# Patient Record
Sex: FEMALE | Race: WHITE | NOT HISPANIC OR LATINO | Employment: FULL TIME | URBAN - METROPOLITAN AREA
[De-identification: names, ages, dates, MRNs, and addresses within clinical notes are randomized per-mention and may not be internally consistent; named-entity substitution may affect disease eponyms.]

---

## 2023-03-26 ENCOUNTER — TELEMEDICINE (OUTPATIENT)
Dept: PRIMARY CARE | Facility: CLINIC | Age: 58
End: 2023-03-26
Payer: COMMERCIAL

## 2023-03-26 DIAGNOSIS — J01.90 ACUTE NON-RECURRENT SINUSITIS, UNSPECIFIED LOCATION: Primary | ICD-10-CM

## 2023-03-26 PROBLEM — I10 PRIMARY HYPERTENSION: Status: ACTIVE | Noted: 2023-03-26

## 2023-03-26 PROCEDURE — 99441 PR PHYS/QHP TELEPHONE EVALUATION 5-10 MIN: CPT | Performed by: STUDENT IN AN ORGANIZED HEALTH CARE EDUCATION/TRAINING PROGRAM

## 2023-03-26 RX ORDER — LISINOPRIL 20 MG/1
20 TABLET ORAL DAILY
COMMUNITY
Start: 2017-05-26 | End: 2023-09-19

## 2023-03-26 RX ORDER — AMOXICILLIN AND CLAVULANATE POTASSIUM 875; 125 MG/1; MG/1
1 TABLET, FILM COATED ORAL 2 TIMES DAILY
Qty: 20 TABLET | Refills: 0 | Status: SHIPPED | OUTPATIENT
Start: 2023-03-26 | End: 2023-04-05

## 2023-03-26 ASSESSMENT — ENCOUNTER SYMPTOMS
SORE THROAT: 0
ACTIVITY CHANGE: 1
FATIGUE: 1
COUGH: 0
SHORTNESS OF BREATH: 0
SINUS PRESSURE: 1
FACIAL SWELLING: 0
FEVER: 0
TROUBLE SWALLOWING: 0

## 2023-03-26 ASSESSMENT — LIFESTYLE VARIABLES: HISTORY_OF_SMOKING: I HAVE NEVER SMOKED

## 2023-03-26 NOTE — PROGRESS NOTES
Patient Name:  Angie Tobin  MRN:  [id number]  :  1965    Subjective   Patient ID: Angie Tobin is a 57 y.o. female who presents for No chief complaint on file..    HPI   56 yo female presents with sinus concerns  Works-pediatrics [employer]  10 days of sinus congestion, some dental discomfort   Mucinex, nasal saline, sudafed  Honey and tea  Symptoms lingering and not improving  No COVID contacts, no fevers noted    Review of Systems   Constitutional:  Positive for activity change and fatigue. Negative for fever.   HENT:  Positive for congestion, postnasal drip, sinus pressure and sneezing. Negative for facial swelling, sore throat and trouble swallowing.    Respiratory:  Negative for cough and shortness of breath.    Cardiovascular:  Negative for chest pain.   Allergic/Immunologic: Positive for immunocompromised state.     Objective   There were no vitals taken for this visit.    Physical Exam  Constitutional:       Appearance: Normal appearance.   HENT:      Head:      Comments: Sinus pain and pressure to self palpation     Nose: Congestion present.   Pulmonary:      Effort: No respiratory distress.   Neurological:      General: No focal deficit present.      Mental Status: She is alert and oriented to person, place, and time.   Psychiatric:         Mood and Affect: Mood normal.         Behavior: Behavior normal.       Assessment/Plan   1. Acute non-recurrent sinusitis, unspecified location  - amoxicillin-pot clavulanate (Augmentin) 875-125 mg tablet; Take 1 tablet (875 mg) by mouth in the morning and 1 tablet (875 mg) before bedtime. Do all this for 10 days.  Dispense: 20 tablet; Refill: 0    Patient has exhibited symptoms for more than 7 days with worsened features and symptoms including one of the clinical practice guidelines to indicate bacterial sinusitis (purulent nasal drainage, unilateral nasal obstruction, facial pain/pressure, and/or anosmia).In patients with rhinosinusitis, antibiotic therapy is  recommended if the above criteria are met.   Patient is to be treated with: Augmentin  Patient is advised on recommended duration of treatment, allergies are checked with the patient to assure no hx of reaction, patient advised on possible side effects of this medication.   Patient may also use symptomatic relief for this condition including nasal saline irrigation, intranasal corticosteroids.    If symptoms fail to improve or worsen, discussed importance of in person assessment for physical exam and vitals, patient agrees

## 2023-03-28 DIAGNOSIS — B37.9 YEAST INFECTION: Primary | ICD-10-CM

## 2023-03-28 RX ORDER — FLUCONAZOLE 150 MG/1
150 TABLET ORAL ONCE
Qty: 2 TABLET | Refills: 0 | Status: SHIPPED | OUTPATIENT
Start: 2023-03-28 | End: 2023-03-28

## 2023-04-19 ENCOUNTER — TELEMEDICINE (OUTPATIENT)
Dept: PRIMARY CARE | Facility: CLINIC | Age: 58
End: 2023-04-19
Payer: COMMERCIAL

## 2023-04-19 DIAGNOSIS — B37.0 ORAL THRUSH: Primary | ICD-10-CM

## 2023-04-19 DIAGNOSIS — C49.9 LEIOMYOSARCOMA (MULTI): ICD-10-CM

## 2023-04-19 DIAGNOSIS — C78.00 MALIGNANT NEOPLASM METASTATIC TO LUNG, UNSPECIFIED LATERALITY (MULTI): ICD-10-CM

## 2023-04-19 PROBLEM — E21.0 PRIMARY HYPERPARATHYROIDISM (MULTI): Status: ACTIVE | Noted: 2023-04-19

## 2023-04-19 PROBLEM — M25.552 HIP PAIN, BILATERAL: Status: RESOLVED | Noted: 2023-04-19 | Resolved: 2023-04-19

## 2023-04-19 PROBLEM — R60.0 LOWER EXTREMITY EDEMA: Status: RESOLVED | Noted: 2023-04-19 | Resolved: 2023-04-19

## 2023-04-19 PROBLEM — K42.9 UMBILICAL HERNIA: Status: ACTIVE | Noted: 2023-04-19

## 2023-04-19 PROBLEM — M54.9 ACUTE BACK PAIN: Status: RESOLVED | Noted: 2023-04-19 | Resolved: 2023-04-19

## 2023-04-19 PROBLEM — M25.551 HIP PAIN, BILATERAL: Status: RESOLVED | Noted: 2023-04-19 | Resolved: 2023-04-19

## 2023-04-19 PROBLEM — I10 BENIGN ESSENTIAL HTN: Status: RESOLVED | Noted: 2023-04-19 | Resolved: 2023-04-19

## 2023-04-19 PROBLEM — E78.00 PURE HYPERCHOLESTEROLEMIA: Status: ACTIVE | Noted: 2023-04-19

## 2023-04-19 PROBLEM — E04.1 LEFT THYROID NODULE: Status: ACTIVE | Noted: 2023-04-19

## 2023-04-19 PROBLEM — F41.9 ANXIETY DISORDER: Status: ACTIVE | Noted: 2023-04-19

## 2023-04-19 PROBLEM — N95.1 MENOPAUSAL SYMPTOMS: Status: RESOLVED | Noted: 2023-04-19 | Resolved: 2023-04-19

## 2023-04-19 PROBLEM — B35.1 TOENAIL FUNGUS: Status: RESOLVED | Noted: 2023-04-19 | Resolved: 2023-04-19

## 2023-04-19 PROBLEM — R73.09 ELEVATED GLUCOSE LEVEL: Status: RESOLVED | Noted: 2023-04-19 | Resolved: 2023-04-19

## 2023-04-19 PROBLEM — E83.52 HYPERCALCEMIA: Status: ACTIVE | Noted: 2023-04-19

## 2023-04-19 PROBLEM — B00.9 HERPES SIMPLEX: Status: ACTIVE | Noted: 2023-04-19

## 2023-04-19 PROBLEM — K21.9 ACID REFLUX DISEASE: Status: ACTIVE | Noted: 2023-04-19

## 2023-04-19 PROBLEM — E55.9 HYPOVITAMINOSIS D: Status: ACTIVE | Noted: 2023-04-19

## 2023-04-19 PROBLEM — I47.11 INAPPROPRIATE SINUS TACHYCARDIA (CMS-HCC): Status: ACTIVE | Noted: 2023-04-19

## 2023-04-19 PROBLEM — M54.16 ACUTE LEFT LUMBAR RADICULOPATHY: Status: RESOLVED | Noted: 2023-04-19 | Resolved: 2023-04-19

## 2023-04-19 PROBLEM — E66.9 OBESITY: Status: RESOLVED | Noted: 2023-04-19 | Resolved: 2023-04-19

## 2023-04-19 PROBLEM — E04.2 MULTINODULAR GOITER: Status: ACTIVE | Noted: 2023-04-19

## 2023-04-19 PROCEDURE — 99212 OFFICE O/P EST SF 10 MIN: CPT | Performed by: NURSE PRACTITIONER

## 2023-04-19 RX ORDER — FLUCONAZOLE 100 MG/1
100 TABLET ORAL DAILY
Qty: 7 TABLET | Refills: 1 | Status: SHIPPED | OUTPATIENT
Start: 2023-04-19 | End: 2023-04-26

## 2023-04-19 RX ORDER — METFORMIN HYDROCHLORIDE 500 MG/1
1 TABLET ORAL 2 TIMES DAILY
COMMUNITY
Start: 2021-12-17 | End: 2023-06-19

## 2023-04-19 RX ORDER — ZINC SULFATE 50(220)MG
CAPSULE ORAL
COMMUNITY
End: 2023-10-02 | Stop reason: ALTCHOICE

## 2023-04-19 RX ORDER — VALACYCLOVIR HYDROCHLORIDE 1 G/1
1 TABLET, FILM COATED ORAL 2 TIMES DAILY PRN
COMMUNITY
Start: 2016-01-15 | End: 2023-10-31 | Stop reason: SDUPTHER

## 2023-04-19 NOTE — PROGRESS NOTES
Subjective   Patient ID: Angie Tobin is a 57 y.o. female who presents for No chief complaint on file..    This visit was completed via [employer] due to the restrictions of the COVID-19 pandemic. All issues as below were discussed and addressed but no physical exam was performed. If it was felt that the patient should be evaluated in clinic than they were directed there. The patient verbally consented to the visit.    Had amoxicillin for sinus infection 2 weeks ago.  Started with sores and white coating on tongue x4 days. Painful when eating and drinking          Review of Systems   HENT:  Positive for mouth sores.    All other systems reviewed and are negative.      Objective   There were no vitals taken for this visit.    Physical Exam    Assessment/Plan   Problem List Items Addressed This Visit          Respiratory    Metastatic cancer to lung (CMS/HCC)     Follows oncology             Infectious/Inflammatory    Oral thrush - Primary    Relevant Medications    fluconazole (Diflucan) 100 mg tablet       Other    Leiomyosarcoma (CMS/HCC)     Follows oncology

## 2023-04-25 DIAGNOSIS — B37.0 ORAL THRUSH: Primary | ICD-10-CM

## 2023-04-25 RX ORDER — NYSTATIN 100000 [USP'U]/ML
500000 SUSPENSION ORAL 4 TIMES DAILY
Qty: 280 ML | Refills: 0 | Status: SHIPPED | OUTPATIENT
Start: 2023-04-25 | End: 2023-06-24

## 2023-05-11 LAB
CALCIUM (MG/DL) IN URINE: 13.2 MG/DL
CALCIUM (MG/L) IN 24 HOUR URINE: 333 MG/24H (ref 100–300)
COLLECTION DURATION OF URINE: 24 HR
CREATININE (MG/24HR) IN 24 HOUR URINE: 1.61 G/24H (ref 0.67–1.59)
CREATININE (MG/DL) IN URINE: 63.7 MG/DL (ref 20–320)
PHOSPHATE (MG/DL) IN URINE: 57 MG/DL
PHOSPHORUS 24 HOUR URINE: 1439 MG/24H (ref 400–1300)
SODIUM (MMOL/24 HR) IN 24 HOUR URINE: 225 MMOL/24H (ref 80–220)
SODIUM (MMOL/L ) IN URINE: 89 MMOL/L
VOLUME OF URINE: 2525 ML

## 2023-05-15 LAB
ALBUMIN (G/DL) IN SER/PLAS: 4.5 G/DL (ref 3.4–5)
ANION GAP IN SER/PLAS: 16 MMOL/L (ref 10–20)
CALCIUM (MG/DL) IN SER/PLAS: 11.7 MG/DL (ref 8.6–10.6)
CARBON DIOXIDE, TOTAL (MMOL/L) IN SER/PLAS: 24 MMOL/L (ref 21–32)
CHLORIDE (MMOL/L) IN SER/PLAS: 105 MMOL/L (ref 98–107)
CREATININE (MG/DL) IN SER/PLAS: 0.64 MG/DL (ref 0.5–1.05)
GFR FEMALE: >90 ML/MIN/1.73M2
GLUCOSE (MG/DL) IN SER/PLAS: 105 MG/DL (ref 74–99)
PARATHYRIN INTACT (PG/ML) IN SER/PLAS: 161.2 PG/ML (ref 18.5–88)
PHOSPHATE (MG/DL) IN SER/PLAS: 2.8 MG/DL (ref 2.5–4.9)
POTASSIUM (MMOL/L) IN SER/PLAS: 5 MMOL/L (ref 3.5–5.3)
SODIUM (MMOL/L) IN SER/PLAS: 140 MMOL/L (ref 136–145)
UREA NITROGEN (MG/DL) IN SER/PLAS: 14 MG/DL (ref 6–23)

## 2023-06-01 LAB
ALANINE AMINOTRANSFERASE (SGPT) (U/L) IN SER/PLAS: 28 U/L (ref 7–45)
ALBUMIN (G/DL) IN SER/PLAS: 4.8 G/DL (ref 3.4–5)
ALKALINE PHOSPHATASE (U/L) IN SER/PLAS: 138 U/L (ref 33–110)
ANION GAP IN SER/PLAS: 17 MMOL/L (ref 10–20)
ASPARTATE AMINOTRANSFERASE (SGOT) (U/L) IN SER/PLAS: 20 U/L (ref 9–39)
BILIRUBIN TOTAL (MG/DL) IN SER/PLAS: 0.7 MG/DL (ref 0–1.2)
CALCIUM (MG/DL) IN SER/PLAS: 12.1 MG/DL (ref 8.6–10.6)
CARBON DIOXIDE, TOTAL (MMOL/L) IN SER/PLAS: 25 MMOL/L (ref 21–32)
CHLORIDE (MMOL/L) IN SER/PLAS: 104 MMOL/L (ref 98–107)
CREATININE (MG/DL) IN SER/PLAS: 0.66 MG/DL (ref 0.5–1.05)
GFR FEMALE: >90 ML/MIN/1.73M2
GLUCOSE (MG/DL) IN SER/PLAS: 109 MG/DL (ref 74–99)
LACTATE DEHYDROGENASE (U/L) IN SER/PLAS BY LAC->PYR RXN: 164 U/L (ref 84–246)
POTASSIUM (MMOL/L) IN SER/PLAS: 5.5 MMOL/L (ref 3.5–5.3)
PROTEIN TOTAL: 7.5 G/DL (ref 6.4–8.2)
SODIUM (MMOL/L) IN SER/PLAS: 140 MMOL/L (ref 136–145)
UREA NITROGEN (MG/DL) IN SER/PLAS: 17 MG/DL (ref 6–23)

## 2023-06-02 LAB
BASOPHILS (10*3/UL) IN BLOOD BY MANUAL COUNT - WAM: 0.17 X10E9/L (ref 0–0.1)
BASOPHILS/100 LEUKOCYTES IN BLOOD BY MANUAL COUNT - WAM: 0.8 % (ref 0–2)
CBC DIFFERENTIAL PATH REVIEW: NORMAL
EOSINOPHILS (10*3/UL) IN BLOOD BY MANUAL COUNT - WAM: 0.9 X10E9/L (ref 0–0.7)
EOSINOPHILS/100 LEUKOCYTES IN BLOOD BY MANUAL COUNT - WAM: 4.2 % (ref 0–6)
ERYTHROCYTE DISTRIBUTION WIDTH (RATIO) BY AUTOMATED COUNT: 14.9 % (ref 11.5–14.5)
ERYTHROCYTE MEAN CORPUSCULAR HEMOGLOBIN CONCENTRATION (G/DL) BY AUTOMATED: 30.1 G/DL (ref 32–36)
ERYTHROCYTE MEAN CORPUSCULAR VOLUME (FL) BY AUTOMATED COUNT: 84 FL (ref 80–100)
ERYTHROCYTES (10*6/UL) IN BLOOD BY AUTOMATED COUNT: 5.45 X10E12/L (ref 4–5.2)
HEMATOCRIT (%) IN BLOOD BY AUTOMATED COUNT: 45.8 % (ref 36–46)
HEMOGLOBIN (G/DL) IN BLOOD: 13.8 G/DL (ref 12–16)
IMMATURE GRANULOCYTES/100 LEUKOCYTES IN BLOOD BY AUTOMATED COUNT: 0.5 % (ref 0–0.9)
LEUKOCYTES (10*3/UL) IN BLOOD BY AUTOMATED COUNT: 21.5 X10E9/L (ref 4.4–11.3)
LYMPHOCYTES (10*3/UL) IN BLOOD BY MANUAL COUNT - WAM: 11.37 X10E9/L (ref 1.2–4.8)
LYMPHOCYTES VARIANT/100 LEUKOCYTES IN BLOOD - WAM: 3.4 % (ref 0–2)
LYMPHOCYTES/100 LEUKOCYTES IN BLOOD BY MANUAL COUNT - WAM: 52.9 % (ref 13–44)
MANUAL DIFFERENTIAL Y/N: ABNORMAL
MONOCYTES (10*3/UL) IN BLOOD BY MANUAL COUNT - WAM: 0.37 X10E9/L (ref 0.1–1)
MONOCYTES/100 LEUKOCYTES IN BLOOD BY MANUAL COUNT - WAM: 1.7 % (ref 2–10)
NEUTROPHILS (SEGS+BANDS) (10*3/UL) MANUAL COUNT - WAM: 7.96 X10E9/L (ref 1.2–7.7)
NRBC (PER 100 WBCS) BY AUTOMATED COUNT: 0 /100 WBC (ref 0–0)
PLATELETS (10*3/UL) IN BLOOD AUTOMATED COUNT: 377 X10E9/L (ref 150–450)
RBC MORPHOLOGY IN BLOOD: NORMAL
SEGMENTED NEUTROPHILS (10*3/UL) BLOOD MANUAL - WAM: 7.96 X10E9/L (ref 1.2–7)
SEGMENTED NEUTROPHILS/100 LEUKOCYTES BY MANUAL COUNT -: 37 % (ref 40–80)
VARIANT LYMPHOCYTES (10*3/UL) BLOOD MANUAL COUNT - WAM: 0.73 X10E9/L (ref 0–0.5)

## 2023-06-19 DIAGNOSIS — E11.9 TYPE 2 DIABETES MELLITUS WITHOUT COMPLICATION, WITHOUT LONG-TERM CURRENT USE OF INSULIN (MULTI): Primary | ICD-10-CM

## 2023-06-19 RX ORDER — METFORMIN HYDROCHLORIDE 500 MG/1
TABLET ORAL
Qty: 180 TABLET | Refills: 1 | Status: SHIPPED | OUTPATIENT
Start: 2023-06-19 | End: 2023-06-19

## 2023-07-08 LAB
ALANINE AMINOTRANSFERASE (SGPT) (U/L) IN SER/PLAS: 26 U/L (ref 7–45)
ALBUMIN (G/DL) IN SER/PLAS: 4.5 G/DL (ref 3.4–5)
ALKALINE PHOSPHATASE (U/L) IN SER/PLAS: 111 U/L (ref 33–110)
ANION GAP IN SER/PLAS: 15 MMOL/L (ref 10–20)
ASPARTATE AMINOTRANSFERASE (SGOT) (U/L) IN SER/PLAS: 18 U/L (ref 9–39)
BASOPHILS (10*3/UL) IN BLOOD BY AUTOMATED COUNT: 0.12 X10E9/L (ref 0–0.1)
BASOPHILS/100 LEUKOCYTES IN BLOOD BY AUTOMATED COUNT: 0.7 % (ref 0–2)
BILIRUBIN TOTAL (MG/DL) IN SER/PLAS: 0.6 MG/DL (ref 0–1.2)
CALCIUM (MG/DL) IN SER/PLAS: 11.7 MG/DL (ref 8.6–10.6)
CARBON DIOXIDE, TOTAL (MMOL/L) IN SER/PLAS: 22 MMOL/L (ref 21–32)
CHLORIDE (MMOL/L) IN SER/PLAS: 107 MMOL/L (ref 98–107)
CREATININE (MG/DL) IN SER/PLAS: 0.56 MG/DL (ref 0.5–1.05)
EOSINOPHILS (10*3/UL) IN BLOOD BY AUTOMATED COUNT: 0.43 X10E9/L (ref 0–0.7)
EOSINOPHILS/100 LEUKOCYTES IN BLOOD BY AUTOMATED COUNT: 2.6 % (ref 0–6)
ERYTHROCYTE DISTRIBUTION WIDTH (RATIO) BY AUTOMATED COUNT: 14.6 % (ref 11.5–14.5)
ERYTHROCYTE MEAN CORPUSCULAR HEMOGLOBIN CONCENTRATION (G/DL) BY AUTOMATED: 30.5 G/DL (ref 32–36)
ERYTHROCYTE MEAN CORPUSCULAR VOLUME (FL) BY AUTOMATED COUNT: 85 FL (ref 80–100)
ERYTHROCYTES (10*6/UL) IN BLOOD BY AUTOMATED COUNT: 5.07 X10E12/L (ref 4–5.2)
GFR FEMALE: >90 ML/MIN/1.73M2
GLUCOSE (MG/DL) IN SER/PLAS: 139 MG/DL (ref 74–99)
HEMATOCRIT (%) IN BLOOD BY AUTOMATED COUNT: 42.9 % (ref 36–46)
HEMOGLOBIN (G/DL) IN BLOOD: 13.1 G/DL (ref 12–16)
IMMATURE GRANULOCYTES/100 LEUKOCYTES IN BLOOD BY AUTOMATED COUNT: 0.5 % (ref 0–0.9)
INR IN PPP BY COAGULATION ASSAY: 0.9 (ref 0.9–1.1)
LACTATE DEHYDROGENASE (U/L) IN SER/PLAS BY LAC->PYR RXN: 127 U/L (ref 84–246)
LEUKOCYTES (10*3/UL) IN BLOOD BY AUTOMATED COUNT: 16.9 X10E9/L (ref 4.4–11.3)
LYMPHOCYTES (10*3/UL) IN BLOOD BY AUTOMATED COUNT: 8.05 X10E9/L (ref 1.2–4.8)
LYMPHOCYTES/100 LEUKOCYTES IN BLOOD BY AUTOMATED COUNT: 47.8 % (ref 13–44)
MONOCYTES (10*3/UL) IN BLOOD BY AUTOMATED COUNT: 0.8 X10E9/L (ref 0.1–1)
MONOCYTES/100 LEUKOCYTES IN BLOOD BY AUTOMATED COUNT: 4.7 % (ref 2–10)
NEUTROPHILS (10*3/UL) IN BLOOD BY AUTOMATED COUNT: 7.37 X10E9/L (ref 1.2–7.7)
NEUTROPHILS/100 LEUKOCYTES IN BLOOD BY AUTOMATED COUNT: 43.7 % (ref 40–80)
NRBC (PER 100 WBCS) BY AUTOMATED COUNT: 0 /100 WBC (ref 0–0)
PLATELETS (10*3/UL) IN BLOOD AUTOMATED COUNT: 281 X10E9/L (ref 150–450)
POTASSIUM (MMOL/L) IN SER/PLAS: 4.6 MMOL/L (ref 3.5–5.3)
PROTEIN TOTAL: 7 G/DL (ref 6.4–8.2)
PROTHROMBIN TIME (PT) IN PPP BY COAGULATION ASSAY: 10.4 SEC (ref 9.8–12.8)
RBC MORPHOLOGY IN BLOOD: NORMAL
SODIUM (MMOL/L) IN SER/PLAS: 139 MMOL/L (ref 136–145)
UREA NITROGEN (MG/DL) IN SER/PLAS: 17 MG/DL (ref 6–23)

## 2023-07-11 ENCOUNTER — HOSPITAL ENCOUNTER (OUTPATIENT)
Dept: DATA CONVERSION | Facility: HOSPITAL | Age: 58
End: 2023-07-11
Attending: INTERNAL MEDICINE | Admitting: INTERNAL MEDICINE
Payer: COMMERCIAL

## 2023-07-11 DIAGNOSIS — I10 ESSENTIAL (PRIMARY) HYPERTENSION: ICD-10-CM

## 2023-07-11 DIAGNOSIS — C49.9 MALIGNANT NEOPLASM OF CONNECTIVE AND SOFT TISSUE, UNSPECIFIED (MULTI): ICD-10-CM

## 2023-07-11 DIAGNOSIS — K76.89 OTHER SPECIFIED DISEASES OF LIVER: ICD-10-CM

## 2023-07-11 DIAGNOSIS — F41.9 ANXIETY DISORDER, UNSPECIFIED: ICD-10-CM

## 2023-07-11 DIAGNOSIS — Z00.6 ENCOUNTER FOR EXAMINATION FOR NORMAL COMPARISON AND CONTROL IN CLINICAL RESEARCH PROGRAM: ICD-10-CM

## 2023-08-04 LAB
ALANINE AMINOTRANSFERASE (SGPT) (U/L) IN SER/PLAS: 33 U/L (ref 7–45)
ALBUMIN (G/DL) IN SER/PLAS: 4 G/DL (ref 3.4–5)
ALKALINE PHOSPHATASE (U/L) IN SER/PLAS: 140 U/L (ref 33–110)
ANION GAP IN SER/PLAS: 14 MMOL/L (ref 10–20)
ASPARTATE AMINOTRANSFERASE (SGOT) (U/L) IN SER/PLAS: 23 U/L (ref 9–39)
BAND NEUTROPHILS (10*3/UL) BLOOD MANUAL COUNT - WAM: 2.31 X10E9/L (ref 0–0.7)
BASOPHILS (10*3/UL) IN BLOOD BY MANUAL COUNT - WAM: 0.24 X10E9/L (ref 0–0.1)
BASOPHILS/100 LEUKOCYTES IN BLOOD BY MANUAL COUNT - WAM: 0.9 % (ref 0–2)
BILIRUBIN TOTAL (MG/DL) IN SER/PLAS: 0.5 MG/DL (ref 0–1.2)
BURR CELLS PRESENCE IN BLOOD BY LIGHT MICROSCOPY: NORMAL
CALCIUM (MG/DL) IN SER/PLAS: 10.8 MG/DL (ref 8.6–10.6)
CARBON DIOXIDE, TOTAL (MMOL/L) IN SER/PLAS: 25 MMOL/L (ref 21–32)
CHLORIDE (MMOL/L) IN SER/PLAS: 105 MMOL/L (ref 98–107)
CREATININE (MG/DL) IN SER/PLAS: 0.64 MG/DL (ref 0.5–1.05)
DACROCYTES PRESENCE IN BLOOD BY LIGHT MICROSCOPY: NORMAL
EOSINOPHILS (10*3/UL) IN BLOOD BY MANUAL COUNT - WAM: 0 X10E9/L (ref 0–0.7)
EOSINOPHILS/100 LEUKOCYTES IN BLOOD BY MANUAL COUNT - WAM: 0 % (ref 0–6)
ERYTHROCYTE DISTRIBUTION WIDTH (RATIO) BY AUTOMATED COUNT: 15.9 % (ref 11.5–14.5)
ERYTHROCYTE MEAN CORPUSCULAR HEMOGLOBIN CONCENTRATION (G/DL) BY AUTOMATED: 29.9 G/DL (ref 32–36)
ERYTHROCYTE MEAN CORPUSCULAR VOLUME (FL) BY AUTOMATED COUNT: 86 FL (ref 80–100)
ERYTHROCYTES (10*6/UL) IN BLOOD BY AUTOMATED COUNT: 4.38 X10E12/L (ref 4–5.2)
GFR FEMALE: >90 ML/MIN/1.73M2
GLUCOSE (MG/DL) IN SER/PLAS: 119 MG/DL (ref 74–99)
HEMATOCRIT (%) IN BLOOD BY AUTOMATED COUNT: 37.8 % (ref 36–46)
HEMOGLOBIN (G/DL) IN BLOOD: 11.3 G/DL (ref 12–16)
IMMATURE GRANULOCYTES/100 LEUKOCYTES IN BLOOD BY AUTOMATED COUNT: 6.6 % (ref 0–0.9)
LACTATE DEHYDROGENASE (U/L) IN SER/PLAS BY LAC->PYR RXN: 250 U/L (ref 84–246)
LEUKOCYTES (10*3/UL) IN BLOOD BY AUTOMATED COUNT: 26.9 X10E9/L (ref 4.4–11.3)
LYMPHOCYTES (10*3/UL) IN BLOOD BY MANUAL COUNT - WAM: 10.2 X10E9/L (ref 1.2–4.8)
LYMPHOCYTES VARIANT/100 LEUKOCYTES IN BLOOD - WAM: 0.9 % (ref 0–2)
LYMPHOCYTES/100 LEUKOCYTES IN BLOOD BY MANUAL COUNT - WAM: 37.9 % (ref 13–44)
MANUAL DIFFERENTIAL Y/N: ABNORMAL
METAMYELOCYTES (10*3/UL) IN BLOOD BY MANUAL COUNT - WAM: 0.46 X10E9/L (ref 0–0)
METAMYELOCYTES/100 LEUKOCYTES IN BLOOD BY MANUAL COUNT - WAM: 1.7 % (ref 0–0)
MONOCYTES (10*3/UL) IN BLOOD BY MANUAL COUNT - WAM: 0.91 X10E9/L (ref 0.1–1)
MONOCYTES/100 LEUKOCYTES IN BLOOD BY MANUAL COUNT - WAM: 3.4 % (ref 2–10)
NEUTROPHILS (SEGS+BANDS) (10*3/UL) MANUAL COUNT - WAM: 14.85 X10E9/L (ref 1.2–7.7)
NEUTROPHILS BAND FORM/100 LEUKOCYTES IN BLOOD BY MANUAL COUNT - WAM: 8.6 % (ref 0–5)
NRBC (PER 100 WBCS) BY AUTOMATED COUNT: 0.2 /100 WBC (ref 0–0)
PLATELETS (10*3/UL) IN BLOOD AUTOMATED COUNT: 198 X10E9/L (ref 150–450)
POLYCHROMASIA IN BLOOD BY LIGHT MICROSCOPY: NORMAL
POTASSIUM (MMOL/L) IN SER/PLAS: 4.4 MMOL/L (ref 3.5–5.3)
PROTEIN TOTAL: 6.5 G/DL (ref 6.4–8.2)
RBC MORPHOLOGY IN BLOOD: NORMAL
SEGMENTED NEUTROPHILS (10*3/UL) BLOOD MANUAL - WAM: 12.54 X10E9/L (ref 1.2–7)
SEGMENTED NEUTROPHILS/100 LEUKOCYTES BY MANUAL COUNT -: 46.6 % (ref 40–80)
SODIUM (MMOL/L) IN SER/PLAS: 140 MMOL/L (ref 136–145)
UREA NITROGEN (MG/DL) IN SER/PLAS: 12 MG/DL (ref 6–23)
VARIANT LYMPHOCYTES (10*3/UL) BLOOD MANUAL COUNT - WAM: 0.24 X10E9/L (ref 0–0.5)

## 2023-08-12 LAB
ALANINE AMINOTRANSFERASE (SGPT) (U/L) IN SER/PLAS: 62 U/L (ref 7–45)
ALBUMIN (G/DL) IN SER/PLAS: 4.3 G/DL (ref 3.4–5)
ALKALINE PHOSPHATASE (U/L) IN SER/PLAS: 122 U/L (ref 33–110)
ANION GAP IN SER/PLAS: 14 MMOL/L (ref 10–20)
ASPARTATE AMINOTRANSFERASE (SGOT) (U/L) IN SER/PLAS: 37 U/L (ref 9–39)
BASOPHILS (10*3/UL) IN BLOOD BY AUTOMATED COUNT: 0.16 X10E9/L (ref 0–0.1)
BASOPHILS/100 LEUKOCYTES IN BLOOD BY AUTOMATED COUNT: 1 % (ref 0–2)
BILIRUBIN TOTAL (MG/DL) IN SER/PLAS: 0.7 MG/DL (ref 0–1.2)
CALCIUM (MG/DL) IN SER/PLAS: 11.7 MG/DL (ref 8.6–10.6)
CARBON DIOXIDE, TOTAL (MMOL/L) IN SER/PLAS: 24 MMOL/L (ref 21–32)
CHLORIDE (MMOL/L) IN SER/PLAS: 104 MMOL/L (ref 98–107)
CREATININE (MG/DL) IN SER/PLAS: 0.6 MG/DL (ref 0.5–1.05)
DACROCYTES PRESENCE IN BLOOD BY LIGHT MICROSCOPY: NORMAL
EOSINOPHILS (10*3/UL) IN BLOOD BY AUTOMATED COUNT: 0.12 X10E9/L (ref 0–0.7)
EOSINOPHILS/100 LEUKOCYTES IN BLOOD BY AUTOMATED COUNT: 0.8 % (ref 0–6)
ERYTHROCYTE DISTRIBUTION WIDTH (RATIO) BY AUTOMATED COUNT: 16.1 % (ref 11.5–14.5)
ERYTHROCYTE MEAN CORPUSCULAR HEMOGLOBIN CONCENTRATION (G/DL) BY AUTOMATED: 30.8 G/DL (ref 32–36)
ERYTHROCYTE MEAN CORPUSCULAR VOLUME (FL) BY AUTOMATED COUNT: 85 FL (ref 80–100)
ERYTHROCYTES (10*6/UL) IN BLOOD BY AUTOMATED COUNT: 4.26 X10E12/L (ref 4–5.2)
GFR FEMALE: >90 ML/MIN/1.73M2
GLUCOSE (MG/DL) IN SER/PLAS: 198 MG/DL (ref 74–99)
HEMATOCRIT (%) IN BLOOD BY AUTOMATED COUNT: 36.4 % (ref 36–46)
HEMOGLOBIN (G/DL) IN BLOOD: 11.2 G/DL (ref 12–16)
IMMATURE GRANULOCYTES/100 LEUKOCYTES IN BLOOD BY AUTOMATED COUNT: 0.4 % (ref 0–0.9)
LEUKOCYTES (10*3/UL) IN BLOOD BY AUTOMATED COUNT: 16 X10E9/L (ref 4.4–11.3)
LYMPHOCYTES (10*3/UL) IN BLOOD BY AUTOMATED COUNT: 6.95 X10E9/L (ref 1.2–4.8)
LYMPHOCYTES/100 LEUKOCYTES IN BLOOD BY AUTOMATED COUNT: 43.5 % (ref 13–44)
MONOCYTES (10*3/UL) IN BLOOD BY AUTOMATED COUNT: 0.25 X10E9/L (ref 0.1–1)
MONOCYTES/100 LEUKOCYTES IN BLOOD BY AUTOMATED COUNT: 1.6 % (ref 2–10)
NEUTROPHILS (10*3/UL) IN BLOOD BY AUTOMATED COUNT: 8.41 X10E9/L (ref 1.2–7.7)
NEUTROPHILS/100 LEUKOCYTES IN BLOOD BY AUTOMATED COUNT: 52.7 % (ref 40–80)
NRBC (PER 100 WBCS) BY AUTOMATED COUNT: 0 /100 WBC (ref 0–0)
PLATELETS (10*3/UL) IN BLOOD AUTOMATED COUNT: 561 X10E9/L (ref 150–450)
POTASSIUM (MMOL/L) IN SER/PLAS: 5.1 MMOL/L (ref 3.5–5.3)
PROTEIN TOTAL: 6.9 G/DL (ref 6.4–8.2)
RBC MORPHOLOGY IN BLOOD: NORMAL
SODIUM (MMOL/L) IN SER/PLAS: 137 MMOL/L (ref 136–145)
UREA NITROGEN (MG/DL) IN SER/PLAS: 16 MG/DL (ref 6–23)

## 2023-09-19 DIAGNOSIS — I10 PRIMARY HYPERTENSION: Primary | ICD-10-CM

## 2023-09-19 LAB
ALANINE AMINOTRANSFERASE (SGPT) (U/L) IN SER/PLAS: 48 U/L (ref 7–45)
ALBUMIN (G/DL) IN SER/PLAS: 4.6 G/DL (ref 3.4–5)
ALKALINE PHOSPHATASE (U/L) IN SER/PLAS: 139 U/L (ref 33–110)
ANION GAP IN SER/PLAS: 17 MMOL/L (ref 10–20)
APPEARANCE, URINE: ABNORMAL
ASPARTATE AMINOTRANSFERASE (SGOT) (U/L) IN SER/PLAS: 31 U/L (ref 9–39)
BACTERIA, URINE: ABNORMAL /HPF
BASOPHILS (10*3/UL) IN BLOOD BY AUTOMATED COUNT: 0.18 X10E9/L (ref 0–0.1)
BASOPHILS/100 LEUKOCYTES IN BLOOD BY AUTOMATED COUNT: 1.1 % (ref 0–2)
BILIRUBIN TOTAL (MG/DL) IN SER/PLAS: 1.6 MG/DL (ref 0–1.2)
BILIRUBIN, URINE: NEGATIVE
BLOOD, URINE: ABNORMAL
BURR CELLS PRESENCE IN BLOOD BY LIGHT MICROSCOPY: NORMAL
CALCIUM (MG/DL) IN SER/PLAS: 10.6 MG/DL (ref 8.6–10.6)
CARBON DIOXIDE, TOTAL (MMOL/L) IN SER/PLAS: 22 MMOL/L (ref 21–32)
CHLORIDE (MMOL/L) IN SER/PLAS: 106 MMOL/L (ref 98–107)
COLOR, URINE: ABNORMAL
CREATININE (MG/DL) IN SER/PLAS: 0.71 MG/DL (ref 0.5–1.05)
DACROCYTES PRESENCE IN BLOOD BY LIGHT MICROSCOPY: NORMAL
EOSINOPHILS (10*3/UL) IN BLOOD BY AUTOMATED COUNT: 0.88 X10E9/L (ref 0–0.7)
EOSINOPHILS/100 LEUKOCYTES IN BLOOD BY AUTOMATED COUNT: 5.5 % (ref 0–6)
ERYTHROCYTE DISTRIBUTION WIDTH (RATIO) BY AUTOMATED COUNT: 17.8 % (ref 11.5–14.5)
ERYTHROCYTE MEAN CORPUSCULAR HEMOGLOBIN CONCENTRATION (G/DL) BY AUTOMATED: 31.6 G/DL (ref 32–36)
ERYTHROCYTE MEAN CORPUSCULAR VOLUME (FL) BY AUTOMATED COUNT: 84 FL (ref 80–100)
ERYTHROCYTES (10*6/UL) IN BLOOD BY AUTOMATED COUNT: 5.49 X10E12/L (ref 4–5.2)
GAMMA GLUTAMYL TRANSFERASE (U/L) IN SER/PLAS: 61 U/L (ref 5–55)
GFR FEMALE: >90 ML/MIN/1.73M2
GLUCOSE (MG/DL) IN SER/PLAS: 137 MG/DL (ref 74–99)
GLUCOSE, URINE: NEGATIVE MG/DL
HEMATOCRIT (%) IN BLOOD BY AUTOMATED COUNT: 45.9 % (ref 36–46)
HEMOGLOBIN (G/DL) IN BLOOD: 14.5 G/DL (ref 12–16)
HYALINE CASTS, URINE: ABNORMAL /LPF
IGG (MG/DL) IN SER/PLAS: 610 MG/DL (ref 700–1600)
IMMATURE GRANULOCYTES/100 LEUKOCYTES IN BLOOD BY AUTOMATED COUNT: 0.4 % (ref 0–0.9)
KETONES, URINE: NEGATIVE MG/DL
LACTATE DEHYDROGENASE (U/L) IN SER/PLAS BY LAC->PYR RXN: 181 U/L (ref 84–246)
LEUKOCYTE ESTERASE, URINE: ABNORMAL
LEUKOCYTES (10*3/UL) IN BLOOD BY AUTOMATED COUNT: 16.1 X10E9/L (ref 4.4–11.3)
LYMPHOCYTES (10*3/UL) IN BLOOD BY AUTOMATED COUNT: 6.57 X10E9/L (ref 1.2–4.8)
LYMPHOCYTES/100 LEUKOCYTES IN BLOOD BY AUTOMATED COUNT: 40.9 % (ref 13–44)
MONOCYTES (10*3/UL) IN BLOOD BY AUTOMATED COUNT: 0.71 X10E9/L (ref 0.1–1)
MONOCYTES/100 LEUKOCYTES IN BLOOD BY AUTOMATED COUNT: 4.4 % (ref 2–10)
MUCUS, URINE: ABNORMAL /LPF
NEUTROPHILS (10*3/UL) IN BLOOD BY AUTOMATED COUNT: 7.64 X10E9/L (ref 1.2–7.7)
NEUTROPHILS/100 LEUKOCYTES IN BLOOD BY AUTOMATED COUNT: 47.7 % (ref 40–80)
NITRITE, URINE: NEGATIVE
NRBC (PER 100 WBCS) BY AUTOMATED COUNT: 0 /100 WBC (ref 0–0)
PH, URINE: 5 (ref 5–8)
PLATELETS (10*3/UL) IN BLOOD AUTOMATED COUNT: 227 X10E9/L (ref 150–450)
POTASSIUM (MMOL/L) IN SER/PLAS: 4.5 MMOL/L (ref 3.5–5.3)
PROTEIN TOTAL: 7.1 G/DL (ref 6.4–8.2)
PROTEIN, URINE: ABNORMAL MG/DL
RBC MORPHOLOGY IN BLOOD: NORMAL
RBC, URINE: 14 /HPF (ref 0–5)
SODIUM (MMOL/L) IN SER/PLAS: 140 MMOL/L (ref 136–145)
SPECIFIC GRAVITY, URINE: 1.02 (ref 1–1.03)
SQUAMOUS EPITHELIAL CELLS, URINE: 25 /HPF
UREA NITROGEN (MG/DL) IN SER/PLAS: 10 MG/DL (ref 6–23)
UROBILINOGEN, URINE: <2 MG/DL (ref 0–1.9)
WBC, URINE: 57 /HPF (ref 0–5)

## 2023-09-19 RX ORDER — LISINOPRIL 20 MG/1
20 TABLET ORAL DAILY
Qty: 90 TABLET | Refills: 1 | Status: SHIPPED | OUTPATIENT
Start: 2023-09-19 | End: 2023-09-19

## 2023-09-20 ENCOUNTER — TELEPHONE (OUTPATIENT)
Dept: PRIMARY CARE | Facility: CLINIC | Age: 58
End: 2023-09-20
Payer: COMMERCIAL

## 2023-09-20 DIAGNOSIS — N39.0 URINARY TRACT INFECTION WITHOUT HEMATURIA, SITE UNSPECIFIED: Primary | ICD-10-CM

## 2023-09-20 RX ORDER — SULFAMETHOXAZOLE AND TRIMETHOPRIM 800; 160 MG/1; MG/1
1 TABLET ORAL 2 TIMES DAILY
Qty: 10 TABLET | Refills: 0 | Status: SHIPPED | OUTPATIENT
Start: 2023-09-20 | End: 2023-10-02 | Stop reason: ALTCHOICE

## 2023-09-20 RX ORDER — HEPARIN 100 UNIT/ML
500 SYRINGE INTRAVENOUS AS NEEDED
Status: CANCELLED | OUTPATIENT
Start: 2023-10-01

## 2023-09-20 RX ORDER — HEPARIN SODIUM,PORCINE/PF 10 UNIT/ML
50 SYRINGE (ML) INTRAVENOUS AS NEEDED
Status: CANCELLED | OUTPATIENT
Start: 2023-10-01

## 2023-09-21 LAB
ABCP4: NORMAL
CCOLL: NORMAL PER TUBE
CCOUN: 16.1 X10E9/L
FANTI: NORMAL
FCTOR: NORMAL
FCTSO: NORMAL
FSITE: NORMAL
GPERC: 55 %
L10: NORMAL
L11C: NORMAL
L13: NEGATIVE
L14: NEGATIVE
L16: NEGATIVE
L180: NORMAL
L19: NORMAL
L1C: NORMAL
L200: NEGATIVE
L20: NORMAL
L23: NORMAL
L25: NEGATIVE
L2: NEGATIVE
L38: NEGATIVE
L3: NEGATIVE
L43: NEGATIVE
L45: NORMAL
L4: NEGATIVE
L56: NEGATIVE
L5: NEGATIVE
L71: NEGATIVE
L79B: NORMAL
L7: NEGATIVE
L8: NEGATIVE
LCD19: 54 % OF LYMPH
LCD4: 29 % OF LYMPH
LCD8: 7 % OF LYMPH
LDESC: NORMAL
LGPD1: NORMAL
LGPNO: NORMAL
LHLAD: NORMAL
LIGD: NEGATIVE
LKAPP: NORMAL
LLAMB: NEGATIVE
LNK: 10 % OF LYMPH
LPERC: 38 %
MPERC: 4 %
PERC4: 22.7 %
PV194: NORMAL
VIAB: NORMAL

## 2023-09-27 PROBLEM — C54.9: Status: ACTIVE | Noted: 2023-09-27

## 2023-09-27 PROBLEM — H10.9 CONJUNCTIVITIS: Status: ACTIVE | Noted: 2023-09-27

## 2023-09-27 PROBLEM — R19.00 ABDOMINAL MASS: Status: ACTIVE | Noted: 2023-09-27

## 2023-09-27 PROBLEM — D25.9 UTERINE FIBROID: Status: ACTIVE | Noted: 2023-09-27

## 2023-09-27 PROBLEM — R73.09 ELEVATED GLUCOSE LEVEL: Status: ACTIVE | Noted: 2023-09-27

## 2023-09-27 PROBLEM — D47.9 B-CELL LYMPHOPROLIFERATIVE DISORDER (MULTI): Status: ACTIVE | Noted: 2023-09-27

## 2023-09-27 PROBLEM — M51.26 LUMBAR DISC HERNIATION: Status: ACTIVE | Noted: 2023-09-27

## 2023-09-27 PROBLEM — M25.551 HIP PAIN, BILATERAL: Status: ACTIVE | Noted: 2023-09-27

## 2023-09-27 PROBLEM — N95.1 MENOPAUSAL SYMPTOMS: Status: ACTIVE | Noted: 2023-09-27

## 2023-09-27 PROBLEM — D72.828 NEUTROPHILIA: Status: ACTIVE | Noted: 2023-09-27

## 2023-09-27 PROBLEM — D72.9 NEUTROPHILIA: Status: ACTIVE | Noted: 2023-09-27

## 2023-09-27 PROBLEM — M25.552 HIP PAIN, BILATERAL: Status: ACTIVE | Noted: 2023-09-27

## 2023-09-27 PROBLEM — D70.9 NEUTROPENIA (CMS-HCC): Status: ACTIVE | Noted: 2023-09-27

## 2023-09-27 PROBLEM — K43.9 VENTRAL HERNIA WITHOUT OBSTRUCTION OR GANGRENE: Status: ACTIVE | Noted: 2023-09-27

## 2023-09-27 PROBLEM — D72.820 RELATIVE LYMPHOCYTOSIS: Status: ACTIVE | Noted: 2023-09-27

## 2023-09-27 PROBLEM — J06.9 VIRAL URI: Status: ACTIVE | Noted: 2023-09-27

## 2023-09-27 PROBLEM — B35.1 TOENAIL FUNGUS: Status: ACTIVE | Noted: 2023-09-27

## 2023-09-27 PROBLEM — D72.829 LEUKOCYTOSIS: Status: ACTIVE | Noted: 2023-09-27

## 2023-09-27 PROBLEM — K86.89 PANCREATIC MASS (HHS-HCC): Status: ACTIVE | Noted: 2023-09-27

## 2023-09-27 PROBLEM — R60.0 LOWER EXTREMITY EDEMA: Status: ACTIVE | Noted: 2023-09-27

## 2023-09-27 PROBLEM — E66.9 OBESITY: Status: ACTIVE | Noted: 2023-09-27

## 2023-09-27 RX ORDER — MECLIZINE HCL 12.5 MG 12.5 MG/1
12.5-25 TABLET ORAL 3 TIMES DAILY PRN
COMMUNITY

## 2023-09-27 RX ORDER — NIFEDIPINE 30 MG/1
30 TABLET, EXTENDED RELEASE ORAL DAILY
COMMUNITY
Start: 2023-09-12 | End: 2023-10-18 | Stop reason: DRUGHIGH

## 2023-09-27 RX ORDER — OMEPRAZOLE 40 MG/1
40 CAPSULE, DELAYED RELEASE ORAL DAILY
COMMUNITY
Start: 2023-07-26 | End: 2023-10-02 | Stop reason: ALTCHOICE

## 2023-09-27 RX ORDER — PAZOPANIB HYDROCHLORIDE 200 MG/1
TABLET, FILM COATED ORAL
COMMUNITY
Start: 2023-09-11 | End: 2023-11-02 | Stop reason: SDUPTHER

## 2023-09-27 RX ORDER — LORAZEPAM 0.5 MG/1
0.5 TABLET ORAL EVERY 8 HOURS PRN
COMMUNITY
End: 2024-03-11 | Stop reason: SDUPTHER

## 2023-09-27 RX ORDER — DEXAMETHASONE 4 MG/1
TABLET ORAL
COMMUNITY
Start: 2023-08-02 | End: 2024-01-07 | Stop reason: WASHOUT

## 2023-09-27 RX ORDER — PROCHLORPERAZINE MALEATE 10 MG
TABLET ORAL
COMMUNITY
Start: 2023-07-17 | End: 2023-10-02 | Stop reason: SDUPTHER

## 2023-09-27 RX ORDER — TRIAMCINOLONE ACETONIDE 1 MG/G
CREAM TOPICAL
COMMUNITY
Start: 2023-08-23 | End: 2023-10-02 | Stop reason: SDUPTHER

## 2023-10-02 ENCOUNTER — SPECIALTY PHARMACY (OUTPATIENT)
Dept: PHARMACY | Facility: CLINIC | Age: 58
End: 2023-10-02

## 2023-10-02 ENCOUNTER — TELEPHONE (OUTPATIENT)
Dept: ADMISSION | Facility: HOSPITAL | Age: 58
End: 2023-10-02

## 2023-10-02 ENCOUNTER — OFFICE VISIT (OUTPATIENT)
Dept: OPHTHALMOLOGY | Facility: CLINIC | Age: 58
End: 2023-10-02
Payer: COMMERCIAL

## 2023-10-02 ENCOUNTER — APPOINTMENT (OUTPATIENT)
Dept: OPHTHALMOLOGY | Facility: CLINIC | Age: 58
End: 2023-10-02
Payer: COMMERCIAL

## 2023-10-02 DIAGNOSIS — H53.9 VISION DISTURBANCE: Primary | ICD-10-CM

## 2023-10-02 DIAGNOSIS — D31.31 CHOROIDAL NEVUS OF RIGHT EYE: ICD-10-CM

## 2023-10-02 DIAGNOSIS — H53.143 PHOTOPHOBIA OF BOTH EYES: ICD-10-CM

## 2023-10-02 PROCEDURE — 99214 OFFICE O/P EST MOD 30 MIN: CPT | Performed by: OPHTHALMOLOGY

## 2023-10-02 PROCEDURE — 99204 OFFICE O/P NEW MOD 45 MIN: CPT | Performed by: OPHTHALMOLOGY

## 2023-10-02 RX ORDER — CYCLOBENZAPRINE HCL 5 MG
TABLET ORAL
COMMUNITY
Start: 2023-09-27 | End: 2024-01-07 | Stop reason: WASHOUT

## 2023-10-02 ASSESSMENT — ENCOUNTER SYMPTOMS
ALLERGIC/IMMUNOLOGIC NEGATIVE: 0
GASTROINTESTINAL NEGATIVE: 0
HEMATOLOGIC/LYMPHATIC NEGATIVE: 0
NEUROLOGICAL NEGATIVE: 0
CARDIOVASCULAR NEGATIVE: 0
MUSCULOSKELETAL NEGATIVE: 0
PSYCHIATRIC NEGATIVE: 0
EYES NEGATIVE: 0
ENDOCRINE NEGATIVE: 0
RESPIRATORY NEGATIVE: 0
CONSTITUTIONAL NEGATIVE: 0

## 2023-10-02 ASSESSMENT — CONF VISUAL FIELD
OS_SUPERIOR_NASAL_RESTRICTION: 0
OD_INFERIOR_TEMPORAL_RESTRICTION: 0
OS_INFERIOR_NASAL_RESTRICTION: 0
OS_SUPERIOR_TEMPORAL_RESTRICTION: 0
OD_SUPERIOR_TEMPORAL_RESTRICTION: 0
OD_NORMAL: 1
OS_NORMAL: 1
OD_INFERIOR_NASAL_RESTRICTION: 0
OS_INFERIOR_TEMPORAL_RESTRICTION: 0
OD_SUPERIOR_NASAL_RESTRICTION: 0

## 2023-10-02 ASSESSMENT — TONOMETRY
OD_IOP_MMHG: 19
IOP_METHOD: GOLDMANN APPLANATION
OS_IOP_MMHG: 19

## 2023-10-02 ASSESSMENT — VISUAL ACUITY
OD_SC: 20/25
OS_PH_SC: 20/25
METHOD: SNELLEN - LINEAR
OS_SC: 20/50

## 2023-10-02 ASSESSMENT — SLIT LAMP EXAM - LIDS
COMMENTS: NORMAL
COMMENTS: NORMAL

## 2023-10-02 ASSESSMENT — EXTERNAL EXAM - RIGHT EYE: OD_EXAM: NORMAL

## 2023-10-02 ASSESSMENT — CUP TO DISC RATIO
OS_RATIO: 0.4
OD_RATIO: 0.4

## 2023-10-02 ASSESSMENT — EXTERNAL EXAM - LEFT EYE: OS_EXAM: NORMAL

## 2023-10-02 NOTE — TELEPHONE ENCOUNTER
Patient states [facility] is not able to deliver her Votrient until Wednesday. She took her last pills today. Questioning if OK to miss a dose tomorrow or if Dr. Walsh wants her to drive to the specialty pharmacy and  the medication. Secure chat sent to team.     1559: I called Angie back and let her know that I am still waiting to hear back from Dr. Walsh, however, if she can obtain her medication tomorrow by driving to [facility] that would be ideal. She is calling them tomorrow at 0830 to see if they can have it ready for her.     1705: Called Angie and let her know that per Dr. Walsh it is OK if she misses a dose tomorrow. She verbalized understanding and has no further questions/concerns.

## 2023-10-02 NOTE — PROGRESS NOTES
Assessment/Plan   Diagnoses and all orders for this visit:  Vision disturbance  Photophobia of both eyes  Choroidal nevus of right eye  Discussed flashes and floaters and risk of retinal detachment (RD) and to call immediately if sxs are worse.    F/U 6-8 weeks iop and dilate (dil).

## 2023-10-03 ENCOUNTER — SPECIALTY PHARMACY (OUTPATIENT)
Dept: PHARMACY | Facility: CLINIC | Age: 58
End: 2023-10-03

## 2023-10-03 ENCOUNTER — PHARMACY VISIT (OUTPATIENT)
Dept: PHARMACY | Facility: CLINIC | Age: 58
End: 2023-10-03
Payer: COMMERCIAL

## 2023-10-03 PROCEDURE — RXMED WILLOW AMBULATORY MEDICATION CHARGE

## 2023-10-04 ENCOUNTER — PHARMACY VISIT (OUTPATIENT)
Dept: PHARMACY | Facility: CLINIC | Age: 58
End: 2023-10-04
Payer: COMMERCIAL

## 2023-10-04 PROCEDURE — RXMED WILLOW AMBULATORY MEDICATION CHARGE

## 2023-10-09 ENCOUNTER — PHARMACY VISIT (OUTPATIENT)
Dept: PHARMACY | Facility: CLINIC | Age: 58
End: 2023-10-09
Payer: COMMERCIAL

## 2023-10-09 PROCEDURE — RXMED WILLOW AMBULATORY MEDICATION CHARGE

## 2023-10-09 RX ORDER — NIFEDIPINE 30 MG/1
30 TABLET, EXTENDED RELEASE ORAL DAILY
Qty: 30 TABLET | Refills: 2 | OUTPATIENT
Start: 2023-09-12 | End: 2023-10-18 | Stop reason: DRUGHIGH

## 2023-10-12 ENCOUNTER — PHARMACY VISIT (OUTPATIENT)
Dept: PHARMACY | Facility: CLINIC | Age: 58
End: 2023-10-12

## 2023-10-12 PROCEDURE — RXOTC WILLOW AMBULATORY OTC CHARGE

## 2023-10-16 ENCOUNTER — PHARMACY VISIT (OUTPATIENT)
Dept: PHARMACY | Facility: CLINIC | Age: 58
End: 2023-10-16
Payer: COMMERCIAL

## 2023-10-16 ENCOUNTER — TELEMEDICINE (OUTPATIENT)
Dept: PRIMARY CARE | Facility: CLINIC | Age: 58
End: 2023-10-16
Payer: COMMERCIAL

## 2023-10-16 ENCOUNTER — SPECIALTY PHARMACY (OUTPATIENT)
Dept: PHARMACY | Facility: CLINIC | Age: 58
End: 2023-10-16

## 2023-10-16 DIAGNOSIS — J06.9 UPPER RESPIRATORY TRACT INFECTION, UNSPECIFIED TYPE: Primary | ICD-10-CM

## 2023-10-16 PROCEDURE — 99213 OFFICE O/P EST LOW 20 MIN: CPT | Performed by: STUDENT IN AN ORGANIZED HEALTH CARE EDUCATION/TRAINING PROGRAM

## 2023-10-16 PROCEDURE — RXMED WILLOW AMBULATORY MEDICATION CHARGE

## 2023-10-16 RX ORDER — AMOXICILLIN AND CLAVULANATE POTASSIUM 875; 125 MG/1; MG/1
875 TABLET, FILM COATED ORAL 2 TIMES DAILY
Qty: 20 TABLET | Refills: 0 | Status: SHIPPED | OUTPATIENT
Start: 2023-10-16 | End: 2023-10-25 | Stop reason: ALTCHOICE

## 2023-10-16 RX ORDER — BENZONATATE 200 MG/1
200 CAPSULE ORAL 3 TIMES DAILY PRN
Qty: 42 CAPSULE | Refills: 0 | Status: SHIPPED | OUTPATIENT
Start: 2023-10-16 | End: 2024-04-24 | Stop reason: SDUPTHER

## 2023-10-16 RX ORDER — FLUCONAZOLE 150 MG/1
150 TABLET ORAL ONCE
Qty: 1 TABLET | Refills: 0 | Status: SHIPPED | OUTPATIENT
Start: 2023-10-16 | End: 2023-10-16

## 2023-10-16 ASSESSMENT — ENCOUNTER SYMPTOMS
SHORTNESS OF BREATH: 0
FEVER: 0
MYALGIAS: 0
HEADACHES: 0
SWEATS: 0
HEARTBURN: 0
WEIGHT LOSS: 0
CHILLS: 0
WHEEZING: 1
COUGH: 1
HEMOPTYSIS: 0
SORE THROAT: 0
RHINORRHEA: 1

## 2023-10-16 NOTE — PROGRESS NOTES
Subjective   Patient ID: Angie Tobin is a 58 y.o. female who presents for No chief complaint on file..  Cough  This is a new problem. The current episode started in the past 7 days. The problem has been unchanged. The problem occurs every few hours. The cough is Productive of sputum. Associated symptoms include nasal congestion, postnasal drip, rhinorrhea and wheezing. Pertinent negatives include no chest pain, chills, ear congestion, ear pain, fever, headaches, heartburn, hemoptysis, myalgias, rash, sore throat, shortness of breath, sweats or weight loss. The symptoms are aggravated by lying down.       Review of Systems   Constitutional:  Negative for chills, fever and weight loss.   HENT:  Positive for postnasal drip and rhinorrhea. Negative for ear pain and sore throat.    Respiratory:  Positive for cough and wheezing. Negative for hemoptysis and shortness of breath.    Cardiovascular:  Negative for chest pain.   Gastrointestinal:  Negative for heartburn.   Musculoskeletal:  Negative for myalgias.   Skin:  Negative for rash.   Neurological:  Negative for headaches.           Assessment/Plan   Problem List Items Addressed This Visit    None  Visit Diagnoses         Codes    Upper respiratory tract infection, unspecified type    -  Primary J06.9    Relevant Medications    benzonatate (Tessalon) 200 mg capsule    amoxicillin-pot clavulanate (Augmentin) 875-125 mg tablet    fluconazole (Diflucan) 150 mg tablet

## 2023-10-17 ENCOUNTER — LAB (OUTPATIENT)
Dept: LAB | Facility: LAB | Age: 58
End: 2023-10-17
Payer: COMMERCIAL

## 2023-10-17 DIAGNOSIS — C78.00 SECONDARY MALIGNANT NEOPLASM OF UNSPECIFIED LUNG (MULTI): Primary | ICD-10-CM

## 2023-10-17 DIAGNOSIS — Z00.00 ENCOUNTER FOR GENERAL ADULT MEDICAL EXAMINATION WITHOUT ABNORMAL FINDINGS: ICD-10-CM

## 2023-10-17 DIAGNOSIS — Z79.899 OTHER LONG TERM (CURRENT) DRUG THERAPY: ICD-10-CM

## 2023-10-17 LAB
ALBUMIN SERPL BCP-MCNC: 4.7 G/DL (ref 3.4–5)
ALP SERPL-CCNC: 155 U/L (ref 33–110)
ALT SERPL W P-5'-P-CCNC: 78 U/L (ref 7–45)
ANION GAP SERPL CALC-SCNC: 20 MMOL/L (ref 10–20)
APPEARANCE UR: CLEAR
AST SERPL W P-5'-P-CCNC: 52 U/L (ref 9–39)
BACTERIA #/AREA URNS AUTO: ABNORMAL /HPF
BASOPHILS # BLD MANUAL: 0 X10*3/UL (ref 0–0.1)
BASOPHILS NFR BLD MANUAL: 0 %
BILIRUB SERPL-MCNC: 1.3 MG/DL (ref 0–1.2)
BILIRUB UR STRIP.AUTO-MCNC: NEGATIVE MG/DL
BUN SERPL-MCNC: 12 MG/DL (ref 6–23)
CALCIUM SERPL-MCNC: 10.7 MG/DL (ref 8.6–10.6)
CHLORIDE SERPL-SCNC: 102 MMOL/L (ref 98–107)
CO2 SERPL-SCNC: 21 MMOL/L (ref 21–32)
COLOR UR: ABNORMAL
CREAT SERPL-MCNC: 0.72 MG/DL (ref 0.5–1.05)
EOSINOPHIL # BLD MANUAL: 0.29 X10*3/UL (ref 0–0.7)
EOSINOPHIL NFR BLD MANUAL: 1.7 %
ERYTHROCYTE [DISTWIDTH] IN BLOOD BY AUTOMATED COUNT: 17.6 % (ref 11.5–14.5)
GFR SERPL CREATININE-BSD FRML MDRD: >90 ML/MIN/1.73M*2
GGT SERPL-CCNC: 129 U/L (ref 5–55)
GLUCOSE SERPL-MCNC: 127 MG/DL (ref 74–99)
GLUCOSE UR STRIP.AUTO-MCNC: NEGATIVE MG/DL
HCT VFR BLD AUTO: 49.5 % (ref 36–46)
HGB BLD-MCNC: 15 G/DL (ref 12–16)
IGG SERPL-MCNC: 601 MG/DL (ref 700–1600)
IMM GRANULOCYTES # BLD AUTO: 0.06 X10*3/UL (ref 0–0.7)
IMM GRANULOCYTES NFR BLD AUTO: 0.3 % (ref 0–0.9)
KETONES UR STRIP.AUTO-MCNC: NEGATIVE MG/DL
LDH SERPL L TO P-CCNC: 199 U/L (ref 84–246)
LEUKOCYTE ESTERASE UR QL STRIP.AUTO: NEGATIVE
LYMPHOCYTES # BLD MANUAL: 7.64 X10*3/UL (ref 1.2–4.8)
LYMPHOCYTES NFR BLD MANUAL: 44.4 %
MCH RBC QN AUTO: 27 PG (ref 26–34)
MCHC RBC AUTO-ENTMCNC: 30.3 G/DL (ref 32–36)
MCV RBC AUTO: 89 FL (ref 80–100)
MONOCYTES # BLD MANUAL: 0.45 X10*3/UL (ref 0.1–1)
MONOCYTES NFR BLD MANUAL: 2.6 %
NEUTS SEG # BLD MANUAL: 8.82 X10*3/UL (ref 1.2–7)
NEUTS SEG NFR BLD MANUAL: 51.3 %
NITRITE UR QL STRIP.AUTO: NEGATIVE
NRBC BLD-RTO: 0 /100 WBCS (ref 0–0)
PH UR STRIP.AUTO: 5 [PH]
PLATELET # BLD AUTO: 214 X10*3/UL (ref 150–450)
PMV BLD AUTO: 10 FL (ref 7.5–11.5)
POTASSIUM SERPL-SCNC: 4.8 MMOL/L (ref 3.5–5.3)
PROT SERPL-MCNC: 7.6 G/DL (ref 6.4–8.2)
PROT UR STRIP.AUTO-MCNC: NEGATIVE MG/DL
RBC # BLD AUTO: 5.55 X10*6/UL (ref 4–5.2)
RBC # UR STRIP.AUTO: ABNORMAL /UL
RBC #/AREA URNS AUTO: ABNORMAL /HPF
RBC MORPH BLD: ABNORMAL
SODIUM SERPL-SCNC: 138 MMOL/L (ref 136–145)
SP GR UR STRIP.AUTO: 1
TOTAL CELLS COUNTED BLD: 115
UROBILINOGEN UR STRIP.AUTO-MCNC: <2 MG/DL
WBC # BLD AUTO: 17.2 X10*3/UL (ref 4.4–11.3)
WBC #/AREA URNS AUTO: ABNORMAL /HPF

## 2023-10-17 PROCEDURE — 85027 COMPLETE CBC AUTOMATED: CPT

## 2023-10-17 PROCEDURE — 85007 BL SMEAR W/DIFF WBC COUNT: CPT

## 2023-10-17 PROCEDURE — 36415 COLL VENOUS BLD VENIPUNCTURE: CPT

## 2023-10-17 PROCEDURE — 82977 ASSAY OF GGT: CPT

## 2023-10-17 PROCEDURE — 82784 ASSAY IGA/IGD/IGG/IGM EACH: CPT

## 2023-10-17 PROCEDURE — 83615 LACTATE (LD) (LDH) ENZYME: CPT

## 2023-10-17 PROCEDURE — 81001 URINALYSIS AUTO W/SCOPE: CPT

## 2023-10-17 PROCEDURE — 80053 COMPREHEN METABOLIC PANEL: CPT

## 2023-10-18 ENCOUNTER — OFFICE VISIT (OUTPATIENT)
Dept: HEMATOLOGY/ONCOLOGY | Facility: CLINIC | Age: 58
End: 2023-10-18
Payer: COMMERCIAL

## 2023-10-18 DIAGNOSIS — E07.9 THYROID DISORDER: ICD-10-CM

## 2023-10-18 DIAGNOSIS — C49.9 LEIOMYOSARCOMA (MULTI): Primary | ICD-10-CM

## 2023-10-18 DIAGNOSIS — I15.8 OTHER SECONDARY HYPERTENSION: ICD-10-CM

## 2023-10-18 DIAGNOSIS — Z79.899 ENCOUNTER FOR LONG-TERM CURRENT USE OF HIGH RISK MEDICATION: ICD-10-CM

## 2023-10-18 PROCEDURE — 1036F TOBACCO NON-USER: CPT | Performed by: INTERNAL MEDICINE

## 2023-10-18 PROCEDURE — 99443 PR PHYS/QHP TELEPHONE EVALUATION 21-30 MIN: CPT | Performed by: INTERNAL MEDICINE

## 2023-10-18 RX ORDER — NIFEDIPINE 60 MG/1
60 TABLET, FILM COATED, EXTENDED RELEASE ORAL
Qty: 90 TABLET | Refills: 3 | Status: SHIPPED | OUTPATIENT
Start: 2023-10-18 | End: 2023-10-25 | Stop reason: SDUPTHER

## 2023-10-18 ASSESSMENT — ENCOUNTER SYMPTOMS
FEVER: 0
EYE PROBLEMS: 0
ADENOPATHY: 0
HEMOPTYSIS: 0
FLANK PAIN: 0
FREQUENCY: 0
APPETITE CHANGE: 0
SORE THROAT: 0
HEMATURIA: 0
CONFUSION: 0
DYSURIA: 0
DEPRESSION: 0
HOT FLASHES: 0
CHILLS: 0
SCLERAL ICTERUS: 0
COUGH: 1
CHEST TIGHTNESS: 0
NAUSEA: 0
SHORTNESS OF BREATH: 0
MYALGIAS: 0
DIFFICULTY URINATING: 0
VOMITING: 0
DIZZINESS: 0
LEG SWELLING: 0
DIARRHEA: 0
FATIGUE: 0
EXTREMITY WEAKNESS: 0
PALPITATIONS: 0
CONSTIPATION: 0
BACK PAIN: 0
NERVOUS/ANXIOUS: 0
TROUBLE SWALLOWING: 0
ABDOMINAL PAIN: 0
SEIZURES: 0

## 2023-10-19 ENCOUNTER — PHARMACY VISIT (OUTPATIENT)
Dept: PHARMACY | Facility: CLINIC | Age: 58
End: 2023-10-19
Payer: COMMERCIAL

## 2023-10-19 PROCEDURE — RXMED WILLOW AMBULATORY MEDICATION CHARGE

## 2023-10-19 RX ORDER — NIFEDIPINE 60 MG/1
TABLET, EXTENDED RELEASE ORAL
Qty: 90 TABLET | Refills: 3 | OUTPATIENT
Start: 2023-10-18 | End: 2023-10-25

## 2023-10-19 NOTE — PATIENT INSTRUCTIONS
Ms. Angie Tobin ,  It was a pleasure talking to you today.    As discussed, this is the plan for you  CT scan and MRI on 11/13/23.  Please get labs done on the same day  RTC on 11/15/23  New prescription of Nifedipine ER 60mg pO daily has been sent to your pharmacy. Please continue monitoring blood pressure.    Instructions to take Pazopanib:  - Take 4 pills together (1 pill at a time). Do not crush the pill. Swallow as a whole tablet.   - Take 1 hour prior to meal or 2 hours after meal.   - Please do not drink or eat Grapefruit, [place]aird oranges, Pomelo and Star fruit (and their juice), while you are on this medication.  - If you are taking Pantoprazole, Omeprazole, Lansoprazole or Esmoprazole, then please stop these medicines while you are on Pazopanib.   - Monitor your blood pressure at home. If systolic BP goes above 150mmHg or diastolic BP goes above 94mmHg, then give us a call.   - Every physician/nurse, especially surgeons should know that you are on Pazopanib. Surgeries are CONTRAINDICATED when you are taking pazopanib. Please let your surgeon talk to us before they plan surgery for you.    Our offices will be reaching out to you to make all the appointments. I am always available at the phone numbers listed below for an earlier appointment should you wish one.    In case of an emergency please dial 911 or report to your nearest Emergency Room.  For all other questions, please do not hesitate to reach out to us at the number listed below.    Thank you for choosing [facility] at [facility].   We appreciate your visit.     MD Roxann Frankel, LUDA Boone, RN    Sarcoma and Cutaneous Oncology team    Phone: [phone]  Fax: [phone].

## 2023-10-19 NOTE — PROGRESS NOTES
.Patient ID: Angie Tobin is a 58 y.o. female.  Referring Physician: No referring provider defined for this encounter.  Primary Care Provider: JUAN Russell     Chief Complaint   Patient presents with    Follow-up     Follow up on high risk medicine (pazopanib)    Sarcoma        Cancer History:   Treatment Synopsis:   Ms. Tobin is a pleasant woman who presented with RUQ pain in March 2019. Further investigations and imaging showed a large heterogeneous mass in the pancreatic  bed. Initially this was thought to be lymphoma, however biopsy showed soft tissue sarcoma. On April 22, 2019 this mass was removed by Dr. Gonzalez via surgery. 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 She was subsequently  seen by Gyn Onc and underwent hysterectomy and bilateral salpingo-oopherectomy on Danae 10, 2019. This specimen did not show any evidence of tumor. We saw her first in August 2019. Scans did not detect any tumor at that point. Her case was discussed in  the tumor board for post op RT. Since there was no focus to be seen, it was decided that RT would be deferred for the future if a recurrence happens. CT scan in April 2021 detected new solitary nodules in lung and liver. MRI done on April 23, 2021 confirmed  recurrence in the liver. Her case was discussed in the tumor board and a decision was made to pursue systemic chemotherapy. We started her on doxorubicin and dacarbazine with zinecard protection. s/p 2 cycles, CT scans shows positive response in tumor  burden. Completed 6 cycles uneventfully on 08/26/2021.     Of note, the patient also has a history of Monoclonal B-cell lymphocytosis. She was worked up in 2016 for this reason (BM Biopsy report present in the physician portal)     03/31/2022- CT scan showed a lung nodule that is 0.6cm (was inconspicuous on the previous scan). Port removed on Feb 10, 2022.   07/05/2022- CT scan on 7/1/22 showed that the lung nodule  "has enlarged to 0.9cm. We petitioned for a biopsy of this lung nodule. Interestingly, the liver lesions have disappeared.   08/16/2022- Lung biopsy completed in early august and path reads leiomyosarcoma. Case presented in tumor board on 8/19/22 and decision made to refer her to CT surgery   10/14/2022- Surgery completed- 6 wedges removed. Multiple small nodules of leiomyosarcoma and microscopic foci reported.   12/06/2022- CT scan is [facility]. Repeat staging in 3 months.  03/03/2023- CT scan showed the liver lesion to be stable.   06/02/2023- CT scan shows worsening liver lesion- MRI liver ordered on 06/16/2023 showing progressing liver mets. We petitioned for Docetaxel/Gemcitabine to start 07/17/2023.  08/21/2023- CT scan and MRI done after 2 cycles show progressive disease in the liver and in the lungs.   08/23/2023- Ordered Pazopanib.   08/30/2023- Started pazopanib at 400mg by mouth daily  09/07/2023- Increased to 800mg by mouth daily     Treatment History:   Systemic treatment:  1. Doxorubicin + Dacarbazine + Zinecard (Day 1-3) of 21 day of cycle.  C1- 05/10/2021 to 05/12/2021  C2- 06/01/2021 to 06/03/2021  C3- 06/22/2021 to 06/24/2021  C4- 07/13/2021 to 07/15/2021  C5- 08/03/2021 to 08/05/2021   C6- 08/24/2021 to 08/26/2021     2. Docetaxel (75mg/m2) + Gemcitabine (900mg/m2) D1, D8 of a 21 day cycle  C1- 07/17/2023   C2- 08/07/2023- Discontinued after progression noted.      3. Pazopanib   Start date 08/30/2023 at 400mg by mouth daily.   Increase to 09/07/2023 at 800mg by mouth daily.         Subjective   Please refer to \"Notes/Cancer History\" above for complete History of present illness.     Ms Angie Tobin is doing well. The blood pressure is slightly on the higher side which makes her worried. She is otherwise doing well.     The visit was changed to phone visit/virtual visit in agreement with the patient. I am always available to meet with the patient in person should they wish to meet me.    Review of " Systems:   Review of Systems   Constitutional:  Negative for appetite change, chills, fatigue and fever.   HENT:   Negative for hearing loss, lump/mass, mouth sores, sore throat and trouble swallowing.    Eyes:  Negative for eye problems and icterus.   Respiratory:  Positive for cough. Negative for chest tightness, hemoptysis and shortness of breath.    Cardiovascular:  Negative for chest pain, leg swelling and palpitations.   Gastrointestinal:  Negative for abdominal pain, constipation, diarrhea, nausea and vomiting.   Endocrine: Negative for hot flashes.   Genitourinary:  Negative for difficulty urinating, dysuria, frequency and hematuria.    Musculoskeletal:  Negative for back pain, flank pain, gait problem and myalgias.   Skin:  Negative for itching and rash.   Neurological:  Negative for dizziness, extremity weakness, gait problem and seizures.   Hematological:  Negative for adenopathy.   Psychiatric/Behavioral:  Negative for confusion and depression. The patient is not nervous/anxious.          MEDICAL HISTORY  Past Medical History:   Diagnosis Date    Benign essential HTN 04/19/2023    Candidiasis, unspecified 01/20/2022    Antibiotic-induced yeast infection    Elevated blood-pressure reading, without diagnosis of hypertension 05/26/2017    Elevated BP without diagnosis of hypertension    Other conditions influencing health status 09/27/2017    History of cough    Personal history of diseases of the blood and blood-forming organs and certain disorders involving the immune mechanism 06/06/2016    History of leukocytosis    Personal history of other benign neoplasm 05/08/2019    History of other benign neoplasm    Personal history of other diseases of the musculoskeletal system and connective tissue 04/18/2018    History of low back pain    Personal history of other diseases of the nervous system and sense organs 10/03/2016    History of neuropathy    Personal history of other diseases of the respiratory system  2017    History of acute bronchitis    Personal history of other diseases of the respiratory system 2022    History of acute sinusitis    Personal history of other specified conditions 2020    History of weight gain    Right lower quadrant abdominal tenderness 2019    RLQ abdominal tenderness    Right upper quadrant pain 2015    Abdominal pain, RUQ (right upper quadrant)    Toenail fungus 2023       FAMILY HISTORY  Family History   Problem Relation Name Age of Onset    Other (atrial flutter) Mother      Diabetes Mother      Leukemia Father      Liver cancer Father      Ovarian cancer Sister      Hypothyroidism Brother      Breast cancer Paternal Grandmother      No Known Problems Sibling         TOBACCO HISTORY  Tobacco Use: Low Risk  (10/16/2023)    Patient History     Smoking Tobacco Use: Never     Smokeless Tobacco Use: Never     Passive Exposure: Not on file       SOCIAL HISTORY  Social Connections: Not on file        Outpatient Medication Profile:  Current Outpatient Medications on File Prior to Visit   Medication Sig Dispense Refill    albuterol 108 (90 Base) MCG/ACT inhaler Inhale 1-2 puffs. Every 4-6 hours as needed      amoxicillin-pot clavulanate (Augmentin) 875-125 mg tablet Take 1 tablet (875 mg) by mouth 2 times a day for 10 days. 20 tablet 0    benzonatate (Tessalon) 200 mg capsule Take 1 capsule (200 mg) by mouth 3 times a day as needed for cough. Do not crush or chew. 42 capsule 0    cyclobenzaprine (Flexeril) 5 mg tablet       dexAMETHasone (Decadron) 4 mg tablet       [] fluconazole (Diflucan) 150 mg tablet Take 1 tablet (150 mg) by mouth 1 time for 1 dose. 1 tablet 0    lisinopril 20 mg tablet TAKE 1 TABLET BY MOUTH ONE TIME DAILY. 90 tablet 1    LORazepam (Ativan) 0.5 mg tablet Take 1 tablet (0.5 mg) by mouth every 8 hours if needed (agitation).      meclizine (Antivert) 12.5 mg tablet Take 1-2 tablets (12.5-25 mg) by mouth 3 times a day as needed  (vertigo).      metFORMIN (Glucophage) 500 mg tablet TAKE 1 TABLET BY MOUTH TWO TIMES A DAY. 180 tablet 1    PAZOPanib (Votrient) 200 mg tablet TAKE FOUR (4) TABLETS BY MOUTH ONCE A  tablet 3    prochlorperazine (Compazine) 10 mg tablet TAKE 1 TABLET BY MOUTH EVERY 6 HOURS AS NEEDED FOR NAUSEA AND VOMITING WITH CHEMOTHERAPY 56 tablet 2    triamcinolone (Kenalog) 0.1 % cream APPLY TOPICALLY TO AFFECTED AREA 2 TIMES A DAY AS DIRECTED 454 g 2    valACYclovir (Valtrex) 1 gram tablet Take 1 tablet (1,000 mg) by mouth 2 times a day as needed (cold sores).      Votrient 200 mg tablet       [DISCONTINUED] NIFEdipine ER (NIFEdipine XL) 30 mg 24 hr tablet Take 1 tablet (30 mg) by mouth once daily. 30 tablet 2    [DISCONTINUED] NIFEdipine XL 30 mg 24 hr tablet Take 1 tablet (30 mg) by mouth once daily.       No current facility-administered medications on file prior to visit.         Performance Status:  Asymptomatic     Vitals and Measurements:   There were no vitals taken for this visit.      Physical Exam:   Physical Exam         Lab Results:  I have reviewed these laboratory results:     Lab Results   Component Value Date    WBC 17.2 (H) 10/17/2023    HGB 15.0 10/17/2023    HCT 49.5 (H) 10/17/2023    MCV 89 10/17/2023     10/17/2023         Chemistry    Lab Results   Component Value Date/Time     10/17/2023 0927    K 4.8 10/17/2023 0927     10/17/2023 0927    CO2 21 10/17/2023 0927    BUN 12 10/17/2023 0927    CREATININE 0.72 10/17/2023 0927    Lab Results   Component Value Date/Time    CALCIUM 10.7 (H) 10/17/2023 0927    ALKPHOS 155 (H) 10/17/2023 0927    AST 52 (H) 10/17/2023 0927    ALT 78 (H) 10/17/2023 0927    BILITOT 1.3 (H) 10/17/2023 0927            Lab Results   Component Value Date    TSH 1.73 03/31/2022    THYROIDPAB 37 03/31/2022        Radiology Result:  I have reviewed the latest Imaging in PACS and the findings are noted in this note. I discussed the results of the latest imaging  with the patient. All previous imaging were reviewed at the time it was completed. Full records are available in the EMR for review as well.     === 08/21/23 ===    CT CHEST WO IV CONTRAST    Addendum 8/23/2023  9:54 AM ------------------------------------------------  Interpreted By:  IKE BLEDSOE MD  Addendum Begins  MRN: [id number]  Patient Name: MAGAN SCHULTZ    ADDENDUM:  Notifi message was left for JANE JOYCEGIFTY regarding this exam  by Dr. Bledsoe on 8/22/2023 at approximately 17:01 hours.    Addendum Ends    - Impression -  1. Slight interval increase in size of dominant bilateral  subcentimeter lung nodules as above.  2. Interval progression of metastatic liver disease. Correlation with  MRI findings of the liver recommended.  3. Additional detailed findings as above.    === 08/21/23 ===    MR PELVIS W AND WO CONTRAST    - Impression -  1.  Multifocal liver lesions are mildly measurably increased.  2. Mild increased hepatosplenomegaly.  3. Similar hepatic steatosis.  4. Unchanged small renal cortical cysts.  5. Mild colonic diverticulosis.       Pathology Results:  I have reviewed the full pathology report recorded in the EMR. The pertinent portions indicating diagnosis are listed here in the note. for details please refer to the full report recorded in the EMR.    Surgical Pathology [Apr 30 2019 4:54PM] ([id number])     Specimens: RETROPERITONEAL MASS ANTERIOR TO VENA CAVA /Received fresh for intraoperative consultation, labeled with the patient's      Name MAGAN SCHULTZ      Accession #: [id number]   Pathologist: CIELO AGUIRRE M.D.   Date of Procedure: 4/22/2019   Date Received: 4/22/2019   Date Reported 4/30/2019   Submitting Physician: KANA MUKHERJEE M.D.   Location: [facility] Other External #         FINAL DIAGNOSIS   A. RETROPERITONEAL MASS, ANTERIOR TO VENA CAVA, EXCISION:   -- LEIOMYOSARCOMA, 8.7 CM, INFILTRATING VESSEL WALL OF INFERIOR VENA CAVA, SEE NOTE   -- DISTANCE TO SOFT  TISSUE/ PERIPHERAL RESECTION MARGIN < 0.5 MM, FOCAL INVOLVEMENT CANNOT BY EXCLUDED   -- VASCULAR MARGINS UNINVOLVED BY MALIGNANCY   -- TWO LYMPH NODES WITH NO EVIDENCE OF METASTASIS (0/2)      Note: The spindle cell neoplasm demonstrates moderate to high-grade nuclear atypia, a high mitotic rate (up to 28 mitoses per 10 HPF), and extensively infiltrates the adventitia and  media of the venous vessel wall. Focal (indeterminate-type) necrosis is noted. Immunohistochemistry demonstrates expression of estrogen receptor (60-70% of tumor cells) and progesterone receptor (>95% of tumor cells) and focal WT-1. Immunohistochemistry  performed on the prior core needle biopsy ([id number]) had demonstrated smooth muscle differentiation (positive: SMA, desmin, h-caldesmon; negative: CD34, HMB-45, myogenin, S100, DOG-1, ).      The gross and/or microscopic findings were reviewed in conjunction with pathology resident, Vika Ruiz M.D.      Electronically Signed Out By CIELO AGUIRRE M.D./CIRO   By the signature on this report, the individual or group listed as making the Final Interpretation/Diagnosis certifies that they have reviewed this case.      Assessment and Plan:   Assessment/Plan      Mr. Angie Tobin is a 58 y.o. female with a diagnosis of metastatic leiomyosarcoma. Please see the evolution of the case listed above in the cancer history.     Today,   Ms. Tobin has tolerated the Pazopanib 800mg po daily well. Her LFT show a slight rise in ALT/AST, but it seems that she is dehydrated. She will increase her water intake and then we will hopefully see recovery in 4 weeks. Her scans will be scheduled in 4 weeks.     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   - Continue Pazopanib at 800mg by mouth daily today.  - CT and MRI scheduled on 11/13/23.     # VEGF induced HTN  - Increase Nifedipine ER to 60mg by mouth daily today (10/18/23).       # Chemo induced skin rash  - Triamcinolone 0.1% for local  application over the affected area.      # B-cell monoclonal lymphocytosis:   - White count came down. Likely Neulasta effect that diminished after chemotherapy      # Fatty infiltration of liver  - Following up with the PCP.     # High PTH levels and hypercalcemia  - Continue follow up with Endocrinology and nephrology      # Tachycardia   - Following with Cardiology      # Diabetes Mellitus  - Followed by PCP.  - Currently on Metformin     DISCLAIMER:   In preparing for this visit and writing this note, I reviewed all the previous electronic medical records (labs, imaging and medical charts) of the patient available in the physician portal. Significant findings which helped in decision making are recorded  in this chart.     The plan was discussed with the patient. We gave him ample opportunities to ask questions. All questions were answered to his satisfaction and he verbalized understanding.       Nico Walsh MD    Hematology and Oncology     Phone: [phone]  Fax: [phone].

## 2023-10-20 ENCOUNTER — SPECIALTY PHARMACY (OUTPATIENT)
Dept: PHARMACY | Facility: CLINIC | Age: 58
End: 2023-10-20

## 2023-10-24 ENCOUNTER — TELEPHONE (OUTPATIENT)
Dept: ADMISSION | Facility: HOSPITAL | Age: 58
End: 2023-10-24
Payer: COMMERCIAL

## 2023-10-24 NOTE — TELEPHONE ENCOUNTER
Pt started nifedipine 60mg this morning.   Pt has noticed multiple incidents of heart rate going up to 120-130. Rate goes back to 100 within a few minutes .   Pt denies dizziness, changes in vision. Pt states that she does have a dull headache as well.     BP today 136/86 and 130/90.  yesterday ranged 129-142/90-98.

## 2023-10-24 NOTE — TELEPHONE ENCOUNTER
Pt will be seen in [facility] 10/25 at 10am for evaluation, EKG and labs if needed.   Pt in agreement with plan.

## 2023-10-25 ENCOUNTER — OFFICE VISIT (OUTPATIENT)
Dept: CARDIOLOGY | Facility: HOSPITAL | Age: 58
End: 2023-10-25
Payer: COMMERCIAL

## 2023-10-25 ENCOUNTER — APPOINTMENT (OUTPATIENT)
Dept: HEMATOLOGY/ONCOLOGY | Facility: HOSPITAL | Age: 58
End: 2023-10-25
Payer: COMMERCIAL

## 2023-10-25 ENCOUNTER — OFFICE VISIT (OUTPATIENT)
Dept: HEMATOLOGY/ONCOLOGY | Facility: HOSPITAL | Age: 58
End: 2023-10-25
Payer: COMMERCIAL

## 2023-10-25 VITALS
DIASTOLIC BLOOD PRESSURE: 102 MMHG | SYSTOLIC BLOOD PRESSURE: 148 MMHG | BODY MASS INDEX: 38.39 KG/M2 | HEART RATE: 122 BPM | WEIGHT: 264.55 LBS

## 2023-10-25 VITALS
RESPIRATION RATE: 18 BRPM | HEART RATE: 159 BPM | TEMPERATURE: 98.4 F | SYSTOLIC BLOOD PRESSURE: 142 MMHG | BODY MASS INDEX: 38.29 KG/M2 | OXYGEN SATURATION: 97 % | WEIGHT: 263.89 LBS | DIASTOLIC BLOOD PRESSURE: 103 MMHG

## 2023-10-25 DIAGNOSIS — C49.9 LEIOMYOSARCOMA (MULTI): ICD-10-CM

## 2023-10-25 DIAGNOSIS — R00.0 TACHYCARDIA: ICD-10-CM

## 2023-10-25 DIAGNOSIS — R60.0 LOWER EXTREMITY EDEMA: ICD-10-CM

## 2023-10-25 DIAGNOSIS — I47.11 INAPPROPRIATE SINUS TACHYCARDIA (CMS-HCC): ICD-10-CM

## 2023-10-25 DIAGNOSIS — I15.8 OTHER SECONDARY HYPERTENSION: ICD-10-CM

## 2023-10-25 DIAGNOSIS — C78.02 MALIGNANT NEOPLASM METASTATIC TO LEFT LUNG (MULTI): ICD-10-CM

## 2023-10-25 DIAGNOSIS — Z51.81 ENCOUNTER FOR MONITORING CARDIOTOXIC DRUG THERAPY: ICD-10-CM

## 2023-10-25 DIAGNOSIS — E86.0 DEHYDRATION: ICD-10-CM

## 2023-10-25 DIAGNOSIS — I10 PRIMARY HYPERTENSION: Primary | ICD-10-CM

## 2023-10-25 DIAGNOSIS — R19.7 DIARRHEA, UNSPECIFIED TYPE: ICD-10-CM

## 2023-10-25 DIAGNOSIS — Z79.899 ENCOUNTER FOR MONITORING CARDIOTOXIC DRUG THERAPY: ICD-10-CM

## 2023-10-25 DIAGNOSIS — E86.0 DEHYDRATION: Primary | ICD-10-CM

## 2023-10-25 PROBLEM — Z78.9 NEVER SMOKED ANY SUBSTANCE: Status: ACTIVE | Noted: 2023-10-25

## 2023-10-25 LAB
25(OH)D3 SERPL-MCNC: 14 NG/ML (ref 30–100)
ALBUMIN SERPL BCP-MCNC: 4.8 G/DL (ref 3.4–5)
ALP SERPL-CCNC: 134 U/L (ref 33–110)
ALT SERPL W P-5'-P-CCNC: 106 U/L (ref 7–45)
ANION GAP SERPL CALC-SCNC: 17 MMOL/L (ref 10–20)
AST SERPL W P-5'-P-CCNC: 72 U/L (ref 9–39)
BASOPHILS # BLD AUTO: 0.07 X10*3/UL (ref 0–0.1)
BASOPHILS NFR BLD AUTO: 0.4 %
BILIRUB SERPL-MCNC: 1.5 MG/DL (ref 0–1.2)
BNP SERPL-MCNC: 6 PG/ML (ref 0–99)
BUN SERPL-MCNC: 13 MG/DL (ref 6–23)
CALCIUM SERPL-MCNC: 11.2 MG/DL (ref 8.6–10.3)
CARDIAC TROPONIN I PNL SERPL HS: <3 NG/L (ref 0–34)
CHLORIDE SERPL-SCNC: 102 MMOL/L (ref 98–107)
CO2 SERPL-SCNC: 21 MMOL/L (ref 21–32)
CREAT SERPL-MCNC: 0.76 MG/DL (ref 0.5–1.05)
EOSINOPHIL # BLD AUTO: 0.16 X10*3/UL (ref 0–0.7)
EOSINOPHIL NFR BLD AUTO: 1 %
ERYTHROCYTE [DISTWIDTH] IN BLOOD BY AUTOMATED COUNT: 17.2 % (ref 11.5–14.5)
GFR SERPL CREATININE-BSD FRML MDRD: >90 ML/MIN/1.73M*2
GLUCOSE SERPL-MCNC: 123 MG/DL (ref 74–99)
HCT VFR BLD AUTO: 47.3 % (ref 36–46)
HGB BLD-MCNC: 15.7 G/DL (ref 12–16)
IMM GRANULOCYTES # BLD AUTO: 0.06 X10*3/UL (ref 0–0.7)
IMM GRANULOCYTES NFR BLD AUTO: 0.4 % (ref 0–0.9)
LYMPHOCYTES # BLD AUTO: 6.34 X10*3/UL (ref 1.2–4.8)
LYMPHOCYTES NFR BLD AUTO: 39.3 %
MCH RBC QN AUTO: 27.4 PG (ref 26–34)
MCHC RBC AUTO-ENTMCNC: 33.2 G/DL (ref 32–36)
MCV RBC AUTO: 82 FL (ref 80–100)
MONOCYTES # BLD AUTO: 0.53 X10*3/UL (ref 0.1–1)
MONOCYTES NFR BLD AUTO: 3.3 %
NEUTROPHILS # BLD AUTO: 8.96 X10*3/UL (ref 1.2–7.7)
NEUTROPHILS NFR BLD AUTO: 55.6 %
NRBC BLD-RTO: 0 /100 WBCS (ref 0–0)
PLATELET # BLD AUTO: 231 X10*3/UL (ref 150–450)
PMV BLD AUTO: 9.3 FL (ref 7.5–11.5)
POLYCHROMASIA BLD QL SMEAR: NORMAL
POTASSIUM SERPL-SCNC: 4.6 MMOL/L (ref 3.5–5.3)
PROT SERPL-MCNC: 7.9 G/DL (ref 6.4–8.2)
RBC # BLD AUTO: 5.74 X10*6/UL (ref 4–5.2)
RBC MORPH BLD: NORMAL
SODIUM SERPL-SCNC: 135 MMOL/L (ref 136–145)
T4 FREE SERPL-MCNC: 1.05 NG/DL (ref 0.78–1.48)
TSH SERPL-ACNC: 8 MIU/L (ref 0.44–3.98)
WBC # BLD AUTO: 16.1 X10*3/UL (ref 4.4–11.3)

## 2023-10-25 PROCEDURE — 2500000004 HC RX 250 GENERAL PHARMACY W/ HCPCS (ALT 636 FOR OP/ED)

## 2023-10-25 PROCEDURE — 83880 ASSAY OF NATRIURETIC PEPTIDE: CPT

## 2023-10-25 PROCEDURE — 80048 BASIC METABOLIC PNL TOTAL CA: CPT

## 2023-10-25 PROCEDURE — 3080F DIAST BP >= 90 MM HG: CPT | Performed by: INTERNAL MEDICINE

## 2023-10-25 PROCEDURE — 82306 VITAMIN D 25 HYDROXY: CPT

## 2023-10-25 PROCEDURE — 3077F SYST BP >= 140 MM HG: CPT | Performed by: INTERNAL MEDICINE

## 2023-10-25 PROCEDURE — 85025 COMPLETE CBC W/AUTO DIFF WBC: CPT

## 2023-10-25 PROCEDURE — 84443 ASSAY THYROID STIM HORMONE: CPT

## 2023-10-25 PROCEDURE — 96360 HYDRATION IV INFUSION INIT: CPT | Mod: INF

## 2023-10-25 PROCEDURE — 1036F TOBACCO NON-USER: CPT | Performed by: INTERNAL MEDICINE

## 2023-10-25 PROCEDURE — 3077F SYST BP >= 140 MM HG: CPT

## 2023-10-25 PROCEDURE — 3080F DIAST BP >= 90 MM HG: CPT

## 2023-10-25 PROCEDURE — 99215 OFFICE O/P EST HI 40 MIN: CPT

## 2023-10-25 PROCEDURE — 80053 COMPREHEN METABOLIC PANEL: CPT

## 2023-10-25 PROCEDURE — 99215 OFFICE O/P EST HI 40 MIN: CPT | Performed by: INTERNAL MEDICINE

## 2023-10-25 PROCEDURE — 84484 ASSAY OF TROPONIN QUANT: CPT

## 2023-10-25 PROCEDURE — 1036F TOBACCO NON-USER: CPT

## 2023-10-25 PROCEDURE — 84439 ASSAY OF FREE THYROXINE: CPT

## 2023-10-25 PROCEDURE — 36415 COLL VENOUS BLD VENIPUNCTURE: CPT

## 2023-10-25 RX ORDER — NIFEDIPINE 30 MG/1
30 TABLET, FILM COATED, EXTENDED RELEASE ORAL
Qty: 90 TABLET | Refills: 3 | Status: SHIPPED | OUTPATIENT
Start: 2023-10-25 | End: 2023-10-30 | Stop reason: SDUPTHER

## 2023-10-25 RX ORDER — METOPROLOL SUCCINATE 25 MG/1
25 TABLET, EXTENDED RELEASE ORAL DAILY
Qty: 90 TABLET | Refills: 3 | Status: SHIPPED | OUTPATIENT
Start: 2023-10-25 | End: 2024-02-13 | Stop reason: ALTCHOICE

## 2023-10-25 RX ORDER — SODIUM CHLORIDE 9 MG/ML
1000 INJECTION, SOLUTION INTRAVENOUS ONCE
Status: COMPLETED | OUTPATIENT
Start: 2023-10-25 | End: 2023-10-25

## 2023-10-25 RX ADMIN — SODIUM CHLORIDE 1000 ML/HR: 9 INJECTION, SOLUTION INTRAVENOUS at 11:55

## 2023-10-25 ASSESSMENT — ENCOUNTER SYMPTOMS
NERVOUS/ANXIOUS: 1
VOMITING: 0
EYES NEGATIVE: 1
OCCASIONAL FEELINGS OF UNSTEADINESS: 1
CARDIOVASCULAR NEGATIVE: 1
ALLERGIC/IMMUNOLOGIC NEGATIVE: 1
NAUSEA: 1
BLOOD IN STOOL: 0
DIZZINESS: 1
FATIGUE: 1
FEVER: 0
DIARRHEA: 1
ARTHRALGIAS: 1
UNEXPECTED WEIGHT CHANGE: 0
RESPIRATORY NEGATIVE: 1
ABDOMINAL DISTENTION: 1
DEPRESSION: 0
HEMATOLOGIC/LYMPHATIC NEGATIVE: 1
LOSS OF SENSATION IN FEET: 0
WEAKNESS: 0
ENDOCRINE NEGATIVE: 1

## 2023-10-25 ASSESSMENT — PAIN SCALES - GENERAL: PAINLEVEL: 0-NO PAIN

## 2023-10-25 NOTE — PROGRESS NOTES
Patient ID: Angie Tobin is a 58 y.o. female        Oncologic history:  Treatment Synopsis:   Ms. Tobin is a pleasant woman who presented with RUQ pain in March 2019. Further investigations and imaging showed a large heterogeneous mass in the pancreatic  bed. Initially this was thought to be lymphoma, however biopsy showed soft tissue sarcoma. On April 22, 2019 this mass was removed by Dr. Gonzalez via surgery. 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 She was subsequently  seen by Gyn Onc and underwent hysterectomy and bilateral salpingo-oopherectomy on Danae 10, 2019. This specimen did not show any evidence of tumor. We saw her first in August 2019. Scans did not detect any tumor at that point. Her case was discussed in  the tumor board for post op RT. Since there was no focus to be seen, it was decided that RT would be deferred for the future if a recurrence happens. CT scan in April 2021 detected new solitary nodules in lung and liver. MRI done on April 23, 2021 confirmed  recurrence in the liver. Her case was discussed in the tumor board and a decision was made to pursue systemic chemotherapy. We started her on doxorubicin and dacarbazine with zinecard protection. s/p 2 cycles, CT scans shows positive response in tumor  burden. Completed 6 cycles uneventfully on 08/26/2021.     Of note, the patient also has a history of Monoclonal B-cell lymphocytosis. She was worked up in 2016 for this reason (BM Biopsy report present in the physician portal)     03/31/2022- CT scan showed a lung nodule that is 0.6cm (was inconspicuous on the previous scan). Port removed on Feb 10, 2022.   07/05/2022- CT scan on 7/1/22 showed that the lung nodule has enlarged to 0.9cm. We petitioned for a biopsy of this lung nodule. Interestingly, the liver lesions have disappeared.   08/16/2022- Lung biopsy completed in early august and path reads leiomyosarcoma. Case presented in tumor board on  8/19/22 and decision made to refer her to CT surgery   10/14/2022- Surgery completed- 6 wedges removed. Multiple small nodules of leiomyosarcoma and microscopic foci reported.   12/06/2022- CT scan is LIN[facility]. Repeat staging in 3 months.  03/03/2023- CT scan showed the liver lesion to be stable.   06/02/2023- CT scan shows worsening liver lesion- MRI liver ordered on 06/16/2023 showing progressing liver mets. We petitioned for Docetaxel/Gemcitabine to start 07/17/2023.  08/21/2023- CT scan and MRI done after 2 cycles show progressive disease in the liver and in the lungs.   08/23/2023- Ordered Pazopanib.   08/30/2023- Started pazopanib at 400mg by mouth daily  09/07/2023- Increased to 800mg by mouth daily    Treatment History:   Systemic treatment:  1. Doxorubicin + Dacarbazine + Zinecard (Day 1-3) of 21 day of cycle.  C1- 05/10/2021 to 05/12/2021  C2- 06/01/2021 to 06/03/2021  C3- 06/22/2021 to 06/24/2021  C4- 07/13/2021 to 07/15/2021  C5- 08/03/2021 to 08/05/2021   C6- 08/24/2021 to 08/26/2021     2. Docetaxel (75mg/m2) + Gemcitabine (900mg/m2) D1, D8 of a 21 day cycle  C1- 07/17/2023   C2- 08/07/2023- Discontinued after progression noted.      3. Pazopanib   Start date 08/30/2023 at 400mg by mouth daily.   Increase to 09/07/2023 at 800mg by mouth daily.    Past Medical History:   Past Medical History:  04/19/2023: Benign essential HTN  01/20/2022: Candidiasis, unspecified      Comment:  Antibiotic-induced yeast infection  05/26/2017: Elevated blood-pressure reading, without diagnosis of   hypertension      Comment:  Elevated BP without diagnosis of hypertension  09/27/2017: Other conditions influencing health status      Comment:  History of cough  06/06/2016: Personal history of diseases of the blood and blood-  forming organs and certain disorders involving the immune mechanism      Comment:  History of leukocytosis  05/08/2019: Personal history of other benign neoplasm      Comment:  History of other benign  neoplasm  04/18/2018: Personal history of other diseases of the musculoskeletal   system and connective tissue      Comment:  History of low back pain  10/03/2016: Personal history of other diseases of the nervous system   and sense organs      Comment:  History of neuropathy  08/31/2017: Personal history of other diseases of the respiratory   system      Comment:  History of acute bronchitis  01/24/2022: Personal history of other diseases of the respiratory   system      Comment:  History of acute sinusitis  09/16/2020: Personal history of other specified conditions      Comment:  History of weight gain  03/04/2019: Right lower quadrant abdominal tenderness      Comment:  RLQ abdominal tenderness  03/13/2015: Right upper quadrant pain      Comment:  Abdominal pain, RUQ (right upper quadrant)  04/19/2023: Toenail fungus   Surgical History:    Past Surgical History:   Procedure Laterality Date    BREAST SURGERY  05/30/2013    Breast Surgery Reduction Procedure    CHOLECYSTECTOMY  12/02/2014    Cholecystectomy Laparoscopic    CT GUIDED PERCUTANEOUS BIOPSY BONE  10/24/2016    CT GUIDED PERCUTANEOUS BIOPSY BONE 10/24/2016 [facility]    CT GUIDED PERCUTANEOUS BIOPSY LUNG  8/5/2022    CT GUIDED PERCUTANEOUS BIOPSY LUNG 8/5/2022 [facility]    ESOPHAGOGASTRODUODENOSCOPY  04/16/2013    Diagnostic Esophagogastroduodenoscopy    OTHER SURGICAL HISTORY  04/19/2013    Anal Fistulotomy (Subcutaneous)    OTHER SURGICAL HISTORY  10/25/2022    Lung wedge resection    OTHER SURGICAL HISTORY  10/25/2022    Lung lobectomy    OTHER SURGICAL HISTORY  05/08/2019    Resection    OTHER SURGICAL HISTORY  05/30/2013    Perineal Transplant Of Anovaginal Fistula    US GUIDED NEEDLE LIVER BIOPSY  7/11/2023    US GUIDED NEEDLE LIVER BIOPSY 7/11/2023 [facility]      Family History:    Family History   Problem Relation Name Age of Onset    Other (atrial flutter) Mother      Diabetes Mother      Leukemia Father      Liver cancer Father       Ovarian cancer Sister      Hypothyroidism Brother      Breast cancer Paternal Grandmother      No Known Problems Sibling       Family Oncology History:    Cancer-related family history includes Breast cancer in her paternal grandmother; Liver cancer in her father; Ovarian cancer in her sister.  Social History:    Social History     Tobacco Use    Smoking status: Never    Smokeless tobacco: Never          Subjective   Chief Complaint:   Patient presents to [facility] for tachycardia. States Nifedipine dose was increased to 60mg PO every day. Since taking yesterday she has felt off. Patient monitors her HR daily, typically she runs in the 110's. Believes this is related to dehydration. She has not been keeping up with hydration. Denies headache, blurry vision, dizziness, chest pain, lower extremity edema, SOB, cough, abdominal pain, n/v, or numbness or tingling. Has been having loose stools due to her oral chemotherapy. Denies any other concerns at this time.        ROS  ROS 14 points performed, See HPI for exceptions        Allergies  Allergies   Allergen Reactions    Codeine Other     Abdominal pain        Medications  Current Outpatient Medications   Medication Instructions    albuterol 108 (90 Base) MCG/ACT inhaler 1-2 puffs, inhalation, Every 4-6 hours as needed    benzonatate (TESSALON) 200 mg, oral, 3 times daily PRN, Do not crush or chew.    cyclobenzaprine (Flexeril) 5 mg tablet     dexAMETHasone (Decadron) 4 mg tablet     lisinopril 20 mg tablet TAKE 1 TABLET BY MOUTH ONE TIME DAILY.    LORazepam (ATIVAN) 0.5 mg, oral, Every 8 hours PRN    meclizine (ANTIVERT) 12.5-25 mg, oral, 3 times daily PRN    metFORMIN (Glucophage) 500 mg tablet TAKE 1 TABLET BY MOUTH TWO TIMES A DAY.    metoprolol succinate XL (TOPROL-XL) 25 mg, oral, Daily, Do not crush or chew.    NIFEdipine ER (ADALAT CC) 30 mg, oral, Daily before breakfast, Do not crush, chew, or split.    prochlorperazine (Compazine) 10 mg tablet TAKE 1 TABLET BY MOUTH  EVERY 6 HOURS AS NEEDED FOR NAUSEA AND VOMITING WITH CHEMOTHERAPY    triamcinolone (Kenalog) 0.1 % cream APPLY TOPICALLY TO AFFECTED AREA 2 TIMES A DAY AS DIRECTED    valACYclovir (Valtrex) 1 gram tablet 1 tablet, oral, 2 times daily PRN    Votrient 200 mg tablet           Objective   Vitals: BP (!) 142/103 (BP Location: Right arm, Patient Position: Standing)   Pulse (!) 159   Temp 36.9 °C (98.4 °F) (Core)   Resp 18   Wt 120 kg (263 lb 14.3 oz)   SpO2 97%   BMI 38.29 kg/m²   Weight:   Vitals:    10/25/23 1018   Weight: 120 kg (263 lb 14.3 oz)         Physical exam:  Constitutional: Well developed, no distress, alert and cooperative   Eyes: EOMI, clear sclera   ENMT: mucous membranes moist, no apparent injury, no lesions seen   Head/Neck: Neck supple, no apparent injury   Respiratory/Thorax: Patent airways, CTAB, normal breath sounds with good chest expansion, thorax symmetric   Cardiovascular: tachycardia   Gastrointestinal: Nondistended, soft, +BS   Musculoskeletal: No joint swelling, normal strength   Extremities: normal extremities, no cyanosis, edema, contusions or wounds, no clubbing   Neurological: alert and oriented x4, no apparent sensory or motor abnormalities   Lymphatic: No cervical, supraclavicular, or infraclavicular lymphadenopathy   Psychological: Appropriate mood and behavior   Skin: Warm and dry, no lesions, no rashes, no jaundice       Diagnostic Results     Labs  Results from last 7 days   Lab Units 10/25/23  1138   WBC AUTO x10*3/uL 16.1*   HEMOGLOBIN g/dL 15.7   HEMATOCRIT % 47.3*   PLATELETS AUTO x10*3/uL 231   NEUTROS ABS x10*3/uL 8.96*   LYMPHS ABS AUTO x10*3/uL 6.34*   MONOS ABS AUTO x10*3/uL 0.53   EOS ABS AUTO x10*3/uL 0.16   NEUTROS PCT AUTO % 55.6   LYMPHS PCT AUTO % 39.3   MONOS PCT AUTO % 3.3   EOS PCT AUTO % 1.0      Results from last 7 days   Lab Units 10/25/23  1138   GLUCOSE mg/dL 123*   SODIUM mmol/L 135*   POTASSIUM mmol/L 4.6   CHLORIDE mmol/L 102   CO2 mmol/L 21   BUN  mg/dL 13   CREATININE mg/dL 0.76   EGFR mL/min/1.73m*2 >90   CALCIUM mg/dL 11.2*   ALBUMIN g/dL 4.8   PROTEIN TOTAL g/dL 7.9   BILIRUBIN TOTAL mg/dL 1.5*   ALK PHOS U/L 134*   ALT U/L 106*   AST U/L 72*                     Images         Assessment/Plan   ASSESSMENT  Angie Tobin is a 58 y.o. female with a diagnosis of metastatic leiomyosarcoma. Presents to [facility] with tachycardia.       ACC Course  - Orthos  - Labs CBC, CMP, TSH, vitamin d, BNP, Troponin - Notified hem/onc [place] of TSH level.  - 1L NS for dehydration  -ECG: sinus  tach  -Seen by Dr. Yudith Morgan -provided medication changes- changed nifedipine to 30mg every day, added Metoprolol, continue lisinopril, ordered ECHO    Dispo:  - Patient discharged home with no needs after ACC course complete  - Return to clinic/ED instructions given to patient  - Follow up w/ Oncology as scheduled  -follow up with Dr. Yudith Morgan in 2 weeks        Ana Vilchis, LUDA-CNP

## 2023-10-25 NOTE — PATIENT INSTRUCTIONS
Take lisinopril and metoprolol in the am and the nifedipine in the evening  Two week follow up  Keep track of heart rte and blood pressure at home  We will schedule you for an echo when we see you next

## 2023-10-25 NOTE — PROGRESS NOTES
"  Patient:  Angie Tobin  YOB: 1965  MRN: [id number]       Chief Complaint/Active Symptoms:       Angie Tobin is a 58 y.o. female who returns today for cardiac follow-up.    Asked to see patient while on infusion therapy for HTN and tachycardia. Was seen by Cardio-Oncology last a year ago with hx of cardiotoxic chemotherapy for her leiomyosarcoma with lung mets. She has been trying to do better with hydration but knows that her heart rate is elevated. Has an apple watch and her resting HR is usually below 100 but frequently is 110 or so. Has no associated dizziness or lightheadedness. Has been drinking 48-64 oz daily but unfortunately has also been having intermittent large volume diarrhea. No syncope or near syncope. No anginal chest pain. No edema, claudication. No shortness of breath, orthopnea or PND. Is fatigued.     Past chemotherapy included liposomal doxorubicin with total dose 460 mg/m2. Current Pazopinib has resulted in HTN and nifedipine was started at 30 mg xl daily and increased several days ago to 60 mg XL daily. Felt horrible with the increased dose so did not take it today before coming to the appointment. Is already on lisinopril 20 mg daily which she takes at bedtime.     Was placed on beta blockers in the past for her sinus tachycardia and \"didn't do well\" with them. Also had LE edema with amlodipine. Tolerates her lisinopril well.     Review of Systems   Constitutional:  Positive for fatigue. Negative for fever and unexpected weight change.   HENT:  Positive for congestion and dental problem.    Eyes: Negative.    Respiratory: Negative.     Cardiovascular: Negative.    Gastrointestinal:  Positive for abdominal distention, diarrhea and nausea. Negative for blood in stool and vomiting.   Endocrine: Negative.    Genitourinary: Negative.    Musculoskeletal:  Positive for arthralgias.   Skin: Negative.    Allergic/Immunologic: Negative.    Neurological:  Positive for dizziness. " Negative for syncope and weakness.   Hematological: Negative.    Psychiatric/Behavioral:  The patient is nervous/anxious.    All other systems reviewed and are negative.      Objective:     Vitals:    10/25/23 1215   BP: (!) 148/102   Pulse: (!) 122         Allergies:     Allergies   Allergen Reactions    Codeine Other     Abdominal pain          Medications:     Current Outpatient Medications   Medication Instructions    albuterol 108 (90 Base) MCG/ACT inhaler 1-2 puffs, inhalation, Every 4-6 hours as needed    benzonatate (TESSALON) 200 mg, oral, 3 times daily PRN, Do not crush or chew.    cyclobenzaprine (Flexeril) 5 mg tablet     dexAMETHasone (Decadron) 4 mg tablet     lisinopril 20 mg tablet TAKE 1 TABLET BY MOUTH ONE TIME DAILY.    LORazepam (ATIVAN) 0.5 mg, oral, Every 8 hours PRN    meclizine (ANTIVERT) 12.5-25 mg, oral, 3 times daily PRN    metFORMIN (Glucophage) 500 mg tablet TAKE 1 TABLET BY MOUTH TWO TIMES A DAY.    metoprolol succinate XL (TOPROL-XL) 25 mg, oral, Daily, Do not crush or chew.    NIFEdipine ER (ADALAT CC) 30 mg, oral, Daily before breakfast, Do not crush, chew, or split.    prochlorperazine (Compazine) 10 mg tablet TAKE 1 TABLET BY MOUTH EVERY 6 HOURS AS NEEDED FOR NAUSEA AND VOMITING WITH CHEMOTHERAPY    triamcinolone (Kenalog) 0.1 % cream APPLY TOPICALLY TO AFFECTED AREA 2 TIMES A DAY AS DIRECTED    valACYclovir (Valtrex) 1 gram tablet 1 tablet, oral, 2 times daily PRN    Votrient 200 mg tablet        Physical Examination:   GENERAL:  Well developed, well nourished, in no acute distress.  HEENT: NC AT, EOMI with anicteric sclera, dry membranes  NECK:  Supple, no JVD, no bruit.  CHEST:  Symmetric and nontender.  LUNGS:  Clear to auscultation bilaterally, normal respiratory effort.  HEART:  PMI is nonpalpable. RR and tachycardic, normal S1 and S2, no S3. Soft YAHIR USB, no diastolic murmur or S3.   ABDOMEN: Soft, NT, ND without palpable organomegaly or bruits  EXTREMITIES:  Warm with good  "color, no clubbing or cyanosis.  There is no edema noted.  PERIPHERAL VASCULAR:  Pulses present and equally palpable; 2+ throughout.  MUSCULOSKELETAL:   NEURO/PSYCH:  Alert and oriented times three with approppriate behavior and responses. Nonfocal motor examination with normal gait and ambulation  Lymph: No significant palpable lymphadenopathy  Skin: no rash or lesions on exposed skin or reported.    Lab:     CBC:   Lab Results   Component Value Date    WBC 17.2 (H) 10/17/2023    RBC 5.55 (H) 10/17/2023    HGB 15.0 10/17/2023    HCT 49.5 (H) 10/17/2023     10/17/2023        CMP:    Lab Results   Component Value Date     10/17/2023    K 4.8 10/17/2023     10/17/2023    CO2 21 10/17/2023    BUN 12 10/17/2023    CREATININE 0.72 10/17/2023    GLUCOSE 127 (H) 10/17/2023    CALCIUM 10.7 (H) 10/17/2023       Magnesium:    Lab Results   Component Value Date    MG 1.73 10/17/2022       Lipid Profile:    No results found for: \"CHLPL\", \"TRIG\", \"HDL\", \"LDLCALC\", \"LDLDIRECT\"    TSH:    Lab Results   Component Value Date    TSH 1.73 03/31/2022       BNP:   Lab Results   Component Value Date    BNP 8 06/01/2021        PT/INR:    Lab Results   Component Value Date    PROTIME 10.4 07/08/2023    INR 0.9 07/08/2023       HgBA1c:    Lab Results   Component Value Date    HGBA1C 7.4 (A) 03/31/2022       BMP:  Lab Results   Component Value Date     10/17/2023     09/19/2023     08/23/2023     08/12/2023    K 4.8 10/17/2023    K 4.5 09/19/2023    K 4.6 08/23/2023    K 5.1 08/12/2023     10/17/2023     09/19/2023     08/23/2023     08/12/2023    CO2 21 10/17/2023    CO2 22 09/19/2023    CO2 28 08/23/2023    CO2 24 08/12/2023    BUN 12 10/17/2023    BUN 10 09/19/2023    BUN 12 08/23/2023    BUN 16 08/12/2023    CREATININE 0.72 10/17/2023    CREATININE 0.71 09/19/2023    CREATININE 0.69 08/23/2023    CREATININE 0.60 08/12/2023       Cardiac Enzymes:    No results found for: " "\"TROPHS\"    Hepatic Function Panel:    Lab Results   Component Value Date    ALKPHOS 155 (H) 10/17/2023    ALT 78 (H) 10/17/2023    AST 52 (H) 10/17/2023    PROT 7.6 10/17/2023    BILITOT 1.3 (H) 10/17/2023    BILIDIR 0.1 04/06/2020         Diagnostic Studies:     No echocardiogram results found for the past 12 months  Echo in 2021 with normal LVEF of 60-65%  No nuclear medicine results found for the past 12 months    No valid procedures specified.    EKG:   No results found for: \"EKG\"  EKG today - sinus tachycardia at 138 bpm with NS ST and T abn. No change from prior EKG's   Radiology:     Onco-Echo Limited (Strain And 3D)    (Results Pending)         ASSESSMENT     Problem List Items Addressed This Visit       Primary hypertension - Primary    Relevant Medications    NIFEdipine ER (Adalat CC) 30 mg 24 hr tablet    metoprolol succinate XL (Toprol-XL) 25 mg 24 hr tablet    Inappropriate sinus tachycardia    Relevant Medications    NIFEdipine ER (Adalat CC) 30 mg 24 hr tablet    metoprolol succinate XL (Toprol-XL) 25 mg 24 hr tablet    Other Relevant Orders    Onco-Echo Limited (Strain And 3D)    Metastatic cancer to lung (CMS/HCC)    RESOLVED: Lower extremity edema    Other secondary hypertension    Encounter for monitoring cardiotoxic drug therapy    Relevant Orders    Onco-Echo Limited (Strain And 3D)    Dehydration    Diarrhea       PLAN     1.  Primary and secondary hypertension.  The patient has baseline hypertension and now with her new medication has secondary hypertension as well.  The problem is that she has baseline inappropriate sinus tachycardia that is worsened both by her dehydration and the medications used to treat her secondary hypertension.  As she is feeling very poorly on the higher dose of nifedipine we recommend she go back to 30 mg a day, hydrate herself better, add metoprolol succinate 25 mg a day and monitor her daily blood pressures at noon.  We have rearranged her medication schedule so " she will take her lisinopril in the morning with metoprolol succinate and nifedipine XL with her evening meal.  She will be in contact well last with her heart rate and blood pressure readings so we can continue to titrate her medications as needed.  Hopefully her heart rates will come down she describes having side effects from higher doses of beta-blockers when given them in the past.  Instead of increasing the nifedipine we have the option to increase her metoprolol were to increase the lisinopril dose.  We can also give her lisinopril dose twice daily if she has problems after taking it.    2.  Leiomyosarcoma with history of cardiotoxic drug therapy.  We will dose of doxorubicin was 460 mg per metered squared but this was lysosomal with protection.  We have requested a follow-up echocardiogram.  Her last echocardiogram a year ago was normal.    3.  Sinus tachycardia.  As described above this is likely multifactorial and longstanding.  The patient with the use of her Apple Watch notes that her heart rates at rest are usually less than 100.  Our goal is only to achieve an average heart rate over 24 hours of less than 100 bpm.  TSH has been requested to make sure there is no changes there and we will have the patient increase her hydration as well.  We will see what she is finding on her Apple Watch in terms of her resting heart rates and average heart rates.  If necessary a 24-hour Holter monitor can be performed once we have her optimized on her medications.    4.  Diarrhea and dehydration.  We have talked to a little bit today about how much she should be drinking and compensating for her diarrhea.  She is to receive IV hydration today believe this will help with her heart rates.    We will see the patient in follow-up in 2 weeks but she will be in contact with us about her heart rate blood pressure readings in the interim for us to adjust medications.  For the patient's convenience she will follow-up in the  [place] office location.

## 2023-10-30 DIAGNOSIS — I10 PRIMARY HYPERTENSION: ICD-10-CM

## 2023-10-30 RX ORDER — NIFEDIPINE 30 MG/1
30 TABLET, FILM COATED, EXTENDED RELEASE ORAL
Qty: 90 TABLET | Refills: 3 | Status: SHIPPED | OUTPATIENT
Start: 2023-10-30 | End: 2024-01-23 | Stop reason: ALTCHOICE

## 2023-10-31 ENCOUNTER — TELEPHONE (OUTPATIENT)
Dept: PRIMARY CARE | Facility: CLINIC | Age: 58
End: 2023-10-31
Payer: COMMERCIAL

## 2023-10-31 DIAGNOSIS — B00.9 HERPES: Primary | ICD-10-CM

## 2023-10-31 RX ORDER — VALACYCLOVIR HYDROCHLORIDE 1 G/1
1000 TABLET, FILM COATED ORAL 3 TIMES DAILY
Qty: 21 TABLET | Refills: 0 | Status: SHIPPED | OUTPATIENT
Start: 2023-10-31 | End: 2023-11-07

## 2023-11-02 ENCOUNTER — SPECIALTY PHARMACY (OUTPATIENT)
Dept: PHARMACY | Facility: CLINIC | Age: 58
End: 2023-11-02

## 2023-11-02 ENCOUNTER — TELEPHONE (OUTPATIENT)
Dept: HEMATOLOGY/ONCOLOGY | Facility: HOSPITAL | Age: 58
End: 2023-11-02
Payer: COMMERCIAL

## 2023-11-02 ENCOUNTER — PHARMACY VISIT (OUTPATIENT)
Dept: PHARMACY | Facility: CLINIC | Age: 58
End: 2023-11-02
Payer: COMMERCIAL

## 2023-11-02 DIAGNOSIS — C49.9 LEIOMYOSARCOMA (MULTI): Primary | ICD-10-CM

## 2023-11-02 PROCEDURE — RXMED WILLOW AMBULATORY MEDICATION CHARGE

## 2023-11-02 RX ORDER — PAZOPANIB HYDROCHLORIDE 200 MG/1
800 TABLET, FILM COATED ORAL DAILY
Qty: 360 TABLET | Refills: 3 | Status: SHIPPED | OUTPATIENT
Start: 2023-11-02 | End: 2023-11-15

## 2023-11-02 NOTE — TELEPHONE ENCOUNTER
Rupert emailed back from  [facility] that they are now getting an insurance rejection on this medication. He will keep me updated.

## 2023-11-02 NOTE — TELEPHONE ENCOUNTER
Sent email to Rupert at  [facility] to see the soonest patient can get medication since on last day of pills.

## 2023-11-02 NOTE — TELEPHONE ENCOUNTER
Rupert from  [facility] called and wanted to know if pt. Is still on Votrient or if it had been discontinued. I told him I would check and call him back. If still on medication will need a new RX.

## 2023-11-02 NOTE — TELEPHONE ENCOUNTER
Called Rupert back at  [facility] and L/M on direct line that I put a request in to the team to send a new RX over to  [facility]. Pt is still on Votrient since called this morning and requested a refill. Pt only had one day left of medication.

## 2023-11-03 NOTE — TELEPHONE ENCOUNTER
I called patient to let her know Rupert from  [facility] said they did the override on insurance for the Votrient and now getting the copay card to go through and he will email me when ready for . I let Rupert know not to mail Rx this will be for pick-up.

## 2023-11-03 NOTE — TELEPHONE ENCOUNTER
Patient called the On-Call Nursge Triage line on Sat.  Nmov. 4, 2023  patient is very upset because she is out of her VOTTRIENT, she has been calling everyday for the last 3 days for this medication according to the patient    RX was sent to Dr. Walsh's Team per the phone nurse from 11/2/23, then Roxann SOLANO CNP was going to call the [facility] on 11/2/23 according to the notes. In the EMR on 11/2/23   Not sure if this is an insurance issue or not, reason for the delay.     The patient was given the phone # for [facility], they are closed today, however, there is an On-Call Pharmacist , patient instructed  to request to speak to the On-Call Pharmacist, however,   if she still had questions or concerns to call back, call-back number given [phone]. 100% understanding by the patient.  Yanick, RN            Called patient back to let her know that Rupert from  [facility] pharmacy emailed me that her medication was ready for delivery today. Pt said she did get a call from Olga at pharmacy telling her delivery would be today. Pt agreed to same day delivery and not .

## 2023-11-07 NOTE — PROGRESS NOTES
Cardiology Subsequent Encounter Clinic Note  Name: Angie Tobin  MRN: [id number]  : 1965    CC: History of treatment with cardiotoxic chemotherapy    Active Issues:  Angie Tobin is a 58 y.o. female with a medical history of soft tissue sarcoma (diagnosed 2019). Underwent hysterectomy and bilateral oophorectomy 2019.     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  Radiation: TBD      Risk Factors: HTN   Social Hx: Never smoker  Family Hx: DM      CT Chest 2021 No coronary artery atherosclerotic calcifications.    Since her last visit patient underwent lobe resection/lobectomy 2022.  2023 CT scan were suggestive of worsening liver metastatic that later on was also noted in the lungs.  She has since then been initiated on pazopanib 800 mg daily.    Encounter for cardiotoxic chemotherapy  -Serial echocardiograms have shown preserved ejection fraction. Last echocardiogram performed 2021.     Inappropriate sinus tachycardia  -Review of patient's chart shows that her heart rate has been chronically 120-140 bpm for the past 4 or 5 years  -Review of EKGs shows sinus rhythm; no evidence of atrial/ventricular malignant arrhythmias     Overall patient denies any chest discomfort or shortness of breath. Denies any orthopnea/PND/lower extremity edema. Denies any dizziness lightheadedness. Continues to have issues with diarrhea secondary to her chemotherapy medication.  Reviewed blood pressure readings at home and they range between 130-140 systolic.      Past Medical History  Past Medical History:   Diagnosis Date    Benign essential HTN 2023    Candidiasis, unspecified 2022    Antibiotic-induced yeast infection    Elevated blood-pressure reading, without diagnosis of hypertension 2017    Elevated BP without diagnosis of hypertension    Other conditions influencing health  status 09/27/2017    History of cough    Personal history of diseases of the blood and blood-forming organs and certain disorders involving the immune mechanism 06/06/2016    History of leukocytosis    Personal history of other benign neoplasm 05/08/2019    History of other benign neoplasm    Personal history of other diseases of the musculoskeletal system and connective tissue 04/18/2018    History of low back pain    Personal history of other diseases of the nervous system and sense organs 10/03/2016    History of neuropathy    Personal history of other diseases of the respiratory system 08/31/2017    History of acute bronchitis    Personal history of other diseases of the respiratory system 01/24/2022    History of acute sinusitis    Personal history of other specified conditions 09/16/2020    History of weight gain    Right lower quadrant abdominal tenderness 03/04/2019    RLQ abdominal tenderness    Right upper quadrant pain 03/13/2015    Abdominal pain, RUQ (right upper quadrant)    Toenail fungus 04/19/2023       Past Surgical History  Past Surgical History:   Procedure Laterality Date    BREAST SURGERY  05/30/2013    Breast Surgery Reduction Procedure    CHOLECYSTECTOMY  12/02/2014    Cholecystectomy Laparoscopic    CT GUIDED PERCUTANEOUS BIOPSY BONE  10/24/2016    CT GUIDED PERCUTANEOUS BIOPSY BONE 10/24/2016 [facility]    CT GUIDED PERCUTANEOUS BIOPSY LUNG  8/5/2022    CT GUIDED PERCUTANEOUS BIOPSY LUNG 8/5/2022 [facility]    ESOPHAGOGASTRODUODENOSCOPY  04/16/2013    Diagnostic Esophagogastroduodenoscopy    OTHER SURGICAL HISTORY  04/19/2013    Anal Fistulotomy (Subcutaneous)    OTHER SURGICAL HISTORY  10/25/2022    Lung wedge resection    OTHER SURGICAL HISTORY  10/25/2022    Lung lobectomy    OTHER SURGICAL HISTORY  05/08/2019    Resection    OTHER SURGICAL HISTORY  05/30/2013    Perineal Transplant Of Anovaginal Fistula    US GUIDED NEEDLE LIVER BIOPSY  7/11/2023    US GUIDED NEEDLE LIVER BIOPSY  2023 [facility]       Medications  Current Outpatient Medications on File Prior to Visit   Medication Sig Dispense Refill    albuterol 108 (90 Base) MCG/ACT inhaler Inhale 1-2 puffs. Every 4-6 hours as needed      benzonatate (Tessalon) 200 mg capsule Take 1 capsule (200 mg) by mouth 3 times a day as needed for cough. Do not crush or chew. 42 capsule 0    cyclobenzaprine (Flexeril) 5 mg tablet       dexAMETHasone (Decadron) 4 mg tablet       lisinopril 20 mg tablet TAKE 1 TABLET BY MOUTH ONE TIME DAILY. 90 tablet 1    LORazepam (Ativan) 0.5 mg tablet Take 1 tablet (0.5 mg) by mouth every 8 hours if needed (agitation).      meclizine (Antivert) 12.5 mg tablet Take 1-2 tablets (12.5-25 mg) by mouth 3 times a day as needed (vertigo).      metFORMIN (Glucophage) 500 mg tablet TAKE 1 TABLET BY MOUTH TWO TIMES A DAY. 180 tablet 1    metoprolol succinate XL (Toprol-XL) 25 mg 24 hr tablet Take 1 tablet (25 mg) by mouth once daily. Do not crush or chew. 90 tablet 3    NIFEdipine ER (Adalat CC) 30 mg 24 hr tablet Take 1 tablet (30 mg) by mouth once daily in the morning. Take before meals. Do not crush, chew, or split. 90 tablet 3    prochlorperazine (Compazine) 10 mg tablet TAKE 1 TABLET BY MOUTH EVERY 6 HOURS AS NEEDED FOR NAUSEA AND VOMITING WITH CHEMOTHERAPY 56 tablet 2    triamcinolone (Kenalog) 0.1 % cream APPLY TOPICALLY TO AFFECTED AREA 2 TIMES A DAY AS DIRECTED 454 g 2    Votrient 200 mg tablet Take 4 tablets (800 mg total) by mouth once daily.  Take on an empty stomach, at least 1 hour before or 2 hours after a meal. 360 tablet 3    [] valACYclovir (Valtrex) 1 gram tablet Take 1 tablet (1,000 mg) by mouth 3 times a day for 7 days. 21 tablet 0    [DISCONTINUED] Votrient 200 mg tablet        No current facility-administered medications on file prior to visit.       Allergies  Allergies   Allergen Reactions    Codeine Other     Abdominal pain       Social History  Social History     Tobacco Use    Smoking  status: Never    Smokeless tobacco: Never       Family History  Family History   Problem Relation Name Age of Onset    Other (atrial flutter) Mother      Diabetes Mother      Leukemia Father      Liver cancer Father      Ovarian cancer Sister      Hypothyroidism Brother      Breast cancer Paternal Grandmother      No Known Problems Sibling         Physical Examination  Vitals: BP (!) 144/98   Pulse 105   Temp 36.2 °C (97.2 °F) (Temporal)   Resp 22   Wt 123 kg (270 lb 11.6 oz)   SpO2 98%   BMI 39.29 kg/m²   General: awake, alert and oriented. No acute distress.   Skin: Skin is warm, dry and intact without rashes or lesions. Appropriate color for ethnicity. Nail beds pink with no cyanosis or clubbing  HEENT: normocephalic, atraumatic; conjunctivae are clear without exudates or hemorrhage. Sclera is non-icteric. Eyelids are normal in appearance without swelling or lesions. Hearing intact. Nares are patent bilaterally. Moist mucous membranes.   Cardiovascular: Regular. No murmurs, gallops, or rubs are auscultated. S1 and S2 are heard and are of normal intensity. No JVD, no carotid bruits  Respiratory: Thorax symmetric. CTAB, breath sounds vesicular. No crackles, wheezes or ronchi.   Gastrointestinal: soft, non-distended, BS + x 4  Genitourinary: exam deferred  Musculoskeletal: moves all extremities  Extremities: pulses palpable bilaterally; no swelling or erythema; no edema  Neurological: alert & oriented x 3; no focal deficits  Psychiatric: appropriate mood and affect       Labs/Imaging/Procedures    Lab Results   Component Value Date    HGB 15.7 10/25/2023    HGB 15.0 10/17/2023    HGB 14.5 09/19/2023    HGB 11.4 (L) 08/23/2023    HGB 11.2 (L) 08/12/2023     10/25/2023    WBC 16.1 (H) 10/25/2023     (L) 10/25/2023    K 4.6 10/25/2023    CREATININE 0.76 10/25/2023    CREATININE 0.72 10/17/2023    CREATININE 0.71 09/19/2023    CREATININE 0.69 08/23/2023    CREATININE 0.60 08/12/2023    BUN 13  10/25/2023    CALCIUM 11.2 (H) 10/25/2023    INR 0.9 07/08/2023    BNP 6 10/25/2023    TROPHS <3 10/25/2023     No echocardiogram results found for the past 12 months  MR pelvis w and wo IV contrast  Narrative: Interpreted By:  KRYSTAL MINAYA MD  MRN: [id number]  Patient Name: MAGAN SCHULTZ     STUDY:  MRI ABDOMEN WO/W CONTRAST;  8/21/2023 12:19 pm     INDICATION:  Leiomyosarcoma metastatic to the lung, currently on chemotherapy  C78.00: Metastatic sarcoma to lung.     COMPARISON:  CT examinations as far back as March 4, 2019 including June 2, 2020  CT chest abdomen and pelvis and August 21, 2020 2 chest CT. March 4, 2019, and June 16 2023 MRI examination. Comparison measurements are  with June 16, 2023 MRI     ACCESSION NUMBER(S):  [id number]     ORDERING CLINICIAN:  JANE CHO     TECHNIQUE:  MRI abdomen and pelvis:  Multiplanar magnetic resonance images of the  abdomen were obtained including the following sequences; T2-weighted  SSFSE with and without fat saturation, T1-weighted GRE in/opposed  phase, DWI, fat saturated 3D-T1w GRE pre and dynamically post  contrast.  20 ml of  Gadolinium contrast agent Dotarem were administered  intravenously without immediate complication.     FINDINGS:  LIVER:  Enlarged measuring 25 cm length compared with 23 cm. The liver  parenchyma demonstrates diffusely decreased signal intensity on T1w  opposed phase imaging compared to T1w inphase imaging consistent with  fatty changes.  Redemonstrated multifocal enhancing right and left  lobe liver lesions compatible with metastases which demonstrate  similar distribution. Representative lesions measured on arterial  phase images series 30 include:  4 cm transverse diameter liver dome lesion image 24 compared with 3.7  cm.  2.8 cm posterior right lobe lesion image 53 compares with 2.2 cm.  Adjacent peripheral lateral right lobe lesion measures 2.3 cm image  56 compared with 1.9 cm.  Posteroinferior right lobe lesion measures  2.3 cm image 73 compared  with 2.0 cm.     BILE DUCTS:  No intrahepatic or extrahepatic bile duct dilatation is demonstrated.     GALLBLADDER:  Not identified     PANCREAS:  Normal signal intensity. Normal enhancement. No masses. The  pancreatic duct is normal.     SPLEEN:  Spleen measures 14.5 cm maximum diameter series 15, image 12 compared  with 12.7 cm.     ADRENAL GLANDS:  Within normal limits.     KIDNEYS:  Unchanged small bilateral cortical cysts measuring up to 1 cm  including evident clustered tiny cyst lateral midportion right  kidney. No hydronephrosis.     LYMPH NODES:  No lymphadenopathy.     ABDOMINAL VESSELS:  No detected recurrent caval region mass. The aorta and IVC are normal  caliber. The principal central vessels are patent.     BOWEL:  Normal caliber without detected inflammatory change or mass. Mild  colonic diverticulosis.     PERITONEUM/RETROPERITONEUM:  No detected ascites or mass. Again seen is a ventral wall hernia  containing portion of bowel without features of obstruction.     BLADDER: Unremarkable     REPRODUCTIVE ORGANS: No detected pelvic mass. Uterus and ovaries not  identified.     BONES AND LOWER THORAX:  No focal suspicious enhancing osseous lesion is identified. There is  T2 hypointense marrow which may be treatment related. No acute  detected abnormality visualized portion of the chest.     Impression: 1.  Multifocal liver lesions are mildly measurably increased.  2. Mild increased hepatosplenomegaly.  3. Similar hepatic steatosis.  4. Unchanged small renal cortical cysts.  5. Mild colonic diverticulosis.     CT chest wo IV contrast  Addendum: Interpreted By:  IKE MORALEZ MD   Addendum Begins   MRN: [id number]   Patient Name: MAGAN SCHULTZ       ADDENDUM:   Notifi message was left for JANE CHO regarding this exam   by Dr. Moralez on 8/22/2023 at approximately 17:01 hours.     Addendum Ends  Narrative: MRN: [id number]  Patient Name: MAGAN SCHULTZ     STUDY:  CT  CHEST WO CONTRAST;  8/21/2023 11:48 am     INDICATION:  Leiomyosarcoma metastatic to the lung, currently on chemotherapy  C78.00: Metastatic sarcoma to lung.     COMPARISON:  06/02/2023     ACCESSION NUMBER(S):  [id number]     ORDERING CLINICIAN:  JANE CHO     TECHNIQUE:  CT of the chest was performed. Contiguous axial images were obtained  at 3 mm slice thickness through the chest, abdomen and pelvis.  Coronal and sagittal reconstructions at 3 mm slice thickness were  performed.  No intravenous contrast was administered; positive oral  contrast was given.     FINDINGS:  Please note that the study is limited without intravenous contrast.     CHEST:     LUNG/PLEURA/LARGE AIRWAYS:  Trachea and central airways are patent.  Tiny nonspecific nodules  within bilateral lobes measuring up to 4.5 mm in size, medial aspect  of the right upper lobe, best seen on axial image 121/302 previously  measured 3.3 mm in greatest dimension and up to 4.5 mm in size,  medial aspect of the left upper lobe, axial image 83/302, previously  measured 2.5 mm in size. Additional smaller nodules are seen  bilaterally.     VESSELS:  The imaged thoracic aorta is normal in caliber. No evidence of  aneurysm. Main pulmonary artery is normal in caliber.  Moderate  coronary wall calcifications. Study not optimized for evaluation of  coronary arteries.     HEART:  The heart is normal in size.  No pericardial effusion.     MEDIASTINUM AND NELA:  No pathologically enlarged lymph nodes in the mediastinum or the  nela. Nonspecific borderline enlarged lymph node in the left axilla  with fatty hilum measuring to 1.3 cm in diameter stable since prior.     CHEST WALL AND LOWER NECK:  No pathologically enlarged lymph nodes in the supraclavicular space.  Stable appearance of lucent lesion involving the T2 vertebra. Stable  sclerotic foci within the T4 and T12 vertebra.     ABDOMEN:     Limited evaluation of visualized upper abdominal  structures  demonstrate fat containing subxiphoid hernia within the midline  ventral upper abdomen which is incompletely included in field of view  measuring up to 13.3 cm in diameter. There are multiple liver lesions  seen which are incompletely characterized in this unenhanced exam but  given patient's history of sarcoma are suggestive of metastatic liver  disease. Largest within the dome of the liver measures approximately  4.8 cm in diameter. The spleen is enlarged in size measuring up to  16.2 cm in greatest AP dimension.     Impression: 1. Slight interval increase in size of dominant bilateral  subcentimeter lung nodules as above.  2. Interval progression of metastatic liver disease. Correlation with  MRI findings of the liver recommended.  3. Additional detailed findings as above.      Impression  Angie Tobin is a 58 y.o. female with a medical history of soft tissue sarcoma (diagnosed 4/2019). Underwent hysterectomy and bilateral oophorectomy 6/2019.     Cancer Diagnosis: Leiomyosarcoma: high grade (metastatic to the lung)  Treatment: Has now completed doxorubicin based chemotherapy     Risk Factors: HTN   Social Hx: Never smoker  Family Hx: DM      CT Chest 4/12/2021 No coronary artery atherosclerotic calcifications.     Encounter for cardiotoxic chemotherapy  -Serial echocardiograms have shown preserved ejection fraction. Last echocardiogram performed 12/27/2021.  -We will get repeat echocardiogram now; will need annual echocardiograms till 2025 as part of surveillance for anthracycline induced cardiomyopathy.  -Reviewed EKG from October 25, 2023.  Currently on pazopanib; blood pressure appears to be well controlled; QTc normal on EKG.     Inappropriate sinus tachycardia  -her heart rate has been chronically 120-140 bpm  -Review of EKGs shows sinus rhythm; no evidence of atrial/ventricular malignant arrhythmias     Plan:  -For inappropriate sinus tachycardia: No treatment required at this time.  -Echo  now; continue annual surveillance as mentioned above  -We will get a lipid panel  -RTC 1 year      Darin Paul MD  Advanced Heart Failure/Transplant Cardiology  Cardio-Oncology  [facility]

## 2023-11-08 ENCOUNTER — OFFICE VISIT (OUTPATIENT)
Dept: CARDIOLOGY | Facility: CLINIC | Age: 58
End: 2023-11-08
Payer: COMMERCIAL

## 2023-11-08 VITALS
BODY MASS INDEX: 39.29 KG/M2 | HEART RATE: 105 BPM | TEMPERATURE: 97.2 F | OXYGEN SATURATION: 98 % | RESPIRATION RATE: 22 BRPM | SYSTOLIC BLOOD PRESSURE: 144 MMHG | DIASTOLIC BLOOD PRESSURE: 98 MMHG | WEIGHT: 270.73 LBS

## 2023-11-08 DIAGNOSIS — Z79.899 ENCOUNTER FOR MONITORING CARDIOTOXIC DRUG THERAPY: Primary | ICD-10-CM

## 2023-11-08 DIAGNOSIS — Z51.81 ENCOUNTER FOR MONITORING CARDIOTOXIC DRUG THERAPY: Primary | ICD-10-CM

## 2023-11-08 DIAGNOSIS — I47.11 INAPPROPRIATE SINUS TACHYCARDIA (CMS-HCC): ICD-10-CM

## 2023-11-08 DIAGNOSIS — E78.00 PURE HYPERCHOLESTEROLEMIA: ICD-10-CM

## 2023-11-08 PROCEDURE — 99214 OFFICE O/P EST MOD 30 MIN: CPT | Performed by: STUDENT IN AN ORGANIZED HEALTH CARE EDUCATION/TRAINING PROGRAM

## 2023-11-08 PROCEDURE — 3080F DIAST BP >= 90 MM HG: CPT | Performed by: STUDENT IN AN ORGANIZED HEALTH CARE EDUCATION/TRAINING PROGRAM

## 2023-11-08 PROCEDURE — 3077F SYST BP >= 140 MM HG: CPT | Performed by: STUDENT IN AN ORGANIZED HEALTH CARE EDUCATION/TRAINING PROGRAM

## 2023-11-08 PROCEDURE — 1036F TOBACCO NON-USER: CPT | Performed by: STUDENT IN AN ORGANIZED HEALTH CARE EDUCATION/TRAINING PROGRAM

## 2023-11-08 NOTE — PATIENT INSTRUCTIONS
Schedule echo next available - we will call you with results.   Fasting lipid panel   Echo and follow up in one year.

## 2023-11-09 ENCOUNTER — TELEPHONE (OUTPATIENT)
Dept: ADMISSION | Facility: HOSPITAL | Age: 58
End: 2023-11-09

## 2023-11-09 ENCOUNTER — LAB (OUTPATIENT)
Dept: LAB | Facility: LAB | Age: 58
End: 2023-11-09
Payer: COMMERCIAL

## 2023-11-09 ENCOUNTER — ANCILLARY PROCEDURE (OUTPATIENT)
Dept: RADIOLOGY | Facility: CLINIC | Age: 58
End: 2023-11-09
Payer: COMMERCIAL

## 2023-11-09 ENCOUNTER — APPOINTMENT (OUTPATIENT)
Dept: RADIOLOGY | Facility: CLINIC | Age: 58
End: 2023-11-09
Payer: COMMERCIAL

## 2023-11-09 DIAGNOSIS — Z79.899 ENCOUNTER FOR LONG-TERM CURRENT USE OF HIGH RISK MEDICATION: ICD-10-CM

## 2023-11-09 DIAGNOSIS — I15.8 OTHER SECONDARY HYPERTENSION: ICD-10-CM

## 2023-11-09 DIAGNOSIS — C49.9 LEIOMYOSARCOMA (MULTI): ICD-10-CM

## 2023-11-09 DIAGNOSIS — E07.9 THYROID DISORDER: ICD-10-CM

## 2023-11-09 DIAGNOSIS — C78.00 MALIGNANT NEOPLASM METASTATIC TO LUNG, UNSPECIFIED LATERALITY (MULTI): Primary | ICD-10-CM

## 2023-11-09 LAB
ALBUMIN SERPL BCP-MCNC: 4.5 G/DL (ref 3.4–5)
ALP SERPL-CCNC: 121 U/L (ref 33–110)
ALT SERPL W P-5'-P-CCNC: 81 U/L (ref 7–45)
ANION GAP SERPL CALC-SCNC: 16 MMOL/L (ref 10–20)
AST SERPL W P-5'-P-CCNC: 50 U/L (ref 9–39)
BASOPHILS # BLD AUTO: 0.11 X10*3/UL (ref 0–0.1)
BASOPHILS NFR BLD AUTO: 0.7 %
BILIRUB SERPL-MCNC: 1.1 MG/DL (ref 0–1.2)
BUN SERPL-MCNC: 14 MG/DL (ref 6–23)
CALCIUM SERPL-MCNC: 11.1 MG/DL (ref 8.6–10.6)
CHLORIDE SERPL-SCNC: 104 MMOL/L (ref 98–107)
CO2 SERPL-SCNC: 25 MMOL/L (ref 21–32)
CREAT SERPL-MCNC: 0.75 MG/DL (ref 0.5–1.05)
EOSINOPHIL # BLD AUTO: 0.35 X10*3/UL (ref 0–0.7)
EOSINOPHIL NFR BLD AUTO: 2.1 %
ERYTHROCYTE [DISTWIDTH] IN BLOOD BY AUTOMATED COUNT: 16 % (ref 11.5–14.5)
GFR SERPL CREATININE-BSD FRML MDRD: >90 ML/MIN/1.73M*2
GGT SERPL-CCNC: 113 U/L (ref 5–55)
GLUCOSE SERPL-MCNC: 114 MG/DL (ref 74–99)
HCT VFR BLD AUTO: 49.5 % (ref 36–46)
HGB BLD-MCNC: 15.3 G/DL (ref 12–16)
IMM GRANULOCYTES # BLD AUTO: 0.06 X10*3/UL (ref 0–0.7)
IMM GRANULOCYTES NFR BLD AUTO: 0.4 % (ref 0–0.9)
LDH SERPL L TO P-CCNC: 178 U/L (ref 84–246)
LYMPHOCYTES # BLD AUTO: 8.28 X10*3/UL (ref 1.2–4.8)
LYMPHOCYTES NFR BLD AUTO: 50.5 %
MCH RBC QN AUTO: 26.9 PG (ref 26–34)
MCHC RBC AUTO-ENTMCNC: 30.9 G/DL (ref 32–36)
MCV RBC AUTO: 87 FL (ref 80–100)
MONOCYTES # BLD AUTO: 0.61 X10*3/UL (ref 0.1–1)
MONOCYTES NFR BLD AUTO: 3.7 %
NEUTROPHILS # BLD AUTO: 6.97 X10*3/UL (ref 1.2–7.7)
NEUTROPHILS NFR BLD AUTO: 42.6 %
NRBC BLD-RTO: 0 /100 WBCS (ref 0–0)
PLATELET # BLD AUTO: 223 X10*3/UL (ref 150–450)
POTASSIUM SERPL-SCNC: 4.8 MMOL/L (ref 3.5–5.3)
PROT SERPL-MCNC: 7.3 G/DL (ref 6.4–8.2)
RBC # BLD AUTO: 5.68 X10*6/UL (ref 4–5.2)
RBC MORPH BLD: NORMAL
SODIUM SERPL-SCNC: 140 MMOL/L (ref 136–145)
T4 FREE SERPL-MCNC: 0.95 NG/DL (ref 0.78–1.48)
TSH SERPL-ACNC: 9.96 MIU/L (ref 0.44–3.98)
WBC # BLD AUTO: 16.4 X10*3/UL (ref 4.4–11.3)

## 2023-11-09 PROCEDURE — 71250 CT THORAX DX C-: CPT | Performed by: RADIOLOGY

## 2023-11-09 PROCEDURE — 85025 COMPLETE CBC W/AUTO DIFF WBC: CPT

## 2023-11-09 PROCEDURE — 84443 ASSAY THYROID STIM HORMONE: CPT

## 2023-11-09 PROCEDURE — 71250 CT THORAX DX C-: CPT

## 2023-11-09 PROCEDURE — 83615 LACTATE (LD) (LDH) ENZYME: CPT

## 2023-11-09 PROCEDURE — 82977 ASSAY OF GGT: CPT

## 2023-11-09 PROCEDURE — 84439 ASSAY OF FREE THYROXINE: CPT

## 2023-11-09 PROCEDURE — 36415 COLL VENOUS BLD VENIPUNCTURE: CPT

## 2023-11-09 PROCEDURE — 80053 COMPREHEN METABOLIC PANEL: CPT

## 2023-11-09 NOTE — TELEPHONE ENCOUNTER
Patient left a message on the triage line that the lab is asking pt's CMP be changed to stat to they can run her labs prior to her scans today. Secure Chat sent to team to change order.

## 2023-11-13 ENCOUNTER — HOSPITAL ENCOUNTER (OUTPATIENT)
Dept: RADIOLOGY | Facility: HOSPITAL | Age: 58
Discharge: HOME | End: 2023-11-13
Payer: COMMERCIAL

## 2023-11-13 PROCEDURE — 74183 MRI ABD W/O CNTR FLWD CNTR: CPT

## 2023-11-13 PROCEDURE — 72197 MRI PELVIS W/O & W/DYE: CPT | Performed by: RADIOLOGY

## 2023-11-13 PROCEDURE — 2550000001 HC RX 255 CONTRASTS: Performed by: INTERNAL MEDICINE

## 2023-11-13 PROCEDURE — A9575 INJ GADOTERATE MEGLUMI 0.1ML: HCPCS | Performed by: INTERNAL MEDICINE

## 2023-11-13 PROCEDURE — 74183 MRI ABD W/O CNTR FLWD CNTR: CPT | Performed by: RADIOLOGY

## 2023-11-13 PROCEDURE — 72197 MRI PELVIS W/O & W/DYE: CPT

## 2023-11-13 RX ORDER — GADOTERATE MEGLUMINE 376.9 MG/ML
24 INJECTION INTRAVENOUS
Status: COMPLETED | OUTPATIENT
Start: 2023-11-13 | End: 2023-11-13

## 2023-11-13 RX ADMIN — GADOTERATE MEGLUMINE 24 ML: 376.9 INJECTION INTRAVENOUS at 08:09

## 2023-11-14 ENCOUNTER — PHARMACY VISIT (OUTPATIENT)
Dept: PHARMACY | Facility: CLINIC | Age: 58
End: 2023-11-14
Payer: COMMERCIAL

## 2023-11-14 DIAGNOSIS — I15.8 OTHER SECONDARY HYPERTENSION: Primary | ICD-10-CM

## 2023-11-14 PROCEDURE — RXMED WILLOW AMBULATORY MEDICATION CHARGE

## 2023-11-14 RX ORDER — NIFEDIPINE 30 MG/1
30 TABLET, FILM COATED, EXTENDED RELEASE ORAL DAILY
Qty: 30 TABLET | Refills: 0 | Status: SHIPPED | OUTPATIENT
Start: 2023-11-14 | End: 2023-12-12 | Stop reason: SDUPTHER

## 2023-11-14 RX ORDER — METOPROLOL SUCCINATE 25 MG/1
TABLET, EXTENDED RELEASE ORAL DAILY
Qty: 30 TABLET | Refills: 10 | OUTPATIENT
Start: 2023-10-25 | End: 2024-03-11 | Stop reason: SDUPTHER

## 2023-11-15 ENCOUNTER — PHARMACY VISIT (OUTPATIENT)
Dept: PHARMACY | Facility: CLINIC | Age: 58
End: 2023-11-15
Payer: COMMERCIAL

## 2023-11-15 ENCOUNTER — OFFICE VISIT (OUTPATIENT)
Dept: HEMATOLOGY/ONCOLOGY | Facility: CLINIC | Age: 58
End: 2023-11-15
Payer: COMMERCIAL

## 2023-11-15 VITALS
RESPIRATION RATE: 18 BRPM | OXYGEN SATURATION: 97 % | DIASTOLIC BLOOD PRESSURE: 124 MMHG | WEIGHT: 269.62 LBS | SYSTOLIC BLOOD PRESSURE: 190 MMHG | TEMPERATURE: 96.8 F | HEART RATE: 103 BPM | BODY MASS INDEX: 39.13 KG/M2

## 2023-11-15 DIAGNOSIS — C49.9 LEIOMYOSARCOMA (MULTI): ICD-10-CM

## 2023-11-15 DIAGNOSIS — I15.8 OTHER SECONDARY HYPERTENSION: ICD-10-CM

## 2023-11-15 DIAGNOSIS — Z79.899 ENCOUNTER FOR LONG-TERM CURRENT USE OF HIGH RISK MEDICATION: ICD-10-CM

## 2023-11-15 DIAGNOSIS — E07.9 THYROID DISORDER: ICD-10-CM

## 2023-11-15 PROCEDURE — 99215 OFFICE O/P EST HI 40 MIN: CPT | Performed by: INTERNAL MEDICINE

## 2023-11-15 PROCEDURE — 1036F TOBACCO NON-USER: CPT | Performed by: INTERNAL MEDICINE

## 2023-11-15 PROCEDURE — 3080F DIAST BP >= 90 MM HG: CPT | Performed by: INTERNAL MEDICINE

## 2023-11-15 PROCEDURE — RXOTC WILLOW AMBULATORY OTC CHARGE

## 2023-11-15 PROCEDURE — RXMED WILLOW AMBULATORY MEDICATION CHARGE

## 2023-11-15 PROCEDURE — 3077F SYST BP >= 140 MM HG: CPT | Performed by: INTERNAL MEDICINE

## 2023-11-15 ASSESSMENT — ENCOUNTER SYMPTOMS
ABDOMINAL PAIN: 0
CONSTIPATION: 0
DIARRHEA: 0
EXTREMITY WEAKNESS: 0
OCCASIONAL FEELINGS OF UNSTEADINESS: 0
CONFUSION: 0
TROUBLE SWALLOWING: 0
CHILLS: 0
CHEST TIGHTNESS: 0
SEIZURES: 0
HEMATURIA: 0
NERVOUS/ANXIOUS: 1
VOMITING: 0
LOSS OF SENSATION IN FEET: 0
DIFFICULTY URINATING: 0
HOT FLASHES: 0
COUGH: 0
EYE PROBLEMS: 0
BACK PAIN: 0
FREQUENCY: 0
SORE THROAT: 0
FATIGUE: 0
APPETITE CHANGE: 0
NAUSEA: 0
SHORTNESS OF BREATH: 0
DEPRESSION: 0
ADENOPATHY: 0
FLANK PAIN: 0
DIZZINESS: 0
DYSURIA: 0
SCLERAL ICTERUS: 0
HEMOPTYSIS: 0
MYALGIAS: 0
LEG SWELLING: 0
FEVER: 0
PALPITATIONS: 0

## 2023-11-15 ASSESSMENT — COLUMBIA-SUICIDE SEVERITY RATING SCALE - C-SSRS
1. IN THE PAST MONTH, HAVE YOU WISHED YOU WERE DEAD OR WISHED YOU COULD GO TO SLEEP AND NOT WAKE UP?: NO
6. HAVE YOU EVER DONE ANYTHING, STARTED TO DO ANYTHING, OR PREPARED TO DO ANYTHING TO END YOUR LIFE?: NO
2. HAVE YOU ACTUALLY HAD ANY THOUGHTS OF KILLING YOURSELF?: NO

## 2023-11-15 ASSESSMENT — PAIN SCALES - GENERAL: PAINLEVEL: 0-NO PAIN

## 2023-11-15 ASSESSMENT — PATIENT HEALTH QUESTIONNAIRE - PHQ9
SUM OF ALL RESPONSES TO PHQ9 QUESTIONS 1 AND 2: 0
1. LITTLE INTEREST OR PLEASURE IN DOING THINGS: NOT AT ALL
2. FEELING DOWN, DEPRESSED OR HOPELESS: NOT AT ALL

## 2023-11-15 ASSESSMENT — VISUAL ACUITY: OU: 1

## 2023-11-15 NOTE — PROGRESS NOTES
.Patient ID: Angie Tobin is a 58 y.o. female.  Referring Physician: No referring provider defined for this encounter.  Primary Care Provider: LUDA Russell-CNP     Chief Complaint   Patient presents with    Follow-up     Pazopanib follow up. Imaging discussion.    Sarcoma     Metastatic non-uterine leiomyosarcoma        Cancer History:   Treatment Synopsis:   Ms. Tobin is a pleasant woman who presented with RUQ pain in March 2019. Further investigations and imaging showed a large heterogeneous mass in the pancreatic  bed. Initially this was thought to be lymphoma, however biopsy showed soft tissue sarcoma. On April 22, 2019 this mass was removed by Dr. Gonzalez via surgery. 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 She was subsequently  seen by Gyn Onc and underwent hysterectomy and bilateral salpingo-oopherectomy on Danae 10, 2019. This specimen did not show any evidence of tumor. We saw her first in August 2019. Scans did not detect any tumor at that point. Her case was discussed in  the tumor board for post op RT. Since there was no focus to be seen, it was decided that RT would be deferred for the future if a recurrence happens. CT scan in April 2021 detected new solitary nodules in lung and liver. MRI done on April 23, 2021 confirmed  recurrence in the liver. Her case was discussed in the tumor board and a decision was made to pursue systemic chemotherapy. We started her on doxorubicin and dacarbazine with zinecard protection. s/p 2 cycles, CT scans shows positive response in tumor  burden. Completed 6 cycles uneventfully on 08/26/2021.     Of note, the patient also has a history of Monoclonal B-cell lymphocytosis. She was worked up in 2016 for this reason (BM Biopsy report present in the physician portal)     03/31/2022- CT scan showed a lung nodule that is 0.6cm (was inconspicuous on the previous scan). Port removed on Feb 10, 2022.   07/05/2022- CT scan  "on 7/1/22 showed that the lung nodule has enlarged to 0.9cm. We petitioned for a biopsy of this lung nodule. Interestingly, the liver lesions have disappeared.   08/16/2022- Lung biopsy completed in early august and path reads leiomyosarcoma. Case presented in tumor board on 8/19/22 and decision made to refer her to CT surgery   10/14/2022- Surgery completed- 6 wedges removed. Multiple small nodules of leiomyosarcoma and microscopic foci reported.   12/06/2022- CT scan is [facility]. Repeat staging in 3 months.  03/03/2023- CT scan showed the liver lesion to be stable.   06/02/2023- CT scan shows worsening liver lesion- MRI liver ordered on 06/16/2023 showing progressing liver mets. We petitioned for Docetaxel/Gemcitabine to start 07/17/2023.  08/21/2023- CT scan and MRI done after 2 cycles show progressive disease in the liver and in the lungs.   08/23/2023- Ordered Pazopanib.   08/30/2023- Started pazopanib at 400mg by mouth daily  09/07/2023- Increased to 800mg by mouth daily    11/13/2023- Imaging shows progressive disease in the liver.   11/15/2023- Stopped pazopanib and referred for Y-90.     Treatment History:   Systemic treatment:  1. Doxorubicin + Dacarbazine + Zinecard (Day 1-3) of 21 day of cycle.  C1- 05/10/2021 to 05/12/2021  C2- 06/01/2021 to 06/03/2021  C3- 06/22/2021 to 06/24/2021  C4- 07/13/2021 to 07/15/2021  C5- 08/03/2021 to 08/05/2021   C6- 08/24/2021 to 08/26/2021     2. Docetaxel (75mg/m2) + Gemcitabine (900mg/m2) D1, D8 of a 21 day cycle  C1- 07/17/2023   C2- 08/07/2023- Discontinued after progression noted.      3. Pazopanib   Start date 08/30/2023 at 400mg by mouth daily.   Increase to 09/07/2023 at 800mg by mouth daily.   Stopped on 11/15/23 due to progressive disease.        Subjective   Please refer to \"Notes/Cancer History\" above for complete History of present illness.     Ms Angie Tobin is doing well. The blood pressure is under good control. She wants to know of the results of the " scan.     Review of Systems:   Review of Systems   Constitutional:  Negative for appetite change, chills, fatigue and fever.   HENT:   Negative for hearing loss, lump/mass, mouth sores, sore throat and trouble swallowing.    Eyes:  Negative for eye problems and icterus.   Respiratory:  Negative for chest tightness, cough, hemoptysis and shortness of breath.    Cardiovascular:  Negative for chest pain, leg swelling and palpitations.   Gastrointestinal:  Negative for abdominal pain, constipation, diarrhea, nausea and vomiting.   Endocrine: Negative for hot flashes.   Genitourinary:  Negative for difficulty urinating, dysuria, frequency and hematuria.    Musculoskeletal:  Negative for back pain, flank pain, gait problem and myalgias.   Skin:  Negative for itching and rash.   Neurological:  Negative for dizziness, extremity weakness, gait problem and seizures.   Hematological:  Negative for adenopathy.   Psychiatric/Behavioral:  Negative for confusion and depression. The patient is nervous/anxious.          MEDICAL HISTORY  Past Medical History:   Diagnosis Date    Benign essential HTN 04/19/2023    Candidiasis, unspecified 01/20/2022    Antibiotic-induced yeast infection    Elevated blood-pressure reading, without diagnosis of hypertension 05/26/2017    Elevated BP without diagnosis of hypertension    Other conditions influencing health status 09/27/2017    History of cough    Personal history of diseases of the blood and blood-forming organs and certain disorders involving the immune mechanism 06/06/2016    History of leukocytosis    Personal history of other benign neoplasm 05/08/2019    History of other benign neoplasm    Personal history of other diseases of the musculoskeletal system and connective tissue 04/18/2018    History of low back pain    Personal history of other diseases of the nervous system and sense organs 10/03/2016    History of neuropathy    Personal history of other diseases of the respiratory  system 08/31/2017    History of acute bronchitis    Personal history of other diseases of the respiratory system 01/24/2022    History of acute sinusitis    Personal history of other specified conditions 09/16/2020    History of weight gain    Right lower quadrant abdominal tenderness 03/04/2019    RLQ abdominal tenderness    Right upper quadrant pain 03/13/2015    Abdominal pain, RUQ (right upper quadrant)    Toenail fungus 04/19/2023       FAMILY HISTORY  Family History   Problem Relation Name Age of Onset    Other (atrial flutter) Mother      Diabetes Mother      Leukemia Father      Liver cancer Father      Ovarian cancer Sister      Hypothyroidism Brother      Breast cancer Paternal Grandmother      No Known Problems Sibling         TOBACCO HISTORY  Tobacco Use: Low Risk  (11/15/2023)    Patient History     Smoking Tobacco Use: Never     Smokeless Tobacco Use: Never     Passive Exposure: Not on file       SOCIAL HISTORY  Social Connections: Not on file        Outpatient Medication Profile:  Current Outpatient Medications on File Prior to Visit   Medication Sig Dispense Refill    albuterol 108 (90 Base) MCG/ACT inhaler Inhale 1-2 puffs. Every 4-6 hours as needed      benzonatate (Tessalon) 200 mg capsule Take 1 capsule (200 mg) by mouth 3 times a day as needed for cough. Do not crush or chew. 42 capsule 0    cyclobenzaprine (Flexeril) 5 mg tablet       dexAMETHasone (Decadron) 4 mg tablet       lisinopril 20 mg tablet TAKE 1 TABLET BY MOUTH ONE TIME DAILY. 90 tablet 1    LORazepam (Ativan) 0.5 mg tablet Take 1 tablet (0.5 mg) by mouth every 8 hours if needed (agitation).      meclizine (Antivert) 12.5 mg tablet Take 1-2 tablets (12.5-25 mg) by mouth 3 times a day as needed (vertigo).      metFORMIN (Glucophage) 500 mg tablet TAKE 1 TABLET BY MOUTH TWO TIMES A DAY. 180 tablet 1    metoprolol succinate XL (Toprol-XL) 25 mg 24 hr tablet Take 1 tablet (25 mg) by mouth once daily. Do not crush or chew. 90 tablet 3     metoprolol succinate XL (Toprol-XL) 25 mg 24 hr tablet Take 1 tablet by mouth once daily. Do not crush or chew. 30 tablet 10    NIFEdipine ER (Adalat CC) 30 mg 24 hr tablet Take 1 tablet (30 mg) by mouth once daily in the morning. Take before meals. Do not crush, chew, or split. 90 tablet 3    NIFEdipine ER (Adalat CC) 30 mg 24 hr tablet Take 1 tablet (30 mg) by mouth once daily. 30 tablet 0    prochlorperazine (Compazine) 10 mg tablet TAKE 1 TABLET BY MOUTH EVERY 6 HOURS AS NEEDED FOR NAUSEA AND VOMITING WITH CHEMOTHERAPY 56 tablet 2    triamcinolone (Kenalog) 0.1 % cream APPLY TOPICALLY TO AFFECTED AREA 2 TIMES A DAY AS DIRECTED 454 g 2    [DISCONTINUED] Votrient 200 mg tablet Take 4 tablets (800 mg total) by mouth once daily.  Take on an empty stomach, at least 1 hour before or 2 hours after a meal. 360 tablet 3     No current facility-administered medications on file prior to visit.         Performance Status:  Asymptomatic     Vitals and Measurements:   BP (!) 190/124   Pulse 103   Temp 36 °C (96.8 °F)   Resp 18   Wt 122 kg (269 lb 10 oz)   SpO2 97%   BMI 39.13 kg/m²       Physical Exam:   Physical Exam  Constitutional:       General: She is awake.      Appearance: Normal appearance. She is well-developed.   HENT:      Head: Normocephalic and atraumatic.   Eyes:      General: Lids are normal. Vision grossly intact.   Neck:      Thyroid: No thyroid mass.   Cardiovascular:      Rate and Rhythm: Normal rate and regular rhythm.      Heart sounds: Normal heart sounds, S1 normal and S2 normal.   Pulmonary:      Effort: Pulmonary effort is normal.      Breath sounds: Normal breath sounds and air entry. No decreased breath sounds.   Abdominal:      General: Abdomen is flat. Bowel sounds are normal.      Palpations: Abdomen is soft.      Tenderness: There is no abdominal tenderness.   Musculoskeletal:      Cervical back: Normal range of motion and neck supple. No edema or rigidity.   Lymphadenopathy:      Upper  Body:      Right upper body: No axillary adenopathy.      Lower Body: No right inguinal adenopathy. No left inguinal adenopathy.   Skin:     General: Skin is warm and moist.      Capillary Refill: Capillary refill takes less than 2 seconds.   Neurological:      Mental Status: She is alert and oriented to person, place, and time.      Cranial Nerves: Cranial nerves 2-12 are intact.      Sensory: Sensation is intact.      Motor: Motor function is intact.   Psychiatric:         Attention and Perception: Attention normal.         Mood and Affect: Mood and affect normal.         Speech: Speech normal.         Behavior: Behavior is cooperative.              Lab Results:  I have reviewed these laboratory results:     Lab Results   Component Value Date    WBC 16.4 (H) 11/09/2023    HGB 15.3 11/09/2023    HCT 49.5 (H) 11/09/2023    MCV 87 11/09/2023     11/09/2023         Chemistry    Lab Results   Component Value Date/Time     11/09/2023 0942    K 4.8 11/09/2023 0942     11/09/2023 0942    CO2 25 11/09/2023 0942    BUN 14 11/09/2023 0942    CREATININE 0.75 11/09/2023 0942    Lab Results   Component Value Date/Time    CALCIUM 11.1 (H) 11/09/2023 0942    ALKPHOS 121 (H) 11/09/2023 0942    AST 50 (H) 11/09/2023 0942    ALT 81 (H) 11/09/2023 0942    BILITOT 1.1 11/09/2023 0942            Lab Results   Component Value Date    TSH 9.96 (H) 11/09/2023    THYROIDPAB 37 03/31/2022        Radiology Result:  I have reviewed the latest Imaging in PACS and the findings are noted in this note. I discussed the results of the latest imaging with the patient. All previous imaging were reviewed at the time it was completed. Full records are available in the EMR for review as well.     === 11/09/23 ===    CT CHEST WO IV CONTRAST    - Impression -  1.  Stable size of multiple subcentimeter pulmonary nodules scattered  throughout the bilateral lungs. No new discrete suspicious pulmonary  nodules.  2. Stable postsurgical  changes of left lower lobectomy without  evidence of locoregional disease recurrence.  3. Similar size and appearance of hepatic metastatic lesions as  compared with most recent CT chest dated 08/21/2023.  4. Hepatic steatosis.    I personally reviewed the images/study and I agree with the resident  findings as stated. This study was interpreted at [place], [place].    MACRO:  None    Signed by: Issa Panchal 11/10/2023 10:19 PM  Dictation workstation:   [id number]    === 11/09/23 ===    MR ABDOMEN W AND WO CONTRAST    - Impression -  1.  Hepatic metastases have increased in size and number compared to  prior exam less than 3 months ago.  2. T11 enhancing metastasis has slightly enlarged.  Proximal left femoral enhancing lesion, probably metastatic, was not  imaged on the prior exam.      MACRO:  None    Signed by: Damion Rojas 11/13/2023 10:57 AM  Dictation workstation:   [id number]     Pathology Results:  I have reviewed the full pathology report recorded in the EMR. The pertinent portions indicating diagnosis are listed here in the note. for details please refer to the full report recorded in the EMR.    Surgical Pathology [Apr 30 2019 4:54PM] ([id number])     Specimens: RETROPERITONEAL MASS ANTERIOR TO VENA CAVA /Received fresh for intraoperative consultation, labeled with the patient's      Name MAGAN SCHULTZ      Accession #: [id number]   Pathologist: CIELO AGUIRRE M.D.   Date of Procedure: 4/22/2019   Date Received: 4/22/2019   Date Reported 4/30/2019   Submitting Physician: KANA MUKHERJEE M.D.   Location: [facility] Other External #         FINAL DIAGNOSIS   A. RETROPERITONEAL MASS, ANTERIOR TO VENA CAVA, EXCISION:   -- LEIOMYOSARCOMA, 8.7 CM, INFILTRATING VESSEL WALL OF INFERIOR VENA CAVA, SEE NOTE   -- DISTANCE TO SOFT TISSUE/ PERIPHERAL RESECTION MARGIN < 0.5 MM, FOCAL INVOLVEMENT CANNOT BY EXCLUDED   -- VASCULAR MARGINS UNINVOLVED BY  MALIGNANCY   -- TWO LYMPH NODES WITH NO EVIDENCE OF METASTASIS (0/2)      Note: The spindle cell neoplasm demonstrates moderate to high-grade nuclear atypia, a high mitotic rate (up to 28 mitoses per 10 HPF), and extensively infiltrates the adventitia and  media of the venous vessel wall. Focal (indeterminate-type) necrosis is noted. Immunohistochemistry demonstrates expression of estrogen receptor (60-70% of tumor cells) and progesterone receptor (>95% of tumor cells) and focal WT-1. Immunohistochemistry  performed on the prior core needle biopsy ([id number]) had demonstrated smooth muscle differentiation (positive: SMA, desmin, h-caldesmon; negative: CD34, HMB-45, myogenin, S100, DOG-1, ).      The gross and/or microscopic findings were reviewed in conjunction with pathology resident, Vika Ruiz M.D.      Electronically Signed Out By CIELO AGUIRRE M.D./CIRO   By the signature on this report, the individual or group listed as making the Final Interpretation/Diagnosis certifies that they have reviewed this case.      Assessment and Plan:   Assessment/Plan      Mr. Angie Tobin is a 58 y.o. female with a diagnosis of metastatic leiomyosarcoma. Please see the evolution of the case listed above in the cancer history.     [facility] results- ANNE MARIE stable, TMB- 0 muts/Mb. RB gene mutated. TP53 and PTEN mutated.     Today,   Ms. Tobin has tolerated the Pazopanib 800mg po daily well. Her blood pressure is under good control. Unfortunately, the latest MRI showed progressive disease in the liver. I will refer her for Y-90 as the progressive disease is limited to the liver. We will stop pazopanib for now. Will consider Yondelis in the future.     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   - STOP pazopanib.   - Phone visit next week.   - referred for Y-90.     # VEGF induced HTN  - continue Nifedipine ER to 60mg by mouth daily - will titrate this according to her BP.     # Chemo induced  skin rash  - Triamcinolone 0.1% for local application over the affected area.      # B-cell monoclonal lymphocytosis:   - White count came down. Likely Neulasta effect that diminished after chemotherapy      # Fatty infiltration of liver  - Following up with the PCP.     # High PTH levels and hypercalcemia  - Continue follow up with Endocrinology and nephrology      # Tachycardia   - Following with Cardiology      # Diabetes Mellitus  - Followed by PCP.  - Currently on Metformin     DISCLAIMER:   In preparing for this visit and writing this note, I reviewed all the previous electronic medical records (labs, imaging and medical charts) of the patient available in the physician portal. Significant findings which helped in decision making are recorded  in this chart.     The plan was discussed with the patient. We gave him ample opportunities to ask questions. All questions were answered to his satisfaction and he verbalized understanding.       Nico Walsh MD    Hematology and Oncology     Phone: [phone]  Fax: [phone].

## 2023-11-15 NOTE — H&P (VIEW-ONLY)
.Patient ID: Angie Tobin is a 58 y.o. female.  Referring Physician: No referring provider defined for this encounter.  Primary Care Provider: LUDA Russell-CNP     Chief Complaint   Patient presents with    Follow-up     Pazopanib follow up. Imaging discussion.    Sarcoma     Metastatic non-uterine leiomyosarcoma        Cancer History:   Treatment Synopsis:   Ms. Tobin is a pleasant woman who presented with RUQ pain in March 2019. Further investigations and imaging showed a large heterogeneous mass in the pancreatic  bed. Initially this was thought to be lymphoma, however biopsy showed soft tissue sarcoma. On April 22, 2019 this mass was removed by Dr. Gonzalez via surgery. 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 She was subsequently  seen by Gyn Onc and underwent hysterectomy and bilateral salpingo-oopherectomy on Danae 10, 2019. This specimen did not show any evidence of tumor. We saw her first in August 2019. Scans did not detect any tumor at that point. Her case was discussed in  the tumor board for post op RT. Since there was no focus to be seen, it was decided that RT would be deferred for the future if a recurrence happens. CT scan in April 2021 detected new solitary nodules in lung and liver. MRI done on April 23, 2021 confirmed  recurrence in the liver. Her case was discussed in the tumor board and a decision was made to pursue systemic chemotherapy. We started her on doxorubicin and dacarbazine with zinecard protection. s/p 2 cycles, CT scans shows positive response in tumor  burden. Completed 6 cycles uneventfully on 08/26/2021.     Of note, the patient also has a history of Monoclonal B-cell lymphocytosis. She was worked up in 2016 for this reason (BM Biopsy report present in the physician portal)     03/31/2022- CT scan showed a lung nodule that is 0.6cm (was inconspicuous on the previous scan). Port removed on Feb 10, 2022.   07/05/2022- CT scan  "on 7/1/22 showed that the lung nodule has enlarged to 0.9cm. We petitioned for a biopsy of this lung nodule. Interestingly, the liver lesions have disappeared.   08/16/2022- Lung biopsy completed in early august and path reads leiomyosarcoma. Case presented in tumor board on 8/19/22 and decision made to refer her to CT surgery   10/14/2022- Surgery completed- 6 wedges removed. Multiple small nodules of leiomyosarcoma and microscopic foci reported.   12/06/2022- CT scan is [facility]. Repeat staging in 3 months.  03/03/2023- CT scan showed the liver lesion to be stable.   06/02/2023- CT scan shows worsening liver lesion- MRI liver ordered on 06/16/2023 showing progressing liver mets. We petitioned for Docetaxel/Gemcitabine to start 07/17/2023.  08/21/2023- CT scan and MRI done after 2 cycles show progressive disease in the liver and in the lungs.   08/23/2023- Ordered Pazopanib.   08/30/2023- Started pazopanib at 400mg by mouth daily  09/07/2023- Increased to 800mg by mouth daily    11/13/2023- Imaging shows progressive disease in the liver.   11/15/2023- Stopped pazopanib and referred for Y-90.     Treatment History:   Systemic treatment:  1. Doxorubicin + Dacarbazine + Zinecard (Day 1-3) of 21 day of cycle.  C1- 05/10/2021 to 05/12/2021  C2- 06/01/2021 to 06/03/2021  C3- 06/22/2021 to 06/24/2021  C4- 07/13/2021 to 07/15/2021  C5- 08/03/2021 to 08/05/2021   C6- 08/24/2021 to 08/26/2021     2. Docetaxel (75mg/m2) + Gemcitabine (900mg/m2) D1, D8 of a 21 day cycle  C1- 07/17/2023   C2- 08/07/2023- Discontinued after progression noted.      3. Pazopanib   Start date 08/30/2023 at 400mg by mouth daily.   Increase to 09/07/2023 at 800mg by mouth daily.   Stopped on 11/15/23 due to progressive disease.        Subjective   Please refer to \"Notes/Cancer History\" above for complete History of present illness.     Ms Angie Tobin is doing well. The blood pressure is under good control. She wants to know of the results of the " scan.     Review of Systems:   Review of Systems   Constitutional:  Negative for appetite change, chills, fatigue and fever.   HENT:   Negative for hearing loss, lump/mass, mouth sores, sore throat and trouble swallowing.    Eyes:  Negative for eye problems and icterus.   Respiratory:  Negative for chest tightness, cough, hemoptysis and shortness of breath.    Cardiovascular:  Negative for chest pain, leg swelling and palpitations.   Gastrointestinal:  Negative for abdominal pain, constipation, diarrhea, nausea and vomiting.   Endocrine: Negative for hot flashes.   Genitourinary:  Negative for difficulty urinating, dysuria, frequency and hematuria.    Musculoskeletal:  Negative for back pain, flank pain, gait problem and myalgias.   Skin:  Negative for itching and rash.   Neurological:  Negative for dizziness, extremity weakness, gait problem and seizures.   Hematological:  Negative for adenopathy.   Psychiatric/Behavioral:  Negative for confusion and depression. The patient is nervous/anxious.          MEDICAL HISTORY  Past Medical History:   Diagnosis Date    Benign essential HTN 04/19/2023    Candidiasis, unspecified 01/20/2022    Antibiotic-induced yeast infection    Elevated blood-pressure reading, without diagnosis of hypertension 05/26/2017    Elevated BP without diagnosis of hypertension    Other conditions influencing health status 09/27/2017    History of cough    Personal history of diseases of the blood and blood-forming organs and certain disorders involving the immune mechanism 06/06/2016    History of leukocytosis    Personal history of other benign neoplasm 05/08/2019    History of other benign neoplasm    Personal history of other diseases of the musculoskeletal system and connective tissue 04/18/2018    History of low back pain    Personal history of other diseases of the nervous system and sense organs 10/03/2016    History of neuropathy    Personal history of other diseases of the respiratory  system 08/31/2017    History of acute bronchitis    Personal history of other diseases of the respiratory system 01/24/2022    History of acute sinusitis    Personal history of other specified conditions 09/16/2020    History of weight gain    Right lower quadrant abdominal tenderness 03/04/2019    RLQ abdominal tenderness    Right upper quadrant pain 03/13/2015    Abdominal pain, RUQ (right upper quadrant)    Toenail fungus 04/19/2023       FAMILY HISTORY  Family History   Problem Relation Name Age of Onset    Other (atrial flutter) Mother      Diabetes Mother      Leukemia Father      Liver cancer Father      Ovarian cancer Sister      Hypothyroidism Brother      Breast cancer Paternal Grandmother      No Known Problems Sibling         TOBACCO HISTORY  Tobacco Use: Low Risk  (11/15/2023)    Patient History     Smoking Tobacco Use: Never     Smokeless Tobacco Use: Never     Passive Exposure: Not on file       SOCIAL HISTORY  Social Connections: Not on file        Outpatient Medication Profile:  Current Outpatient Medications on File Prior to Visit   Medication Sig Dispense Refill    albuterol 108 (90 Base) MCG/ACT inhaler Inhale 1-2 puffs. Every 4-6 hours as needed      benzonatate (Tessalon) 200 mg capsule Take 1 capsule (200 mg) by mouth 3 times a day as needed for cough. Do not crush or chew. 42 capsule 0    cyclobenzaprine (Flexeril) 5 mg tablet       dexAMETHasone (Decadron) 4 mg tablet       lisinopril 20 mg tablet TAKE 1 TABLET BY MOUTH ONE TIME DAILY. 90 tablet 1    LORazepam (Ativan) 0.5 mg tablet Take 1 tablet (0.5 mg) by mouth every 8 hours if needed (agitation).      meclizine (Antivert) 12.5 mg tablet Take 1-2 tablets (12.5-25 mg) by mouth 3 times a day as needed (vertigo).      metFORMIN (Glucophage) 500 mg tablet TAKE 1 TABLET BY MOUTH TWO TIMES A DAY. 180 tablet 1    metoprolol succinate XL (Toprol-XL) 25 mg 24 hr tablet Take 1 tablet (25 mg) by mouth once daily. Do not crush or chew. 90 tablet 3     metoprolol succinate XL (Toprol-XL) 25 mg 24 hr tablet Take 1 tablet by mouth once daily. Do not crush or chew. 30 tablet 10    NIFEdipine ER (Adalat CC) 30 mg 24 hr tablet Take 1 tablet (30 mg) by mouth once daily in the morning. Take before meals. Do not crush, chew, or split. 90 tablet 3    NIFEdipine ER (Adalat CC) 30 mg 24 hr tablet Take 1 tablet (30 mg) by mouth once daily. 30 tablet 0    prochlorperazine (Compazine) 10 mg tablet TAKE 1 TABLET BY MOUTH EVERY 6 HOURS AS NEEDED FOR NAUSEA AND VOMITING WITH CHEMOTHERAPY 56 tablet 2    triamcinolone (Kenalog) 0.1 % cream APPLY TOPICALLY TO AFFECTED AREA 2 TIMES A DAY AS DIRECTED 454 g 2    [DISCONTINUED] Votrient 200 mg tablet Take 4 tablets (800 mg total) by mouth once daily.  Take on an empty stomach, at least 1 hour before or 2 hours after a meal. 360 tablet 3     No current facility-administered medications on file prior to visit.         Performance Status:  Asymptomatic     Vitals and Measurements:   BP (!) 190/124   Pulse 103   Temp 36 °C (96.8 °F)   Resp 18   Wt 122 kg (269 lb 10 oz)   SpO2 97%   BMI 39.13 kg/m²       Physical Exam:   Physical Exam  Constitutional:       General: She is awake.      Appearance: Normal appearance. She is well-developed.   HENT:      Head: Normocephalic and atraumatic.   Eyes:      General: Lids are normal. Vision grossly intact.   Neck:      Thyroid: No thyroid mass.   Cardiovascular:      Rate and Rhythm: Normal rate and regular rhythm.      Heart sounds: Normal heart sounds, S1 normal and S2 normal.   Pulmonary:      Effort: Pulmonary effort is normal.      Breath sounds: Normal breath sounds and air entry. No decreased breath sounds.   Abdominal:      General: Abdomen is flat. Bowel sounds are normal.      Palpations: Abdomen is soft.      Tenderness: There is no abdominal tenderness.   Musculoskeletal:      Cervical back: Normal range of motion and neck supple. No edema or rigidity.   Lymphadenopathy:      Upper  Body:      Right upper body: No axillary adenopathy.      Lower Body: No right inguinal adenopathy. No left inguinal adenopathy.   Skin:     General: Skin is warm and moist.      Capillary Refill: Capillary refill takes less than 2 seconds.   Neurological:      Mental Status: She is alert and oriented to person, place, and time.      Cranial Nerves: Cranial nerves 2-12 are intact.      Sensory: Sensation is intact.      Motor: Motor function is intact.   Psychiatric:         Attention and Perception: Attention normal.         Mood and Affect: Mood and affect normal.         Speech: Speech normal.         Behavior: Behavior is cooperative.              Lab Results:  I have reviewed these laboratory results:     Lab Results   Component Value Date    WBC 16.4 (H) 11/09/2023    HGB 15.3 11/09/2023    HCT 49.5 (H) 11/09/2023    MCV 87 11/09/2023     11/09/2023         Chemistry    Lab Results   Component Value Date/Time     11/09/2023 0942    K 4.8 11/09/2023 0942     11/09/2023 0942    CO2 25 11/09/2023 0942    BUN 14 11/09/2023 0942    CREATININE 0.75 11/09/2023 0942    Lab Results   Component Value Date/Time    CALCIUM 11.1 (H) 11/09/2023 0942    ALKPHOS 121 (H) 11/09/2023 0942    AST 50 (H) 11/09/2023 0942    ALT 81 (H) 11/09/2023 0942    BILITOT 1.1 11/09/2023 0942            Lab Results   Component Value Date    TSH 9.96 (H) 11/09/2023    THYROIDPAB 37 03/31/2022        Radiology Result:  I have reviewed the latest Imaging in PACS and the findings are noted in this note. I discussed the results of the latest imaging with the patient. All previous imaging were reviewed at the time it was completed. Full records are available in the EMR for review as well.     === 11/09/23 ===    CT CHEST WO IV CONTRAST    - Impression -  1.  Stable size of multiple subcentimeter pulmonary nodules scattered  throughout the bilateral lungs. No new discrete suspicious pulmonary  nodules.  2. Stable postsurgical  changes of left lower lobectomy without  evidence of locoregional disease recurrence.  3. Similar size and appearance of hepatic metastatic lesions as  compared with most recent CT chest dated 08/21/2023.  4. Hepatic steatosis.    I personally reviewed the images/study and I agree with the resident  findings as stated. This study was interpreted at [place], [place].    MACRO:  None    Signed by: Issa Panchal 11/10/2023 10:19 PM  Dictation workstation:   [id number]    === 11/09/23 ===    MR ABDOMEN W AND WO CONTRAST    - Impression -  1.  Hepatic metastases have increased in size and number compared to  prior exam less than 3 months ago.  2. T11 enhancing metastasis has slightly enlarged.  Proximal left femoral enhancing lesion, probably metastatic, was not  imaged on the prior exam.      MACRO:  None    Signed by: Damion Rojas 11/13/2023 10:57 AM  Dictation workstation:   [id number]     Pathology Results:  I have reviewed the full pathology report recorded in the EMR. The pertinent portions indicating diagnosis are listed here in the note. for details please refer to the full report recorded in the EMR.    Surgical Pathology [Apr 30 2019 4:54PM] ([id number])     Specimens: RETROPERITONEAL MASS ANTERIOR TO VENA CAVA /Received fresh for intraoperative consultation, labeled with the patient's      Name MAGAN SCHULTZ      Accession #: [id number]   Pathologist: CIELO AGUIRRE M.D.   Date of Procedure: 4/22/2019   Date Received: 4/22/2019   Date Reported 4/30/2019   Submitting Physician: KANA MUKHERJEE M.D.   Location: [facility] Other External #         FINAL DIAGNOSIS   A. RETROPERITONEAL MASS, ANTERIOR TO VENA CAVA, EXCISION:   -- LEIOMYOSARCOMA, 8.7 CM, INFILTRATING VESSEL WALL OF INFERIOR VENA CAVA, SEE NOTE   -- DISTANCE TO SOFT TISSUE/ PERIPHERAL RESECTION MARGIN < 0.5 MM, FOCAL INVOLVEMENT CANNOT BY EXCLUDED   -- VASCULAR MARGINS UNINVOLVED BY  MALIGNANCY   -- TWO LYMPH NODES WITH NO EVIDENCE OF METASTASIS (0/2)      Note: The spindle cell neoplasm demonstrates moderate to high-grade nuclear atypia, a high mitotic rate (up to 28 mitoses per 10 HPF), and extensively infiltrates the adventitia and  media of the venous vessel wall. Focal (indeterminate-type) necrosis is noted. Immunohistochemistry demonstrates expression of estrogen receptor (60-70% of tumor cells) and progesterone receptor (>95% of tumor cells) and focal WT-1. Immunohistochemistry  performed on the prior core needle biopsy ([id number]) had demonstrated smooth muscle differentiation (positive: SMA, desmin, h-caldesmon; negative: CD34, HMB-45, myogenin, S100, DOG-1, ).      The gross and/or microscopic findings were reviewed in conjunction with pathology resident, Vika Ruiz M.D.      Electronically Signed Out By CIELO AGUIRRE M.D./CIRO   By the signature on this report, the individual or group listed as making the Final Interpretation/Diagnosis certifies that they have reviewed this case.      Assessment and Plan:   Assessment/Plan      Mr. Angie Tobin is a 58 y.o. female with a diagnosis of metastatic leiomyosarcoma. Please see the evolution of the case listed above in the cancer history.     [facility] results- ANNE MARIE stable, TMB- 0 muts/Mb. RB gene mutated. TP53 and PTEN mutated.     Today,   Ms. Tobin has tolerated the Pazopanib 800mg po daily well. Her blood pressure is under good control. Unfortunately, the latest MRI showed progressive disease in the liver. I will refer her for Y-90 as the progressive disease is limited to the liver. We will stop pazopanib for now. Will consider Yondelis in the future.     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   - STOP pazopanib.   - Phone visit next week.   - referred for Y-90.     # VEGF induced HTN  - continue Nifedipine ER to 60mg by mouth daily - will titrate this according to her BP.     # Chemo induced  skin rash  - Triamcinolone 0.1% for local application over the affected area.      # B-cell monoclonal lymphocytosis:   - White count came down. Likely Neulasta effect that diminished after chemotherapy      # Fatty infiltration of liver  - Following up with the PCP.     # High PTH levels and hypercalcemia  - Continue follow up with Endocrinology and nephrology      # Tachycardia   - Following with Cardiology      # Diabetes Mellitus  - Followed by PCP.  - Currently on Metformin     DISCLAIMER:   In preparing for this visit and writing this note, I reviewed all the previous electronic medical records (labs, imaging and medical charts) of the patient available in the physician portal. Significant findings which helped in decision making are recorded  in this chart.     The plan was discussed with the patient. We gave him ample opportunities to ask questions. All questions were answered to his satisfaction and he verbalized understanding.       Nico Walsh MD    Hematology and Oncology     Phone: [phone]  Fax: [phone].        [phone]

## 2023-11-16 ENCOUNTER — PREP FOR PROCEDURE (OUTPATIENT)
Dept: RADIOLOGY | Facility: HOSPITAL | Age: 58
End: 2023-11-16
Payer: COMMERCIAL

## 2023-11-17 ENCOUNTER — TUMOR BOARD CONFERENCE (OUTPATIENT)
Dept: HEMATOLOGY/ONCOLOGY | Facility: HOSPITAL | Age: 58
End: 2023-11-17
Payer: COMMERCIAL

## 2023-11-17 NOTE — TUMOR BOARD NOTE
Tumor Board Documentation    Angie Tobin was presented by Nico Walsh MD at our Tumor Board on 11/17/2023, which included representatives from Surgical/Ortho/Radiology and Radiation Oncology  .    Angie currently presents as metastatic non-uterine leiomyosarcoma  with history of the following treatments:  .  Surgical resection in 2019  Recurrence in lungs and liver by 2021.   6 cycles of doxorubicin and dacarbazine in 2021.   Lung nodule removed in 07/2022  Progressive liver lesion in 06/2023- started on pazopanib.   11/2023- Continued to have progressive disease.    Additionally, we reviewed previous medical and familial history, history of present illness, and recent lab results along with all available histopathologic and imaging studies. The tumor board considered available treatment options and made the following recommendations:     Agreed with the recommendation to give Y-90. Referral already placed in the clinic.     The following procedures/referrals were also placed: No orders of the defined types were placed in this encounter.    Clinical Trial Status:       National site-specific guidelines were discussed with respect to the case.

## 2023-11-21 ENCOUNTER — APPOINTMENT (OUTPATIENT)
Dept: HEMATOLOGY/ONCOLOGY | Facility: HOSPITAL | Age: 58
End: 2023-11-21
Payer: COMMERCIAL

## 2023-12-01 ENCOUNTER — CONSULT (OUTPATIENT)
Dept: RADIOLOGY | Facility: HOSPITAL | Age: 58
End: 2023-12-01
Payer: COMMERCIAL

## 2023-12-01 VITALS — RESPIRATION RATE: 16 BRPM | DIASTOLIC BLOOD PRESSURE: 85 MMHG | HEART RATE: 81 BPM | SYSTOLIC BLOOD PRESSURE: 156 MMHG

## 2023-12-01 DIAGNOSIS — C49.9 LEIOMYOSARCOMA (MULTI): ICD-10-CM

## 2023-12-01 DIAGNOSIS — Z79.899 ENCOUNTER FOR LONG-TERM CURRENT USE OF HIGH RISK MEDICATION: ICD-10-CM

## 2023-12-01 DIAGNOSIS — E07.9 THYROID DISORDER: ICD-10-CM

## 2023-12-01 DIAGNOSIS — I15.8 OTHER SECONDARY HYPERTENSION: ICD-10-CM

## 2023-12-01 PROCEDURE — 99204 OFFICE O/P NEW MOD 45 MIN: CPT | Performed by: NURSE PRACTITIONER

## 2023-12-01 NOTE — CONSULTS
Interventional Radiology Consultation  December 1, 2023    Assessment/Plan  In clinic today we discussed Y-90 procedure including risks, benefits, and what to expect day of procedure. Patient will be scheduled for Y-90 embolization with Dr. Gold.    History of Present Illness  58 y.o. F currently presents as metastatic non-uterine leiomyosarcoma  with history of the following treatments:  .  Surgical resection in 2019  Recurrence in lungs and liver by 2021.   6 cycles of doxorubicin and dacarbazine in 2021.   Lung nodule removed in 07/2022  Progressive liver lesion in 06/2023- started on pazopanib.   11/2023- Continued to have progressive disease.    Referral from Dr. Walsh for Y-90 consultation. Patient states that it was discovered over the summer and has progressively growing. She has no complaints at time of clinic.    MR pelvis w and wo IV contrast 11/13/2023 [id number] Final     LIVER:  Multiple hyperenhancing masses scattered throughout the liver are  again noted. These are hyperintense to liver from the arterial to  delayed postcontrast sequences. See annotations on series 41 Segment  VI: Enlarged from 23 mm to 28 mm Segment VI/VII: Enlarged from 28 mm  to 39 mm. There are new areas central necrosis. Segment VI/VII  anterior: Enlarged from 23 mm 26 mm. Segment VII/VIII: Enlarged from  40 mm to 50 mm      There are few new enhancing metastases, for example measuring 9 mm in  segment VIII image 30/110 and 8 mm in segment VII on the same image.    Lab on 11/09/2023   Component Date Value Ref Range Status    Glucose 11/09/2023 114 (H)  74 - 99 mg/dL Final    Sodium 11/09/2023 140  136 - 145 mmol/L Final    Potassium 11/09/2023 4.8  3.5 - 5.3 mmol/L Final    Chloride 11/09/2023 104  98 - 107 mmol/L Final    Bicarbonate 11/09/2023 25  21 - 32 mmol/L Final    Anion Gap 11/09/2023 16  10 - 20 mmol/L Final    Urea Nitrogen 11/09/2023 14  6 - 23 mg/dL Final    Creatinine 11/09/2023 0.75  0.50 - 1.05 mg/dL Final     eGFR 11/09/2023 >90  >60 mL/min/1.73m*2 Final    Calculations of estimated GFR are performed using the 2021 CKD-EPI Study Refit equation without the race variable for the IDMS-Traceable creatinine methods.  https://jasn.asnjournals.org/content/early/2021/09/22/ASN.9028025427    Calcium 11/09/2023 11.1 (H)  8.6 - 10.6 mg/dL Final    Albumin 11/09/2023 4.5  3.4 - 5.0 g/dL Final    Alkaline Phosphatase 11/09/2023 121 (H)  33 - 110 U/L Final    Total Protein 11/09/2023 7.3  6.4 - 8.2 g/dL Final    AST 11/09/2023 50 (H)  9 - 39 U/L Final    Bilirubin, Total 11/09/2023 1.1  0.0 - 1.2 mg/dL Final    ALT 11/09/2023 81 (H)  7 - 45 U/L Final    Patients treated with Sulfasalazine may generate falsely decreased results for ALT.    WBC 11/09/2023 16.4 (H)  4.4 - 11.3 x10*3/uL Final    nRBC 11/09/2023 0.0  0.0 - 0.0 /100 WBCs Final    RBC 11/09/2023 5.68 (H)  4.00 - 5.20 x10*6/uL Final    Hemoglobin 11/09/2023 15.3  12.0 - 16.0 g/dL Final    Hematocrit 11/09/2023 49.5 (H)  36.0 - 46.0 % Final    MCV 11/09/2023 87  80 - 100 fL Final    MCH 11/09/2023 26.9  26.0 - 34.0 pg Final    MCHC 11/09/2023 30.9 (L)  32.0 - 36.0 g/dL Final    RDW 11/09/2023 16.0 (H)  11.5 - 14.5 % Final    Platelets 11/09/2023 223  150 - 450 x10*3/uL Final    Neutrophils % 11/09/2023 42.6  40.0 - 80.0 % Final    Immature Granulocytes %, Automated 11/09/2023 0.4  0.0 - 0.9 % Final    Immature Granulocyte Count (IG) includes promyelocytes, myelocytes and metamyelocytes but does not include bands. Percent differential counts (%) should be interpreted in the context of the absolute cell counts (cells/UL).    Lymphocytes % 11/09/2023 50.5  13.0 - 44.0 % Final    Monocytes % 11/09/2023 3.7  2.0 - 10.0 % Final    Eosinophils % 11/09/2023 2.1  0.0 - 6.0 % Final    Basophils % 11/09/2023 0.7  0.0 - 2.0 % Final    Neutrophils Absolute 11/09/2023 6.97  1.20 - 7.70 x10*3/uL Final    Percent differential counts (%) should be interpreted in the context of the absolute  cell counts (cells/uL).    Immature Granulocytes Absolute, Au* 11/09/2023 0.06  0.00 - 0.70 x10*3/uL Final    Lymphocytes Absolute 11/09/2023 8.28 (H)  1.20 - 4.80 x10*3/uL Final    Monocytes Absolute 11/09/2023 0.61  0.10 - 1.00 x10*3/uL Final    Eosinophils Absolute 11/09/2023 0.35  0.00 - 0.70 x10*3/uL Final    Basophils Absolute 11/09/2023 0.11 (H)  0.00 - 0.10 x10*3/uL Final    GGT 11/09/2023 113 (H)  5 - 55 U/L Final    LDH 11/09/2023 178  84 - 246 U/L Final    Thyroid Stimulating Hormone 11/09/2023 9.96 (H)  0.44 - 3.98 mIU/L Final    RBC Morphology 11/09/2023 No significant RBC morphology present   Final    Thyroxine, Free 11/09/2023 0.95  0.78 - 1.48 ng/dL Final   Office Visit on 10/25/2023   Component Date Value Ref Range Status    BNP 10/25/2023 6  0 - 99 pg/mL Final    Troponin I, High Sensitivity 10/25/2023 <3  0 - 34 ng/L Final    Thyroid Stimulating Hormone 10/25/2023 8.00 (H)  0.44 - 3.98 mIU/L Final    Vitamin D, 25-Hydroxy, Total 10/25/2023 14 (L)  30 - 100 ng/mL Final    Glucose 10/25/2023 123 (H)  74 - 99 mg/dL Final    Sodium 10/25/2023 135 (L)  136 - 145 mmol/L Final    Potassium 10/25/2023 4.6  3.5 - 5.3 mmol/L Final    Chloride 10/25/2023 102  98 - 107 mmol/L Final    Bicarbonate 10/25/2023 21  21 - 32 mmol/L Final    Anion Gap 10/25/2023 17  10 - 20 mmol/L Final    Urea Nitrogen 10/25/2023 13  6 - 23 mg/dL Final    Creatinine 10/25/2023 0.76  0.50 - 1.05 mg/dL Final    eGFR 10/25/2023 >90  >60 mL/min/1.73m*2 Final    Calculations of estimated GFR are performed using the 2021 CKD-EPI Study Refit equation without the race variable for the IDMS-Traceable creatinine methods.  https://jasn.asnjournals.org/content/early/2021/09/22/ASN.2155331236    Calcium 10/25/2023 11.2 (H)  8.6 - 10.3 mg/dL Final    Albumin 10/25/2023 4.8  3.4 - 5.0 g/dL Final    Alkaline Phosphatase 10/25/2023 134 (H)  33 - 110 U/L Final    Total Protein 10/25/2023 7.9  6.4 - 8.2 g/dL Final    AST 10/25/2023 72 (H)  9 - 39 U/L  Final    Bilirubin, Total 10/25/2023 1.5 (H)  0.0 - 1.2 mg/dL Final    ALT 10/25/2023 106 (H)  7 - 45 U/L Final    Patients treated with Sulfasalazine may generate falsely decreased results for ALT.    WBC 10/25/2023 16.1 (H)  4.4 - 11.3 x10*3/uL Final    nRBC 10/25/2023 0.0  0.0 - 0.0 /100 WBCs Final    RBC 10/25/2023 5.74 (H)  4.00 - 5.20 x10*6/uL Final    Hemoglobin 10/25/2023 15.7  12.0 - 16.0 g/dL Final    Hematocrit 10/25/2023 47.3 (H)  36.0 - 46.0 % Final    MCV 10/25/2023 82  80 - 100 fL Final    MCH 10/25/2023 27.4  26.0 - 34.0 pg Final    MCHC 10/25/2023 33.2  32.0 - 36.0 g/dL Final    RDW 10/25/2023 17.2 (H)  11.5 - 14.5 % Final    Platelets 10/25/2023 231  150 - 450 x10*3/uL Final    MPV 10/25/2023 9.3  7.5 - 11.5 fL Final    Neutrophils % 10/25/2023 55.6  40.0 - 80.0 % Final    Immature Granulocytes %, Automated 10/25/2023 0.4  0.0 - 0.9 % Final    Immature Granulocyte Count (IG) includes promyelocytes, myelocytes and metamyelocytes but does not include bands. Percent differential counts (%) should be interpreted in the context of the absolute cell counts (cells/UL).    Lymphocytes % 10/25/2023 39.3  13.0 - 44.0 % Final    Monocytes % 10/25/2023 3.3  2.0 - 10.0 % Final    Eosinophils % 10/25/2023 1.0  0.0 - 6.0 % Final    Basophils % 10/25/2023 0.4  0.0 - 2.0 % Final    Neutrophils Absolute 10/25/2023 8.96 (H)  1.20 - 7.70 x10*3/uL Final    Percent differential counts (%) should be interpreted in the context of the absolute cell counts (cells/uL).    Immature Granulocytes Absolute, Au* 10/25/2023 0.06  0.00 - 0.70 x10*3/uL Final    Lymphocytes Absolute 10/25/2023 6.34 (H)  1.20 - 4.80 x10*3/uL Final    Monocytes Absolute 10/25/2023 0.53  0.10 - 1.00 x10*3/uL Final    Eosinophils Absolute 10/25/2023 0.16  0.00 - 0.70 x10*3/uL Final    Basophils Absolute 10/25/2023 0.07  0.00 - 0.10 x10*3/uL Final    Automated WBC differential has been confirmed by manual smear.    RBC Morphology 10/25/2023 See Below    Final    Polychromasia 10/25/2023 Mild   Final    Thyroxine, Free 10/25/2023 1.05  0.78 - 1.48 ng/dL Final   Lab on 10/17/2023   Component Date Value Ref Range Status    WBC 10/17/2023 17.2 (H)  4.4 - 11.3 x10*3/uL Final    nRBC 10/17/2023 0.0  0.0 - 0.0 /100 WBCs Final    RBC 10/17/2023 5.55 (H)  4.00 - 5.20 x10*6/uL Final    Hemoglobin 10/17/2023 15.0  12.0 - 16.0 g/dL Final    Hematocrit 10/17/2023 49.5 (H)  36.0 - 46.0 % Final    MCV 10/17/2023 89  80 - 100 fL Final    MCH 10/17/2023 27.0  26.0 - 34.0 pg Final    MCHC 10/17/2023 30.3 (L)  32.0 - 36.0 g/dL Final    RDW 10/17/2023 17.6 (H)  11.5 - 14.5 % Final    Platelets 10/17/2023 214  150 - 450 x10*3/uL Final    MPV 10/17/2023 10.0  7.5 - 11.5 fL Final    Immature Granulocytes %, Automated 10/17/2023 0.3  0.0 - 0.9 % Final    Immature Granulocyte Count (IG) includes promyelocytes, myelocytes and metamyelocytes but does not include bands. Percent differential counts (%) should be interpreted in the context of the absolute cell counts (cells/UL).    Immature Granulocytes Absolute, Au* 10/17/2023 0.06  0.00 - 0.70 x10*3/uL Final    Glucose 10/17/2023 127 (H)  74 - 99 mg/dL Final    Sodium 10/17/2023 138  136 - 145 mmol/L Final    Potassium 10/17/2023 4.8  3.5 - 5.3 mmol/L Final    Chloride 10/17/2023 102  98 - 107 mmol/L Final    Bicarbonate 10/17/2023 21  21 - 32 mmol/L Final    Anion Gap 10/17/2023 20  10 - 20 mmol/L Final    Urea Nitrogen 10/17/2023 12  6 - 23 mg/dL Final    Creatinine 10/17/2023 0.72  0.50 - 1.05 mg/dL Final    eGFR 10/17/2023 >90  >60 mL/min/1.73m*2 Final    Calculations of estimated GFR are performed using the 2021 CKD-EPI Study Refit equation without the race variable for the IDMS-Traceable creatinine methods.  https://jasn.asnjournals.org/content/early/2021/09/22/ASN.6207643116    Calcium 10/17/2023 10.7 (H)  8.6 - 10.6 mg/dL Final    Albumin 10/17/2023 4.7  3.4 - 5.0 g/dL Final    Alkaline Phosphatase 10/17/2023 155 (H)  33 - 110 U/L  Final    Total Protein 10/17/2023 7.6  6.4 - 8.2 g/dL Final    AST 10/17/2023 52 (H)  9 - 39 U/L Final    Bilirubin, Total 10/17/2023 1.3 (H)  0.0 - 1.2 mg/dL Final    ALT 10/17/2023 78 (H)  7 - 45 U/L Final    Patients treated with Sulfasalazine may generate falsely decreased results for ALT.    LDH 10/17/2023 199  84 - 246 U/L Final    Color, Urine 10/17/2023 Straw  Straw, Yellow Final    Appearance, Urine 10/17/2023 Clear  Clear Final    Specific Gravity, Urine 10/17/2023 1.003 (N)  1.005 - 1.035 Final    pH, Urine 10/17/2023 5.0  5.0, 5.5, 6.0, 6.5, 7.0, 7.5, 8.0 Final    Protein, Urine 10/17/2023 NEGATIVE  NEGATIVE mg/dL Final    Glucose, Urine 10/17/2023 NEGATIVE  NEGATIVE mg/dL Final    Blood, Urine 10/17/2023 SMALL (1+) (A)  NEGATIVE Final    Ketones, Urine 10/17/2023 NEGATIVE  NEGATIVE mg/dL Final    Bilirubin, Urine 10/17/2023 NEGATIVE  NEGATIVE Final    Urobilinogen, Urine 10/17/2023 <2.0  <2.0 mg/dL Final    Nitrite, Urine 10/17/2023 NEGATIVE  NEGATIVE Final    Leukocyte Esterase, Urine 10/17/2023 NEGATIVE  NEGATIVE Final    IgG 10/17/2023 601 (L)  700 - 1,600 mg/dL Final    MONOCLONAL PROTEINS MAY CAUSE FALSELY LOW  RESULTS IN THIS ASSAY. SERUM PROTEIN  ELECTROPHORESIS SHOULD BE DONE AS THE  FIRST TEST TO EVALUATE MONOCLONAL GAMMOPATHY.      GGT 10/17/2023 129 (H)  5 - 55 U/L Final    Neutrophils %, Manual 10/17/2023 51.3  40.0 - 80.0 % Final    WBC: Plt clumping present  Percent differential counts (%) should be interpreted in the context of the absolute cell counts (cells/uL).    Lymphocytes %, Manual 10/17/2023 44.4  13.0 - 44.0 % Final    Monocytes %, Manual 10/17/2023 2.6  2.0 - 10.0 % Final    Eosinophils %, Manual 10/17/2023 1.7  0.0 - 6.0 % Final    Basophils %, Manual 10/17/2023 0.0  0.0 - 2.0 % Final    Seg Neutrophils Absolute, Manual 10/17/2023 8.82 (H)  1.20 - 7.00 x10*3/uL Final    Lymphocytes Absolute, Manual 10/17/2023 7.64 (H)  1.20 - 4.80 x10*3/uL Final    Monocytes Absolute, Manual  10/17/2023 0.45  0.10 - 1.00 x10*3/uL Final    Eosinophils Absolute, Manual 10/17/2023 0.29  0.00 - 0.70 x10*3/uL Final    Basophils Absolute, Manual 10/17/2023 0.00  0.00 - 0.10 x10*3/uL Final    Total Cells Counted 10/17/2023 115   Final    RBC Morphology 10/17/2023 No significant RBC morphology present   Final    WBC, Urine 10/17/2023 NONE  1-5, NONE /HPF Final    RBC, Urine 10/17/2023 NONE  NONE, 1-2, 3-5 /HPF Final    Bacteria, Urine 10/17/2023 1+ (A)  NONE SEEN /HPF Final   Orders Only on 09/19/2023   Component Date Value Ref Range Status    Glucose 09/19/2023 137 (H)  74 - 99 mg/dL Final    Sodium 09/19/2023 140  136 - 145 mmol/L Final    Potassium 09/19/2023 4.5  3.5 - 5.3 mmol/L Final    Chloride 09/19/2023 106  98 - 107 mmol/L Final    Bicarbonate 09/19/2023 22  21 - 32 mmol/L Final    Anion Gap 09/19/2023 17  10 - 20 mmol/L Final    Urea Nitrogen 09/19/2023 10  6 - 23 mg/dL Final    Creatinine 09/19/2023 0.71  0.50 - 1.05 mg/dL Final    GFR Female 09/19/2023 >90  >90 mL/min/1.73m2 Final    Comment:  CALCULATIONS OF ESTIMATED GFR ARE PERFORMED   USING THE 2021 CKD-EPI STUDY REFIT EQUATION   WITHOUT THE RACE VARIABLE FOR THE IDMS-TRACEABLE   CREATININE METHODS.    https://jasn.asnjournals.org/content/early/2021/09/22/ASN.0496315208      Calcium 09/19/2023 10.6  8.6 - 10.6 mg/dL Final    Albumin 09/19/2023 4.6  3.4 - 5.0 g/dL Final    Alkaline Phosphatase 09/19/2023 139 (H)  33 - 110 U/L Final    Total Protein 09/19/2023 7.1  6.4 - 8.2 g/dL Final    AST 09/19/2023 31  9 - 39 U/L Final    Total Bilirubin 09/19/2023 1.6 (H)  0.0 - 1.2 mg/dL Final    ALT (SGPT) 09/19/2023 48 (H)  7 - 45 U/L Final    Comment:  Patients treated with Sulfasalazine may generate    falsely decreased results for ALT.      WBC 09/19/2023 16.1 (H)  4.4 - 11.3 x10E9/L Final    nRBC 09/19/2023 0.0  0.0 - 0.0 /100 WBC Final    RBC 09/19/2023 5.49 (H)  4.00 - 5.20 x10E12/L Final    Hemoglobin 09/19/2023 14.5  12.0 - 16.0 g/dL Final     Hematocrit 09/19/2023 45.9  36.0 - 46.0 % Final    MCV 09/19/2023 84  80 - 100 fL Final    MCHC 09/19/2023 31.6 (L)  32.0 - 36.0 g/dL Final    Platelets 09/19/2023 227  150 - 450 x10E9/L Final    RDW 09/19/2023 17.8 (H)  11.5 - 14.5 % Final    Neutrophils % 09/19/2023 47.7  40.0 - 80.0 % Final    Immature Granulocytes %, Automated 09/19/2023 0.4  0.0 - 0.9 % Final    Comment:  Immature Granulocyte Count (IG) includes promyelocytes,    myelocytes and metamyelocytes but does not include bands.   Percent differential counts (%) should be interpreted in the   context of the absolute cell counts (cells/L).      Lymphocytes % 09/19/2023 40.9  13.0 - 44.0 % Final    Monocytes % 09/19/2023 4.4  2.0 - 10.0 % Final    Eosinophils % 09/19/2023 5.5  0.0 - 6.0 % Final    Basophils % 09/19/2023 1.1  0.0 - 2.0 % Final    Neutrophils Absolute 09/19/2023 7.64  1.20 - 7.70 x10E9/L Final    Lymphocytes Absolute 09/19/2023 6.57 (H)  1.20 - 4.80 x10E9/L Final    Monocytes Absolute 09/19/2023 0.71  0.10 - 1.00 x10E9/L Final    Eosinophils Absolute 09/19/2023 0.88 (H)  0.00 - 0.70 x10E9/L Final    Basophils Absolute 09/19/2023 0.18 (H)  0.00 - 0.10 x10E9/L Final    Automated WBC differential has been confirmed by manual smear.    Source 09/19/2023 WHOLE BLD-EDTA   Final    Flow Test Ordered 09/19/2023 LOW GRADE PANEL   Final    GGT 09/19/2023 61 (H)  5 - 55 U/L Final    Color, Urine 09/19/2023 LUCIANO  STRAW,YELLOW Final    Appearance, Urine 09/19/2023 HAZY  CLEAR Final    Specific Gravity, Urine 09/19/2023 1.020  1.005 - 1.035 Final    pH, Urine 09/19/2023 5.0  5.0 - 8.0 Final    Protein, Urine 09/19/2023 100 (2+) (A)  NEGATIVE mg/dL Final    Glucose, Urine 09/19/2023 NEGATIVE  NEGATIVE mg/dL Final    Blood, Urine 09/19/2023 SMALL (1+) (A)  NEGATIVE Final    Ketones, Urine 09/19/2023 NEGATIVE  NEGATIVE mg/dL Final    Bilirubin, Urine 09/19/2023 NEGATIVE  NEGATIVE Final    Urobilinogen, Urine 09/19/2023 <2.0  0.0 - 1.9 mg/dL Final     Nitrite, Urine 09/19/2023 NEGATIVE  NEGATIVE Final    Leukocyte Esterase, Urine 09/19/2023 SMALL (1+) (A)  NEGATIVE Final    Total IgG 09/19/2023 610 (L)  700 - 1,600 mg/dL Final    Comment: MONOCLONAL PROTEINS MAY CAUSE FALSELY LOW  RESULTS IN THIS ASSAY. SERUM PROTEIN  ELECTROPHORESIS SHOULD BE DONE AS THE  FIRST TEST TO EVALUATE MONOCLONAL GAMMOPATHY.      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      Note 09/19/2023 SEE BELOW   Final    Comment: The phenotype is similar to the one identified in this patient's previous   examination.  Clinical and morphologic correlation is suggested.      Specimen Viability 09/19/2023 Acceptable   Final    Comment:  Flow cytometry results should be interpreted in the   context of morphology.  Flow cytometry findings may be   unreliable due to sampling issues, differential loss or   recovery of cell populations ex vivo, or low viability.      Cell Count 09/19/2023 16.1  x10E9/L Final    Number of Cells Collected 09/19/2023 100,000  per tube Final    Abnormal Cell Population 09/19/2023 Lymphocytes   Final    Percentage 09/19/2023 22.70  % Final    CD1c 09/19/2023 heterogeneous   Final    CD2 09/19/2023 negative   Final    CD3 09/19/2023 negative   Final    CD4 09/19/2023 negative   Final    CD5 09/19/2023 negative   Final    CD7 09/19/2023 negative   Final    CD8  09/19/2023 negative   Final    CD10 09/19/2023 partial dim   Final    CD11c 09/19/2023 dim--negative   Final    CD13 09/19/2023 negative   Final    CD14 09/19/2023 negative   Final    CD16 09/19/2023 negative   Final    CD19 09/19/2023 moderate   Final    CD20 09/19/2023 moderate   Final    CD23 09/19/2023 partial dim   Final    CD25 09/19/2023 negative   Final    CD38 09/19/2023 negative   Final    CD43 09/19/2023 negative   Final    CD45 09/19/2023 moderate   Final    CD56 09/19/2023 negative   Final    CD71 09/19/2023 negative   Final    CD79b 09/19/2023 moderate   Final     09/19/2023 moderate   Final     09/19/2023 negative   Final    HLA-DR 09/19/2023 moderate   Final    IgD 09/19/2023 negative   Final    [place] 09/19/2023 moderate   Final    Lambda 09/19/2023 negative   Final    Lymphocyte % 09/19/2023 38  % Final    Comment 09/19/2023 see below   Final    No immunophenotypic abnormality of T cells was detected.    CD4% 09/19/2023 29  % of Lymph Final    CD8% 09/19/2023 7  % of Lymph Final    NK% 09/19/2023 10  % of Lymph Final    CD19% 09/19/2023 54  % of Lymph Final    Monoclonal, see description above.    Monocyte % 09/19/2023 4  % Final    Granulocyte % 09/19/2023 55  % Final    Method 09/19/2023 SEE BELOW   Final    Comment: This test is a multicolor, whole blood lysis assay.  It was   developed and its performance characteristics determined by the   Department of Pathology, [facility], and   has not been cleared or approved by the U.[place]. Food and Drug   Administration. The laboratory is regulated under CLIA as   qualified to perform high complexity testing.  This test is used   for clinical purposes.  It should not be regarded as   investigational or for research.     Immunophenotypic analysis was performed using the following   antibodies:  CD45, CD71, CD13, , CD14, HLADR, CD2, CD7,   CD4, CD5, CD3, CD16, CD56, CD8, CD10, CD20, CD38, CD19, CD43,   CD23, CD1c, CD25,  , CD11c, CD79b, Kappa, Lambda, IgD.      Pathologist Review 09/19/2023 KRYS   Final    Comment:  By her/his signature above, the Pathologist   listed as making the final interpretation   certifies that she/he has personally reviewed    this case.  ----------------------------------------------      Legacy Encounter on 08/23/2023   Component Date Value Ref Range Status    Glucose 08/23/2023 173 (H)  74 - 99 mg/dL Final    Sodium 08/23/2023 140  136 - 145 mmol/L Final    Potassium 08/23/2023 4.6  3.5 - 5.3 mmol/L Final    Chloride 08/23/2023 103  98 - 107 mmol/L Final    Bicarbonate 08/23/2023 28  21 - 32 mmol/L Final    Anion Gap 08/23/2023 14  10 - 20 mmol/L Final    Urea Nitrogen 08/23/2023 12  6 - 23 mg/dL Final    Creatinine 08/23/2023 0.69  0.50 - 1.05 mg/dL Final    GFR Female 08/23/2023 >90  >90 mL/min/1.73m2 Final    Comment:  CALCULATIONS OF ESTIMATED GFR ARE PERFORMED   USING THE 2021 CKD-EPI STUDY REFIT EQUATION   WITHOUT THE RACE VARIABLE FOR THE IDMS-TRACEABLE   CREATININE METHODS.    https://jasn.asnjournals.org/content/early/2021/09/22/ASN.1532779960      Calcium 08/23/2023 11.3 (H)  8.6 - 10.6 mg/dL Final    Albumin 08/23/2023 4.5  3.4 - 5.0 g/dL Final    Alkaline Phosphatase 08/23/2023 187 (H)  33 - 110 U/L Final    Total Protein 08/23/2023 7.1  6.4 - 8.2 g/dL Final    AST 08/23/2023 36  9 - 39 U/L Final    Total Bilirubin 08/23/2023 0.7  0.0 - 1.2 mg/dL Final    ALT (SGPT) 08/23/2023 49 (H)  7 - 45 U/L Final    Comment:  Patients treated with Sulfasalazine may generate    falsely decreased results for ALT.      LD 08/23/2023 297 (H)  84 - 246 U/L Final    WBC 08/23/2023 44.9 (H)  4.4 - 11.3 x10E9/L Final    RBC 08/23/2023 4.36  4.00 - 5.20 x10E12/L Final    Hemoglobin 08/23/2023 11.4 (L)  12.0 - 16.0 g/dL Final    Hematocrit 08/23/2023 37.2  36.0 - 46.0 % Final    MCV 08/23/2023 85  80 - 100 fL Final    MCHC 08/23/2023 30.6 (L)  32.0 - 36.0 g/dL Final    Platelets 08/23/2023 160  150 -  450 x10E9/L Final    RDW 08/23/2023 17.1 (H)  11.5 - 14.5 % Final    Immature Granulocytes %, Automated 08/23/2023 9.5 (H)  0.0 - 0.9 % Final    Comment:  Immature Granulocyte Count (IG) includes promyelocytes,    myelocytes and metamyelocytes but does not include bands.   Percent differential counts (%) should be interpreted in the   context of the absolute cell counts (cells/L).      MANUAL DIFFERENTIAL Y/N 08/23/2023 SEE MANUAL DIFF   Final    Color, Urine 08/23/2023 YELLOW  STRAW,YELLOW Final    Appearance, Urine 08/23/2023 TURBID  CLEAR Final    Specific Gravity, Urine 08/23/2023 1.023  1.005 - 1.035 Final    pH, Urine 08/23/2023 6.0  5.0 - 8.0 Final    Protein, Urine 08/23/2023 NEGATIVE  NEGATIVE mg/dL Final    Glucose, Urine 08/23/2023 NEGATIVE  NEGATIVE mg/dL Final    Blood, Urine 08/23/2023 NEGATIVE  NEGATIVE Final    Ketones, Urine 08/23/2023 NEGATIVE  NEGATIVE mg/dL Final    Bilirubin, Urine 08/23/2023 NEGATIVE  NEGATIVE Final    Urobilinogen, Urine 08/23/2023 <2.0  0.0 - 1.9 mg/dL Final    Nitrite, Urine 08/23/2023 NEGATIVE  NEGATIVE Final    Leukocyte Esterase, Urine 08/23/2023 NEGATIVE  NEGATIVE Final    Neutrophils % 08/23/2023 48.0  40.0 - 80.0 % Final    Comment:  Percent differential counts (%) should be interpreted in the   context of the absolute cell counts (cells/L).      Bands % 08/23/2023 4.0  0.0 - 5.0 % Final    Lymphocytes % 08/23/2023 29.0  13.0 - 44.0 % Final    Monocytes % 08/23/2023 5.0  2.0 - 10.0 % Final    Eosinophils % 08/23/2023 1.0  0.0 - 6.0 % Final    Basophils % 08/23/2023 0.0  0.0 - 2.0 % Final    Atypical Lymphocytes % 08/23/2023 6.0  0.0 - 2.0 % Final    Metamyelocytes % 08/23/2023 3.0  0.0 - 0.0 % Final    Myelocytes Relative 08/23/2023 4.0  0.0 - 0.0 % Final    Absolute Neutrophil Count 08/23/2023 23.35 (H)  1.20 - 7.70 x10E9/L Final    Segs Absolute 08/23/2023 21.55 (H)  1.20 - 7.00 x10E9/L Final    Bands Absolute 08/23/2023 1.80 (H)  0.00 - 0.70 x10E9/L Final     Lymphocytes Absolute 08/23/2023 13.02 (H)  1.20 - 4.80 x10E9/L Final    Monocytes Absolute 08/23/2023 2.25 (H)  0.10 - 1.00 x10E9/L Final    Eosinophils Absolute 08/23/2023 0.45  0.00 - 0.70 x10E9/L Final    Basophils Absolute 08/23/2023 0.00  0.00 - 0.10 x10E9/L Final    Atypical Lymphs Absolute 08/23/2023 2.69 (H)  0.00 - 0.50 x10E9/L Final    Metamyelocytes Absolute 08/23/2023 1.35 (A)  0.00 - 0.00 x10E9/L Final    Myelocytes Absolute 08/23/2023 1.80 (A)  0.00 - 0.00 x10E9/L Final    RBC Morphology 08/23/2023 See Below   Final    Polychromasia 08/23/2023 Mild   Final    Ovalocytes 08/23/2023 Few   Final    Teardrop Cells 08/23/2023 Few   Final    Hypersegmented Neutrophils 08/23/2023 Present   Final    Reactive Lymph 08/23/2023 Few   Final   Orders Only on 08/12/2023   Component Date Value Ref Range Status    WBC 08/12/2023 16.0 (H)  4.4 - 11.3 x10E9/L Final    nRBC 08/12/2023 0.0  0.0 - 0.0 /100 WBC Final    RBC 08/12/2023 4.26  4.00 - 5.20 x10E12/L Final    Hemoglobin 08/12/2023 11.2 (L)  12.0 - 16.0 g/dL Final    Hematocrit 08/12/2023 36.4  36.0 - 46.0 % Final    MCV 08/12/2023 85  80 - 100 fL Final    MCHC 08/12/2023 30.8 (L)  32.0 - 36.0 g/dL Final    Platelets 08/12/2023 561 (H)  150 - 450 x10E9/L Final    RDW 08/12/2023 16.1 (H)  11.5 - 14.5 % Final    Neutrophils % 08/12/2023 52.7  40.0 - 80.0 % Final    Immature Granulocytes %, Automated 08/12/2023 0.4  0.0 - 0.9 % Final    Comment:  Immature Granulocyte Count (IG) includes promyelocytes,    myelocytes and metamyelocytes but does not include bands.   Percent differential counts (%) should be interpreted in the   context of the absolute cell counts (cells/L).      Lymphocytes % 08/12/2023 43.5  13.0 - 44.0 % Final    Monocytes % 08/12/2023 1.6  2.0 - 10.0 % Final    Eosinophils % 08/12/2023 0.8  0.0 - 6.0 % Final    Basophils % 08/12/2023 1.0  0.0 - 2.0 % Final    Neutrophils Absolute 08/12/2023 8.41 (H)  1.20 - 7.70 x10E9/L Final    Lymphocytes Absolute  08/12/2023 6.95 (H)  1.20 - 4.80 x10E9/L Final    Monocytes Absolute 08/12/2023 0.25  0.10 - 1.00 x10E9/L Final    Eosinophils Absolute 08/12/2023 0.12  0.00 - 0.70 x10E9/L Final    Basophils Absolute 08/12/2023 0.16 (H)  0.00 - 0.10 x10E9/L Final    Glucose 08/12/2023 198 (H)  74 - 99 mg/dL Final    Sodium 08/12/2023 137  136 - 145 mmol/L Final    Potassium 08/12/2023 5.1  3.5 - 5.3 mmol/L Final    Chloride 08/12/2023 104  98 - 107 mmol/L Final    Bicarbonate 08/12/2023 24  21 - 32 mmol/L Final    Anion Gap 08/12/2023 14  10 - 20 mmol/L Final    Urea Nitrogen 08/12/2023 16  6 - 23 mg/dL Final    Creatinine 08/12/2023 0.60  0.50 - 1.05 mg/dL Final    GFR Female 08/12/2023 >90  >90 mL/min/1.73m2 Final    Comment:  CALCULATIONS OF ESTIMATED GFR ARE PERFORMED   USING THE 2021 CKD-EPI STUDY REFIT EQUATION   WITHOUT THE RACE VARIABLE FOR THE IDMS-TRACEABLE   CREATININE METHODS.    https://jasn.asnjournals.org/content/early/2021/09/22/ASN.[email or web address]      Calcium 08/12/2023 11.7 (H)  8.6 - 10.6 mg/dL Final    Albumin 08/12/2023 4.3  3.4 - 5.0 g/dL Final    Alkaline Phosphatase 08/12/2023 122 (H)  33 - 110 U/L Final    Total Protein 08/12/2023 6.9  6.4 - 8.2 g/dL Final    AST 08/12/2023 37  9 - 39 U/L Final    Total Bilirubin 08/12/2023 0.7  0.0 - 1.2 mg/dL Final    ALT (SGPT) 08/12/2023 62 (H)  7 - 45 U/L Final    Comment:  Patients treated with Sulfasalazine may generate    falsely decreased results for ALT.      RBC Morphology 08/12/2023 See Below   Final    Teardrop Cells 08/12/2023 Few   Final   Orders Only on 08/04/2023   Component Date Value Ref Range Status    Glucose 08/04/2023 119 (H)  74 - 99 mg/dL Final    Sodium 08/04/2023 140  136 - 145 mmol/L Final    Potassium 08/04/2023 4.4  3.5 - 5.3 mmol/L Final    Chloride 08/04/2023 105  98 - 107 mmol/L Final    Bicarbonate 08/04/2023 25  21 - 32 mmol/L Final    Anion Gap 08/04/2023 14  10 - 20 mmol/L Final    Urea Nitrogen 08/04/2023 12  6 - 23 mg/dL Final     Creatinine 08/04/2023 0.64  0.50 - 1.05 mg/dL Final    GFR Female 08/04/2023 >90  >90 mL/min/1.73m2 Final    Comment:  CALCULATIONS OF ESTIMATED GFR ARE PERFORMED   USING THE 2021 CKD-EPI STUDY REFIT EQUATION   WITHOUT THE RACE VARIABLE FOR THE IDMS-TRACEABLE   CREATININE METHODS.    https://jasn.asnjournals.org/content/early/2021/09/22/ASN.[email or web address]      Calcium 08/04/2023 10.8 (H)  8.6 - 10.6 mg/dL Final    Albumin 08/04/2023 4.0  3.4 - 5.0 g/dL Final    Alkaline Phosphatase 08/04/2023 140 (H)  33 - 110 U/L Final    Total Protein 08/04/2023 6.5  6.4 - 8.2 g/dL Final    AST 08/04/2023 23  9 - 39 U/L Final    Total Bilirubin 08/04/2023 0.5  0.0 - 1.2 mg/dL Final    ALT (SGPT) 08/04/2023 33  7 - 45 U/L Final    Comment:  Patients treated with Sulfasalazine may generate    falsely decreased results for ALT.      WBC 08/04/2023 26.9 (H)  4.4 - 11.3 x10E9/L Final    nRBC 08/04/2023 0.2  0.0 - 0.0 /100 WBC Final    RBC 08/04/2023 4.38  4.00 - 5.20 x10E12/L Final    Hemoglobin 08/04/2023 11.3 (L)  12.0 - 16.0 g/dL Final    Hematocrit 08/04/2023 37.8  36.0 - 46.0 % Final    MCV 08/04/2023 86  80 - 100 fL Final    MCHC 08/04/2023 29.9 (L)  32.0 - 36.0 g/dL Final    Platelets 08/04/2023 198  150 - 450 x10E9/L Final    RDW 08/04/2023 15.9 (H)  11.5 - 14.5 % Final    Immature Granulocytes %, Automated 08/04/2023 6.6 (H)  0.0 - 0.9 % Final    Comment:  Immature Granulocyte Count (IG) includes promyelocytes,    myelocytes and metamyelocytes but does not include bands.   Percent differential counts (%) should be interpreted in the   context of the absolute cell counts (cells/L).      MANUAL DIFFERENTIAL Y/N 08/04/2023 SEE MANUAL DIFF   Final    LD 08/04/2023 250 (H)  84 - 246 U/L Final    Neutrophils % 08/04/2023 46.6  40.0 - 80.0 % Final    Comment:  Percent differential counts (%) should be interpreted in the   context of the absolute cell counts (cells/L).      Bands % 08/04/2023 8.6  0.0 - 5.0 % Final    Lymphocytes %  08/04/2023 37.9  13.0 - 44.0 % Final    Monocytes % 08/04/2023 3.4  2.0 - 10.0 % Final    Eosinophils % 08/04/2023 0.0  0.0 - 6.0 % Final    Basophils % 08/04/2023 0.9  0.0 - 2.0 % Final    Atypical Lymphocytes % 08/04/2023 0.9  0.0 - 2.0 % Final    Metamyelocytes % 08/04/2023 1.7  0.0 - 0.0 % Final    Absolute Neutrophil Count 08/04/2023 14.85 (H)  1.20 - 7.70 x10E9/L Final    Segs Absolute 08/04/2023 12.54 (H)  1.20 - 7.00 x10E9/L Final    Bands Absolute 08/04/2023 2.31 (H)  0.00 - 0.70 x10E9/L Final    Lymphocytes Absolute 08/04/2023 10.20 (H)  1.20 - 4.80 x10E9/L Final    Monocytes Absolute 08/04/2023 0.91  0.10 - 1.00 x10E9/L Final    Eosinophils Absolute 08/04/2023 0.00  0.00 - 0.70 x10E9/L Final    Basophils Absolute 08/04/2023 0.24 (H)  0.00 - 0.10 x10E9/L Final    Atypical Lymphs Absolute 08/04/2023 0.24  0.00 - 0.50 x10E9/L Final    Metamyelocytes Absolute 08/04/2023 0.46 (A)  0.00 - 0.00 x10E9/L Final    RBC Morphology 08/04/2023 See Below   Final    Polychromasia 08/04/2023 Few   Final    Teardrop Cells 08/04/2023 Few   Final    Elaine Cells 08/04/2023 Few   Final   Legacy Encounter on 07/24/2023   Component Date Value Ref Range Status    Glucose 07/24/2023 113 (H)  74 - 99 mg/dL Final    Sodium 07/24/2023 139  136 - 145 mmol/L Final    Potassium 07/24/2023 4.9  3.5 - 5.3 mmol/L Final    Chloride 07/24/2023 105  98 - 107 mmol/L Final    Bicarbonate 07/24/2023 26  21 - 32 mmol/L Final    Anion Gap 07/24/2023 13  10 - 20 mmol/L Final    Urea Nitrogen 07/24/2023 11  6 - 23 mg/dL Final    Creatinine 07/24/2023 0.57  0.50 - 1.05 mg/dL Final    GFR Female 07/24/2023 >90  >90 mL/min/1.73m2 Final    Comment:  CALCULATIONS OF ESTIMATED GFR ARE PERFORMED   USING THE 2021 CKD-EPI STUDY REFIT EQUATION   WITHOUT THE RACE VARIABLE FOR THE IDMS-TRACEABLE   CREATININE METHODS.    https://jasn.asnjournals.org/content/early/2021/09/22/ASN.[email or web address]      Calcium 07/24/2023 11.3 (H)  8.6 - 10.3 mg/dL Final    Albumin  07/24/2023 4.3  3.4 - 5.0 g/dL Final    Alkaline Phosphatase 07/24/2023 116 (H)  33 - 110 U/L Final    Total Protein 07/24/2023 7.1  6.4 - 8.2 g/dL Final    AST 07/24/2023 24  9 - 39 U/L Final    Total Bilirubin 07/24/2023 0.5  0.0 - 1.2 mg/dL Final    ALT (SGPT) 07/24/2023 56 (H)  7 - 45 U/L Final    Comment:  Patients treated with Sulfasalazine may generate    falsely decreased results for ALT.      WBC 07/24/2023 12.7 (H)  4.4 - 11.3 x10E9/L Final    RBC 07/24/2023 4.55  4.00 - 5.20 x10E12/L Final    Hemoglobin 07/24/2023 11.8 (L)  12.0 - 16.0 g/dL Final    Hematocrit 07/24/2023 37.2  36.0 - 46.0 % Final    MCV 07/24/2023 82  80 - 100 fL Final    MCHC 07/24/2023 31.7 (L)  32.0 - 36.0 g/dL Final    Platelets 07/24/2023 219  150 - 450 x10E9/L Final    RDW 07/24/2023 14.4  11.5 - 14.5 % Final    Neutrophils % 07/24/2023 34.9  40.0 - 80.0 % Final    Immature Granulocytes %, Automated 07/24/2023 0.5  0.0 - 0.9 % Final    Comment:  Immature Granulocyte Count (IG) includes promyelocytes,    myelocytes and metamyelocytes but does not include bands.   Percent differential counts (%) should be interpreted in the   context of the absolute cell counts (cells/L).      Lymphocytes % 07/24/2023 58.8  13.0 - 44.0 % Final    Monocytes % 07/24/2023 3.9  2.0 - 10.0 % Final    Eosinophils % 07/24/2023 1.4  0.0 - 6.0 % Final    Basophils % 07/24/2023 0.5  0.0 - 2.0 % Final    Neutrophils Absolute 07/24/2023 4.44  1.20 - 7.70 x10E9/L Final    Lymphocytes Absolute 07/24/2023 7.46 (H)  1.20 - 4.80 x10E9/L Final    Monocytes Absolute 07/24/2023 0.49  0.10 - 1.00 x10E9/L Final    Eosinophils Absolute 07/24/2023 0.18  0.00 - 0.70 x10E9/L Final    Basophils Absolute 07/24/2023 0.06  0.00 - 0.10 x10E9/L Final    Automated WBC differential has been confirmed by manual smear.    RBC Morphology 07/24/2023 SEE COMMENT   Final    Comment: NO SIGNIFICANT RBC ABNORMALITIES SEEN ON  SMEAR REVIEW.     [facility] Encounter on 07/17/2023   Component Date  Value Ref Range Status    WBC 07/18/2023 CANCELED   Final-Edited    Result canceled by the ancillary.    nRBC 07/18/2023 CANCELED   Final-Edited    Result canceled by the ancillary.    RBC 07/18/2023 CANCELED   Final-Edited    Result canceled by the ancillary.    Hemoglobin 07/18/2023 CANCELED   Final-Edited    Result canceled by the ancillary.    Hematocrit 07/18/2023 CANCELED   Final-Edited    Result canceled by the ancillary.    MCV 07/18/2023 CANCELED   Final-Edited    Result canceled by the ancillary.    MCHC 07/18/2023 CANCELED   Final-Edited    Result canceled by the ancillary.    Platelets 07/18/2023 CANCELED   Final-Edited    Result canceled by the ancillary.    RDW 07/18/2023 CANCELED   Final-Edited    Result canceled by the ancillary.    Neutrophils % 07/18/2023 CANCELED   Final-Edited    Result canceled by the ancillary.    Immature Granulocytes %, Automated 07/18/2023 CANCELED   Final-Edited    Comment:  Immature Granulocyte Count (IG) includes promyelocytes,    myelocytes and metamyelocytes but does not include bands.   Percent differential counts (%) should be interpreted in the   context of the absolute cell counts (cells/L).    Result canceled by the ancillary.      Lymphocytes % 07/18/2023 CANCELED   Final-Edited    Result canceled by the ancillary.    Monocytes % 07/18/2023 CANCELED   Final-Edited    Result canceled by the ancillary.    Eosinophils % 07/18/2023 CANCELED   Final-Edited    Result canceled by the ancillary.    Basophils % 07/18/2023 CANCELED   Final-Edited    Result canceled by the ancillary.    Neutrophils Absolute 07/18/2023 CANCELED   Final-Edited    Result canceled by the ancillary.    Lymphocytes Absolute 07/18/2023 CANCELED   Final-Edited    Result canceled by the ancillary.    Monocytes Absolute 07/18/2023 CANCELED   Final-Edited    Result canceled by the ancillary.    Eosinophils Absolute 07/18/2023 CANCELED   Final-Edited    Result canceled by the ancillary.    Basophils  Absolute 07/18/2023 CANCELED   Final-Edited    Result canceled by the ancillary.    MANUAL DIFFERENTIAL Y/N 07/18/2023 CANCELED   Final-Edited    Result canceled by the ancillary.    Glucose 07/18/2023 CANCELED   Final-Edited    Result canceled by the ancillary.    Sodium 07/18/2023 CANCELED   Final-Edited    Result canceled by the ancillary.    Potassium 07/18/2023 CANCELED   Final-Edited    Result canceled by the ancillary.    Chloride 07/18/2023 CANCELED   Final-Edited    Result canceled by the ancillary.    Bicarbonate 07/18/2023 CANCELED   Final-Edited    Result canceled by the ancillary.    Anion Gap 07/18/2023 CANCELED   Final-Edited    Result canceled by the ancillary.    Urea Nitrogen 07/18/2023 CANCELED   Final-Edited    Result canceled by the ancillary.    Creatinine 07/18/2023 CANCELED   Final-Edited    Result canceled by the ancillary.    GFR Female 07/18/2023 CANCELED   Final-Edited    Comment:  CALCULATIONS OF ESTIMATED GFR ARE PERFORMED   USING THE 2021 CKD-EPI STUDY REFIT EQUATION   WITHOUT THE RACE VARIABLE FOR THE IDMS-TRACEABLE   CREATININE METHODS.    https://jasn.asnjournals.org/content/early/2021/09/22/ASN.[email or web address]    Result canceled by the ancillary.      GFR MALE 07/18/2023 CANCELED   Final-Edited    Comment:  CALCULATIONS OF ESTIMATED GFR ARE PERFORMED   USING THE 2021 CKD-EPI STUDY REFIT EQUATION   WITHOUT THE RACE VARIABLE FOR THE IDMS-TRACEABLE   CREATININE METHODS.    https://jasn.asnjournals.org/content/early/2021/09/22/ASN.[email or web address]    Result canceled by the ancillary.      Calcium 07/18/2023 CANCELED   Final-Edited    Result canceled by the ancillary.    Albumin 07/18/2023 CANCELED   Final-Edited    Result canceled by the ancillary.    Alkaline Phosphatase 07/18/2023 CANCELED   Final-Edited    Result canceled by the ancillary.    Total Protein 07/18/2023 CANCELED   Final-Edited    Result canceled by the ancillary.    AST 07/18/2023 CANCELED   Final-Edited    Result canceled by the  ancillary.    Total Bilirubin 07/18/2023 CANCELED   Final-Edited    Result canceled by the ancillary.    ALT (SGPT) 07/18/2023 CANCELED   Final-Edited    Comment:  Patients treated with Sulfasalazine may generate    falsely decreased results for ALT.    Result canceled by the ancillary.     Orders Only on 07/08/2023   Component Date Value Ref Range Status    Glucose 07/08/2023 139 (H)  74 - 99 mg/dL Final    Sodium 07/08/2023 139  136 - 145 mmol/L Final    Potassium 07/08/2023 4.6  3.5 - 5.3 mmol/L Final    Chloride 07/08/2023 107  98 - 107 mmol/L Final    Bicarbonate 07/08/2023 22  21 - 32 mmol/L Final    Anion Gap 07/08/2023 15  10 - 20 mmol/L Final    Urea Nitrogen 07/08/2023 17  6 - 23 mg/dL Final    Creatinine 07/08/2023 0.56  0.50 - 1.05 mg/dL Final    GFR Female 07/08/2023 >90  >90 mL/min/1.73m2 Final    Comment:  CALCULATIONS OF ESTIMATED GFR ARE PERFORMED   USING THE 2021 CKD-EPI STUDY REFIT EQUATION   WITHOUT THE RACE VARIABLE FOR THE IDMS-TRACEABLE   CREATININE METHODS.    https://jasn.asnjournals.org/content/early/2021/09/22/ASN.[email or web address]      Calcium 07/08/2023 11.7 (H)  8.6 - 10.6 mg/dL Final    Albumin 07/08/2023 4.5  3.4 - 5.0 g/dL Final    Alkaline Phosphatase 07/08/2023 111 (H)  33 - 110 U/L Final    Total Protein 07/08/2023 7.0  6.4 - 8.2 g/dL Final    AST 07/08/2023 18  9 - 39 U/L Final    Total Bilirubin 07/08/2023 0.6  0.0 - 1.2 mg/dL Final    ALT (SGPT) 07/08/2023 26  7 - 45 U/L Final    Comment:  Patients treated with Sulfasalazine may generate    falsely decreased results for ALT.      WBC 07/08/2023 16.9 (H)  4.4 - 11.3 x10E9/L Final    nRBC 07/08/2023 0.0  0.0 - 0.0 /100 WBC Final    RBC 07/08/2023 5.07  4.00 - 5.20 x10E12/L Final    Hemoglobin 07/08/2023 13.1  12.0 - 16.0 g/dL Final    Hematocrit 07/08/2023 42.9  36.0 - 46.0 % Final    MCV 07/08/2023 85  80 - 100 fL Final    MCHC 07/08/2023 30.5 (L)  32.0 - 36.0 g/dL Final    Platelets 07/08/2023 281  150 - 450 x10E9/L Final    RDW  07/08/2023 14.6 (H)  11.5 - 14.5 % Final    Neutrophils % 07/08/2023 43.7  40.0 - 80.0 % Final    Immature Granulocytes %, Automated 07/08/2023 0.5  0.0 - 0.9 % Final    Comment:  Immature Granulocyte Count (IG) includes promyelocytes,    myelocytes and metamyelocytes but does not include bands.   Percent differential counts (%) should be interpreted in the   context of the absolute cell counts (cells/L).      Lymphocytes % 07/08/2023 47.8  13.0 - 44.0 % Final    Monocytes % 07/08/2023 4.7  2.0 - 10.0 % Final    Eosinophils % 07/08/2023 2.6  0.0 - 6.0 % Final    Basophils % 07/08/2023 0.7  0.0 - 2.0 % Final    Neutrophils Absolute 07/08/2023 7.37  1.20 - 7.70 x10E9/L Final    Lymphocytes Absolute 07/08/2023 8.05 (H)  1.20 - 4.80 x10E9/L Final    Monocytes Absolute 07/08/2023 0.80  0.10 - 1.00 x10E9/L Final    Eosinophils Absolute 07/08/2023 0.43  0.00 - 0.70 x10E9/L Final    Basophils Absolute 07/08/2023 0.12 (H)  0.00 - 0.10 x10E9/L Final    Automated WBC differential has been confirmed by manual smear.    Protime 07/08/2023 10.4  9.8 - 12.8 sec Final    Note new reference range as of 6/20/2023 at 10:00am.    INR 07/08/2023 0.9  0.9 - 1.1 Final    LD 07/08/2023 127  84 - 246 U/L Final    RBC Morphology 07/08/2023 SEE COMMENT   Final    Comment: NO SIGNIFICANT RBC ABNORMALITIES SEEN ON  SMEAR REVIEW.     There may be more visits with results that are not included.       Review of Systems  Review of Systems   Constitutional: Negative.    HENT: Negative.     Respiratory: Negative.     Cardiovascular: Negative.    Gastrointestinal: Negative.    Genitourinary: Negative.    Musculoskeletal: Negative.    Skin: Negative.    Neurological: Negative.    Hematological: Negative.    Psychiatric/Behavioral: Negative.          Physical Exam  Physical Exam  Constitutional:       Appearance: Normal appearance. She is normal weight.   Eyes:      Pupils: Pupils are equal, round, and reactive to light.   Cardiovascular:      Pulses:  Normal pulses.      Heart sounds: Normal heart sounds.   Pulmonary:      Effort: Pulmonary effort is normal.      Breath sounds: Normal breath sounds.   Abdominal:      General: Bowel sounds are normal.      Palpations: Abdomen is soft.   Musculoskeletal:         General: Normal range of motion.      Cervical back: Normal range of motion and neck supple.   Skin:     General: Skin is warm and dry.   Neurological:      General: No focal deficit present.      Mental Status: She is alert and oriented to person, place, and time. Mental status is at baseline.   Psychiatric:         Mood and Affect: Mood normal.         Behavior: Behavior normal.         Thought Content: Thought content normal.         Judgment: Judgment normal.          I have personally reviewed pertinent imaging and laboratory results.      This procedure has been fully reviewed with the patient and written informed consent has been obtained.      IR has been consulted to evaluate the patient, determine the appropriate procedure, and whether or not a procedure can and should be performed.     Thank you for allowing me to participate in the care of Angie Tobin.

## 2023-12-04 DIAGNOSIS — C49.9 LEIOMYOSARCOMA (MULTI): Primary | ICD-10-CM

## 2023-12-07 ENCOUNTER — SPECIALTY PHARMACY (OUTPATIENT)
Dept: PHARMACY | Facility: CLINIC | Age: 58
End: 2023-12-07

## 2023-12-07 ENCOUNTER — TELEPHONE (OUTPATIENT)
Dept: ADMISSION | Facility: HOSPITAL | Age: 58
End: 2023-12-07
Payer: COMMERCIAL

## 2023-12-07 NOTE — TELEPHONE ENCOUNTER
Received voicemail from patient, stating she had question about notification she received in My Chart for upcoming procedure  She is scheduled for arterial embolism in IR, ordered by LUPE Brown  Left message on unverified voicemail to contact office to discuss

## 2023-12-11 ASSESSMENT — ENCOUNTER SYMPTOMS
MUSCULOSKELETAL NEGATIVE: 1
HEMATOLOGIC/LYMPHATIC NEGATIVE: 1
CONSTITUTIONAL NEGATIVE: 1
PSYCHIATRIC NEGATIVE: 1
GASTROINTESTINAL NEGATIVE: 1
RESPIRATORY NEGATIVE: 1
NEUROLOGICAL NEGATIVE: 1
CARDIOVASCULAR NEGATIVE: 1

## 2023-12-12 ENCOUNTER — APPOINTMENT (OUTPATIENT)
Dept: HEMATOLOGY/ONCOLOGY | Facility: HOSPITAL | Age: 58
End: 2023-12-12
Payer: COMMERCIAL

## 2023-12-12 ENCOUNTER — LAB (OUTPATIENT)
Dept: LAB | Facility: LAB | Age: 58
End: 2023-12-12
Payer: COMMERCIAL

## 2023-12-12 DIAGNOSIS — C49.9 LEIOMYOSARCOMA (MULTI): ICD-10-CM

## 2023-12-12 DIAGNOSIS — I15.8 OTHER SECONDARY HYPERTENSION: ICD-10-CM

## 2023-12-12 PROCEDURE — 36415 COLL VENOUS BLD VENIPUNCTURE: CPT

## 2023-12-12 PROCEDURE — RXMED WILLOW AMBULATORY MEDICATION CHARGE

## 2023-12-12 PROCEDURE — 85610 PROTHROMBIN TIME: CPT

## 2023-12-12 RX ORDER — NIFEDIPINE 30 MG/1
30 TABLET, FILM COATED, EXTENDED RELEASE ORAL DAILY
Qty: 30 TABLET | Refills: 0 | Status: SHIPPED | OUTPATIENT
Start: 2023-12-12 | End: 2024-01-23 | Stop reason: ALTCHOICE

## 2023-12-13 ENCOUNTER — HOSPITAL ENCOUNTER (OUTPATIENT)
Dept: RADIOLOGY | Facility: HOSPITAL | Age: 58
Discharge: HOME | End: 2023-12-13
Payer: COMMERCIAL

## 2023-12-13 VITALS
SYSTOLIC BLOOD PRESSURE: 114 MMHG | WEIGHT: 265 LBS | HEART RATE: 97 BPM | BODY MASS INDEX: 37.94 KG/M2 | DIASTOLIC BLOOD PRESSURE: 70 MMHG | HEIGHT: 70 IN | RESPIRATION RATE: 18 BRPM | TEMPERATURE: 98.1 F | OXYGEN SATURATION: 99 %

## 2023-12-13 DIAGNOSIS — C49.9 LEIOMYOSARCOMA (MULTI): ICD-10-CM

## 2023-12-13 LAB
ALBUMIN SERPL BCP-MCNC: 4.5 G/DL (ref 3.4–5)
ALP SERPL-CCNC: 99 U/L (ref 33–110)
ALT SERPL W P-5'-P-CCNC: 61 U/L (ref 7–45)
ANION GAP SERPL CALC-SCNC: 18 MMOL/L (ref 10–20)
AST SERPL W P-5'-P-CCNC: 47 U/L (ref 9–39)
BILIRUB SERPL-MCNC: 1 MG/DL (ref 0–1.2)
BUN SERPL-MCNC: 15 MG/DL (ref 6–23)
CALCIUM SERPL-MCNC: 12 MG/DL (ref 8.6–10.6)
CHLORIDE SERPL-SCNC: 107 MMOL/L (ref 98–107)
CO2 SERPL-SCNC: 22 MMOL/L (ref 21–32)
CREAT SERPL-MCNC: 0.71 MG/DL (ref 0.5–1.05)
GFR SERPL CREATININE-BSD FRML MDRD: >90 ML/MIN/1.73M*2
GLUCOSE SERPL-MCNC: 152 MG/DL (ref 74–99)
INR PPP: 0.8 (ref 0.9–1.1)
POTASSIUM SERPL-SCNC: 5 MMOL/L (ref 3.5–5.3)
PROT SERPL-MCNC: 7.4 G/DL (ref 6.4–8.2)
PROTHROMBIN TIME: 9.3 SECONDS (ref 9.8–12.8)
SODIUM SERPL-SCNC: 142 MMOL/L (ref 136–145)

## 2023-12-13 PROCEDURE — 2780000003 HC OR 278 NO HCPCS

## 2023-12-13 PROCEDURE — 36415 COLL VENOUS BLD VENIPUNCTURE: CPT

## 2023-12-13 PROCEDURE — 37242 VASC EMBOLIZE/OCCLUDE ARTERY: CPT | Performed by: RADIOLOGY

## 2023-12-13 PROCEDURE — 36247 INS CATH ABD/L-EXT ART 3RD: CPT | Performed by: RADIOLOGY

## 2023-12-13 PROCEDURE — 3430000001 HC RX 343 DIAGNOSTIC RADIOPHARMACEUTICALS: Performed by: NURSE PRACTITIONER

## 2023-12-13 PROCEDURE — 2550000001 HC RX 255 CONTRASTS: Performed by: RADIOLOGY

## 2023-12-13 PROCEDURE — 76937 US GUIDE VASCULAR ACCESS: CPT | Performed by: RADIOLOGY

## 2023-12-13 PROCEDURE — C1769 GUIDE WIRE: HCPCS

## 2023-12-13 PROCEDURE — 2720000007 HC OR 272 NO HCPCS

## 2023-12-13 PROCEDURE — 99152 MOD SED SAME PHYS/QHP 5/>YRS: CPT

## 2023-12-13 PROCEDURE — 75726 ARTERY X-RAYS ABDOMEN: CPT | Performed by: RADIOLOGY

## 2023-12-13 PROCEDURE — 78830 RP LOCLZJ TUM SPECT W/CT 1: CPT | Performed by: STUDENT IN AN ORGANIZED HEALTH CARE EDUCATION/TRAINING PROGRAM

## 2023-12-13 PROCEDURE — 99152 MOD SED SAME PHYS/QHP 5/>YRS: CPT | Performed by: RADIOLOGY

## 2023-12-13 PROCEDURE — 2500000004 HC RX 250 GENERAL PHARMACY W/ HCPCS (ALT 636 FOR OP/ED): Performed by: RADIOLOGY

## 2023-12-13 PROCEDURE — 78215 LVR&SPLEEN IMG STATIC ONLY: CPT

## 2023-12-13 PROCEDURE — 99153 MOD SED SAME PHYS/QHP EA: CPT | Mod: 59

## 2023-12-13 PROCEDURE — 99153 MOD SED SAME PHYS/QHP EA: CPT | Performed by: RADIOLOGY

## 2023-12-13 PROCEDURE — C1760 CLOSURE DEV, VASC: HCPCS

## 2023-12-13 PROCEDURE — C1894 INTRO/SHEATH, NON-LASER: HCPCS

## 2023-12-13 PROCEDURE — A9540 TC99M MAA: HCPCS | Performed by: NURSE PRACTITIONER

## 2023-12-13 PROCEDURE — 75710 ARTERY X-RAYS ARM/LEG: CPT | Mod: RT | Performed by: RADIOLOGY

## 2023-12-13 PROCEDURE — 77300 RADIATION THERAPY DOSE PLAN: CPT

## 2023-12-13 PROCEDURE — 80053 COMPREHEN METABOLIC PANEL: CPT

## 2023-12-13 PROCEDURE — C1982 CATH, PRESSURE,VALVE-OCCLU: HCPCS

## 2023-12-13 PROCEDURE — 75774 ARTERY X-RAY EACH VESSEL: CPT | Performed by: RADIOLOGY

## 2023-12-13 PROCEDURE — 96373 THER/PROPH/DIAG INJ IA: CPT | Performed by: NURSE PRACTITIONER

## 2023-12-13 PROCEDURE — 36247 INS CATH ABD/L-EXT ART 3RD: CPT

## 2023-12-13 RX ORDER — MIDAZOLAM HYDROCHLORIDE 1 MG/ML
INJECTION INTRAMUSCULAR; INTRAVENOUS
Status: COMPLETED | OUTPATIENT
Start: 2023-12-13 | End: 2023-12-13

## 2023-12-13 RX ORDER — FENTANYL CITRATE 50 UG/ML
INJECTION, SOLUTION INTRAMUSCULAR; INTRAVENOUS
Status: COMPLETED | OUTPATIENT
Start: 2023-12-13 | End: 2023-12-13

## 2023-12-13 RX ADMIN — Medication 9.8 MILLICURIE: at 10:50

## 2023-12-13 RX ADMIN — FENTANYL CITRATE 50 MCG: 50 INJECTION, SOLUTION INTRAMUSCULAR; INTRAVENOUS at 08:30

## 2023-12-13 RX ADMIN — FENTANYL CITRATE 50 MCG: 50 INJECTION, SOLUTION INTRAMUSCULAR; INTRAVENOUS at 08:35

## 2023-12-13 RX ADMIN — KIT FOR THE PREPARATION OF TECHNETIUM TC 99M ALBUMIN AGGREGATED 2.1 MILLICURIE: 2.5 INJECTION, POWDER, FOR SOLUTION INTRAVENOUS at 09:12

## 2023-12-13 RX ADMIN — MIDAZOLAM HYDROCHLORIDE 1 MG: 1 INJECTION, SOLUTION INTRAMUSCULAR; INTRAVENOUS at 08:35

## 2023-12-13 RX ADMIN — IOHEXOL 150 ML: 350 INJECTION, SOLUTION INTRAVENOUS at 09:13

## 2023-12-13 RX ADMIN — MIDAZOLAM HYDROCHLORIDE 1 MG: 1 INJECTION, SOLUTION INTRAMUSCULAR; INTRAVENOUS at 08:30

## 2023-12-13 ASSESSMENT — PAIN SCALES - GENERAL
PAINLEVEL_OUTOF10: 0 - NO PAIN

## 2023-12-13 ASSESSMENT — PAIN - FUNCTIONAL ASSESSMENT
PAIN_FUNCTIONAL_ASSESSMENT: 0-10

## 2023-12-13 NOTE — PRE-PROCEDURE NOTE
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    Relevant review of systems: NA    Relevant Labs:   Lab Results   Component Value Date    CREATININE 0.75 11/09/2023    EGFR >90 11/09/2023    INR 0.8 (L) 12/12/2023    PROTIME 9.3 (L) 12/12/2023       Planned Sedation/Anesthesia: Minimal    Airway assessment: normal    Directed physical examination:    Physical Exam  Constitutional:       Appearance: Normal appearance.   HENT:      Head: Normocephalic.      Nose: Nose normal.      Mouth/Throat:      Pharynx: Oropharynx is clear.   Cardiovascular:      Rate and Rhythm: Normal rate and regular rhythm.   Pulmonary:      Effort: Pulmonary effort is normal.      Breath sounds: Normal breath sounds.   Neurological:      Mental Status: She is alert and oriented to person, place, and time.          Mallampati: III (soft and hard palate and base of uvula visible)    ASA Score: ASA 2 - Patient with mild systemic disease with no functional limitations    Benefits, risks and alternatives of procedure and planned sedation have been discussed with the patient and/or their representative. All questions answered and they agree to proceed.

## 2023-12-13 NOTE — POST-PROCEDURE NOTE
Interventional Radiology Brief Postprocedure Note    Attending: Dr. Oj Gold    Assistant: None    Diagnosis: Metastatic Non-Uterine Leiomyosarcoma    Description of procedure: Technically successful MAA/Radioembolization procedure through right common femoral artery access. No bleeding or hematoma at conclusion of procedure. Please see PACS for full procedural report.     Anesthesia:  Local    Complications: None    Estimated Blood Loss: none    Medications (Filter: Administrations occurring from 0815 to 0917 on 12/13/23) As of 12/13/23 0917      fentaNYL PF (Sublimaze) injection (mcg) Total dose:  100 mcg      Date/Time Rate/Dose/Volume Action       12/13/23  0830 50 mcg Given      0835 50 mcg Given               midazolam (Versed) injection (mg) Total dose:  2 mg      Date/Time Rate/Dose/Volume Action       12/13/23  0830 1 mg Given      0835 1 mg Given               iohexol (OMNIPaque) 350 mg iodine/mL solution 150 mL (mL) Total volume:  150 mL Dosing weight:  120      Date/Time Rate/Dose/Volume Action       12/13/23  0913 150 mL Given                   No specimens collected      See detailed result report with images in PACS.    The patient tolerated the procedure well without incident or complication and is in stable condition.

## 2023-12-13 NOTE — DISCHARGE INSTRUCTIONS
You received moderate sedation:  - Do not drive a car, or operate any machinery or power tools of any kind.  - Do not drink any alcoholic drinks.  - Do not take any over the counter medications that may cause drowsiness.  - Do not make any important decisions or sign any legal documents.  - You need to have a responsible adult accompany you home.  - You may resume your normal diet.  - We strongly suggest that a responsible adult be with you for the rest of the day and also during the night. This is for your protection and safety.     For questions related to your procedure:  Please call [phone] between the hours of 7:00am-5:00pm Monday through Friday.  Please call [phone] after 5:00pm and on weekends and holidays.     In the event of an emergency call 911 or go to your nearest emergency room.

## 2023-12-13 NOTE — Clinical Note
MAA complete. Access via R groin, closure with 5Fr Mynx, 2x2 tegaderm, dressing clean, dry, and intact, no hematoma. Pt received 100 mcg fentanyl and 2 mg versed. VSS. Pt transferred to Nuclear Medicine, RPCU RN given report.

## 2023-12-16 ENCOUNTER — PHARMACY VISIT (OUTPATIENT)
Dept: PHARMACY | Facility: CLINIC | Age: 58
End: 2023-12-16
Payer: COMMERCIAL

## 2023-12-16 PROCEDURE — RXMED WILLOW AMBULATORY MEDICATION CHARGE

## 2023-12-19 ENCOUNTER — HOSPITAL ENCOUNTER (OUTPATIENT)
Dept: RADIOLOGY | Facility: HOSPITAL | Age: 58
Discharge: HOME | End: 2023-12-19
Payer: COMMERCIAL

## 2023-12-19 VITALS
SYSTOLIC BLOOD PRESSURE: 105 MMHG | TEMPERATURE: 97.2 F | RESPIRATION RATE: 16 BRPM | HEART RATE: 105 BPM | OXYGEN SATURATION: 96 % | DIASTOLIC BLOOD PRESSURE: 67 MMHG

## 2023-12-19 DIAGNOSIS — C49.9 LEIOMYOSARCOMA (MULTI): ICD-10-CM

## 2023-12-19 PROCEDURE — 99152 MOD SED SAME PHYS/QHP 5/>YRS: CPT | Performed by: RADIOLOGY

## 2023-12-19 PROCEDURE — 77263 THER RADIOLOGY TX PLNG CPLX: CPT | Performed by: RADIOLOGY

## 2023-12-19 PROCEDURE — 75726 ARTERY X-RAYS ABDOMEN: CPT | Mod: 59 | Performed by: RADIOLOGY

## 2023-12-19 PROCEDURE — 99152 MOD SED SAME PHYS/QHP 5/>YRS: CPT | Mod: 59

## 2023-12-19 PROCEDURE — C1982 CATH, PRESSURE,VALVE-OCCLU: HCPCS

## 2023-12-19 PROCEDURE — 3430000001 HC RX 343 DIAGNOSTIC RADIOPHARMACEUTICALS: Performed by: NURSE PRACTITIONER

## 2023-12-19 PROCEDURE — 37243 VASC EMBOLIZE/OCCLUDE ORGAN: CPT | Performed by: RADIOLOGY

## 2023-12-19 PROCEDURE — 96373 THER/PROPH/DIAG INJ IA: CPT | Performed by: NURSE PRACTITIONER

## 2023-12-19 PROCEDURE — 2720000007 HC OR 272 NO HCPCS

## 2023-12-19 PROCEDURE — C2616 BRACHYTX, NON-STR,YTTRIUM-90: HCPCS | Performed by: NURSE PRACTITIONER

## 2023-12-19 PROCEDURE — C1760 CLOSURE DEV, VASC: HCPCS

## 2023-12-19 PROCEDURE — 76937 US GUIDE VASCULAR ACCESS: CPT | Performed by: RADIOLOGY

## 2023-12-19 PROCEDURE — 79445 NUCLEAR RX INTRA-ARTERIAL: CPT

## 2023-12-19 PROCEDURE — 77300 RADIATION THERAPY DOSE PLAN: CPT | Performed by: RADIOLOGY

## 2023-12-19 PROCEDURE — 36247 INS CATH ABD/L-EXT ART 3RD: CPT | Performed by: RADIOLOGY

## 2023-12-19 PROCEDURE — 99153 MOD SED SAME PHYS/QHP EA: CPT

## 2023-12-19 PROCEDURE — C1769 GUIDE WIRE: HCPCS

## 2023-12-19 PROCEDURE — 2500000004 HC RX 250 GENERAL PHARMACY W/ HCPCS (ALT 636 FOR OP/ED): Performed by: RADIOLOGY

## 2023-12-19 PROCEDURE — 2780000003 HC OR 278 NO HCPCS

## 2023-12-19 PROCEDURE — 77300 RADIATION THERAPY DOSE PLAN: CPT

## 2023-12-19 PROCEDURE — 77790 RADIATION HANDLING: CPT | Performed by: RADIOLOGY

## 2023-12-19 PROCEDURE — 37243 VASC EMBOLIZE/OCCLUDE ORGAN: CPT

## 2023-12-19 PROCEDURE — 2550000001 HC RX 255 CONTRASTS: Performed by: RADIOLOGY

## 2023-12-19 PROCEDURE — 99153 MOD SED SAME PHYS/QHP EA: CPT | Performed by: RADIOLOGY

## 2023-12-19 PROCEDURE — 75894 X-RAYS TRANSCATH THERAPY: CPT | Mod: 59 | Performed by: RADIOLOGY

## 2023-12-19 RX ORDER — MIDAZOLAM HYDROCHLORIDE 1 MG/ML
INJECTION INTRAMUSCULAR; INTRAVENOUS
Status: COMPLETED | OUTPATIENT
Start: 2023-12-19 | End: 2023-12-19

## 2023-12-19 RX ORDER — FENTANYL CITRATE 50 UG/ML
INJECTION, SOLUTION INTRAMUSCULAR; INTRAVENOUS
Status: COMPLETED | OUTPATIENT
Start: 2023-12-19 | End: 2023-12-19

## 2023-12-19 RX ADMIN — FENTANYL CITRATE 50 MCG: 50 INJECTION, SOLUTION INTRAMUSCULAR; INTRAVENOUS at 08:27

## 2023-12-19 RX ADMIN — Medication 139.42 MILLICURIE: at 12:04

## 2023-12-19 RX ADMIN — MIDAZOLAM HYDROCHLORIDE 1 MG: 1 INJECTION, SOLUTION INTRAMUSCULAR; INTRAVENOUS at 08:34

## 2023-12-19 RX ADMIN — FENTANYL CITRATE 50 MCG: 50 INJECTION, SOLUTION INTRAMUSCULAR; INTRAVENOUS at 08:34

## 2023-12-19 RX ADMIN — IOHEXOL 100 ML: 350 INJECTION, SOLUTION INTRAVENOUS at 08:54

## 2023-12-19 RX ADMIN — MIDAZOLAM HYDROCHLORIDE 1 MG: 1 INJECTION, SOLUTION INTRAMUSCULAR; INTRAVENOUS at 08:27

## 2023-12-19 ASSESSMENT — PAIN SCALES - GENERAL
PAINLEVEL_OUTOF10: 0 - NO PAIN
PAINLEVEL_OUTOF10: 5 - MODERATE PAIN
PAINLEVEL_OUTOF10: 0 - NO PAIN
PAINLEVEL_OUTOF10: 4
PAINLEVEL_OUTOF10: 0 - NO PAIN

## 2023-12-19 ASSESSMENT — PAIN - FUNCTIONAL ASSESSMENT

## 2023-12-19 NOTE — Clinical Note
Y-90 procedure complete.  Pt received 2mg Versed/100mcg over 45 minutes. Attempting to close R groin with Mynx closing device.

## 2023-12-19 NOTE — PRE-PROCEDURE NOTE
INTERVENTIONAL RADIOLOGY PRE-PROCEDURE NOTE    Angie Tobin is a 58 y.o. female with PMHx of metastatic leiomyosarcoma who presents to the interventional radiology department for Y-90 right hepatic lobar therapy.    Procedure: Y-90 right hepatic lobar therapy    Indication for procedure: The encounter diagnosis was Leiomyosarcoma (CMS/HCC).    Past Medical History:   Diagnosis Date    Benign essential HTN 04/19/2023    Candidiasis, unspecified 01/20/2022    Antibiotic-induced yeast infection    Elevated blood-pressure reading, without diagnosis of hypertension 05/26/2017    Elevated BP without diagnosis of hypertension    Other conditions influencing health status 09/27/2017    History of cough    Personal history of diseases of the blood and blood-forming organs and certain disorders involving the immune mechanism 06/06/2016    History of leukocytosis    Personal history of other benign neoplasm 05/08/2019    History of other benign neoplasm    Personal history of other diseases of the musculoskeletal system and connective tissue 04/18/2018    History of low back pain    Personal history of other diseases of the nervous system and sense organs 10/03/2016    History of neuropathy    Personal history of other diseases of the respiratory system 08/31/2017    History of acute bronchitis    Personal history of other diseases of the respiratory system 01/24/2022    History of acute sinusitis    Personal history of other specified conditions 09/16/2020    History of weight gain    Right lower quadrant abdominal tenderness 03/04/2019    RLQ abdominal tenderness    Right upper quadrant pain 03/13/2015    Abdominal pain, RUQ (right upper quadrant)    Toenail fungus 04/19/2023      Past Surgical History:   Procedure Laterality Date    BREAST SURGERY  05/30/2013    Breast Surgery Reduction Procedure    CHOLECYSTECTOMY  12/02/2014    Cholecystectomy Laparoscopic    CT GUIDED PERCUTANEOUS BIOPSY BONE  10/24/2016    CT GUIDED  PERCUTANEOUS BIOPSY BONE 10/24/2016 [facility]    CT GUIDED PERCUTANEOUS BIOPSY LUNG  8/5/2022    CT GUIDED PERCUTANEOUS BIOPSY LUNG 8/5/2022 [facility]    ESOPHAGOGASTRODUODENOSCOPY  04/16/2013    Diagnostic Esophagogastroduodenoscopy    IR INTERVENTION ARTERIAL EMBOLIZATION  12/13/2023    IR INTERVENTION ARTERIAL EMBOLIZATION 12/13/2023 Oj Gold MD [facility] [place] ANGIO    OTHER SURGICAL HISTORY  04/19/2013    Anal Fistulotomy (Subcutaneous)    OTHER SURGICAL HISTORY  10/25/2022    Lung wedge resection    OTHER SURGICAL HISTORY  10/25/2022    Lung lobectomy    OTHER SURGICAL HISTORY  05/08/2019    Resection    OTHER SURGICAL HISTORY  05/30/2013    Perineal Transplant Of Anovaginal Fistula    US GUIDED NEEDLE LIVER BIOPSY  7/11/2023    US GUIDED NEEDLE LIVER BIOPSY 7/11/2023 [facility] US       Relevant Labs:   Lab Results   Component Value Date    CREATININE 0.71 12/13/2023    EGFR >90 12/13/2023    INR 0.8 (L) 12/12/2023    PROTIME 9.3 (L) 12/12/2023       Planned Sedation/Anesthesia: Moderate    Directed physical examination:    General Appearance: Normal appearance, behavior, cognition and NAD  Heart: Heart regular rate and rhythm  Lungs: No increased work of breathing  Abdomen: soft and nontender  Psych exam: oriented to time, place and person  Skin: normal and no rashes      Current Outpatient Medications:     albuterol 108 (90 Base) MCG/ACT inhaler, Inhale 1-2 puffs. Every 4-6 hours as needed, Disp: , Rfl:     cyclobenzaprine (Flexeril) 5 mg tablet, , Disp: , Rfl:     dexAMETHasone (Decadron) 4 mg tablet, , Disp: , Rfl:     lisinopril 20 mg tablet, TAKE 1 TABLET BY MOUTH ONE TIME DAILY., Disp: 90 tablet, Rfl: 1    LORazepam (Ativan) 0.5 mg tablet, Take 1 tablet (0.5 mg) by mouth every 8 hours if needed (agitation)., Disp: , Rfl:     meclizine (Antivert) 12.5 mg tablet, Take 1-2 tablets (12.5-25 mg) by mouth 3 times a day as needed (vertigo)., Disp: , Rfl:     metFORMIN (Glucophage) 500 mg tablet, TAKE 1 TABLET  BY MOUTH TWO TIMES A DAY., Disp: 180 tablet, Rfl: 1    metoprolol succinate XL (Toprol-XL) 25 mg 24 hr tablet, Take 1 tablet (25 mg) by mouth once daily. Do not crush or chew., Disp: 90 tablet, Rfl: 3    metoprolol succinate XL (Toprol-XL) 25 mg 24 hr tablet, Take 1 tablet by mouth once daily. Do not crush or chew., Disp: 30 tablet, Rfl: 10    NIFEdipine ER (Adalat CC) 30 mg 24 hr tablet, Take 1 tablet (30 mg) by mouth once daily in the morning. Take before meals. Do not crush, chew, or split., Disp: 90 tablet, Rfl: 3    NIFEdipine ER (Adalat CC) 30 mg 24 hr tablet, Take 1 tablet (30 mg) by mouth once daily., Disp: 30 tablet, Rfl: 0    prochlorperazine (Compazine) 10 mg tablet, TAKE 1 TABLET BY MOUTH EVERY 6 HOURS AS NEEDED FOR NAUSEA AND VOMITING WITH CHEMOTHERAPY, Disp: 56 tablet, Rfl: 2    triamcinolone (Kenalog) 0.1 % cream, APPLY TOPICALLY TO AFFECTED AREA 2 TIMES A DAY AS DIRECTED, Disp: 454 g, Rfl: 2  No current facility-administered medications for this encounter.     Mallampati: III (soft and hard palate and base of uvula visible)    ASA Score: ASA 2 - Patient with mild systemic disease with no functional limitations    Benefits, risks and alternatives of procedure and planned sedation have been discussed with the patient and/or their representative. All questions answered and they agree to proceed.     Herman Marcos MD, PGY-5  Vascular & Interventional Radiology  IR pager: [phone]    NON-Urgent on call weekends and after hours weekdays (5pm - 5am) IR pager: [phone]  Urgent & emergent on call weekends and after hours weekdays (5pm-7am) IR pager: [phone]

## 2023-12-19 NOTE — POST-PROCEDURE NOTE
Interventional Radiology Post-Procedure Note    Y-90 right hepatic lobar therapy      Indication for procedure: The encounter diagnosis was Leiomyosarcoma (CMS/HCC).    Pre-Procedure Verification and Time Out:  · Procedure Location procedure area   · HUDDLE - Pre-procedure Verification completed   · TIME OUT - Final Verification completed immediately prior to procedure start   · DEBRIEF completed     General Information:  Date/Time of Procedure: 12/19/23 at 9:45 AM  Indication(s)/Pre - Procedure Diagnoses: metastatic leiomyosarcoma  Post-Procedure Diagnosis: metastatic leiomyosarcoma  Procedure Name: Y-90 right hepatic lobar therapy  Findings: See PACS  Procedure performed by: Herman Marcos MD   Assistant(s): Dr. Oj Gold MD  Estimated Blood Loss (mL):  10  Specimen: No  Informed Consent: written consent obtained    Procedure Details:    Technically successful and uncomplicated Y-90 right hepatic lobar therapy.  Please see PACS for full procedural details.    Patient Tolerance: good  Complications: None    Prep:  Ultrasound Guided Insertion: Yes  Large Drape, Hand Hygiene, Surgical Cap, Surgical Mask, Sterile Gown, Sterile Gloves, Glasses, and Scrubs  Patient Position: Supine  Site Prep: chlorhexidine, draped, usual sterile procedure followed    Anesthesia/Medications:  Procedural Sedation:  Medications (Filter: Administrations occurring from 0807 to 0930 on 12/19/23) As of 12/19/23 0930      fentaNYL PF (Sublimaze) injection (mcg) Total dose:  100 mcg      Date/Time Rate/Dose/Volume Action       12/19/23  0827 50 mcg Given      0834 50 mcg Given               midazolam (Versed) injection (mg) Total dose:  2 mg      Date/Time Rate/Dose/Volume Action       12/19/23  0827 1 mg Given      0834 1 mg Given               iohexol (OMNIPaque) 350 mg iodine/mL solution 100 mL (mL) Total volume:  100 mL Dosing weight:  120      Date/Time Rate/Dose/Volume Action       12/19/23  0854 100 mL Given                    Fentanyl: 100 mcg  Versed: 2 mg  1% Lidocaine: 9 mL    Attending Attestation:  I was present for the entire procedure    Herman Marcos MD, PGY-5  Interventional Radiology  IR pager: [phone]    NON-Urgent on call weekends and after hours weekdays (5pm - 5am) IR pager: [phone]  Urgent & emergent on call weekends and after hours weekdays (5pm-7am) IR pager: [phone]

## 2023-12-19 NOTE — DISCHARGE INSTRUCTIONS
You received moderate sedation:  - Do not drive a car, or operate any machinery or power tools of any kind.  - Do not drink any alcoholic drinks.  - Do not take any over the counter medications that may cause drowsiness.  - Do not make any important decisions or sign any legal documents.  - You need to have a responsible adult accompany you home.  - You may resume your normal diet.  - We strongly suggest that a responsible adult be with you for the rest of the day and also during the night. This is for your protection and safety.   Refer to mynx pamphlet for post care on closure device and y-90 discharge instruction sheet from nuclear medicine    For questions related to your procedure:  Please call [phone] between the hours of 7:00am-5:00pm Monday through Friday.  Please call [phone] after 5:00pm and on weekends and holidays.     In the event of an emergency call [phone] or go to your nearest emergency room.

## 2023-12-21 NOTE — ADDENDUM NOTE
Encounter addended by: Ana Millan on: 12/21/2023 1:37 PM   Actions taken: Charge Capture section accepted

## 2023-12-23 PROCEDURE — RXMED WILLOW AMBULATORY MEDICATION CHARGE

## 2023-12-26 ENCOUNTER — APPOINTMENT (OUTPATIENT)
Dept: HEMATOLOGY/ONCOLOGY | Facility: HOSPITAL | Age: 58
End: 2023-12-26
Payer: COMMERCIAL

## 2023-12-27 ENCOUNTER — PHARMACY VISIT (OUTPATIENT)
Dept: PHARMACY | Facility: CLINIC | Age: 58
End: 2023-12-27
Payer: COMMERCIAL

## 2024-01-07 ENCOUNTER — TELEMEDICINE (OUTPATIENT)
Dept: PRIMARY CARE | Facility: CLINIC | Age: 59
End: 2024-01-07
Payer: COMMERCIAL

## 2024-01-07 DIAGNOSIS — J32.9 SINOBRONCHITIS: Primary | ICD-10-CM

## 2024-01-07 DIAGNOSIS — J40 SINOBRONCHITIS: Primary | ICD-10-CM

## 2024-01-07 PROCEDURE — 99213 OFFICE O/P EST LOW 20 MIN: CPT | Performed by: NURSE PRACTITIONER

## 2024-01-07 RX ORDER — METHYLPREDNISOLONE 4 MG/1
TABLET ORAL
Qty: 21 TABLET | Refills: 0 | Status: SHIPPED | OUTPATIENT
Start: 2024-01-07 | End: 2024-01-14

## 2024-01-07 RX ORDER — DOXYCYCLINE 100 MG/1
100 CAPSULE ORAL 2 TIMES DAILY
Qty: 20 CAPSULE | Refills: 0 | Status: SHIPPED | OUTPATIENT
Start: 2024-01-07 | End: 2024-01-17

## 2024-01-07 RX ORDER — ALBUTEROL SULFATE 90 UG/1
2 AEROSOL, METERED RESPIRATORY (INHALATION) EVERY 4 HOURS PRN
Qty: 8.5 G | Refills: 0 | Status: SHIPPED | OUTPATIENT
Start: 2024-01-07 | End: 2024-02-13 | Stop reason: ALTCHOICE

## 2024-01-07 ASSESSMENT — ENCOUNTER SYMPTOMS
SINUS PAIN: 1
SHORTNESS OF BREATH: 0
FEVER: 0
WHEEZING: 0
RHINORRHEA: 1
MYALGIAS: 0
NAUSEA: 0
FATIGUE: 0
APPETITE CHANGE: 0
HEADACHES: 0
COUGH: 1
DIARRHEA: 0
CHILLS: 0
SINUS PRESSURE: 1
ABDOMINAL PAIN: 0
VOMITING: 0
SORE THROAT: 0

## 2024-01-07 NOTE — PROGRESS NOTES
Symptoms started three weeks ago with cough and congestion. Patient says that her symptoms never improved and have persisted. Pt has taken tessalon perles and mucinex and it is not helping. Patient has yellow/green nasal drainage. No chest pain or difficulty breathing. Has been using inhaler but will need a refill. Patient is vaccinated against COVID with boosters (not the most recent vaccine).     Review of Systems   Constitutional:  Negative for appetite change, chills, fatigue and fever.   HENT:  Positive for congestion, postnasal drip, rhinorrhea, sinus pressure and sinus pain. Negative for sore throat.    Respiratory:  Positive for cough. Negative for shortness of breath and wheezing.    Cardiovascular:  Negative for chest pain.   Gastrointestinal:  Negative for abdominal pain, diarrhea, nausea and vomiting.   Musculoskeletal:  Negative for myalgias.   Neurological:  Negative for headaches.     Objective   There were no vitals taken for this visit.    Physical Exam  Constitutional:       General: She is not in acute distress.     Appearance: Normal appearance. She is not toxic-appearing.   Pulmonary:      Comments: Patient with a dry cough   Neurological:      Mental Status: She is alert.     Assessment/Plan   Problem List Items Addressed This Visit    None  Visit Diagnoses         Codes    Sinobronchitis    -  Primary J32.9, J40    Relevant Medications    doxycycline (Vibramycin) 100 mg capsule    albuterol (ProAir HFA) 90 mcg/actuation inhaler    methylPREDNISolone (Medrol Dospak) 4 mg tablets          This visit was completed via Epic. All issues as below were discussed and addressed but no physical exam was performed. If it was felt that the patient should be evaluated in the clinic then they were directed there. The patient verbally consented to the visit.     Approximately 15 minutes spent performing virtual video visit.     Pt declined the need for COVID testing at this time stating she will test if her  symptoms don't improve. Pt declined the need for a chest xray at this time.     Pt started on medrol gissell, doxycycline and proair inhaler. Pt to avoid other anti-inflammatories while on the steroids; she agreed. She will use the inhaler every four to six hours as needed. Advised patient on use of humidifier and hot steam treatments. Discussed that patient is to drink plenty of fluids and stay well hydrated. Patient can use mucinex and flonase as well. Discussed that patient is to go to the ER for any chest pain, difficulty breathing, shortness of breath or new/concerning symptoms; she agreed. Pt to follow up in person in 2-3 days if no better despite the use of the medications.

## 2024-01-09 DIAGNOSIS — I15.8 OTHER SECONDARY HYPERTENSION: ICD-10-CM

## 2024-01-09 DIAGNOSIS — E11.9 TYPE 2 DIABETES MELLITUS WITHOUT COMPLICATION, WITHOUT LONG-TERM CURRENT USE OF INSULIN (MULTI): ICD-10-CM

## 2024-01-09 PROCEDURE — RXMED WILLOW AMBULATORY MEDICATION CHARGE

## 2024-01-09 RX ORDER — METFORMIN HYDROCHLORIDE 500 MG/1
500 TABLET ORAL 2 TIMES DAILY
Qty: 60 TABLET | Refills: 0 | Status: SHIPPED | OUTPATIENT
Start: 2024-01-09 | End: 2024-02-12 | Stop reason: SDUPTHER

## 2024-01-09 RX ORDER — NIFEDIPINE 30 MG/1
30 TABLET, FILM COATED, EXTENDED RELEASE ORAL DAILY
Qty: 30 TABLET | Refills: 0 | Status: CANCELLED | OUTPATIENT
Start: 2024-01-09

## 2024-01-10 ENCOUNTER — PHARMACY VISIT (OUTPATIENT)
Dept: PHARMACY | Facility: CLINIC | Age: 59
End: 2024-01-10
Payer: COMMERCIAL

## 2024-01-10 NOTE — TELEPHONE ENCOUNTER
Attempted to call patient back to discuss refill request, voice message left to return our call at the office [phone]

## 2024-01-10 NOTE — TELEPHONE ENCOUNTER
Spoke to patient, advised we started the nifedipine due to the Votrient she was previously taking while she received radiation, this medication can cause your blood pressure to increase and then will normally not be needed any longer once the pazopanib is stopped, patient educated to stop nifedipine per Dr. Walsh and monitor BP at home, she will call back with any concerns or questions

## 2024-01-18 ENCOUNTER — TELEMEDICINE (OUTPATIENT)
Dept: PRIMARY CARE | Facility: CLINIC | Age: 59
End: 2024-01-18
Payer: COMMERCIAL

## 2024-01-18 DIAGNOSIS — U07.1 COVID-19: Primary | ICD-10-CM

## 2024-01-18 PROCEDURE — 99213 OFFICE O/P EST LOW 20 MIN: CPT

## 2024-01-18 ASSESSMENT — ENCOUNTER SYMPTOMS
VOMITING: 0
COUGH: 1
SORE THROAT: 0
HEADACHES: 1
RHINORRHEA: 1
DIARRHEA: 1
NAUSEA: 1
SINUS PAIN: 1
WHEEZING: 0
DYSURIA: 0

## 2024-01-18 NOTE — PROGRESS NOTES
This visit was completed via video conference. All issues as below were discussed and addressed but no physical exam was performed. If it was felt that the patient should be evaluated in clinic than they were directed there. The patient verbally consented to the visit.      Subjective   Patient ID: Angie Tobin is a 58 y.o. female who presents for covid.    URI   This is a new problem. The current episode started yesterday (COVID positive 1/18/24). The problem has been gradually worsening. Maximum temperature: felt feverish, did not measure. Associated symptoms include congestion, coughing, diarrhea, headaches, nausea, rhinorrhea and sinus pain. Pertinent negatives include no dysuria, ear pain, plugged ear sensation, sore throat, vomiting or wheezing. She has tried NSAIDs (mucinex) for the symptoms. The treatment provided mild relief.        Review of Systems   HENT:  Positive for congestion, rhinorrhea and sinus pain. Negative for ear pain and sore throat.    Respiratory:  Positive for cough. Negative for wheezing.    Gastrointestinal:  Positive for diarrhea and nausea. Negative for vomiting.   Genitourinary:  Negative for dysuria.   Neurological:  Positive for headaches.       Objective   There were no vitals taken for this visit.    Physical Exam pt seen from her home to be in no acute distress.     Assessment/Plan   Problem List Items Addressed This Visit    None  Visit Diagnoses         Codes    COVID-19    -  Primary U07.1    Relevant Medications    nirmatrelvir-ritonavir (PAXLOVID) 300 mg (150 mg x 2)-100 mg tablet therapy pack

## 2024-01-23 ENCOUNTER — OFFICE VISIT (OUTPATIENT)
Dept: HEMATOLOGY/ONCOLOGY | Facility: HOSPITAL | Age: 59
End: 2024-01-23
Payer: COMMERCIAL

## 2024-01-23 DIAGNOSIS — E07.9 THYROID DISORDER: ICD-10-CM

## 2024-01-23 DIAGNOSIS — F41.1 GENERALIZED ANXIETY DISORDER: ICD-10-CM

## 2024-01-23 DIAGNOSIS — I15.8 OTHER SECONDARY HYPERTENSION: ICD-10-CM

## 2024-01-23 DIAGNOSIS — C49.9 LEIOMYOSARCOMA (MULTI): Primary | ICD-10-CM

## 2024-01-23 DIAGNOSIS — Z79.899 ENCOUNTER FOR LONG-TERM CURRENT USE OF HIGH RISK MEDICATION: ICD-10-CM

## 2024-01-23 PROCEDURE — 1036F TOBACCO NON-USER: CPT | Performed by: INTERNAL MEDICINE

## 2024-01-23 PROCEDURE — 99443 PR PHYS/QHP TELEPHONE EVALUATION 21-30 MIN: CPT | Performed by: INTERNAL MEDICINE

## 2024-01-23 ASSESSMENT — ENCOUNTER SYMPTOMS
APPETITE CHANGE: 0
SHORTNESS OF BREATH: 0
NERVOUS/ANXIOUS: 1
ADENOPATHY: 0
BACK PAIN: 0
CONSTIPATION: 0
DIARRHEA: 0
CHEST TIGHTNESS: 0
CHILLS: 0
DEPRESSION: 0
CONFUSION: 0
HEMOPTYSIS: 0
SEIZURES: 0
SORE THROAT: 0
DIZZINESS: 0
DYSURIA: 0
SCLERAL ICTERUS: 0
NAUSEA: 0
COUGH: 0
FREQUENCY: 0
EYE PROBLEMS: 0
FATIGUE: 0
FEVER: 0
EXTREMITY WEAKNESS: 0
HEMATURIA: 0
FLANK PAIN: 0
MYALGIAS: 0
LEG SWELLING: 0
PALPITATIONS: 0
DIFFICULTY URINATING: 0
HOT FLASHES: 0
TROUBLE SWALLOWING: 0
VOMITING: 0
ABDOMINAL PAIN: 0

## 2024-01-23 NOTE — PROGRESS NOTES
.Patient ID: Angie Tobin is a 58 y.o. female.  Referring Physician: Nico Walsh MD  [id number] [place] [address]  Primary Care Provider: JUAN Russell     Chief Complaint   Patient presents with    Follow-up    Sarcoma     Leiomyosarcoma follow up  Y-90 follow up  Phone visit follow up        Cancer History:   Treatment Synopsis:   Ms. Tobin is a pleasant woman who presented with RUQ pain in March 2019. Further investigations and imaging showed a large heterogeneous mass in the pancreatic  bed. Initially this was thought to be lymphoma, however biopsy showed soft tissue sarcoma. On April 22, 2019 this mass was removed by Dr. Gonzalez via surgery. 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 She was subsequently  seen by Gyn Onc and underwent hysterectomy and bilateral salpingo-oopherectomy on Danae 10, 2019. This specimen did not show any evidence of tumor. We saw her first in August 2019. Scans did not detect any tumor at that point. Her case was discussed in  the tumor board for post op RT. Since there was no focus to be seen, it was decided that RT would be deferred for the future if a recurrence happens. CT scan in April 2021 detected new solitary nodules in lung and liver. MRI done on April 23, 2021 confirmed  recurrence in the liver. Her case was discussed in the tumor board and a decision was made to pursue systemic chemotherapy. We started her on doxorubicin and dacarbazine with zinecard protection. s/p 2 cycles, CT scans shows positive response in tumor  burden. Completed 6 cycles uneventfully on 08/26/2021.     Of note, the patient also has a history of Monoclonal B-cell lymphocytosis. She was worked up in 2016 for this reason (BM Biopsy report present in the physician portal)     03/31/2022- CT scan showed a lung nodule that is 0.6cm (was inconspicuous on the previous scan). Port removed on Feb 10, 2022.   07/05/2022- CT scan on 7/1/22  "showed that the lung nodule has enlarged to 0.9cm. We petitioned for a biopsy of this lung nodule. Interestingly, the liver lesions have disappeared.   08/16/2022- Lung biopsy completed in early august and path reads leiomyosarcoma. Case presented in tumor board on 8/19/22 and decision made to refer her to CT surgery   10/14/2022- Surgery completed- 6 wedges removed. Multiple small nodules of leiomyosarcoma and microscopic foci reported.   12/06/2022- CT scan is [facility]. Repeat staging in 3 months.  03/03/2023- CT scan showed the liver lesion to be stable.   06/02/2023- CT scan shows worsening liver lesion- MRI liver ordered on 06/16/2023 showing progressing liver mets. We petitioned for Docetaxel/Gemcitabine to start 07/17/2023.  08/21/2023- CT scan and MRI done after 2 cycles show progressive disease in the liver and in the lungs.   08/23/2023- Ordered Pazopanib.   08/30/2023- Started pazopanib at 400mg by mouth daily  09/07/2023- Increased to 800mg by mouth daily  11/13/2023- Imaging shows progressive disease in the liver.   11/15/2023- Stopped pazopanib and referred for Y-90.  12/19/2023- Received Y-90 treatment.     2024 01/23/24- Phone visit. Ordered imaging. Recovered well from y-90.     Treatment History:   Systemic treatment:  1. Doxorubicin + Dacarbazine + Zinecard (Day 1-3) of 21 day of cycle.  C1- 05/10/2021 to 05/12/2021  C2- 06/01/2021 to 06/03/2021  C3- 06/22/2021 to 06/24/2021  C4- 07/13/2021 to 07/15/2021  C5- 08/03/2021 to 08/05/2021   C6- 08/24/2021 to 08/26/2021     2. Docetaxel (75mg/m2) + Gemcitabine (900mg/m2) D1, D8 of a 21 day cycle  C1- 07/17/2023   C2- 08/07/2023- Discontinued after progression noted.      3. Pazopanib   Start date 08/30/2023 at 400mg by mouth daily.   Increase to 09/07/2023 at 800mg by mouth daily.   Stopped on 11/15/23 due to progressive disease.      Subjective   Please refer to \"Notes/Cancer History\" above for complete History of present illness.     Ms Angie Tobin " is doing well. She got COVID, hence this was converted to a phone visit. She tolerated the Y-90 very well.     The visit was changed to phone visit/virtual visit in agreement with the patient. I am always available to meet with the patient in person should they wish to meet me.    Review of Systems:   Review of Systems   Constitutional:  Negative for appetite change, chills, fatigue and fever.   HENT:   Negative for hearing loss, lump/mass, mouth sores, sore throat and trouble swallowing.    Eyes:  Negative for eye problems and icterus.   Respiratory:  Negative for chest tightness, cough, hemoptysis and shortness of breath.    Cardiovascular:  Negative for chest pain, leg swelling and palpitations.   Gastrointestinal:  Negative for abdominal pain, constipation, diarrhea, nausea and vomiting.   Endocrine: Negative for hot flashes.   Genitourinary:  Negative for difficulty urinating, dysuria, frequency and hematuria.    Musculoskeletal:  Negative for back pain, flank pain, gait problem and myalgias.   Skin:  Negative for itching and rash.   Neurological:  Negative for dizziness, extremity weakness, gait problem and seizures.   Hematological:  Negative for adenopathy.   Psychiatric/Behavioral:  Negative for confusion and depression. The patient is nervous/anxious.          MEDICAL HISTORY  Past Medical History:   Diagnosis Date    Benign essential HTN 04/19/2023    Candidiasis, unspecified 01/20/2022    Antibiotic-induced yeast infection    Elevated blood-pressure reading, without diagnosis of hypertension 05/26/2017    Elevated BP without diagnosis of hypertension    Other conditions influencing health status 09/27/2017    History of cough    Personal history of diseases of the blood and blood-forming organs and certain disorders involving the immune mechanism 06/06/2016    History of leukocytosis    Personal history of other benign neoplasm 05/08/2019    History of other benign neoplasm    Personal history of other  diseases of the musculoskeletal system and connective tissue 2018    History of low back pain    Personal history of other diseases of the nervous system and sense organs 10/03/2016    History of neuropathy    Personal history of other diseases of the respiratory system 2017    History of acute bronchitis    Personal history of other diseases of the respiratory system 2022    History of acute sinusitis    Personal history of other specified conditions 2020    History of weight gain    Right lower quadrant abdominal tenderness 2019    RLQ abdominal tenderness    Right upper quadrant pain 2015    Abdominal pain, RUQ (right upper quadrant)    Toenail fungus 2023       FAMILY HISTORY  Family History   Problem Relation Name Age of Onset    Other (atrial flutter) Mother      Diabetes Mother      Leukemia Father      Liver cancer Father      Ovarian cancer Sister      Hypothyroidism Brother      Breast cancer Paternal Grandmother      No Known Problems Sibling         TOBACCO HISTORY  Tobacco Use: Low Risk  (2024)    Patient History     Smoking Tobacco Use: Never     Smokeless Tobacco Use: Never     Passive Exposure: Not on file       SOCIAL HISTORY  Social Connections: Not on file        Outpatient Medication Profile:  Current Outpatient Medications on File Prior to Visit   Medication Sig Dispense Refill    albuterol (ProAir HFA) 90 mcg/actuation inhaler Inhale 2 puffs every 4 hours if needed for wheezing or shortness of breath. 8.5 g 0    albuterol 108 (90 Base) MCG/ACT inhaler Inhale 1-2 puffs. Every 4-6 hours as needed      [] doxycycline (Vibramycin) 100 mg capsule Take 1 capsule (100 mg) by mouth 2 times a day for 10 days. Take with at least 8 ounces (large glass) of water, do not lie down for 30 minutes after 20 capsule 0    lisinopril 20 mg tablet TAKE 1 TABLET BY MOUTH ONE TIME DAILY. 90 tablet 1    LORazepam (Ativan) 0.5 mg tablet Take 1 tablet (0.5 mg) by  mouth every 8 hours if needed (agitation).      meclizine (Antivert) 12.5 mg tablet Take 1-2 tablets (12.5-25 mg) by mouth 3 times a day as needed (vertigo).      metFORMIN (Glucophage) 500 mg tablet Take 1 tablet (500 mg) by mouth 2 times a day. PT NEEDS TO MAKE A ROUTINE FOLLOW UP WITH ANOTHER PROVIDER AT [facility] FOR MORE REFILLS. 60 tablet 0    metoprolol succinate XL (Toprol-XL) 25 mg 24 hr tablet Take 1 tablet (25 mg) by mouth once daily. Do not crush or chew. 90 tablet 3    metoprolol succinate XL (Toprol-XL) 25 mg 24 hr tablet Take 1 tablet by mouth once daily. Do not crush or chew. 30 tablet 10    NIFEdipine ER (Adalat CC) 30 mg 24 hr tablet Take 1 tablet (30 mg) by mouth once daily in the morning. Take before meals. Do not crush, chew, or split. 90 tablet 3    NIFEdipine ER (Adalat CC) 30 mg 24 hr tablet Take 1 tablet (30 mg) by mouth once daily. 30 tablet 0    nirmatrelvir-ritonavir (PAXLOVID) 300 mg (150 mg x 2)-100 mg tablet therapy pack Take 3 tablets by mouth 2 times a day for 5 days. Follow the instructions on the package 30 tablet 0    prochlorperazine (Compazine) 10 mg tablet TAKE 1 TABLET BY MOUTH EVERY 6 HOURS AS NEEDED FOR NAUSEA AND VOMITING WITH CHEMOTHERAPY 56 tablet 2    triamcinolone (Kenalog) 0.1 % cream APPLY TOPICALLY TO AFFECTED AREA 2 TIMES A DAY AS DIRECTED 454 g 2     No current facility-administered medications on file prior to visit.         Performance Status:  Asymptomatic     Vitals and Measurements:   There were no vitals taken for this visit.      Physical Exam:   Physical Exam         Lab Results:  I have reviewed these laboratory results:     No visits with results within 1 Month(s) from this visit.   Latest known visit with results is:   Hospital Outpatient Visit on 12/13/2023   Component Date Value Ref Range Status    Glucose 12/13/2023 152 (H)  74 - 99 mg/dL Final    Sodium 12/13/2023 142  136 - 145 mmol/L Final    Potassium 12/13/2023 5.0  3.5 - 5.3 mmol/L  Final    Chloride 12/13/2023 107  98 - 107 mmol/L Final    Bicarbonate 12/13/2023 22  21 - 32 mmol/L Final    Anion Gap 12/13/2023 18  10 - 20 mmol/L Final    Urea Nitrogen 12/13/2023 15  6 - 23 mg/dL Final    Creatinine 12/13/2023 0.71  0.50 - 1.05 mg/dL Final    eGFR 12/13/2023 >90  >60 mL/min/1.73m*2 Final    Calcium 12/13/2023 12.0 (H)  8.6 - 10.6 mg/dL Final    Albumin 12/13/2023 4.5  3.4 - 5.0 g/dL Final    Alkaline Phosphatase 12/13/2023 99  33 - 110 U/L Final    Total Protein 12/13/2023 7.4  6.4 - 8.2 g/dL Final    AST 12/13/2023 47 (H)  9 - 39 U/L Final    Bilirubin, Total 12/13/2023 1.0  0.0 - 1.2 mg/dL Final    ALT 12/13/2023 61 (H)  7 - 45 U/L Final     Radiology Result:  I have reviewed the latest Imaging in PACS and the findings are noted in this note. I discussed the results of the latest imaging with the patient. All previous imaging were reviewed at the time it was completed. Full records are available in the EMR for review as well.     No new imaging to review.      Pathology Results:  I have reviewed the full pathology report recorded in the EMR. The pertinent portions indicating diagnosis are listed here in the note. for details please refer to the full report recorded in the EMR.    Surgical Pathology [Apr 30 2019 4:54PM] ([id number])     Specimens: RETROPERITONEAL MASS ANTERIOR TO VENA CAVA /Received fresh for intraoperative consultation, labeled with the patient's      Name MAGAN SCHULTZ      Accession #: [id number]   Pathologist: CIELO AGUIRRE M.D.   Date of Procedure: 4/22/2019   Date Received: 4/22/2019   Date Reported 4/30/2019   Submitting Physician: KANA MUKHERJEE M.D.   Location: [facility] Other External #         FINAL DIAGNOSIS   A. RETROPERITONEAL MASS, ANTERIOR TO VENA CAVA, EXCISION:   -- LEIOMYOSARCOMA, 8.7 CM, INFILTRATING VESSEL WALL OF INFERIOR VENA CAVA, SEE NOTE   -- DISTANCE TO SOFT TISSUE/ PERIPHERAL RESECTION MARGIN < 0.5 MM, FOCAL INVOLVEMENT CANNOT BY  EXCLUDED   -- VASCULAR MARGINS UNINVOLVED BY MALIGNANCY   -- TWO LYMPH NODES WITH NO EVIDENCE OF METASTASIS (0/2)      Note: The spindle cell neoplasm demonstrates moderate to high-grade nuclear atypia, a high mitotic rate (up to 28 mitoses per 10 HPF), and extensively infiltrates the adventitia and  media of the venous vessel wall. Focal (indeterminate-type) necrosis is noted. Immunohistochemistry demonstrates expression of estrogen receptor (60-70% of tumor cells) and progesterone receptor (>95% of tumor cells) and focal WT-1. Immunohistochemistry  performed on the prior core needle biopsy ([id number]) had demonstrated smooth muscle differentiation (positive: SMA, desmin, h-caldesmon; negative: CD34, HMB-45, myogenin, S100, DOG-1, ).      The gross and/or microscopic findings were reviewed in conjunction with pathology resident, Vika Ruiz M.D.      Electronically Signed Out By CIELO AGUIRRE M.D./SXA   By the signature on this report, the individual or group listed as making the Final Interpretation/Diagnosis certifies that they have reviewed this case.      Assessment and Plan:   Assessment/Plan      Mr. Angie Tobin is a 58 y.o. female with a diagnosis of metastatic leiomyosarcoma. Please see the evolution of the case listed above in the cancer history.     [facility] results- ANNE MARIE stable, TMB- 0 muts/Mb. RB gene mutated. TP53 and PTEN mutated.     Today,   Ms. Tobin had Y-90 procedure before [facility]. She tolerated the Y-90 OK. She is recovering from COVID. I will order restaging scan to happen in 2 weeks.     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   - s/p Y-90 to liver.   - Imaging in 2 weeks to restage.     # VEGF induced HTN  - disconitnued VEGF. Blood pressure under good control. Stopped Nifedipine.     # Chemo induced skin rash  - Resolved     # B-cell monoclonal lymphocytosis:   - White count came down. Likely Neulasta effect that diminished after chemotherapy       # Fatty infiltration of liver  - Following up with the PCP.     # High PTH levels and hypercalcemia  - Continue follow up with Endocrinology and nephrology      # Tachycardia   - Following with Cardiology      # Diabetes Mellitus  - Followed by PCP.  - Currently on Metformin     DISCLAIMER:   In preparing for this visit and writing this note, I reviewed all the previous electronic medical records (labs, imaging and medical charts) of the patient available in the physician portal. Significant findings which helped in decision making are recorded  in this chart.     The plan was discussed with the patient. We gave him ample opportunities to ask questions. All questions were answered to his satisfaction and he verbalized understanding.       Nico Walsh MD    Hematology and Oncology     Phone: [phone]  Fax: [phone].

## 2024-01-24 PROCEDURE — RXMED WILLOW AMBULATORY MEDICATION CHARGE

## 2024-01-26 ENCOUNTER — PHARMACY VISIT (OUTPATIENT)
Dept: PHARMACY | Facility: CLINIC | Age: 59
End: 2024-01-26
Payer: COMMERCIAL

## 2024-01-31 ENCOUNTER — HOSPITAL ENCOUNTER (OUTPATIENT)
Dept: RADIOLOGY | Facility: HOSPITAL | Age: 59
Discharge: HOME | End: 2024-01-31
Payer: COMMERCIAL

## 2024-01-31 DIAGNOSIS — C49.9 LEIOMYOSARCOMA (MULTI): ICD-10-CM

## 2024-01-31 DIAGNOSIS — E07.9 THYROID DISORDER: ICD-10-CM

## 2024-01-31 PROCEDURE — A9575 INJ GADOTERATE MEGLUMI 0.1ML: HCPCS | Performed by: INTERNAL MEDICINE

## 2024-01-31 PROCEDURE — 74183 MRI ABD W/O CNTR FLWD CNTR: CPT

## 2024-01-31 PROCEDURE — 71250 CT THORAX DX C-: CPT

## 2024-01-31 PROCEDURE — 2550000001 HC RX 255 CONTRASTS: Performed by: INTERNAL MEDICINE

## 2024-01-31 RX ORDER — GADOTERATE MEGLUMINE 376.9 MG/ML
20 INJECTION INTRAVENOUS
Status: COMPLETED | OUTPATIENT
Start: 2024-01-31 | End: 2024-01-31

## 2024-01-31 RX ADMIN — GADOTERATE MEGLUMINE 20 ML: 376.9 INJECTION INTRAVENOUS at 11:05

## 2024-02-05 ENCOUNTER — LAB (OUTPATIENT)
Dept: LAB | Facility: LAB | Age: 59
End: 2024-02-05
Payer: COMMERCIAL

## 2024-02-05 ENCOUNTER — HOSPITAL ENCOUNTER (OUTPATIENT)
Dept: RADIOLOGY | Facility: HOSPITAL | Age: 59
Discharge: HOME | End: 2024-02-05
Payer: COMMERCIAL

## 2024-02-05 DIAGNOSIS — C49.9 LEIOMYOSARCOMA (MULTI): ICD-10-CM

## 2024-02-05 DIAGNOSIS — E07.9 THYROID DISORDER: ICD-10-CM

## 2024-02-05 DIAGNOSIS — F41.1 GENERALIZED ANXIETY DISORDER: ICD-10-CM

## 2024-02-05 LAB
ALBUMIN SERPL BCP-MCNC: 4.2 G/DL (ref 3.4–5)
ALP SERPL-CCNC: 158 U/L (ref 33–110)
ALT SERPL W P-5'-P-CCNC: 41 U/L (ref 7–45)
ANION GAP SERPL CALC-SCNC: 19 MMOL/L (ref 10–20)
AST SERPL W P-5'-P-CCNC: 41 U/L (ref 9–39)
BASOPHILS # BLD AUTO: 0.1 X10*3/UL (ref 0–0.1)
BASOPHILS NFR BLD AUTO: 0.9 %
BILIRUB SERPL-MCNC: 1 MG/DL (ref 0–1.2)
BUN SERPL-MCNC: 9 MG/DL (ref 6–23)
CALCIUM SERPL-MCNC: 11.4 MG/DL (ref 8.6–10.3)
CHLORIDE SERPL-SCNC: 103 MMOL/L (ref 98–107)
CO2 SERPL-SCNC: 22 MMOL/L (ref 21–32)
CREAT SERPL-MCNC: 0.65 MG/DL (ref 0.5–1.05)
EGFRCR SERPLBLD CKD-EPI 2021: >90 ML/MIN/1.73M*2
EOSINOPHIL # BLD AUTO: 0.45 X10*3/UL (ref 0–0.7)
EOSINOPHIL NFR BLD AUTO: 4.2 %
ERYTHROCYTE [DISTWIDTH] IN BLOOD BY AUTOMATED COUNT: 14.9 % (ref 11.5–14.5)
GGT SERPL-CCNC: 173 U/L (ref 5–55)
GLUCOSE SERPL-MCNC: 138 MG/DL (ref 74–99)
HCT VFR BLD AUTO: 43.7 % (ref 36–46)
HGB BLD-MCNC: 13.1 G/DL (ref 12–16)
IMM GRANULOCYTES # BLD AUTO: 0.06 X10*3/UL (ref 0–0.7)
IMM GRANULOCYTES NFR BLD AUTO: 0.6 % (ref 0–0.9)
LDH SERPL L TO P-CCNC: 211 U/L (ref 84–246)
LYMPHOCYTES # BLD AUTO: 1.75 X10*3/UL (ref 1.2–4.8)
LYMPHOCYTES NFR BLD AUTO: 16.4 %
MCH RBC QN AUTO: 27.7 PG (ref 26–34)
MCHC RBC AUTO-ENTMCNC: 30 G/DL (ref 32–36)
MCV RBC AUTO: 92 FL (ref 80–100)
MONOCYTES # BLD AUTO: 0.81 X10*3/UL (ref 0.1–1)
MONOCYTES NFR BLD AUTO: 7.6 %
NEUTROPHILS # BLD AUTO: 7.51 X10*3/UL (ref 1.2–7.7)
NEUTROPHILS NFR BLD AUTO: 70.3 %
NRBC BLD-RTO: 0 /100 WBCS (ref 0–0)
PLATELET # BLD AUTO: 208 X10*3/UL (ref 150–450)
POTASSIUM SERPL-SCNC: 4.9 MMOL/L (ref 3.5–5.3)
PROT SERPL-MCNC: 6.8 G/DL (ref 6.4–8.2)
RBC # BLD AUTO: 4.73 X10*6/UL (ref 4–5.2)
SODIUM SERPL-SCNC: 139 MMOL/L (ref 136–145)
TSH SERPL-ACNC: 2.12 MIU/L (ref 0.44–3.98)
WBC # BLD AUTO: 10.7 X10*3/UL (ref 4.4–11.3)

## 2024-02-05 PROCEDURE — 36415 COLL VENOUS BLD VENIPUNCTURE: CPT

## 2024-02-05 PROCEDURE — 80053 COMPREHEN METABOLIC PANEL: CPT

## 2024-02-05 PROCEDURE — A9575 INJ GADOTERATE MEGLUMI 0.1ML: HCPCS | Performed by: INTERNAL MEDICINE

## 2024-02-05 PROCEDURE — 82977 ASSAY OF GGT: CPT

## 2024-02-05 PROCEDURE — 2550000001 HC RX 255 CONTRASTS: Performed by: INTERNAL MEDICINE

## 2024-02-05 PROCEDURE — 84443 ASSAY THYROID STIM HORMONE: CPT

## 2024-02-05 PROCEDURE — 85025 COMPLETE CBC W/AUTO DIFF WBC: CPT

## 2024-02-05 PROCEDURE — 72197 MRI PELVIS W/O & W/DYE: CPT

## 2024-02-05 PROCEDURE — 83615 LACTATE (LD) (LDH) ENZYME: CPT

## 2024-02-05 PROCEDURE — 72197 MRI PELVIS W/O & W/DYE: CPT | Performed by: RADIOLOGY

## 2024-02-05 RX ORDER — GADOTERATE MEGLUMINE 376.9 MG/ML
20 INJECTION INTRAVENOUS
Status: COMPLETED | OUTPATIENT
Start: 2024-02-05 | End: 2024-02-05

## 2024-02-05 RX ADMIN — GADOTERATE MEGLUMINE 20 ML: 376.9 INJECTION INTRAVENOUS at 11:08

## 2024-02-06 ENCOUNTER — APPOINTMENT (OUTPATIENT)
Dept: RADIOLOGY | Facility: HOSPITAL | Age: 59
End: 2024-02-06
Payer: COMMERCIAL

## 2024-02-06 ENCOUNTER — APPOINTMENT (OUTPATIENT)
Dept: RADIOLOGY | Facility: CLINIC | Age: 59
End: 2024-02-06
Payer: COMMERCIAL

## 2024-02-12 DIAGNOSIS — E11.9 TYPE 2 DIABETES MELLITUS WITHOUT COMPLICATION, WITHOUT LONG-TERM CURRENT USE OF INSULIN (MULTI): ICD-10-CM

## 2024-02-12 RX ORDER — METFORMIN HYDROCHLORIDE 500 MG/1
500 TABLET ORAL 2 TIMES DAILY
Qty: 20 TABLET | Refills: 0 | Status: SHIPPED | OUTPATIENT
Start: 2024-02-12 | End: 2024-02-13 | Stop reason: SDUPTHER

## 2024-02-13 ENCOUNTER — OFFICE VISIT (OUTPATIENT)
Dept: HEMATOLOGY/ONCOLOGY | Facility: HOSPITAL | Age: 59
End: 2024-02-13
Payer: COMMERCIAL

## 2024-02-13 VITALS
HEART RATE: 146 BPM | BODY MASS INDEX: 38.03 KG/M2 | RESPIRATION RATE: 20 BRPM | OXYGEN SATURATION: 100 % | HEIGHT: 70 IN | SYSTOLIC BLOOD PRESSURE: 161 MMHG | WEIGHT: 265.65 LBS | TEMPERATURE: 97.7 F | DIASTOLIC BLOOD PRESSURE: 100 MMHG

## 2024-02-13 DIAGNOSIS — I15.8 OTHER SECONDARY HYPERTENSION: ICD-10-CM

## 2024-02-13 DIAGNOSIS — C49.9 LEIOMYOSARCOMA (MULTI): Primary | ICD-10-CM

## 2024-02-13 DIAGNOSIS — E11.9 TYPE 2 DIABETES MELLITUS WITHOUT COMPLICATION, WITHOUT LONG-TERM CURRENT USE OF INSULIN (MULTI): ICD-10-CM

## 2024-02-13 DIAGNOSIS — F41.1 GENERALIZED ANXIETY DISORDER: ICD-10-CM

## 2024-02-13 PROCEDURE — 1036F TOBACCO NON-USER: CPT | Performed by: INTERNAL MEDICINE

## 2024-02-13 PROCEDURE — 3077F SYST BP >= 140 MM HG: CPT | Performed by: INTERNAL MEDICINE

## 2024-02-13 PROCEDURE — 99215 OFFICE O/P EST HI 40 MIN: CPT | Performed by: INTERNAL MEDICINE

## 2024-02-13 PROCEDURE — 3080F DIAST BP >= 90 MM HG: CPT | Performed by: INTERNAL MEDICINE

## 2024-02-13 PROCEDURE — 4010F ACE/ARB THERAPY RXD/TAKEN: CPT | Performed by: INTERNAL MEDICINE

## 2024-02-13 RX ORDER — METFORMIN HYDROCHLORIDE 500 MG/1
500 TABLET ORAL 2 TIMES DAILY
Qty: 60 TABLET | Refills: 0 | Status: SHIPPED | OUTPATIENT
Start: 2024-02-13 | End: 2024-03-11 | Stop reason: SDUPTHER

## 2024-02-13 ASSESSMENT — ENCOUNTER SYMPTOMS
NAUSEA: 0
TROUBLE SWALLOWING: 0
DYSURIA: 0
PALPITATIONS: 0
ABDOMINAL PAIN: 0
CHILLS: 0
HEMOPTYSIS: 0
ADENOPATHY: 0
VOMITING: 0
EXTREMITY WEAKNESS: 0
DIFFICULTY URINATING: 0
HEMATURIA: 0
FLANK PAIN: 0
FATIGUE: 0
FEVER: 0
SORE THROAT: 0
BACK PAIN: 0
EYE PROBLEMS: 0
LEG SWELLING: 0
HOT FLASHES: 0
CHEST TIGHTNESS: 0
COUGH: 0
MYALGIAS: 0
DIARRHEA: 0
SCLERAL ICTERUS: 0
DEPRESSION: 0
FREQUENCY: 0
APPETITE CHANGE: 0
CONSTIPATION: 0
CONFUSION: 0
SEIZURES: 0
SHORTNESS OF BREATH: 0
NERVOUS/ANXIOUS: 1
DIZZINESS: 0

## 2024-02-13 ASSESSMENT — PAIN SCALES - GENERAL: PAINLEVEL: 0-NO PAIN

## 2024-02-13 NOTE — PROGRESS NOTES
.Patient ID: Angie Tobin is a 58 y.o. female.  Referring Physician: Nico Walsh MD  [id number] [place] [address]  Primary Care Provider: LUDA Huertas-CNP     Chief Complaint   Patient presents with    Follow-up    Sarcoma     Leiomyosarcoma follow-up   Y-90 follow up  Imaging follow up        Cancer History:   Treatment Synopsis:   Ms. Tobin is a pleasant woman who presented with RUQ pain in March 2019. Further investigations and imaging showed a large heterogeneous mass in the pancreatic  bed. Initially this was thought to be lymphoma, however biopsy showed soft tissue sarcoma. On April 22, 2019 this mass was removed by Dr. Gonzalez via surgery. 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 She was subsequently  seen by Gyn Onc and underwent hysterectomy and bilateral salpingo-oopherectomy on Dnaae 10, 2019. This specimen did not show any evidence of tumor. We saw her first in August 2019. Scans did not detect any tumor at that point. Her case was discussed in  the tumor board for post op RT. Since there was no focus to be seen, it was decided that RT would be deferred for the future if a recurrence happens. CT scan in April 2021 detected new solitary nodules in lung and liver. MRI done on April 23, 2021 confirmed  recurrence in the liver. Her case was discussed in the tumor board and a decision was made to pursue systemic chemotherapy. We started her on doxorubicin and dacarbazine with zinecard protection. s/p 2 cycles, CT scans shows positive response in tumor  burden. Completed 6 cycles uneventfully on 08/26/2021.     Of note, the patient also has a history of Monoclonal B-cell lymphocytosis. She was worked up in 2016 for this reason (BM Biopsy report present in the physician portal)     03/31/2022- CT scan showed a lung nodule that is 0.6cm (was inconspicuous on the previous scan). Port removed on Feb 10, 2022.   07/05/2022- CT scan on  7/1/22 showed that the lung nodule has enlarged to 0.9cm. We petitioned for a biopsy of this lung nodule. Interestingly, the liver lesions have disappeared.   08/16/2022- Lung biopsy completed in early august and path reads leiomyosarcoma. Case presented in tumor board on 8/19/22 and decision made to refer her to CT surgery   10/14/2022- Surgery completed- 6 wedges removed. Multiple small nodules of leiomyosarcoma and microscopic foci reported.   12/06/2022- CT scan is [facility]. Repeat staging in 3 months.  03/03/2023- CT scan showed the liver lesion to be stable.   06/02/2023- CT scan shows worsening liver lesion- MRI liver ordered on 06/16/2023 showing progressing liver mets. We petitioned for Docetaxel/Gemcitabine to start 07/17/2023.  08/21/2023- CT scan and MRI done after 2 cycles show progressive disease in the liver and in the lungs.   08/23/2023- Ordered Pazopanib.   08/30/2023- Started pazopanib at 400mg by mouth daily  09/07/2023- Increased to 800mg by mouth daily  11/13/2023- Imaging shows progressive disease in the liver.   11/15/2023- Stopped pazopanib and referred for Y-90.  12/19/2023- Received Y-90 treatment.     01/31/2024- CT scan done. Very small lung nodules. Radiology reports mild increase in size. I feel this is measurement differences.  02/02/2024- Excellent response to Y-90. However, there is another mass in the dome of the liver that is increasing in size. I spoke to Dr. Gold and he will perform Y-90 to that mass.       2024 01/23/24- Phone visit. Ordered imaging. Recovered well from y-90.     Treatment History:   Systemic treatment:  1. Doxorubicin + Dacarbazine + Zinecard (Day 1-3) of 21 day of cycle.  C1- 05/10/2021 to 05/12/2021  C2- 06/01/2021 to 06/03/2021  C3- 06/22/2021 to 06/24/2021  C4- 07/13/2021 to 07/15/2021  C5- 08/03/2021 to 08/05/2021   C6- 08/24/2021 to 08/26/2021     2. Docetaxel (75mg/m2) + Gemcitabine (900mg/m2) D1, D8 of a 21 day cycle  C1- 07/17/2023   C2- 08/07/2023-  "Discontinued after progression noted.      3. Pazopanib   Start date 08/30/2023 at 400mg by mouth daily.   Increase to 09/07/2023 at 800mg by mouth daily.   Stopped on 11/15/23 due to progressive disease.      Subjective   Please refer to \"Notes/Cancer History\" above for complete History of present illness.     Ms Angie Tobin is doing well. She is here with her friend. Wants to review the scan.     Review of Systems:   Review of Systems   Constitutional:  Negative for appetite change, chills, fatigue and fever.   HENT:   Negative for hearing loss, lump/mass, mouth sores, sore throat and trouble swallowing.    Eyes:  Negative for eye problems and icterus.   Respiratory:  Negative for chest tightness, cough, hemoptysis and shortness of breath.    Cardiovascular:  Negative for chest pain, leg swelling and palpitations.   Gastrointestinal:  Negative for abdominal pain, constipation, diarrhea, nausea and vomiting.   Endocrine: Negative for hot flashes.   Genitourinary:  Negative for difficulty urinating, dysuria, frequency and hematuria.    Musculoskeletal:  Negative for back pain, flank pain, gait problem and myalgias.   Skin:  Negative for itching and rash.   Neurological:  Negative for dizziness, extremity weakness, gait problem and seizures.   Hematological:  Negative for adenopathy.   Psychiatric/Behavioral:  Negative for confusion and depression. The patient is nervous/anxious.          MEDICAL HISTORY  Past Medical History:   Diagnosis Date    Benign essential HTN 04/19/2023    Candidiasis, unspecified 01/20/2022    Antibiotic-induced yeast infection    Elevated blood-pressure reading, without diagnosis of hypertension 05/26/2017    Elevated BP without diagnosis of hypertension    Other conditions influencing health status 09/27/2017    History of cough    Personal history of diseases of the blood and blood-forming organs and certain disorders involving the immune mechanism 06/06/2016    History of " leukocytosis    Personal history of other benign neoplasm 05/08/2019    History of other benign neoplasm    Personal history of other diseases of the musculoskeletal system and connective tissue 04/18/2018    History of low back pain    Personal history of other diseases of the nervous system and sense organs 10/03/2016    History of neuropathy    Personal history of other diseases of the respiratory system 08/31/2017    History of acute bronchitis    Personal history of other diseases of the respiratory system 01/24/2022    History of acute sinusitis    Personal history of other specified conditions 09/16/2020    History of weight gain    Right lower quadrant abdominal tenderness 03/04/2019    RLQ abdominal tenderness    Right upper quadrant pain 03/13/2015    Abdominal pain, RUQ (right upper quadrant)    Toenail fungus 04/19/2023       FAMILY HISTORY  Family History   Problem Relation Name Age of Onset    Other (atrial flutter) Mother      Diabetes Mother      Leukemia Father      Liver cancer Father      Ovarian cancer Sister      Hypothyroidism Brother      Breast cancer Paternal Grandmother      No Known Problems Sibling         TOBACCO HISTORY  Tobacco Use: Low Risk  (1/7/2024)    Patient History     Smoking Tobacco Use: Never     Smokeless Tobacco Use: Never     Passive Exposure: Not on file       SOCIAL HISTORY  Social Connections: Not on file        Outpatient Medication Profile:  Current Outpatient Medications on File Prior to Visit   Medication Sig Dispense Refill    albuterol 108 (90 Base) MCG/ACT inhaler Inhale 1-2 puffs. Every 4-6 hours as needed      lisinopril 20 mg tablet TAKE 1 TABLET BY MOUTH ONE TIME DAILY. 90 tablet 1    LORazepam (Ativan) 0.5 mg tablet Take 1 tablet (0.5 mg) by mouth every 8 hours if needed (agitation).      meclizine (Antivert) 12.5 mg tablet Take 1-2 tablets (12.5-25 mg) by mouth 3 times a day as needed (vertigo).      metoprolol succinate XL (Toprol-XL) 25 mg 24 hr tablet  "Take 1 tablet by mouth once daily. Do not crush or chew. 30 tablet 10    prochlorperazine (Compazine) 10 mg tablet TAKE 1 TABLET BY MOUTH EVERY 6 HOURS AS NEEDED FOR NAUSEA AND VOMITING WITH CHEMOTHERAPY 56 tablet 2    triamcinolone (Kenalog) 0.1 % cream APPLY TOPICALLY TO AFFECTED AREA 2 TIMES A DAY AS DIRECTED 454 g 2    [DISCONTINUED] albuterol (ProAir HFA) 90 mcg/actuation inhaler Inhale 2 puffs every 4 hours if needed for wheezing or shortness of breath. 8.5 g 0    [DISCONTINUED] metFORMIN (Glucophage) 500 mg tablet Take 1 tablet (500 mg) by mouth 2 times a day. PT NEEDS TO MAKE A ROUTINE FOLLOW UP WITH ANOTHER PROVIDER AT [facility] FOR MORE REFILLS. 60 tablet 0    [DISCONTINUED] metFORMIN (Glucophage) 500 mg tablet Take 1 tablet (500 mg) by mouth 2 times a day for 10 days. 20 tablet 0    [DISCONTINUED] metoprolol succinate XL (Toprol-XL) 25 mg 24 hr tablet Take 1 tablet (25 mg) by mouth once daily. Do not crush or chew. 90 tablet 3     No current facility-administered medications on file prior to visit.         Performance Status:  Asymptomatic     Vitals and Measurements:   BP (!) 161/100   Pulse (!) 146   Temp 36.5 °C (97.7 °F) (Core)   Resp 20   Ht (S) 1.771 m (5' 9.72\")   Wt 121 kg (265 lb 10.5 oz)   SpO2 100%   BMI 38.42 kg/m²       Physical Exam:   Physical Exam         Lab Results:  I have reviewed these laboratory results:     Lab on 02/05/2024   Component Date Value Ref Range Status    WBC 02/05/2024 10.7  4.4 - 11.3 x10*3/uL Final    nRBC 02/05/2024 0.0  0.0 - 0.0 /100 WBCs Final    RBC 02/05/2024 4.73  4.00 - 5.20 x10*6/uL Final    Hemoglobin 02/05/2024 13.1  12.0 - 16.0 g/dL Final    Hematocrit 02/05/2024 43.7  36.0 - 46.0 % Final    MCV 02/05/2024 92  80 - 100 fL Final    MCH 02/05/2024 27.7  26.0 - 34.0 pg Final    MCHC 02/05/2024 30.0 (L)  32.0 - 36.0 g/dL Final    RDW 02/05/2024 14.9 (H)  11.5 - 14.5 % Final    Platelets 02/05/2024 208  150 - 450 x10*3/uL Final    Neutrophils " % 02/05/2024 70.3  40.0 - 80.0 % Final    Immature Granulocytes %, Automated 02/05/2024 0.6  0.0 - 0.9 % Final    Lymphocytes % 02/05/2024 16.4  13.0 - 44.0 % Final    Monocytes % 02/05/2024 7.6  2.0 - 10.0 % Final    Eosinophils % 02/05/2024 4.2  0.0 - 6.0 % Final    Basophils % 02/05/2024 0.9  0.0 - 2.0 % Final    Neutrophils Absolute 02/05/2024 7.51  1.20 - 7.70 x10*3/uL Final    Immature Granulocytes Absolute, Au* 02/05/2024 0.06  0.00 - 0.70 x10*3/uL Final    Lymphocytes Absolute 02/05/2024 1.75  1.20 - 4.80 x10*3/uL Final    Monocytes Absolute 02/05/2024 0.81  0.10 - 1.00 x10*3/uL Final    Eosinophils Absolute 02/05/2024 0.45  0.00 - 0.70 x10*3/uL Final    Basophils Absolute 02/05/2024 0.10  0.00 - 0.10 x10*3/uL Final    Glucose 02/05/2024 138 (H)  74 - 99 mg/dL Final    Sodium 02/05/2024 139  136 - 145 mmol/L Final    Potassium 02/05/2024 4.9  3.5 - 5.3 mmol/L Final    Chloride 02/05/2024 103  98 - 107 mmol/L Final    Bicarbonate 02/05/2024 22  21 - 32 mmol/L Final    Anion Gap 02/05/2024 19  10 - 20 mmol/L Final    Urea Nitrogen 02/05/2024 9  6 - 23 mg/dL Final    Creatinine 02/05/2024 0.65  0.50 - 1.05 mg/dL Final    eGFR 02/05/2024 >90  >60 mL/min/1.73m*2 Final    Calcium 02/05/2024 11.4 (H)  8.6 - 10.3 mg/dL Final    Albumin 02/05/2024 4.2  3.4 - 5.0 g/dL Final    Alkaline Phosphatase 02/05/2024 158 (H)  33 - 110 U/L Final    Total Protein 02/05/2024 6.8  6.4 - 8.2 g/dL Final    AST 02/05/2024 41 (H)  9 - 39 U/L Final    Bilirubin, Total 02/05/2024 1.0  0.0 - 1.2 mg/dL Final    ALT 02/05/2024 41  7 - 45 U/L Final    LDH 02/05/2024 211  84 - 246 U/L Final    Thyroid Stimulating Hormone 02/05/2024 2.12  0.44 - 3.98 mIU/L Final    GGT 02/05/2024 173 (H)  5 - 55 U/L Final     Radiology Result:  I have reviewed the latest Imaging in PACS and the findings are noted in this note. I discussed the results of the latest imaging with the patient. All previous imaging were reviewed at the time it was completed. Full  records are available in the EMR for review as well.     MR pelvis w and wo IV contrast    Result Date: 2/9/2024  Impression: No evidence of metastatic disease in the pelvis.   I personally reviewed the images/study and I agree with the findings as stated by resident physician Dr. Rex Kim.   MACRO: None   Signed by: Issa Panchal 2/9/2024 4:08 PM Dictation workstation:   [id number]    MR abdomen w and wo IV contrast    Result Date: 2/2/2024  Impression: Hyperenhancement involving the posterior and mid right lobe of the liver most consistent with radiation effects on the hepatic parenchyma.   There has been interval decrease in size of enhancing lesions within the right lobe of the liver within the treatment area.   Interval enlargement of 1 of the 3 hyperenhancing lesions within the dome of the right lobe of the liver which appear outside the treatment field. MACRO: none   Signed by: Davie Hernandez 2/2/2024 5:53 PM Dictation workstation:   [id number]    CT chest wo IV contrast    Result Date: 1/31/2024  Impression: 1. There are several subcentimeter pulmonary nodules bilaterally. Several of these appear to have increased slightly compared to the previous study from 11/09/2023. No new pulmonary nodules are identified. Findings may be indicative of metastatic disease in view of the interval slight increase in size. These appear to be too small to be assessed by PET-CT at this time. 2. Large amount of coronary artery calcification is incidentally noted. 3. Partially included within the field of view is upper abdominal anterior wall hernia containing mesenteric fat.   MACRO: none   Signed by: Nabeel Nolan 1/31/2024 4:56 PM Dictation workstation:   [id number]        Pathology Results:  I have reviewed the full pathology report recorded in the EMR. The pertinent portions indicating diagnosis are listed here in the note. for details please refer to the full report recorded in the EMR.    Surgical Pathology  [Apr 30 2019 4:54PM] ([id number])     Specimens: RETROPERITONEAL MASS ANTERIOR TO VENA CAVA /Received fresh for intraoperative consultation, labeled with the patient's      Name ANGIE TOBIN      Accession #: [id number]   Pathologist: CIELO AGUIRRE M.D.   Date of Procedure: 4/22/2019   Date Received: 4/22/2019   Date Reported 4/30/2019   Submitting Physician: KANA MUKHERJEE M.D.   Location: [facility] Other External #         FINAL DIAGNOSIS   A. RETROPERITONEAL MASS, ANTERIOR TO VENA CAVA, EXCISION:   -- LEIOMYOSARCOMA, 8.7 CM, INFILTRATING VESSEL WALL OF INFERIOR VENA CAVA, SEE NOTE   -- DISTANCE TO SOFT TISSUE/ PERIPHERAL RESECTION MARGIN < 0.5 MM, FOCAL INVOLVEMENT CANNOT BY EXCLUDED   -- VASCULAR MARGINS UNINVOLVED BY MALIGNANCY   -- TWO LYMPH NODES WITH NO EVIDENCE OF METASTASIS (0/2)      Note: The spindle cell neoplasm demonstrates moderate to high-grade nuclear atypia, a high mitotic rate (up to 28 mitoses per 10 HPF), and extensively infiltrates the adventitia and  media of the venous vessel wall. Focal (indeterminate-type) necrosis is noted. Immunohistochemistry demonstrates expression of estrogen receptor (60-70% of tumor cells) and progesterone receptor (>95% of tumor cells) and focal WT-1. Immunohistochemistry  performed on the prior core needle biopsy ([id number]) had demonstrated smooth muscle differentiation (positive: SMA, desmin, h-caldesmon; negative: CD34, HMB-45, myogenin, S100, DOG-1, ).      The gross and/or microscopic findings were reviewed in conjunction with pathology resident, Vika Ruiz M.D.      Electronically Signed Out By CIELO AGUIRRE M.D./CIRO   By the signature on this report, the individual or group listed as making the Final Interpretation/Diagnosis certifies that they have reviewed this case.      Assessment and Plan:   Assessment/Plan      Mr. Angie Tobin is a 58 y.o. female with a diagnosis of metastatic leiomyosarcoma. Please see the evolution of  the case listed above in the cancer history.     [facility] results- ANNE MARIE stable, TMB- 0 muts/Mb. RB gene mutated. TP53 and PTEN mutated.     Today,   Ms. Tobin had a very good response to the Y-90 procedure. The most recent imaging shows some growth in the liver which is outside the radiation field. We will send her again for a second Y-90 procedure.     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   - s/p Y-90 to liver. Will send her again for the 2nd procedure.   - The nodules in the lungs are very small and hard to measure. She also had COVID recently. I recommend continued surveillance.      # B-cell monoclonal lymphocytosis:   - White count came down. Likely Neulasta effect that diminished after chemotherapy      # Fatty infiltration of liver  - Following up with the PCP.     # High PTH levels and hypercalcemia  - Continue follow up with Endocrinology and nephrology      # Tachycardia   - Following with Cardiology      # Diabetes Mellitus  - Followed by PCP.  - Currently on Metformin     DISCLAIMER:   In preparing for this visit and writing this note, I reviewed all the previous electronic medical records (labs, imaging and medical charts) of the patient available in the physician portal. Significant findings which helped in decision making are recorded  in this chart.     The plan was discussed with the patient. We gave him ample opportunities to ask questions. All questions were answered to his satisfaction and he verbalized understanding.       Nico Walsh MD    Hematology and Oncology     Phone: [phone]  Fax: [phone].

## 2024-02-19 PROCEDURE — RXMED WILLOW AMBULATORY MEDICATION CHARGE

## 2024-02-20 ENCOUNTER — APPOINTMENT (OUTPATIENT)
Dept: PRIMARY CARE | Facility: CLINIC | Age: 59
End: 2024-02-20
Payer: COMMERCIAL

## 2024-02-26 ENCOUNTER — PHARMACY VISIT (OUTPATIENT)
Dept: PHARMACY | Facility: CLINIC | Age: 59
End: 2024-02-26
Payer: COMMERCIAL

## 2024-02-27 ENCOUNTER — HOSPITAL ENCOUNTER (OUTPATIENT)
Dept: RADIOLOGY | Facility: HOSPITAL | Age: 59
Discharge: HOME | End: 2024-02-27
Payer: COMMERCIAL

## 2024-02-27 VITALS
SYSTOLIC BLOOD PRESSURE: 186 MMHG | DIASTOLIC BLOOD PRESSURE: 81 MMHG | OXYGEN SATURATION: 97 % | TEMPERATURE: 99.1 F | HEART RATE: 124 BPM

## 2024-02-27 DIAGNOSIS — C49.9 LEIOMYOSARCOMA (MULTI): ICD-10-CM

## 2024-02-27 PROCEDURE — 99203 OFFICE O/P NEW LOW 30 MIN: CPT | Performed by: PHYSICIAN ASSISTANT

## 2024-02-27 ASSESSMENT — ENCOUNTER SYMPTOMS
EYES NEGATIVE: 1
CONSTITUTIONAL NEGATIVE: 1
CARDIOVASCULAR NEGATIVE: 1
PSYCHIATRIC NEGATIVE: 1
GASTROINTESTINAL NEGATIVE: 1
RESPIRATORY NEGATIVE: 1
NEUROLOGICAL NEGATIVE: 1

## 2024-02-27 NOTE — CONSULTS
INTERVENTIONAL RADIOLOGY OUTPATIENT CONSULTATION  [facility], [place]    Subjective  Angie Tobin, 58 y.o. female is a patent presenting with:  No chief complaint on file.      HPI    58 y.o. F currently presents as metastatic non-uterine leiomyosarcoma  with history of the following treatments:  .  Surgical resection in 2019  Recurrence in lungs and liver by 2021.   6 cycles of doxorubicin and dacarbazine in 2021.   Lung nodule removed in 07/2022  Progressive liver lesion in 06/2023- started on pazopanib.   11/2023- Continued to have progressive disease.  12/2023 - Y-90 procedure with Dr. Gold  1/2024 - follow up imaging with good response to Y-90 treatment area, interval increase in size of lesion in liver dome outside of treatment area     Referral from Dr. Walsh for Y-90 consultation for liver dome lesion. She has no complaints at time of clinic, previous Y-90 procedure was well tolerated and patient is agreeable with scheduling second procedure.    Interventional Radiology has been consulted for: Y-90 for liver dome lesion    Review of Systems   Constitutional: Negative.    HENT: Negative.     Eyes: Negative.    Respiratory: Negative.     Cardiovascular: Negative.    Gastrointestinal: Negative.    Genitourinary: Negative.    Skin: Negative.    Neurological: Negative.    Psychiatric/Behavioral: Negative.         Past Medical History:   Diagnosis Date    Benign essential HTN 04/19/2023    Candidiasis, unspecified 01/20/2022    Antibiotic-induced yeast infection    Elevated blood-pressure reading, without diagnosis of hypertension 05/26/2017    Elevated BP without diagnosis of hypertension    Other conditions influencing health status 09/27/2017    History of cough    Personal history of diseases of the blood and blood-forming organs and certain disorders involving the immune mechanism 06/06/2016    History of leukocytosis    Personal history of other benign neoplasm 05/08/2019    History of other  benign neoplasm    Personal history of other diseases of the musculoskeletal system and connective tissue 04/18/2018    History of low back pain    Personal history of other diseases of the nervous system and sense organs 10/03/2016    History of neuropathy    Personal history of other diseases of the respiratory system 08/31/2017    History of acute bronchitis    Personal history of other diseases of the respiratory system 01/24/2022    History of acute sinusitis    Personal history of other specified conditions 09/16/2020    History of weight gain    Right lower quadrant abdominal tenderness 03/04/2019    RLQ abdominal tenderness    Right upper quadrant pain 03/13/2015    Abdominal pain, RUQ (right upper quadrant)    Toenail fungus 04/19/2023     Past Surgical History:   Procedure Laterality Date    BREAST SURGERY  05/30/2013    Breast Surgery Reduction Procedure    CHOLECYSTECTOMY  12/02/2014    Cholecystectomy Laparoscopic    CT GUIDED PERCUTANEOUS BIOPSY BONE  10/24/2016    CT GUIDED PERCUTANEOUS BIOPSY BONE 10/24/2016 [facility]    CT GUIDED PERCUTANEOUS BIOPSY LUNG  8/5/2022    CT GUIDED PERCUTANEOUS BIOPSY LUNG 8/5/2022 [facility]    ESOPHAGOGASTRODUODENOSCOPY  04/16/2013    Diagnostic Esophagogastroduodenoscopy    IR INTERVENTION ARTERIAL EMBOLIZATION  12/13/2023    IR INTERVENTION ARTERIAL EMBOLIZATION 12/13/2023 Oj Gold MD [facility]    IR INTERVENTION ARTERIAL EMBOLIZATION  12/19/2023    IR INTERVENTION ARTERIAL EMBOLIZATION 12/19/2023 Oj Gold MD [facility]    OTHER SURGICAL HISTORY  04/19/2013    Anal Fistulotomy (Subcutaneous)    OTHER SURGICAL HISTORY  10/25/2022    Lung wedge resection    OTHER SURGICAL HISTORY  10/25/2022    Lung lobectomy    OTHER SURGICAL HISTORY  05/08/2019    Resection    OTHER SURGICAL HISTORY  05/30/2013    Perineal Transplant Of Anovaginal Fistula    US GUIDED NEEDLE LIVER BIOPSY  7/11/2023    US GUIDED NEEDLE LIVER BIOPSY 7/11/2023 [facility]     Social  History     Socioeconomic History    Marital status: Single     Spouse name: Not on file    Number of children: Not on file    Years of education: Not on file    Highest education level: Not on file   Occupational History    Not on file   Tobacco Use    Smoking status: Never    Smokeless tobacco: Never   Substance and Sexual Activity    Alcohol use: Not on file    Drug use: Not on file    Sexual activity: Not on file   Other Topics Concern    Not on file   Social History Narrative    Not on file     Social Determinants of Health     Financial Resource Strain: Not on file   Food Insecurity: Not on file   Transportation Needs: Not on file   Physical Activity: Not on file   Stress: Not on file   Social Connections: Not on file   Intimate Partner Violence: Not on file   Housing Stability: Not on file     Family History   Problem Relation Name Age of Onset    Other (atrial flutter) Mother      Diabetes Mother      Leukemia Father      Liver cancer Father      Ovarian cancer Sister      Hypothyroidism Brother      Breast cancer Paternal Grandmother      No Known Problems Sibling       Allergies   Allergen Reactions    Codeine Other     Abdominal pain     Current Outpatient Medications on File Prior to Encounter   Medication Sig Dispense Refill    albuterol 108 (90 Base) MCG/ACT inhaler Inhale 1-2 puffs. Every 4-6 hours as needed      lisinopril 20 mg tablet TAKE 1 TABLET BY MOUTH ONE TIME DAILY. 90 tablet 1    LORazepam (Ativan) 0.5 mg tablet Take 1 tablet (0.5 mg) by mouth every 8 hours if needed (agitation).      meclizine (Antivert) 12.5 mg tablet Take 1-2 tablets (12.5-25 mg) by mouth 3 times a day as needed (vertigo).      metFORMIN (Glucophage) 500 mg tablet Take 1 tablet (500 mg) by mouth 2 times a day. 60 tablet 0    metoprolol succinate XL (Toprol-XL) 25 mg 24 hr tablet Take 1 tablet by mouth once daily. Do not crush or chew. 30 tablet 10    prochlorperazine (Compazine) 10 mg tablet TAKE 1 TABLET BY MOUTH EVERY 6  HOURS AS NEEDED FOR NAUSEA AND VOMITING WITH CHEMOTHERAPY 56 tablet 2    triamcinolone (Kenalog) 0.1 % cream APPLY TOPICALLY TO AFFECTED AREA 2 TIMES A DAY AS DIRECTED 454 g 2     No current facility-administered medications on file prior to encounter.       Objective    Vitals:    02/27/24 1308   BP: (!) 186/81   BP Location: Right arm   Patient Position: Sitting   Pulse: (!) 124   Temp: 37.3 °C (99.1 °F)   TempSrc: Temporal   SpO2: 97%       Physical Exam  Constitutional:       General: She is not in acute distress.     Appearance: Normal appearance.   HENT:      Head: Normocephalic.      Mouth/Throat:      Mouth: Mucous membranes are moist.   Eyes:      Conjunctiva/sclera: Conjunctivae normal.   Cardiovascular:      Rate and Rhythm: Tachycardia present.   Pulmonary:      Effort: Pulmonary effort is normal. No respiratory distress.   Abdominal:      General: There is no distension.      Palpations: Abdomen is soft.   Musculoskeletal:         General: Normal range of motion.      Cervical back: Neck supple.      Comments: Ambulates independently and without assist device   Skin:     General: Skin is warm and dry.   Neurological:      General: No focal deficit present.      Mental Status: She is alert and oriented to person, place, and time. Mental status is at baseline.   Psychiatric:         Mood and Affect: Mood normal.         Behavior: Behavior normal.      Comments: Mildly anxious; having a stressful day with work and needing to step away to come to appointment today             Assessment & Plan   MELD 3.0: 7 at 12/13/2023  8:13 AM  MELD-Na: 6 at 12/13/2023  8:13 AM  Calculated from:  Serum Creatinine: 0.71 mg/dL (Using min of 1 mg/dL) at 12/13/2023  8:13 AM  Serum Sodium: 142 mmol/L (Using max of 137 mmol/L) at 12/13/2023  8:13 AM  Total Bilirubin: 1.0 mg/dL at 12/13/2023  8:13 AM  Serum Albumin: 4.5 g/dL (Using max of 3.5 g/dL) at 12/13/2023  8:13 AM  INR(ratio): 0.8 (Using min of 1) at 12/12/2023  1:46  PM  Age at listing (hypothetical): 58 years  Sex: Female at 12/13/2023  8:13 AM      0- Fully active, able to carry on all pre-disease performance w/o restriction.    Review of noted imaging reveals interval increase in size of liver dome lesion    Current plan:   -discussed patient with Dr. Gold  -will plan for mapping and Y-90 procedure for liver dome lesion  -patient educated, all questions answered, agrees to proceed with procedure      Alyce Jeronimo PA-C    Vascular and Interventional Radiology  [facility]  [phone] Nursing  [phone] Scheduling

## 2024-03-11 ENCOUNTER — OFFICE VISIT (OUTPATIENT)
Dept: PRIMARY CARE | Facility: CLINIC | Age: 59
End: 2024-03-11
Payer: COMMERCIAL

## 2024-03-11 ENCOUNTER — TELEPHONE (OUTPATIENT)
Dept: HEMATOLOGY/ONCOLOGY | Facility: HOSPITAL | Age: 59
End: 2024-03-11

## 2024-03-11 VITALS
SYSTOLIC BLOOD PRESSURE: 122 MMHG | HEART RATE: 114 BPM | DIASTOLIC BLOOD PRESSURE: 80 MMHG | WEIGHT: 271.4 LBS | BODY MASS INDEX: 38.85 KG/M2 | HEIGHT: 70 IN | OXYGEN SATURATION: 98 % | RESPIRATION RATE: 18 BRPM

## 2024-03-11 DIAGNOSIS — R73.09 ELEVATED GLUCOSE LEVEL: ICD-10-CM

## 2024-03-11 DIAGNOSIS — C49.9 LEIOMYOSARCOMA (MULTI): ICD-10-CM

## 2024-03-11 DIAGNOSIS — I10 PRIMARY HYPERTENSION: ICD-10-CM

## 2024-03-11 DIAGNOSIS — C78.02 MALIGNANT NEOPLASM METASTATIC TO LEFT LUNG (MULTI): ICD-10-CM

## 2024-03-11 DIAGNOSIS — D47.9 B-CELL LYMPHOPROLIFERATIVE DISORDER (MULTI): ICD-10-CM

## 2024-03-11 DIAGNOSIS — E11.9 TYPE 2 DIABETES MELLITUS WITHOUT COMPLICATION, WITHOUT LONG-TERM CURRENT USE OF INSULIN (MULTI): ICD-10-CM

## 2024-03-11 DIAGNOSIS — C78.7 METASTATIC SARCOMA TO LIVER (MULTI): Primary | ICD-10-CM

## 2024-03-11 DIAGNOSIS — E21.0 PRIMARY HYPERPARATHYROIDISM (MULTI): ICD-10-CM

## 2024-03-11 DIAGNOSIS — R91.8 MULTIPLE LUNG NODULES ON CT: ICD-10-CM

## 2024-03-11 DIAGNOSIS — C49.9 METASTATIC SARCOMA TO LIVER (MULTI): Primary | ICD-10-CM

## 2024-03-11 DIAGNOSIS — C54.9 MALIGNANT NEOPLASM OF CORPUS UTERI, UNSPECIFIED (MULTI): ICD-10-CM

## 2024-03-11 DIAGNOSIS — F41.1 GENERALIZED ANXIETY DISORDER: Primary | ICD-10-CM

## 2024-03-11 PROBLEM — Z20.822 CONTACT WITH AND (SUSPECTED) EXPOSURE TO COVID-19: Status: RESOLVED | Noted: 2022-09-15 | Resolved: 2024-03-11

## 2024-03-11 PROBLEM — J01.90 ACUTE SINUSITIS: Status: RESOLVED | Noted: 2023-09-27 | Resolved: 2024-03-11

## 2024-03-11 PROBLEM — D25.9 UTERINE LEIOMYOMA: Status: RESOLVED | Noted: 2023-09-27 | Resolved: 2024-03-11

## 2024-03-11 PROBLEM — J06.9 VIRAL URI: Status: RESOLVED | Noted: 2023-09-27 | Resolved: 2024-03-11

## 2024-03-11 PROBLEM — G89.18 POSTOPERATIVE PAIN: Status: RESOLVED | Noted: 2024-03-11 | Resolved: 2024-03-11

## 2024-03-11 PROBLEM — E78.00 HYPERCHOLESTEROLEMIA: Status: RESOLVED | Noted: 2022-09-15 | Resolved: 2024-03-11

## 2024-03-11 PROBLEM — I15.9 SECONDARY HYPERTENSION: Status: RESOLVED | Noted: 2023-10-18 | Resolved: 2024-03-11

## 2024-03-11 PROBLEM — E07.9 DISORDER OF THYROID: Status: RESOLVED | Noted: 2023-10-18 | Resolved: 2024-03-11

## 2024-03-11 PROBLEM — I15.8 OTHER SECONDARY HYPERTENSION: Status: RESOLVED | Noted: 2023-10-18 | Resolved: 2024-03-11

## 2024-03-11 PROBLEM — B00.9 HERPES SIMPLEX VIRUS (HSV) INFECTION: Status: RESOLVED | Noted: 2023-04-19 | Resolved: 2024-03-11

## 2024-03-11 PROBLEM — H10.9 CONJUNCTIVITIS: Status: RESOLVED | Noted: 2023-09-27 | Resolved: 2024-03-11

## 2024-03-11 PROBLEM — K21.9 GASTROESOPHAGEAL REFLUX DISEASE: Status: RESOLVED | Noted: 2022-09-15 | Resolved: 2024-03-11

## 2024-03-11 PROBLEM — D21.9 LEIOMYOMA: Status: RESOLVED | Noted: 2024-03-11 | Resolved: 2024-03-11

## 2024-03-11 PROBLEM — G62.9 NEUROPATHY: Status: RESOLVED | Noted: 2024-03-11 | Resolved: 2024-03-11

## 2024-03-11 PROBLEM — K76.9 DISORDER OF LIVER: Status: RESOLVED | Noted: 2023-07-11 | Resolved: 2024-03-11

## 2024-03-11 PROBLEM — E55.9 VITAMIN D DEFICIENCY: Status: RESOLVED | Noted: 2023-04-19 | Resolved: 2024-03-11

## 2024-03-11 PROBLEM — M51.26 HERNIATED LUMBAR INTERVERTEBRAL DISC: Status: RESOLVED | Noted: 2022-09-13 | Resolved: 2024-03-11

## 2024-03-11 PROBLEM — I47.11: Status: RESOLVED | Noted: 2023-04-19 | Resolved: 2024-03-11

## 2024-03-11 PROBLEM — K86.89 MASS OF PANCREAS (HHS-HCC): Status: RESOLVED | Noted: 2023-09-27 | Resolved: 2024-03-11

## 2024-03-11 PROBLEM — R63.5 WEIGHT GAIN: Status: RESOLVED | Noted: 2024-03-11 | Resolved: 2024-03-11

## 2024-03-11 PROBLEM — Z78.9 NEVER SMOKED ANY SUBSTANCE: Status: RESOLVED | Noted: 2023-10-25 | Resolved: 2024-03-11

## 2024-03-11 PROCEDURE — 4010F ACE/ARB THERAPY RXD/TAKEN: CPT | Performed by: NURSE PRACTITIONER

## 2024-03-11 PROCEDURE — 3074F SYST BP LT 130 MM HG: CPT | Performed by: NURSE PRACTITIONER

## 2024-03-11 PROCEDURE — 3079F DIAST BP 80-89 MM HG: CPT | Performed by: NURSE PRACTITIONER

## 2024-03-11 PROCEDURE — 99214 OFFICE O/P EST MOD 30 MIN: CPT | Performed by: NURSE PRACTITIONER

## 2024-03-11 PROCEDURE — RXMED WILLOW AMBULATORY MEDICATION CHARGE

## 2024-03-11 PROCEDURE — 1036F TOBACCO NON-USER: CPT | Performed by: NURSE PRACTITIONER

## 2024-03-11 RX ORDER — LORAZEPAM 0.5 MG/1
0.5 TABLET ORAL EVERY 8 HOURS PRN
Qty: 30 TABLET | Refills: 0 | Status: SHIPPED | OUTPATIENT
Start: 2024-03-11 | End: 2024-05-28

## 2024-03-11 RX ORDER — METFORMIN HYDROCHLORIDE 500 MG/1
500 TABLET ORAL 2 TIMES DAILY
Qty: 180 TABLET | Refills: 3 | Status: SHIPPED | OUTPATIENT
Start: 2024-03-11 | End: 2025-03-11

## 2024-03-11 RX ORDER — METOPROLOL SUCCINATE 25 MG/1
25 TABLET, EXTENDED RELEASE ORAL DAILY
Qty: 90 TABLET | Refills: 3 | Status: SHIPPED | OUTPATIENT
Start: 2024-03-11 | End: 2025-03-11

## 2024-03-11 RX ORDER — LISINOPRIL 20 MG/1
20 TABLET ORAL DAILY
Qty: 90 TABLET | Refills: 3 | Status: SHIPPED | OUTPATIENT
Start: 2024-03-11 | End: 2025-03-11

## 2024-03-11 NOTE — ASSESSMENT & PLAN NOTE
- I have personally reviewed the OARRS report for the patient. This report is scanned into the electronic medical record. I have considered the risks of abuse, dependence, addiction and diversion. I believe that it is clinically appropriate for the patient to be prescribed this medication  -  short term supply of lorazepam  - refilled lorazepam   - UDS UTD

## 2024-03-11 NOTE — PROGRESS NOTES
"Subjective   Patient ID: Angie Tobin is a 58 y.o. female who presents for Follow-up (Patient in today for routine F/U, reports no concerns at this time. A1C was 7.2% at today's visit. ).    Presents for follow up   Has followed up with oncology - has another radiation session in 2 weeks    Needs refill for lorazepam.  Will have panic attacks when she has to follow up with oncologist.  Has not had refill since 10/22.      Diabetes Mellitus  Patient presents for follow up of diabetes. Current symptoms include: none. Symptoms have stabilized. Patient denies polydipsia and polyuria. Evaluation to date has included: hemoglobin A1C.  Home sugars: BGs are running  consistent with Hgb A1C. Current treatment: no recent interventions.     Hypertension  Patient is here for follow-up of elevated blood pressure. She is exercising and is adherent to a low-salt diet. Blood pressure is well controlled at home. Cardiac symptoms: none. Patient denies chest pain. Cardiovascular risk factors: diabetes mellitus and hypertension. Use of agents associated with hypertension: none. History of target organ damage: none.           Review of Systems   All other systems reviewed and are negative.      Objective   /80   Pulse (!) 114   Resp 18   Ht 1.771 m (5' 9.72\")   Wt 123 kg (271 lb 6.4 oz)   SpO2 98%   BMI 39.26 kg/m²     Physical Exam  Vitals and nursing note reviewed.   Constitutional:       General: She is not in acute distress.     Appearance: Normal appearance. She is normal weight.   HENT:      Head: Normocephalic and atraumatic.      Right Ear: External ear normal.      Left Ear: External ear normal.      Nose: Nose normal.      Mouth/Throat:      Mouth: Mucous membranes are moist.      Pharynx: Oropharynx is clear.   Eyes:      Extraocular Movements: Extraocular movements intact.      Conjunctiva/sclera: Conjunctivae normal.      Pupils: Pupils are equal, round, and reactive to light.   Neck:      Vascular: No " carotid bruit.   Cardiovascular:      Rate and Rhythm: Normal rate and regular rhythm.      Pulses: Normal pulses.      Heart sounds: Normal heart sounds.   Pulmonary:      Effort: Pulmonary effort is normal.      Breath sounds: Normal breath sounds.   Musculoskeletal:      Cervical back: Normal range of motion and neck supple.   Lymphadenopathy:      Cervical: No cervical adenopathy.   Skin:     General: Skin is warm and dry.      Capillary Refill: Capillary refill takes less than 2 seconds.   Neurological:      Mental Status: She is alert and oriented to person, place, and time.   Psychiatric:         Mood and Affect: Mood normal.         Behavior: Behavior normal.         Thought Content: Thought content normal.         Judgment: Judgment normal.         Assessment/Plan   Problem List Items Addressed This Visit             ICD-10-CM    Primary hypertension I10     - in office BP stable  -  continue lisinopril  - continue metoprolol   - cbc,cmp, lipid reviewed   -  follow up in 6 months         Relevant Medications    lisinopril 20 mg tablet    metoprolol succinate XL (Toprol-XL) 25 mg 24 hr tablet    Anxiety disorder - Primary F41.9     - I have personally reviewed the OARRS report for the patient. This report is scanned into the electronic medical record. I have considered the risks of abuse, dependence, addiction and diversion. I believe that it is clinically appropriate for the patient to be prescribed this medication  -  short term supply of lorazepam  - refilled lorazepam   - UDS UTD         Relevant Medications    LORazepam (Ativan) 0.5 mg tablet    Metastatic cancer to lung (CMS/HCC) C78.00     Following oncology         Primary hyperparathyroidism (CMS/HCC) E21.0    B-cell lymphoproliferative disorder (CMS/HCC) D47.9     Following oncology         Malignant neoplasm of corpus uteri, unspecified (CMS/HCC) C54.9       Following oncology         Elevated glucose level R73.09     A1c 7.1 - improved  Continue  metformin 500mg twice a day  Follow up in 6 months          Other Visit Diagnoses         Codes    Type 2 diabetes mellitus without complication, without long-term current use of insulin (CMS/[facility])     E11.9    Relevant Medications    metFORMIN (Glucophage) 500 mg tablet

## 2024-03-11 NOTE — TELEPHONE ENCOUNTER
Called the patient ahead of clinic time. She is scheduled to get Y-90 on 3/27/24 (anticipated). We will order CT scan and MRI on 5/9/24 and will meet her in clinic on 5/14/24. She will also need labs on 5/9/24.     Nico Walsh MD    Hematology and Oncology    Phone: [phone]  Fax: [phone]    
no

## 2024-03-11 NOTE — ASSESSMENT & PLAN NOTE
- in office BP stable  -  continue lisinopril  - continue metoprolol   - cbc,cmp, lipid reviewed   -  follow up in 6 months

## 2024-03-12 ENCOUNTER — TELEMEDICINE (OUTPATIENT)
Dept: HEMATOLOGY/ONCOLOGY | Facility: HOSPITAL | Age: 59
End: 2024-03-12
Payer: COMMERCIAL

## 2024-03-12 DIAGNOSIS — C49.9 LEIOMYOSARCOMA (MULTI): ICD-10-CM

## 2024-03-12 DIAGNOSIS — F41.1 GENERALIZED ANXIETY DISORDER: ICD-10-CM

## 2024-03-12 DIAGNOSIS — I15.8 OTHER SECONDARY HYPERTENSION: ICD-10-CM

## 2024-03-12 DIAGNOSIS — E11.9 TYPE 2 DIABETES MELLITUS WITHOUT COMPLICATION, WITHOUT LONG-TERM CURRENT USE OF INSULIN (MULTI): ICD-10-CM

## 2024-03-19 ENCOUNTER — APPOINTMENT (OUTPATIENT)
Dept: RADIOLOGY | Facility: HOSPITAL | Age: 59
End: 2024-03-19
Payer: COMMERCIAL

## 2024-03-19 ENCOUNTER — HOSPITAL ENCOUNTER (OUTPATIENT)
Dept: RADIOLOGY | Facility: HOSPITAL | Age: 59
Discharge: HOME | End: 2024-03-19
Payer: COMMERCIAL

## 2024-03-19 VITALS
SYSTOLIC BLOOD PRESSURE: 124 MMHG | DIASTOLIC BLOOD PRESSURE: 88 MMHG | HEART RATE: 90 BPM | OXYGEN SATURATION: 97 % | RESPIRATION RATE: 14 BRPM | TEMPERATURE: 96.6 F

## 2024-03-19 DIAGNOSIS — C49.9 LEIOMYOSARCOMA (MULTI): ICD-10-CM

## 2024-03-19 LAB
ALBUMIN SERPL BCP-MCNC: 4.5 G/DL (ref 3.4–5)
ALP SERPL-CCNC: 224 U/L (ref 33–110)
ALT SERPL W P-5'-P-CCNC: 41 U/L (ref 7–45)
ANION GAP SERPL CALC-SCNC: 16 MMOL/L (ref 10–20)
AST SERPL W P-5'-P-CCNC: 31 U/L (ref 9–39)
BILIRUB SERPL-MCNC: 0.7 MG/DL (ref 0–1.2)
BUN SERPL-MCNC: 15 MG/DL (ref 6–23)
CALCIUM SERPL-MCNC: 11.7 MG/DL (ref 8.6–10.6)
CHLORIDE SERPL-SCNC: 105 MMOL/L (ref 98–107)
CO2 SERPL-SCNC: 19 MMOL/L (ref 21–32)
CREAT SERPL-MCNC: 0.59 MG/DL (ref 0.5–1.05)
EGFRCR SERPLBLD CKD-EPI 2021: >90 ML/MIN/1.73M*2
GLUCOSE SERPL-MCNC: 186 MG/DL (ref 74–99)
POTASSIUM SERPL-SCNC: 4.6 MMOL/L (ref 3.5–5.3)
PROT SERPL-MCNC: 7.2 G/DL (ref 6.4–8.2)
SODIUM SERPL-SCNC: 135 MMOL/L (ref 136–145)

## 2024-03-19 PROCEDURE — C1760 CLOSURE DEV, VASC: HCPCS

## 2024-03-19 PROCEDURE — 2720000007 HC OR 272 NO HCPCS

## 2024-03-19 PROCEDURE — 99152 MOD SED SAME PHYS/QHP 5/>YRS: CPT | Performed by: RADIOLOGY

## 2024-03-19 PROCEDURE — 36247 INS CATH ABD/L-EXT ART 3RD: CPT | Performed by: RADIOLOGY

## 2024-03-19 PROCEDURE — 80053 COMPREHEN METABOLIC PANEL: CPT | Performed by: RADIOLOGY

## 2024-03-19 PROCEDURE — 75894 X-RAYS TRANSCATH THERAPY: CPT | Performed by: RADIOLOGY

## 2024-03-19 PROCEDURE — C1982 CATH, PRESSURE,VALVE-OCCLU: HCPCS

## 2024-03-19 PROCEDURE — 99153 MOD SED SAME PHYS/QHP EA: CPT | Performed by: RADIOLOGY

## 2024-03-19 PROCEDURE — C1894 INTRO/SHEATH, NON-LASER: HCPCS

## 2024-03-19 PROCEDURE — A9540 TC99M MAA: HCPCS | Performed by: PHYSICIAN ASSISTANT

## 2024-03-19 PROCEDURE — 2780000003 HC OR 278 NO HCPCS

## 2024-03-19 PROCEDURE — 3430000001 HC RX 343 DIAGNOSTIC RADIOPHARMACEUTICALS: Performed by: PHYSICIAN ASSISTANT

## 2024-03-19 PROCEDURE — 78215 LVR&SPLEEN IMG STATIC ONLY: CPT

## 2024-03-19 PROCEDURE — 99152 MOD SED SAME PHYS/QHP 5/>YRS: CPT

## 2024-03-19 PROCEDURE — 77001 FLUOROGUIDE FOR VEIN DEVICE: CPT | Performed by: RADIOLOGY

## 2024-03-19 PROCEDURE — 75726 ARTERY X-RAYS ABDOMEN: CPT | Performed by: RADIOLOGY

## 2024-03-19 PROCEDURE — 76937 US GUIDE VASCULAR ACCESS: CPT | Performed by: RADIOLOGY

## 2024-03-19 PROCEDURE — 75710 ARTERY X-RAYS ARM/LEG: CPT | Mod: RT | Performed by: RADIOLOGY

## 2024-03-19 PROCEDURE — 77300 RADIATION THERAPY DOSE PLAN: CPT

## 2024-03-19 PROCEDURE — 75774 ARTERY X-RAY EACH VESSEL: CPT | Performed by: RADIOLOGY

## 2024-03-19 PROCEDURE — 99153 MOD SED SAME PHYS/QHP EA: CPT

## 2024-03-19 PROCEDURE — 36415 COLL VENOUS BLD VENIPUNCTURE: CPT | Performed by: RADIOLOGY

## 2024-03-19 PROCEDURE — C1769 GUIDE WIRE: HCPCS

## 2024-03-19 PROCEDURE — 2500000004 HC RX 250 GENERAL PHARMACY W/ HCPCS (ALT 636 FOR OP/ED): Performed by: RADIOLOGY

## 2024-03-19 PROCEDURE — 2550000001 HC RX 255 CONTRASTS: Performed by: RADIOLOGY

## 2024-03-19 RX ORDER — FENTANYL CITRATE 50 UG/ML
INJECTION, SOLUTION INTRAMUSCULAR; INTRAVENOUS
Status: COMPLETED | OUTPATIENT
Start: 2024-03-19 | End: 2024-03-19

## 2024-03-19 RX ORDER — MIDAZOLAM HYDROCHLORIDE 1 MG/ML
INJECTION INTRAMUSCULAR; INTRAVENOUS
Status: COMPLETED | OUTPATIENT
Start: 2024-03-19 | End: 2024-03-19

## 2024-03-19 RX ADMIN — FENTANYL CITRATE 50 MCG: 50 INJECTION, SOLUTION INTRAMUSCULAR; INTRAVENOUS at 09:34

## 2024-03-19 RX ADMIN — MIDAZOLAM HYDROCHLORIDE 1 MG: 1 INJECTION, SOLUTION INTRAMUSCULAR; INTRAVENOUS at 09:34

## 2024-03-19 RX ADMIN — MIDAZOLAM HYDROCHLORIDE 1 MG: 1 INJECTION, SOLUTION INTRAMUSCULAR; INTRAVENOUS at 08:49

## 2024-03-19 RX ADMIN — IOHEXOL 100 ML: 350 INJECTION, SOLUTION INTRAVENOUS at 09:04

## 2024-03-19 RX ADMIN — IOHEXOL 50 ML: 350 INJECTION, SOLUTION INTRAVENOUS at 09:04

## 2024-03-19 RX ADMIN — KIT FOR THE PREPARATION OF TECHNETIUM TC 99M ALBUMIN AGGREGATED 2.2 MILLICURIE: 2.5 INJECTION, POWDER, FOR SOLUTION INTRAVENOUS at 09:35

## 2024-03-19 RX ADMIN — FENTANYL CITRATE 50 MCG: 50 INJECTION, SOLUTION INTRAMUSCULAR; INTRAVENOUS at 08:40

## 2024-03-19 RX ADMIN — MIDAZOLAM HYDROCHLORIDE 1 MG: 1 INJECTION, SOLUTION INTRAMUSCULAR; INTRAVENOUS at 08:40

## 2024-03-19 RX ADMIN — Medication 10.1 MILLICURIE: at 11:08

## 2024-03-19 RX ADMIN — IOHEXOL 100 ML: 350 INJECTION, SOLUTION INTRAVENOUS at 09:05

## 2024-03-19 RX ADMIN — FENTANYL CITRATE 50 MCG: 50 INJECTION, SOLUTION INTRAMUSCULAR; INTRAVENOUS at 08:49

## 2024-03-19 ASSESSMENT — PAIN - FUNCTIONAL ASSESSMENT

## 2024-03-19 ASSESSMENT — PAIN SCALES - GENERAL

## 2024-03-19 NOTE — POST-PROCEDURE NOTE
Interventional Radiology Brief Postprocedure Note    Attending:   Oj Gold MD    Assistant:   DO Gaston Vasquez MD    Diagnosis:   1. Leiomyosarcoma (CMS/HCC)  IR intervention arterial embolization    IR intervention arterial embolization           Description of procedure:   Under ultrasound guidance, the right femoral artery was accessed.  Utilizing the seldinger technique, the access was upsized to a 5 Fr working sheath.  Femoral angiographic run was unremarkable. Using a microcatheter/microwire combination, the right hepatic artery was selected demonstrating tortuous vasculature indicative of tumor arterial supply. T99 was subsequently injected into the targeted vessel. A mynx closure device was deployed.  Hemostasis was achieved, and sterile bandage was applied. Patient tolerated the procedure and transported to nuclear medicine    See PACS for further details.       Anesthesia:  MAC    Complications: None    Estimated Blood Loss: minimal    Medications (Filter: Administrations occurring from 0823 to 1000 on 03/19/24) As of 03/19/24 1000      fentaNYL PF (Sublimaze) injection (mcg) Total dose:  150 mcg      Date/Time Rate/Dose/Volume Action       03/19/24  0840 50 mcg Given      0849 50 mcg Given      0934 50 mcg Given               midazolam (Versed) injection (mg) Total dose:  3 mg      Date/Time Rate/Dose/Volume Action       03/19/24  0840 1 mg Given      0849 1 mg Given      0934 1 mg Given               iohexol (OMNIPaque) 350 mg iodine/mL solution 100 mL (mL) Total volume:  200 mL      Date/Time Rate/Dose/Volume Action       03/19/24  0904 100 mL Given      0905 100 mL Given               iohexol (OMNIPaque) 350 mg iodine/mL solution 50 mL (mL) Total volume:  50 mL      Date/Time Rate/Dose/Volume Action       03/19/24  0904 50 mL Given                   No specimens collected        See detailed result report with images in PACS.    The patient tolerated the procedure well without  incident or complication and is in stable condition.     Carl Self DO  Department of Surgery / IR Integrated PGY1

## 2024-03-19 NOTE — PRE-PROCEDURE NOTE
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    Relevant review of systems: NA    Relevant Labs:   Lab Results   Component Value Date    CREATININE 0.65 02/05/2024    EGFR >90 02/05/2024    INR 0.8 (L) 12/12/2023    PROTIME 9.3 (L) 12/12/2023       Planned Sedation/Anesthesia: Moderate    Airway assessment: normal    Directed physical examination:    Aox3  Normal rate of respirations  Neck is without erythema or swelling  Abdomen is soft and non-distended    Mallampati: II (hard and soft palate, upper portion of tonsils anduvula visible)    ASA Score: ASA 3 - Patient with moderate systemic disease with functional limitations    Benefits, risks and alternatives of procedure and planned sedation have been discussed with the patient and/or their representative. All questions answered and they agree to proceed.     Carl Self DO  Department of Surgery / IR Integrated PGY1

## 2024-03-19 NOTE — Clinical Note
VSS throughout. 2mg versed, 100mcg fentanyl given ivp. Right groin access, MAA performed. Mynx closure, 2 hour bedrest. No visualized hematoma or bleeding at access site. Pt will be transport to nuc med. Report given to rpcu rn.

## 2024-03-19 NOTE — DISCHARGE INSTRUCTIONS
You received moderate sedation:  - Do not drive a car, or operate any machinery or power tools of any kind.  - Do not drink any alcoholic drinks.  - Do not take any over the counter medications that may cause drowsiness.  - Do not make any important decisions or sign any legal documents.  - You need to have a responsible adult accompany you home.  - You may resume your normal diet.  - We strongly suggest that a responsible adult be with you for the rest of the day and also during the night. This is for your protection and safety.     For questions related to your procedure:  Please call [phone] between the hours of 7:00am-5:00pm Monday through Friday.  Please call [phone] after 5:00pm and on weekends and holidays.     In the event of an emergency call 911 or go to your nearest emergency room.

## 2024-03-20 ENCOUNTER — PHARMACY VISIT (OUTPATIENT)
Dept: PHARMACY | Facility: CLINIC | Age: 59
End: 2024-03-20
Payer: COMMERCIAL

## 2024-03-21 ENCOUNTER — TELEPHONE (OUTPATIENT)
Dept: HEMATOLOGY/ONCOLOGY | Facility: HOSPITAL | Age: 59
End: 2024-03-21
Payer: COMMERCIAL

## 2024-03-21 NOTE — TELEPHONE ENCOUNTER
Angie calls to inquire if Dr. Walsh has heard an update from Dr. Gold in regards to Y90 mapping which she had done on Tuesday. She states that Dr. Gold told her that she may not need the procedure and he was going to talk to Dr. Walsh.     Patient has been unable to reach Dr. Gold.    Message sent to team.

## 2024-03-22 NOTE — TELEPHONE ENCOUNTER
Per Dr. Walsh:    The Y-90 plan has been abandoned as Dr. Gold does not feel that there are any tumors visible at this time. We are looking forward to scans in May 2024 to decide upon this.     Called Angie back to communicate the plan of care. Understanding verbalized with teach back.    Pt requests cancellation of Y-90 mapping appointments with IR as she is still receiving text reminders.    Message forwarded to Gisell Scheduling to cancel appointments.

## 2024-03-27 ENCOUNTER — APPOINTMENT (OUTPATIENT)
Dept: RADIOLOGY | Facility: HOSPITAL | Age: 59
End: 2024-03-27
Payer: COMMERCIAL

## 2024-04-21 ENCOUNTER — TELEMEDICINE (OUTPATIENT)
Dept: PRIMARY CARE | Facility: CLINIC | Age: 59
End: 2024-04-21
Payer: COMMERCIAL

## 2024-04-21 DIAGNOSIS — B37.9 ANTIBIOTIC-INDUCED YEAST INFECTION: ICD-10-CM

## 2024-04-21 DIAGNOSIS — J01.00 ACUTE MAXILLARY SINUSITIS, RECURRENCE NOT SPECIFIED: Primary | ICD-10-CM

## 2024-04-21 DIAGNOSIS — T36.95XA ANTIBIOTIC-INDUCED YEAST INFECTION: ICD-10-CM

## 2024-04-21 PROCEDURE — 99213 OFFICE O/P EST LOW 20 MIN: CPT | Performed by: FAMILY MEDICINE

## 2024-04-21 RX ORDER — FLUTICASONE PROPIONATE 50 MCG
1 SPRAY, SUSPENSION (ML) NASAL DAILY
Qty: 16 G | Refills: 0 | Status: SHIPPED | OUTPATIENT
Start: 2024-04-21 | End: 2025-04-21

## 2024-04-21 RX ORDER — FLUCONAZOLE 150 MG/1
TABLET ORAL
Qty: 2 TABLET | Refills: 0 | Status: SHIPPED | OUTPATIENT
Start: 2024-04-21 | End: 2024-06-05 | Stop reason: ALTCHOICE

## 2024-04-21 RX ORDER — LORATADINE 10 MG/1
10 TABLET ORAL DAILY
Qty: 30 TABLET | Refills: 0 | Status: SHIPPED | OUTPATIENT
Start: 2024-04-21 | End: 2024-06-05 | Stop reason: ALTCHOICE

## 2024-04-21 RX ORDER — AMOXICILLIN AND CLAVULANATE POTASSIUM 875; 125 MG/1; MG/1
875 TABLET, FILM COATED ORAL 2 TIMES DAILY
Qty: 20 TABLET | Refills: 0 | Status: SHIPPED | OUTPATIENT
Start: 2024-04-21 | End: 2024-05-01

## 2024-04-21 NOTE — PROGRESS NOTES
Subjective   Patient ID: Angie Tobin is a 58 y.o. female who presents for sinus pressure and headaches    HPI  Virtual Visit    An interactive audio and video telecommunication system which permits real time communications between the patient (at the originating site) and provider (at the distant site) was utilized to provide this telehealth service.   Verbal consent was requested and obtained from Angie Tobin on this date, 04/21/24 for a telehealth visit.     Patient has been having cough, sinus pressure x 2 weeks, not getting better. No sob or wheezing. No fever. Had sore throat but has resolved. No ear pain. No nausea/vomiting/diarrhea. Tried mucinex.   Gets yeast infections with antibiotics  Reviewed allergies    Review of Systems  As per HPI    Vitals:   due to telehealth visit, vitals not obtained, patient did not have them available at the time of the visit       Objective   Physical Exam  Physical exam done virtually through the computer screen     Gen: NAD  eyes: conjunctivae normal   Nose: external nose normal, maxillary sinus tenderness present   Resp: does not appear to be short of breath at present time, no audible wheezing    Assessment/Plan   Problem List Items Addressed This Visit    None  Visit Diagnoses       Acute maxillary sinusitis, recurrence not specified    -  Primary    Relevant Medications    amoxicillin-pot clavulanate (Augmentin) 875-125 mg tablet    fluticasone (Flonase) 50 mcg/actuation nasal spray    loratadine (Claritin) 10 mg tablet    Antibiotic-induced yeast infection        Relevant Medications    fluconazole (Diflucan) 150 mg tablet

## 2024-04-21 NOTE — PATIENT INSTRUCTIONS
Please take your medications as prescribed  please call our clinic if your symptoms fail to improve or worsen

## 2024-04-24 DIAGNOSIS — J06.9 UPPER RESPIRATORY TRACT INFECTION, UNSPECIFIED TYPE: ICD-10-CM

## 2024-04-24 RX ORDER — BENZONATATE 200 MG/1
200 CAPSULE ORAL 3 TIMES DAILY PRN
Qty: 42 CAPSULE | Refills: 1 | Status: SHIPPED | OUTPATIENT
Start: 2024-04-24 | End: 2024-05-28

## 2024-05-01 ENCOUNTER — DOCUMENTATION (OUTPATIENT)
Dept: INPATIENT UNIT | Facility: HOSPITAL | Age: 59
End: 2024-05-01
Payer: COMMERCIAL

## 2024-05-08 ENCOUNTER — OFFICE VISIT (OUTPATIENT)
Dept: CARDIOLOGY | Facility: CLINIC | Age: 59
End: 2024-05-08
Payer: COMMERCIAL

## 2024-05-08 ENCOUNTER — HOSPITAL ENCOUNTER (OUTPATIENT)
Dept: CARDIOLOGY | Facility: CLINIC | Age: 59
Discharge: HOME | End: 2024-05-08
Payer: COMMERCIAL

## 2024-05-08 VITALS — OXYGEN SATURATION: 97 % | DIASTOLIC BLOOD PRESSURE: 85 MMHG | SYSTOLIC BLOOD PRESSURE: 129 MMHG

## 2024-05-08 DIAGNOSIS — Z79.899 ENCOUNTER FOR MONITORING CARDIOTOXIC DRUG THERAPY: ICD-10-CM

## 2024-05-08 DIAGNOSIS — I47.29 OTHER VENTRICULAR TACHYCARDIA (MULTI): ICD-10-CM

## 2024-05-08 DIAGNOSIS — Z51.81 ENCOUNTER FOR MONITORING CARDIOTOXIC DRUG THERAPY: ICD-10-CM

## 2024-05-08 DIAGNOSIS — I47.11 INAPPROPRIATE SINUS TACHYCARDIA (CMS-HCC): ICD-10-CM

## 2024-05-08 DIAGNOSIS — E78.00 PURE HYPERCHOLESTEROLEMIA: ICD-10-CM

## 2024-05-08 LAB
LEFT VENTRICLE INTERNAL DIMENSION DIASTOLE: 3.73 CM (ref 3.5–6)
MITRAL VALVE E/A RATIO: 0.61
RIGHT VENTRICLE FREE WALL PEAK S': 15 CM/S
TRICUSPID ANNULAR PLANE SYSTOLIC EXCURSION: 2.6 CM

## 2024-05-08 PROCEDURE — 93321 DOPPLER ECHO F-UP/LMTD STD: CPT | Performed by: INTERNAL MEDICINE

## 2024-05-08 PROCEDURE — 93308 TTE F-UP OR LMTD: CPT | Performed by: INTERNAL MEDICINE

## 2024-05-08 PROCEDURE — 3079F DIAST BP 80-89 MM HG: CPT | Performed by: STUDENT IN AN ORGANIZED HEALTH CARE EDUCATION/TRAINING PROGRAM

## 2024-05-08 PROCEDURE — 3074F SYST BP LT 130 MM HG: CPT | Performed by: STUDENT IN AN ORGANIZED HEALTH CARE EDUCATION/TRAINING PROGRAM

## 2024-05-08 PROCEDURE — 99214 OFFICE O/P EST MOD 30 MIN: CPT | Performed by: STUDENT IN AN ORGANIZED HEALTH CARE EDUCATION/TRAINING PROGRAM

## 2024-05-08 PROCEDURE — 93321 DOPPLER ECHO F-UP/LMTD STD: CPT

## 2024-05-08 PROCEDURE — 93325 DOPPLER ECHO COLOR FLOW MAPG: CPT | Performed by: INTERNAL MEDICINE

## 2024-05-08 NOTE — PROGRESS NOTES
Cardiology Subsequent Encounter Clinic Note  Name: Angie Tobin  MRN: [id number]  : 1965    CC: History of treatment with cardiotoxic chemotherapy    Active Issues:  Angie Tobin is a 58 y.o. female with a medical history of soft tissue sarcoma (diagnosed 2019). Underwent hysterectomy and bilateral oophorectomy 2019.     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  Radiation: TBD      Risk Factors: HTN   Social Hx: Never smoker  Family Hx: DM      CT Chest 2021 No coronary artery atherosclerotic calcifications.    Since her last visit the patient has stopped pazopanib and has received Y-90 treatment with good response.     Encounter for cardiotoxic chemotherapy  -Serial echocardiograms have shown preserved ejection fraction.  Echocardiogram from today reviewed has markedly suboptimal images but appears to have grossly normal left ventricular function     Inappropriate sinus tachycardia  -Review of patient's chart shows that her heart rate has been chronically 120-140 bpm for the past 4 or 5 years  -Review of EKGs shows sinus rhythm; no evidence of atrial/ventricular malignant arrhythmias     Overall patient denies any chest discomfort or shortness of breath. Denies any orthopnea/PND/lower extremity edema. Denies any dizziness lightheadedness.     Past Medical History  Past Medical History:   Diagnosis Date    Acute sinusitis 2023    Benign essential HTN 2023    Body mass index (BMI) 40.0-44.9, adult (Multi) 2023    Candidiasis, unspecified 2022    Antibiotic-induced yeast infection    Conjunctivitis 2023    Contact with and (suspected) exposure to covid-19 09/15/2022    Disorder of liver 2023    Disorder of thyroid 10/18/2023    Elevated blood-pressure reading, without diagnosis of hypertension 2017    Elevated BP without diagnosis of hypertension     Gastroesophageal reflux disease 09/15/2022    Herniated lumbar intervertebral disc 09/13/2022    Comment on above: OTHER INTERVERTEBRAL DISC DISPLACEMENT, LUMBAR REGION    Herpes simplex virus (HSV) infection 04/19/2023    Hypercholesterolemia 09/15/2022    Inappropriate sinus tachycardia, so stated (CMS-HCC) 04/19/2023    Leiomyoma 03/11/2024    Lymphoproliferative disorder (Multi) 09/06/2023    Malignant neoplasm metastatic to left lung (Multi) 04/19/2023    Malignant neoplasm of body of uterus (Multi) 09/27/2023    Mass of pancreas ([facility]-H[facility]) 09/27/2023    Neuropathy 03/11/2024    Never smoked any substance 10/25/2023    Other conditions influencing health status 09/27/2017    History of cough    Personal history of diseases of the blood and blood-forming organs and certain disorders involving the immune mechanism 06/06/2016    History of leukocytosis    Personal history of other benign neoplasm 05/08/2019    History of other benign neoplasm    Personal history of other diseases of the musculoskeletal system and connective tissue 04/18/2018    History of low back pain    Personal history of other diseases of the nervous system and sense organs 10/03/2016    History of neuropathy    Personal history of other diseases of the respiratory system 08/31/2017    History of acute bronchitis    Personal history of other diseases of the respiratory system 01/24/2022    History of acute sinusitis    Personal history of other specified conditions 09/16/2020    History of weight gain    Postoperative pain 03/11/2024    Right lower quadrant abdominal tenderness 03/04/2019    RLQ abdominal tenderness    Right upper quadrant pain 03/13/2015    Abdominal pain, RUQ (right upper quadrant)    Secondary hypertension 10/18/2023    Toenail fungus 04/19/2023    Uterine leiomyoma 09/27/2023    Viral URI 09/27/2023    Vitamin D deficiency 04/19/2023    Weight gain 03/11/2024       Past Surgical History  Past Surgical History:   Procedure  Laterality Date    BREAST SURGERY  2013    Breast Surgery Reduction Procedure    CHOLECYSTECTOMY  2014    Cholecystectomy Laparoscopic    CT GUIDED PERCUTANEOUS BIOPSY BONE  10/24/2016    CT GUIDED PERCUTANEOUS BIOPSY BONE 10/24/2016 [facility]    CT GUIDED PERCUTANEOUS BIOPSY LUNG  2022    CT GUIDED PERCUTANEOUS BIOPSY LUNG 2022 [facility]    ESOPHAGOGASTRODUODENOSCOPY  2013    Diagnostic Esophagogastroduodenoscopy    IR INTERVENTION ARTERIAL EMBOLIZATION  2023    IR INTERVENTION ARTERIAL EMBOLIZATION 2023 Oj Gold MD [facility]    IR INTERVENTION ARTERIAL EMBOLIZATION  2023    IR INTERVENTION ARTERIAL EMBOLIZATION 2023 Oj Gold MD [facility]    OTHER SURGICAL HISTORY  2013    Anal Fistulotomy (Subcutaneous)    OTHER SURGICAL HISTORY  10/25/2022    Lung wedge resection    OTHER SURGICAL HISTORY  10/25/2022    Lung lobectomy    OTHER SURGICAL HISTORY  2019    Resection    OTHER SURGICAL HISTORY  2013    Perineal Transplant Of Anovaginal Fistula    US GUIDED NEEDLE LIVER BIOPSY  2023    US GUIDED NEEDLE LIVER BIOPSY 2023 [facility]       Medications  Current Outpatient Medications on File Prior to Visit   Medication Sig Dispense Refill    albuterol 108 (90 Base) MCG/ACT inhaler Inhale 1-2 puffs. Every 4-6 hours as needed      [] amoxicillin-pot clavulanate (Augmentin) 875-125 mg tablet Take 1 tablet (875 mg) by mouth 2 times a day for 10 days. 20 tablet 0    benzonatate (Tessalon) 200 mg capsule Take 1 capsule (200 mg) by mouth 3 times a day as needed for cough. Do not crush or chew. 42 capsule 1    fluconazole (Diflucan) 150 mg tablet Take 1 tablet once, if still having symptoms take another a week later 2 tablet 0    fluticasone (Flonase) 50 mcg/actuation nasal spray Administer 1 spray into each nostril once daily. Shake gently. Before first use, prime pump. After use, clean tip and replace cap. 16 g 0    lisinopril  20 mg tablet Take 1 tablet (20 mg) by mouth once daily. 90 tablet 3    loratadine (Claritin) 10 mg tablet Take 1 tablet (10 mg) by mouth once daily. 30 tablet 0    LORazepam (Ativan) 0.5 mg tablet Take 1 tablet (0.5 mg) by mouth every 8 hours if needed (agitation) for up to 10 days. 30 tablet 0    meclizine (Antivert) 12.5 mg tablet Take 1-2 tablets (12.5-25 mg) by mouth 3 times a day as needed (vertigo).      metFORMIN (Glucophage) 500 mg tablet Take 1 tablet (500 mg) by mouth 2 times a day. 180 tablet 3    metoprolol succinate XL (Toprol-XL) 25 mg 24 hr tablet Take 1 tablet (25 mg) by mouth once daily. 90 tablet 3    prochlorperazine (Compazine) 10 mg tablet TAKE 1 TABLET BY MOUTH EVERY 6 HOURS AS NEEDED FOR NAUSEA AND VOMITING WITH CHEMOTHERAPY 56 tablet 2    triamcinolone (Kenalog) 0.1 % cream APPLY TOPICALLY TO AFFECTED AREA 2 TIMES A DAY AS DIRECTED 454 g 2     No current facility-administered medications on file prior to visit.       Allergies  Allergies   Allergen Reactions    Hydromorphone Other    Codeine Other     Abdominal pain       Social History  Social History     Tobacco Use    Smoking status: Never    Smokeless tobacco: Never       Family History  Family History   Problem Relation Name Age of Onset    Other (atrial flutter) Mother      Diabetes Mother      Leukemia Father      Liver cancer Father      Ovarian cancer Sister      Hypothyroidism Brother      Breast cancer Paternal Grandmother      No Known Problems Sibling       Current Outpatient Medications   Medication Instructions    albuterol 108 (90 Base) MCG/ACT inhaler 1-2 puffs, inhalation, Every 4-6 hours as needed    benzonatate (TESSALON) 200 mg, oral, 3 times daily PRN, Do not crush or chew.    fluconazole (Diflucan) 150 mg tablet Take 1 tablet once, if still having symptoms take another a week later    fluticasone (Flonase) 50 mcg/actuation nasal spray 1 spray, Each Nostril, Daily, Shake gently. Before first use, prime pump. After use,  clean tip and replace cap.    lisinopril 20 mg, oral, Daily    loratadine (CLARITIN) 10 mg, oral, Daily    LORazepam (ATIVAN) 0.5 mg, oral, Every 8 hours PRN    meclizine (ANTIVERT) 12.5-25 mg, oral, 3 times daily PRN    metFORMIN (GLUCOPHAGE) 500 mg, oral, 2 times daily    metoprolol succinate XL (TOPROL-XL) 25 mg, oral, Daily    prochlorperazine (Compazine) 10 mg tablet TAKE 1 TABLET BY MOUTH EVERY 6 HOURS AS NEEDED FOR NAUSEA AND VOMITING WITH CHEMOTHERAPY    triamcinolone (Kenalog) 0.1 % cream APPLY TOPICALLY TO AFFECTED AREA 2 TIMES A DAY AS DIRECTED           Physical Examination  Vitals: /85   SpO2 97%   General: awake, alert and oriented. No acute distress.   Skin: Skin is warm, dry and intact without rashes or lesions. Appropriate color for ethnicity. Nail beds pink with no cyanosis or clubbing  HEENT: normocephalic, atraumatic; conjunctivae are clear without exudates or hemorrhage. Sclera is non-icteric. Eyelids are normal in appearance without swelling or lesions. Hearing intact. Nares are patent bilaterally. Moist mucous membranes.   Cardiovascular: Regular. No murmurs, gallops, or rubs are auscultated. S1 and S2 are heard and are of normal intensity. No JVD, no carotid bruits  Respiratory: Thorax symmetric. CTAB, breath sounds vesicular. No crackles, wheezes or ronchi.   Gastrointestinal: soft, non-distended, BS + x 4  Genitourinary: exam deferred  Musculoskeletal: moves all extremities  Extremities: pulses palpable bilaterally; no swelling or erythema; no edema  Neurological: alert & oriented x 3; no focal deficits  Psychiatric: appropriate mood and affect       Labs/Imaging/Procedures    Lab Results   Component Value Date    HGB 13.1 02/05/2024    HGB 15.3 11/09/2023    HGB 15.7 10/25/2023     02/05/2024    WBC 10.7 02/05/2024     (L) 03/19/2024    K 4.6 03/19/2024    CREATININE 0.59 03/19/2024    CREATININE 0.65 02/05/2024    CREATININE 0.71 12/13/2023    BUN 15 03/19/2024     CALCIUM 11.7 (H) 03/19/2024    INR 0.8 (L) 12/12/2023    BNP 6 10/25/2023    TROPHS <3 10/25/2023     No echocardiogram results found for the past 12 months      Impression  Angie Tobin is a 58 y.o. female with a medical history of soft tissue sarcoma (diagnosed 4/2019). Underwent hysterectomy and bilateral oophorectomy 6/2019.  Has completed doxorubicin based chemotherapy and has been on pazopanib in the past.  Most recently has received Y-90 procedure     Encounter for cardiotoxic chemotherapy  -Serial echocardiograms have shown preserved ejection fraction.  -Echocardiogram from today is markedly limited; will get a BNP as well.  Left ventricular function appears to be grossly normal  -will need annual echocardiograms till 2025 as part of surveillance for anthracycline induced cardiomyopathy.    Inappropriate sinus tachycardia  -her heart rate has been chronically 120-140 bpm  -Review of EKGs shows sinus rhythm; no evidence of atrial/ventricular malignant arrhythmias     Plan:  -For inappropriate sinus tachycardia: No treatment required at this time.  -continue annual surveillance as mentioned above  -RTC 1 year      Darin Paul MD  Advanced Heart Failure/Transplant Cardiology  Cardio-Oncology  [facility]

## 2024-05-09 ENCOUNTER — APPOINTMENT (OUTPATIENT)
Dept: RADIOLOGY | Facility: CLINIC | Age: 59
End: 2024-05-09
Payer: COMMERCIAL

## 2024-05-09 DIAGNOSIS — Z79.899 ENCOUNTER FOR MONITORING CARDIOTOXIC DRUG THERAPY: Primary | ICD-10-CM

## 2024-05-09 DIAGNOSIS — Z51.81 ENCOUNTER FOR MONITORING CARDIOTOXIC DRUG THERAPY: Primary | ICD-10-CM

## 2024-05-10 ENCOUNTER — HOSPITAL ENCOUNTER (OUTPATIENT)
Dept: RADIOLOGY | Facility: HOSPITAL | Age: 59
Discharge: HOME | End: 2024-05-10
Payer: COMMERCIAL

## 2024-05-10 DIAGNOSIS — C49.9 METASTATIC SARCOMA TO LIVER (MULTI): ICD-10-CM

## 2024-05-10 DIAGNOSIS — R91.8 MULTIPLE LUNG NODULES ON CT: ICD-10-CM

## 2024-05-10 DIAGNOSIS — C78.7 METASTATIC SARCOMA TO LIVER (MULTI): ICD-10-CM

## 2024-05-10 DIAGNOSIS — C49.9 LEIOMYOSARCOMA (MULTI): ICD-10-CM

## 2024-05-10 PROCEDURE — A9575 INJ GADOTERATE MEGLUMI 0.1ML: HCPCS | Performed by: INTERNAL MEDICINE

## 2024-05-10 PROCEDURE — 2550000001 HC RX 255 CONTRASTS: Performed by: INTERNAL MEDICINE

## 2024-05-10 PROCEDURE — 72197 MRI PELVIS W/O & W/DYE: CPT | Performed by: STUDENT IN AN ORGANIZED HEALTH CARE EDUCATION/TRAINING PROGRAM

## 2024-05-10 PROCEDURE — 71250 CT THORAX DX C-: CPT

## 2024-05-10 PROCEDURE — 74183 MRI ABD W/O CNTR FLWD CNTR: CPT

## 2024-05-10 PROCEDURE — 74183 MRI ABD W/O CNTR FLWD CNTR: CPT | Performed by: STUDENT IN AN ORGANIZED HEALTH CARE EDUCATION/TRAINING PROGRAM

## 2024-05-10 PROCEDURE — 72197 MRI PELVIS W/O & W/DYE: CPT

## 2024-05-10 PROCEDURE — 71250 CT THORAX DX C-: CPT | Performed by: RADIOLOGY

## 2024-05-10 RX ORDER — GADOTERATE MEGLUMINE 376.9 MG/ML
24 INJECTION INTRAVENOUS
Status: COMPLETED | OUTPATIENT
Start: 2024-05-10 | End: 2024-05-10

## 2024-05-10 RX ADMIN — GADOTERATE MEGLUMINE 24 ML: 376.9 INJECTION INTRAVENOUS at 13:20

## 2024-05-13 ENCOUNTER — TELEMEDICINE (OUTPATIENT)
Dept: PRIMARY CARE | Facility: CLINIC | Age: 59
End: 2024-05-13
Payer: COMMERCIAL

## 2024-05-13 ENCOUNTER — APPOINTMENT (OUTPATIENT)
Dept: PRIMARY CARE | Facility: CLINIC | Age: 59
End: 2024-05-13
Payer: COMMERCIAL

## 2024-05-13 DIAGNOSIS — N30.00 ACUTE CYSTITIS WITHOUT HEMATURIA: Primary | ICD-10-CM

## 2024-05-13 PROCEDURE — 1036F TOBACCO NON-USER: CPT | Performed by: FAMILY MEDICINE

## 2024-05-13 PROCEDURE — 99213 OFFICE O/P EST LOW 20 MIN: CPT | Performed by: FAMILY MEDICINE

## 2024-05-13 RX ORDER — CIPROFLOXACIN 500 MG/1
500 TABLET ORAL 2 TIMES DAILY
Qty: 14 TABLET | Refills: 0 | Status: SHIPPED | OUTPATIENT
Start: 2024-05-13 | End: 2024-05-20

## 2024-05-13 ASSESSMENT — PATIENT HEALTH QUESTIONNAIRE - PHQ9
2. FEELING DOWN, DEPRESSED OR HOPELESS: NOT AT ALL
1. LITTLE INTEREST OR PLEASURE IN DOING THINGS: NOT AT ALL
SUM OF ALL RESPONSES TO PHQ9 QUESTIONS 1 AND 2: 0

## 2024-05-13 ASSESSMENT — ENCOUNTER SYMPTOMS
OCCASIONAL FEELINGS OF UNSTEADINESS: 0
DYSURIA: 1
LOSS OF SENSATION IN FEET: 0
DEPRESSION: 0
FREQUENCY: 1

## 2024-05-13 NOTE — PROGRESS NOTES
Subjective   Patient ID: Angie Tobin is a 58 y.o. female who presents for No chief complaint on file..    Pt has uti sx  Onset today  No v,d,c  No fevers    UTI   This is a new problem. The current episode started today. The problem occurs every urination. The problem has been unchanged. Associated symptoms include frequency.        Review of Systems   Genitourinary:  Positive for dysuria and frequency.   All other systems reviewed and are negative.      Objective   There were no vitals taken for this visit.    Physical Exam  Constitutional:       Appearance: Normal appearance.   HENT:      Head: Normocephalic.      Right Ear: External ear normal.      Left Ear: External ear normal.   Pulmonary:      Effort: Pulmonary effort is normal.   Neurological:      General: No focal deficit present.      Mental Status: She is alert and oriented to person, place, and time.   Psychiatric:         Mood and Affect: Mood normal.         Behavior: Behavior normal.         Assessment/Plan   Diagnoses and all orders for this visit:  Acute cystitis without hematuria  -     Urinalysis with Reflex Microscopic; Future  -     Urine Culture; Future  -     ciprofloxacin (Cipro) 500 mg tablet; Take 1 tablet (500 mg) by mouth 2 times a day for 7 days.

## 2024-05-14 ENCOUNTER — LAB (OUTPATIENT)
Dept: LAB | Facility: HOSPITAL | Age: 59
End: 2024-05-14
Payer: COMMERCIAL

## 2024-05-14 ENCOUNTER — OFFICE VISIT (OUTPATIENT)
Dept: HEMATOLOGY/ONCOLOGY | Facility: HOSPITAL | Age: 59
End: 2024-05-14
Payer: COMMERCIAL

## 2024-05-14 VITALS
RESPIRATION RATE: 20 BRPM | BODY MASS INDEX: 39.16 KG/M2 | OXYGEN SATURATION: 96 % | WEIGHT: 270.73 LBS | HEART RATE: 116 BPM | DIASTOLIC BLOOD PRESSURE: 97 MMHG | SYSTOLIC BLOOD PRESSURE: 152 MMHG | TEMPERATURE: 97.9 F

## 2024-05-14 DIAGNOSIS — C49.9 METASTATIC SARCOMA TO LIVER (MULTI): ICD-10-CM

## 2024-05-14 DIAGNOSIS — N30.00 ACUTE CYSTITIS WITHOUT HEMATURIA: ICD-10-CM

## 2024-05-14 DIAGNOSIS — C78.7 METASTATIC SARCOMA TO LIVER (MULTI): ICD-10-CM

## 2024-05-14 DIAGNOSIS — Z51.81 ENCOUNTER FOR MONITORING CARDIOTOXIC DRUG THERAPY: ICD-10-CM

## 2024-05-14 DIAGNOSIS — F41.1 GENERALIZED ANXIETY DISORDER: ICD-10-CM

## 2024-05-14 DIAGNOSIS — R91.8 MULTIPLE LUNG NODULES ON CT: ICD-10-CM

## 2024-05-14 DIAGNOSIS — Z79.899 ENCOUNTER FOR MONITORING CARDIOTOXIC DRUG THERAPY: ICD-10-CM

## 2024-05-14 DIAGNOSIS — C49.9 LEIOMYOSARCOMA (MULTI): ICD-10-CM

## 2024-05-14 DIAGNOSIS — D47.9 B-CELL LYMPHOPROLIFERATIVE DISORDER (MULTI): ICD-10-CM

## 2024-05-14 DIAGNOSIS — C49.9 LEIOMYOSARCOMA (MULTI): Primary | ICD-10-CM

## 2024-05-14 LAB
ALBUMIN SERPL BCP-MCNC: 4.5 G/DL (ref 3.4–5)
ALP SERPL-CCNC: 179 U/L (ref 33–110)
ALT SERPL W P-5'-P-CCNC: 42 U/L (ref 7–45)
ANION GAP SERPL CALC-SCNC: 16 MMOL/L (ref 10–20)
APPEARANCE UR: CLEAR
AST SERPL W P-5'-P-CCNC: 30 U/L (ref 9–39)
BASOPHILS # BLD AUTO: 0.07 X10*3/UL (ref 0–0.1)
BASOPHILS NFR BLD AUTO: 0.5 %
BILIRUB SERPL-MCNC: 1.2 MG/DL (ref 0–1.2)
BILIRUB UR STRIP.AUTO-MCNC: NEGATIVE MG/DL
BNP SERPL-MCNC: 19 PG/ML (ref 0–99)
BUN SERPL-MCNC: 18 MG/DL (ref 6–23)
CALCIUM SERPL-MCNC: 11.9 MG/DL (ref 8.6–10.3)
CHLORIDE SERPL-SCNC: 105 MMOL/L (ref 98–107)
CK SERPL-CCNC: 86 U/L (ref 0–215)
CO2 SERPL-SCNC: 20 MMOL/L (ref 21–32)
COLOR UR: NORMAL
COTININE UR QL SCN: NEGATIVE
CREAT SERPL-MCNC: 0.72 MG/DL (ref 0.5–1.05)
EGFRCR SERPLBLD CKD-EPI 2021: >90 ML/MIN/1.73M*2
EOSINOPHIL # BLD AUTO: 0.29 X10*3/UL (ref 0–0.7)
EOSINOPHIL NFR BLD AUTO: 2 %
ERYTHROCYTE [DISTWIDTH] IN BLOOD BY AUTOMATED COUNT: 15.4 % (ref 11.5–14.5)
GGT SERPL-CCNC: 181 U/L (ref 5–55)
GLUCOSE SERPL-MCNC: 168 MG/DL (ref 74–99)
GLUCOSE UR STRIP.AUTO-MCNC: NORMAL MG/DL
HBV CORE IGM SER QL: NONREACTIVE
HBV SURFACE AB SER-ACNC: <3.1 MIU/ML
HBV SURFACE AG SERPL QL IA: NONREACTIVE
HCT VFR BLD AUTO: 39.9 % (ref 36–46)
HCV AB SER QL: NONREACTIVE
HGB BLD-MCNC: 13 G/DL (ref 12–16)
HIV 1+2 AB+HIV1 P24 AG SERPL QL IA: NONREACTIVE
IGG SERPL-MCNC: 701 MG/DL (ref 700–1600)
IMM GRANULOCYTES # BLD AUTO: 0.11 X10*3/UL (ref 0–0.7)
IMM GRANULOCYTES NFR BLD AUTO: 0.7 % (ref 0–0.9)
KETONES UR STRIP.AUTO-MCNC: NEGATIVE MG/DL
LDH SERPL L TO P-CCNC: 115 U/L (ref 84–246)
LEUKOCYTE ESTERASE UR QL STRIP.AUTO: NEGATIVE
LYMPHOCYTES # BLD AUTO: 2.2 X10*3/UL (ref 1.2–4.8)
LYMPHOCYTES NFR BLD AUTO: 14.8 %
MCH RBC QN AUTO: 26.9 PG (ref 26–34)
MCHC RBC AUTO-ENTMCNC: 32.6 G/DL (ref 32–36)
MCV RBC AUTO: 82 FL (ref 80–100)
MONOCYTES # BLD AUTO: 0.87 X10*3/UL (ref 0.1–1)
MONOCYTES NFR BLD AUTO: 5.9 %
NEUTROPHILS # BLD AUTO: 11.29 X10*3/UL (ref 1.2–7.7)
NEUTROPHILS NFR BLD AUTO: 76.1 %
NITRITE UR QL STRIP.AUTO: NEGATIVE
NRBC BLD-RTO: 0 /100 WBCS (ref 0–0)
PH UR STRIP.AUTO: 5 [PH]
PLATELET # BLD AUTO: 265 X10*3/UL (ref 150–450)
POTASSIUM SERPL-SCNC: 4.7 MMOL/L (ref 3.5–5.3)
PROT SERPL-MCNC: 7.7 G/DL (ref 6.4–8.2)
PROT UR STRIP.AUTO-MCNC: NEGATIVE MG/DL
RBC # BLD AUTO: 4.84 X10*6/UL (ref 4–5.2)
RBC # UR STRIP.AUTO: NEGATIVE /UL
SODIUM SERPL-SCNC: 136 MMOL/L (ref 136–145)
SP GR UR STRIP.AUTO: 1.01
UROBILINOGEN UR STRIP.AUTO-MCNC: NORMAL MG/DL
WBC # BLD AUTO: 14.8 X10*3/UL (ref 4.4–11.3)

## 2024-05-14 PROCEDURE — 80053 COMPREHEN METABOLIC PANEL: CPT

## 2024-05-14 PROCEDURE — 82550 ASSAY OF CK (CPK): CPT

## 2024-05-14 PROCEDURE — 83880 ASSAY OF NATRIURETIC PEPTIDE: CPT

## 2024-05-14 PROCEDURE — 87389 HIV-1 AG W/HIV-1&-2 AB AG IA: CPT

## 2024-05-14 PROCEDURE — 85025 COMPLETE CBC W/AUTO DIFF WBC: CPT

## 2024-05-14 PROCEDURE — 36415 COLL VENOUS BLD VENIPUNCTURE: CPT

## 2024-05-14 PROCEDURE — 81003 URINALYSIS AUTO W/O SCOPE: CPT

## 2024-05-14 PROCEDURE — 83615 LACTATE (LD) (LDH) ENZYME: CPT

## 2024-05-14 PROCEDURE — 99215 OFFICE O/P EST HI 40 MIN: CPT | Performed by: INTERNAL MEDICINE

## 2024-05-14 PROCEDURE — 86803 HEPATITIS C AB TEST: CPT

## 2024-05-14 PROCEDURE — 3077F SYST BP >= 140 MM HG: CPT | Performed by: INTERNAL MEDICINE

## 2024-05-14 PROCEDURE — 86706 HEP B SURFACE ANTIBODY: CPT

## 2024-05-14 PROCEDURE — 87086 URINE CULTURE/COLONY COUNT: CPT

## 2024-05-14 PROCEDURE — 3080F DIAST BP >= 90 MM HG: CPT | Performed by: INTERNAL MEDICINE

## 2024-05-14 PROCEDURE — 82977 ASSAY OF GGT: CPT

## 2024-05-14 PROCEDURE — 87340 HEPATITIS B SURFACE AG IA: CPT

## 2024-05-14 PROCEDURE — 82784 ASSAY IGA/IGD/IGG/IGM EACH: CPT

## 2024-05-14 PROCEDURE — 86705 HEP B CORE ANTIBODY IGM: CPT

## 2024-05-14 RX ORDER — PROCHLORPERAZINE MALEATE 10 MG
10 TABLET ORAL EVERY 6 HOURS PRN
Status: CANCELLED | OUTPATIENT
Start: 2024-06-05

## 2024-05-14 RX ORDER — EPINEPHRINE 0.3 MG/.3ML
0.3 INJECTION SUBCUTANEOUS EVERY 5 MIN PRN
Status: CANCELLED | OUTPATIENT
Start: 2024-06-05

## 2024-05-14 RX ORDER — FAMOTIDINE 10 MG/ML
20 INJECTION INTRAVENOUS ONCE AS NEEDED
Status: CANCELLED | OUTPATIENT
Start: 2024-06-05

## 2024-05-14 RX ORDER — DEXAMETHASONE IN 0.9 % SOD CHL 20 MG/50ML
20 INTRAVENOUS SOLUTION, PIGGYBACK (ML) INTRAVENOUS ONCE
Status: CANCELLED | OUTPATIENT
Start: 2024-06-05

## 2024-05-14 RX ORDER — PROCHLORPERAZINE EDISYLATE 5 MG/ML
10 INJECTION INTRAMUSCULAR; INTRAVENOUS EVERY 6 HOURS PRN
Status: CANCELLED | OUTPATIENT
Start: 2024-06-05

## 2024-05-14 RX ORDER — DIPHENHYDRAMINE HYDROCHLORIDE 50 MG/ML
50 INJECTION INTRAMUSCULAR; INTRAVENOUS AS NEEDED
Status: CANCELLED | OUTPATIENT
Start: 2024-06-05

## 2024-05-14 RX ORDER — LIDOCAINE AND PRILOCAINE 25; 25 MG/G; MG/G
CREAM TOPICAL AS NEEDED
Qty: 30 G | Refills: 3 | Status: SHIPPED | OUTPATIENT
Start: 2024-05-14

## 2024-05-14 RX ORDER — PALONOSETRON 0.05 MG/ML
0.25 INJECTION, SOLUTION INTRAVENOUS ONCE
Status: CANCELLED | OUTPATIENT
Start: 2024-06-05

## 2024-05-14 RX ORDER — ALBUTEROL SULFATE 0.83 MG/ML
3 SOLUTION RESPIRATORY (INHALATION) AS NEEDED
Status: CANCELLED | OUTPATIENT
Start: 2024-06-05

## 2024-05-14 ASSESSMENT — PAIN SCALES - GENERAL: PAINLEVEL: 0-NO PAIN

## 2024-05-14 ASSESSMENT — PATIENT HEALTH QUESTIONNAIRE - PHQ9
1. LITTLE INTEREST OR PLEASURE IN DOING THINGS: NOT AT ALL
2. FEELING DOWN, DEPRESSED OR HOPELESS: NOT AT ALL
SUM OF ALL RESPONSES TO PHQ9 QUESTIONS 1 AND 2: 0

## 2024-05-14 NOTE — PATIENT INSTRUCTIONS
Dr Walsh would like to see you on 5/28 and 6/18 for follow up visits. He has placed orders for a port placement which you can schedule prior to leaving today. You will need to obtain labwork and a urinalysis prior to your next visit. You have been given information on chemotherapy Yondelis, as well as information on infusion pumps and port placement.  You will have the port placed prior to 5/28, and will need labwork at that time so that we have results prior to your infusion. The pump will be placed 5/28 and disconnected on 5/29. You can have the disconnection done at the [place] location.     Please call [phone] option 5, option 2 for any questions or concerns. Please call [phone] option 1, for any scheduling concerns or issues.

## 2024-05-15 LAB — BACTERIA UR CULT: NORMAL

## 2024-05-15 ASSESSMENT — ENCOUNTER SYMPTOMS
PALPITATIONS: 0
SEIZURES: 0
FLANK PAIN: 0
VOMITING: 0
BACK PAIN: 0
ADENOPATHY: 0
CONSTIPATION: 0
EXTREMITY WEAKNESS: 0
DIARRHEA: 0
HOT FLASHES: 0
FATIGUE: 0
HEMOPTYSIS: 0
APPETITE CHANGE: 0
LEG SWELLING: 0
SORE THROAT: 0
SHORTNESS OF BREATH: 0
COUGH: 0
CONFUSION: 0
FEVER: 0
SCLERAL ICTERUS: 0
DYSURIA: 0
FREQUENCY: 0
DIFFICULTY URINATING: 0
ABDOMINAL PAIN: 0
MYALGIAS: 0
HEMATURIA: 0
NAUSEA: 0
CHILLS: 0
CHEST TIGHTNESS: 0
DIZZINESS: 0
NERVOUS/ANXIOUS: 1
EYE PROBLEMS: 0
TROUBLE SWALLOWING: 0
DEPRESSION: 0

## 2024-05-15 ASSESSMENT — VISUAL ACUITY: OU: 1

## 2024-05-15 NOTE — PROGRESS NOTES
.Patient ID: Angie Tobin is a 58 y.o. female.  Referring Physician: No referring provider defined for this encounter.  Primary Care Provider: LUDA Huertas-CNP     Chief Complaint   Patient presents with    Follow-up     Imaging review  Discussion of chemotherapy.     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         Cancer History:   Treatment Synopsis:   Ms. Tobin is a pleasant woman who presented with RUQ pain in March 2019. Further investigations and imaging showed a large heterogeneous mass in the pancreatic  bed. Initially this was thought to be lymphoma, however biopsy showed soft tissue sarcoma. On April 22, 2019 this mass was removed by Dr. Gonzalez via surgery. 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 She was subsequently  seen by Gyn Onc and underwent hysterectomy and bilateral salpingo-oopherectomy on Danae 10, 2019. This specimen did not show any evidence of tumor. We saw her first in August 2019. Scans did not detect any tumor at that point. Her case was discussed in  the tumor board for post op RT. Since there was no focus to be seen, it was decided that RT would be deferred for the future if a recurrence happens. CT scan in April 2021 detected new solitary nodules in lung and liver. MRI done on April 23, 2021 confirmed  recurrence in the liver. Her case was discussed in the tumor board and a decision was made to pursue systemic chemotherapy. We started her on doxorubicin and dacarbazine with zinecard protection. s/p 2 cycles, CT scans shows positive response in tumor  burden. Completed 6 cycles uneventfully on 08/26/2021.     Of note, the patient also has a history of Monoclonal B-cell lymphocytosis. She was worked up in 2016 for this reason (BM Biopsy report present in the physician portal)     03/31/2022- CT scan showed a lung nodule that is 0.6cm (was inconspicuous on the previous scan). Port removed on Feb  10, 2022.   07/05/2022- CT scan on 7/1/22 showed that the lung nodule has enlarged to 0.9cm. We petitioned for a biopsy of this lung nodule. Interestingly, the liver lesions have disappeared.   08/16/2022- Lung biopsy completed in early august and path reads leiomyosarcoma. Case presented in tumor board on 8/19/22 and decision made to refer her to CT surgery   10/14/2022- Surgery completed- 6 wedges removed. Multiple small nodules of leiomyosarcoma and microscopic foci reported.   12/06/2022- CT scan is LIN[facility]. Repeat staging in 3 months.  03/03/2023- CT scan showed the liver lesion to be stable.   06/02/2023- CT scan shows worsening liver lesion- MRI liver ordered on 06/16/2023 showing progressing liver mets. We petitioned for Docetaxel/Gemcitabine to start 07/17/2023.  08/21/2023- CT scan and MRI done after 2 cycles show progressive disease in the liver and in the lungs.   08/23/2023- Ordered Pazopanib.   08/30/2023- Started pazopanib at 400mg by mouth daily  09/07/2023- Increased to 800mg by mouth daily  11/13/2023- Imaging shows progressive disease in the liver.   11/15/2023- Stopped pazopanib and referred for Y-90.  12/19/2023- Received Y-90 treatment.     2024 01/23/24- Phone visit. Ordered imaging. Recovered well from y-90.  01/31/2024- CT scan done. Very small lung nodules. Radiology reports mild increase in size. I feel this is measurement differences.  02/02/2024- Excellent response to Y-90. However, there is another mass in the dome of the liver that is increasing in size. I spoke to Dr. Gold and he will perform Y-90 to that mass. However, upon review of MRI- Dr. Gold felt to wait for some time and repeat the scan in 3 months.  05/12/2024- Imaging shows progressive disease in the lung and in the liver.   05/14/2024- Met with the patient and discussed Yondelis. Her images will be discussed in the tumor board. Port placement was ordered.        Treatment History:   Systemic treatment:  1. Doxorubicin  "+ Dacarbazine + Zinecard (Day 1-3) of 21 day of cycle.  C1- 05/10/2021 to 05/12/2021  C2- 06/01/2021 to 06/03/2021  C3- 06/22/2021 to 06/24/2021  C4- 07/13/2021 to 07/15/2021  C5- 08/03/2021 to 08/05/2021   C6- 08/24/2021 to 08/26/2021     2. Docetaxel (75mg/m2) + Gemcitabine (900mg/m2) D1, D8 of a 21 day cycle  C1- 07/17/2023   C2- 08/07/2023- Discontinued after progression noted.      3. Pazopanib   Start date 08/30/2023 at 400mg by mouth daily.   Increase to 09/07/2023 at 800mg by mouth daily.   Stopped on 11/15/23 due to progressive disease.      Subjective   Please refer to \"Notes/Cancer History\" above for complete History of present illness.     Ms Angie Tobin is doing well. She is here alone. She read the reports online. She wants to review her scans.     Review of Systems:   Review of Systems   Constitutional:  Negative for appetite change, chills, fatigue and fever.   HENT:   Negative for hearing loss, lump/mass, mouth sores, sore throat and trouble swallowing.    Eyes:  Negative for eye problems and icterus.   Respiratory:  Negative for chest tightness, cough, hemoptysis and shortness of breath.    Cardiovascular:  Negative for chest pain, leg swelling and palpitations.   Gastrointestinal:  Negative for abdominal pain, constipation, diarrhea, nausea and vomiting.   Endocrine: Negative for hot flashes.   Genitourinary:  Negative for difficulty urinating, dysuria, frequency and hematuria.    Musculoskeletal:  Negative for back pain, flank pain, gait problem and myalgias.   Skin:  Negative for itching and rash.   Neurological:  Negative for dizziness, extremity weakness, gait problem and seizures.   Hematological:  Negative for adenopathy.   Psychiatric/Behavioral:  Negative for confusion and depression. The patient is nervous/anxious.          MEDICAL HISTORY  Past Medical History:   Diagnosis Date    Acute sinusitis 09/27/2023    Benign essential HTN 04/19/2023    Body mass index (BMI) 40.0-44.9, adult " (Multi) 09/27/2023    Candidiasis, unspecified 01/20/2022    Antibiotic-induced yeast infection    Conjunctivitis 09/27/2023    Contact with and (suspected) exposure to covid-19 09/15/2022    Disorder of liver 07/11/2023    Disorder of thyroid 10/18/2023    Elevated blood-pressure reading, without diagnosis of hypertension 05/26/2017    Elevated BP without diagnosis of hypertension    Gastroesophageal reflux disease 09/15/2022    Herniated lumbar intervertebral disc 09/13/2022    Comment on above: OTHER INTERVERTEBRAL DISC DISPLACEMENT, LUMBAR REGION    Herpes simplex virus (HSV) infection 04/19/2023    Hypercholesterolemia 09/15/2022    Inappropriate sinus tachycardia, so stated (CMS-HCC) 04/19/2023    Leiomyoma 03/11/2024    Lymphoproliferative disorder (Multi) 09/06/2023    Malignant neoplasm metastatic to left lung (Multi) 04/19/2023    Malignant neoplasm of body of uterus (Multi) 09/27/2023    Mass of pancreas ([facility]-HCC) 09/27/2023    Neuropathy 03/11/2024    Never smoked any substance 10/25/2023    Other conditions influencing health status 09/27/2017    History of cough    Personal history of diseases of the blood and blood-forming organs and certain disorders involving the immune mechanism 06/06/2016    History of leukocytosis    Personal history of other benign neoplasm 05/08/2019    History of other benign neoplasm    Personal history of other diseases of the musculoskeletal system and connective tissue 04/18/2018    History of low back pain    Personal history of other diseases of the nervous system and sense organs 10/03/2016    History of neuropathy    Personal history of other diseases of the respiratory system 08/31/2017    History of acute bronchitis    Personal history of other diseases of the respiratory system 01/24/2022    History of acute sinusitis    Personal history of other specified conditions 09/16/2020    History of weight gain    Postoperative pain 03/11/2024    Right lower quadrant  abdominal tenderness 03/04/2019    RLQ abdominal tenderness    Right upper quadrant pain 03/13/2015    Abdominal pain, RUQ (right upper quadrant)    Secondary hypertension 10/18/2023    Toenail fungus 04/19/2023    Uterine leiomyoma 09/27/2023    Viral URI 09/27/2023    Vitamin D deficiency 04/19/2023    Weight gain 03/11/2024       FAMILY HISTORY  Family History   Problem Relation Name Age of Onset    Other (atrial flutter) Mother      Diabetes Mother      Leukemia Father      Liver cancer Father      Ovarian cancer Sister      Hypothyroidism Brother      Breast cancer Paternal Grandmother      No Known Problems Sibling         TOBACCO HISTORY  Tobacco Use: Low Risk  (5/14/2024)    Patient History     Smoking Tobacco Use: Never     Smokeless Tobacco Use: Never     Passive Exposure: Not on file       SOCIAL HISTORY  Social Connections: Not on file        Outpatient Medication Profile:  Current Outpatient Medications on File Prior to Visit   Medication Sig Dispense Refill    albuterol 108 (90 Base) MCG/ACT inhaler Inhale 1-2 puffs. Every 4-6 hours as needed      benzonatate (Tessalon) 200 mg capsule Take 1 capsule (200 mg) by mouth 3 times a day as needed for cough. Do not crush or chew. 42 capsule 1    ciprofloxacin (Cipro) 500 mg tablet Take 1 tablet (500 mg) by mouth 2 times a day for 7 days. 14 tablet 0    fluticasone (Flonase) 50 mcg/actuation nasal spray Administer 1 spray into each nostril once daily. Shake gently. Before first use, prime pump. After use, clean tip and replace cap. 16 g 0    lisinopril 20 mg tablet Take 1 tablet (20 mg) by mouth once daily. 90 tablet 3    meclizine (Antivert) 12.5 mg tablet Take 1-2 tablets (12.5-25 mg) by mouth 3 times a day as needed (vertigo).      metFORMIN (Glucophage) 500 mg tablet Take 1 tablet (500 mg) by mouth 2 times a day. 180 tablet 3    metoprolol succinate XL (Toprol-XL) 25 mg 24 hr tablet Take 1 tablet (25 mg) by mouth once daily. 90 tablet 3    prochlorperazine  (Compazine) 10 mg tablet TAKE 1 TABLET BY MOUTH EVERY 6 HOURS AS NEEDED FOR NAUSEA AND VOMITING WITH CHEMOTHERAPY 56 tablet 2    fluconazole (Diflucan) 150 mg tablet Take 1 tablet once, if still having symptoms take another a week later (Patient not taking: Reported on 5/14/2024) 2 tablet 0    loratadine (Claritin) 10 mg tablet Take 1 tablet (10 mg) by mouth once daily. (Patient not taking: Reported on 5/14/2024) 30 tablet 0    LORazepam (Ativan) 0.5 mg tablet Take 1 tablet (0.5 mg) by mouth every 8 hours if needed (agitation) for up to 10 days. 30 tablet 0     No current facility-administered medications on file prior to visit.         Performance Status:  Asymptomatic     Vitals and Measurements:   BP (!) 152/97   Pulse (!) 116   Temp 36.6 °C (97.9 °F) (Core)   Resp 20   Wt 123 kg (270 lb 11.6 oz)   SpO2 96%   BMI 39.16 kg/m²       Physical Exam:   Physical Exam  Constitutional:       General: She is awake.      Appearance: Normal appearance. She is well-developed.   HENT:      Head: Normocephalic and atraumatic.   Eyes:      General: Lids are normal. Vision grossly intact.   Neck:      Thyroid: No thyroid mass.   Cardiovascular:      Rate and Rhythm: Normal rate and regular rhythm.      Heart sounds: Normal heart sounds, S1 normal and S2 normal.   Pulmonary:      Effort: Pulmonary effort is normal.      Breath sounds: Normal breath sounds and air entry. No decreased breath sounds.   Abdominal:      General: Abdomen is flat. Bowel sounds are normal.      Palpations: Abdomen is soft.      Tenderness: There is no abdominal tenderness.   Musculoskeletal:      Cervical back: Normal range of motion and neck supple. No edema or rigidity.   Lymphadenopathy:      Upper Body:      Right upper body: No axillary adenopathy.      Lower Body: No right inguinal adenopathy. No left inguinal adenopathy.   Skin:     General: Skin is warm and moist.      Capillary Refill: Capillary refill takes less than 2 seconds.    Neurological:      Mental Status: She is alert and oriented to person, place, and time.      Cranial Nerves: Cranial nerves 2-12 are intact.      Sensory: Sensation is intact.      Motor: Motor function is intact.   Psychiatric:         Attention and Perception: Attention normal.         Mood and Affect: Mood and affect normal.         Speech: Speech normal.         Behavior: Behavior is cooperative.              Lab Results:  I have reviewed these laboratory results:     Lab on 05/14/2024   Component Date Value Ref Range Status    BNP 05/14/2024 19  0 - 99 pg/mL Final    WBC 05/14/2024 14.8 (H)  4.4 - 11.3 x10*3/uL Final    nRBC 05/14/2024 0.0  0.0 - 0.0 /100 WBCs Final    RBC 05/14/2024 4.84  4.00 - 5.20 x10*6/uL Final    Hemoglobin 05/14/2024 13.0  12.0 - 16.0 g/dL Final    Hematocrit 05/14/2024 39.9  36.0 - 46.0 % Final    MCV 05/14/2024 82  80 - 100 fL Final    MCH 05/14/2024 26.9  26.0 - 34.0 pg Final    MCHC 05/14/2024 32.6  32.0 - 36.0 g/dL Final    RDW 05/14/2024 15.4 (H)  11.5 - 14.5 % Final    Platelets 05/14/2024 265  150 - 450 x10*3/uL Final    Neutrophils % 05/14/2024 76.1  40.0 - 80.0 % Final    Immature Granulocytes %, Automated 05/14/2024 0.7  0.0 - 0.9 % Final    Lymphocytes % 05/14/2024 14.8  13.0 - 44.0 % Final    Monocytes % 05/14/2024 5.9  2.0 - 10.0 % Final    Eosinophils % 05/14/2024 2.0  0.0 - 6.0 % Final    Basophils % 05/14/2024 0.5  0.0 - 2.0 % Final    Neutrophils Absolute 05/14/2024 11.29 (H)  1.20 - 7.70 x10*3/uL Final    Immature Granulocytes Absolute, Au* 05/14/2024 0.11  0.00 - 0.70 x10*3/uL Final    Lymphocytes Absolute 05/14/2024 2.20  1.20 - 4.80 x10*3/uL Final    Monocytes Absolute 05/14/2024 0.87  0.10 - 1.00 x10*3/uL Final    Eosinophils Absolute 05/14/2024 0.29  0.00 - 0.70 x10*3/uL Final    Basophils Absolute 05/14/2024 0.07  0.00 - 0.10 x10*3/uL Final    Glucose 05/14/2024 168 (H)  74 - 99 mg/dL Final    Sodium 05/14/2024 136  136 - 145 mmol/L Final    Potassium  05/14/2024 4.7  3.5 - 5.3 mmol/L Final    Chloride 05/14/2024 105  98 - 107 mmol/L Final    Bicarbonate 05/14/2024 20 (L)  21 - 32 mmol/L Final    Anion Gap 05/14/2024 16  10 - 20 mmol/L Final    Urea Nitrogen 05/14/2024 18  6 - 23 mg/dL Final    Creatinine 05/14/2024 0.72  0.50 - 1.05 mg/dL Final    eGFR 05/14/2024 >90  >60 mL/min/1.73m*2 Final    Calcium 05/14/2024 11.9 (H)  8.6 - 10.3 mg/dL Final    Albumin 05/14/2024 4.5  3.4 - 5.0 g/dL Final    Alkaline Phosphatase 05/14/2024 179 (H)  33 - 110 U/L Final    Total Protein 05/14/2024 7.7  6.4 - 8.2 g/dL Final    AST 05/14/2024 30  9 - 39 U/L Final    Bilirubin, Total 05/14/2024 1.2  0.0 - 1.2 mg/dL Final    ALT 05/14/2024 42  7 - 45 U/L Final    LDH 05/14/2024 115  84 - 246 U/L Final    GGT 05/14/2024 181 (H)  5 - 55 U/L Final    Hepatitis B Core AB; IgM 05/14/2024 Nonreactive  Nonreactive Final    Hepatitis B Surface AG 05/14/2024 Nonreactive  Nonreactive Final    Hepatitis B Surface AB 05/14/2024 <3.1  <10.0 mIU/mL Final    Hepatitis C AB 05/14/2024 Nonreactive  Nonreactive Final    HIV 1/2 Antigen/Antibody Screen wi* 05/14/2024 Nonreactive  Nonreactive Final    IgG 05/14/2024 701  700 - 1,600 mg/dL Final    Creatine Kinase 05/14/2024 86  0 - 215 U/L Final    Tobacco Screen, Urine 05/14/2024 Negative  Negative Final    Urine Culture 05/14/2024 No significant growth   Final    Color, Urine 05/14/2024 Light-Yellow  Light-Yellow, Yellow, Dark-Yellow Final    Appearance, Urine 05/14/2024 Clear  Clear Final    Specific Gravity, Urine 05/14/2024 1.013  1.005 - 1.035 Final    pH, Urine 05/14/2024 5.0  5.0, 5.5, 6.0, 6.5, 7.0, 7.5, 8.0 Final    Protein, Urine 05/14/2024 NEGATIVE  NEGATIVE, 10 (TRACE), 20 (TRACE) mg/dL Final    Glucose, Urine 05/14/2024 Normal  Normal mg/dL Final    Blood, Urine 05/14/2024 NEGATIVE  NEGATIVE Final    Ketones, Urine 05/14/2024 NEGATIVE  NEGATIVE mg/dL Final    Bilirubin, Urine 05/14/2024 NEGATIVE  NEGATIVE Final    Urobilinogen, Urine  05/14/2024 Normal  Normal mg/dL Final    Nitrite, Urine 05/14/2024 NEGATIVE  NEGATIVE Final    Leukocyte Esterase, Urine 05/14/2024 NEGATIVE  NEGATIVE Final   Hospital Outpatient Visit on 05/08/2024   Component Date Value Ref Range Status    MV E/A ratio 05/08/2024 0.61   Final    Tricuspid annular plane systolic e* 05/08/2024 2.6  cm Final    RV free wall pk S' 05/08/2024 15.00  cm/s Final    LVIDd 05/08/2024 3.73  cm Final     Radiology Result:  I have reviewed the latest Imaging in PACS and the findings are noted in this note. I discussed the results of the latest imaging with the patient. All previous imaging were reviewed at the time it was completed. Full records are available in the EMR for review as well.     MR abdomen w and wo IV contrast    Result Date: 5/13/2024  Impression: Compared to 01/31/2024 MRI, decreased size of segment 6 and 7 lesions including dominant segment 7 lesion measuring 41 mm, previously 45 mm. Increased size of segment 8 lesions and stable size segment 2 lesion. No definite new hepatic lesions.   Increased size 21 mm T11 posterior epidural soft tissue mass, concerning for metastasis. Associated moderate to severe canal stenosis without definite evidence of myopathy.   MACRO None   Signed by: Elle Vincent 5/13/2024 11:58 AM Dictation workstation:   [id number]    MR pelvis w and wo IV contrast    Result Date: 5/13/2024  Impression: Compared to 01/31/2024 MRI, decreased size of segment 6 and 7 lesions including dominant segment 7 lesion measuring 41 mm, previously 45 mm. Increased size of segment 8 lesions and stable size segment 2 lesion. No definite new hepatic lesions.   Increased size 21 mm T11 posterior epidural soft tissue mass, concerning for metastasis. Associated moderate to severe canal stenosis without definite evidence of myopathy.   MACRO None   Signed by: Elle Vincent 5/13/2024 11:58 AM Dictation workstation:   [id number]    CT chest wo IV contrast    Result Date:  5/12/2024  Impression: 1.  Mild interval increase in size of scattered subcentimeter pulmonary nodules within the bilateral lungs as described above and compared with most recent prior exam dated 01/31/2024. Findings are again suggestive of worsening metastatic disease, although these again appear to be too small to be assessed on PET-CT at this time. 2. New 0.5 cm lytic lesion within the left posterior 8th rib concerning for a new focus of metastatic disease. 3. Hepatic steatosis. Correlate with serum LFTs. 4. Moderate to severe coronary artery calcifications. 5. Remaining chronic and incidental findings are unchanged as described above.   I personally reviewed the images/study and I agree with the resident findings as stated. This study was interpreted at [place], [place].   MACRO: None   Signed by: Issa Panchal 5/12/2024 8:13 PM Dictation workstation:   [id number]      Pathology Results:  I have reviewed the full pathology report recorded in the EMR. The pertinent portions indicating diagnosis are listed here in the note. for details please refer to the full report recorded in the EMR.    Surgical Pathology [Apr 30 2019 4:54PM] ([id number])     Specimens: RETROPERITONEAL MASS ANTERIOR TO VENA CAVA /Received fresh for intraoperative consultation, labeled with the patient's      Name MAGAN SCHULTZ      Accession #: [id number]   Pathologist: CIELO AGUIRRE M.D.   Date of Procedure: 4/22/2019   Date Received: 4/22/2019   Date Reported 4/30/2019   Submitting Physician: KANA MUKHERJEE M.D.   Location: OR Other External #         FINAL DIAGNOSIS   A. RETROPERITONEAL MASS, ANTERIOR TO VENA CAVA, EXCISION:   -- LEIOMYOSARCOMA, 8.7 CM, INFILTRATING VESSEL WALL OF INFERIOR VENA CAVA, SEE NOTE   -- DISTANCE TO SOFT TISSUE/ PERIPHERAL RESECTION MARGIN < 0.5 MM, FOCAL INVOLVEMENT CANNOT BY EXCLUDED   -- VASCULAR MARGINS UNINVOLVED BY MALIGNANCY   -- TWO LYMPH  NODES WITH NO EVIDENCE OF METASTASIS (0/2)      Note: The spindle cell neoplasm demonstrates moderate to high-grade nuclear atypia, a high mitotic rate (up to 28 mitoses per 10 HPF), and extensively infiltrates the adventitia and  media of the venous vessel wall. Focal (indeterminate-type) necrosis is noted. Immunohistochemistry demonstrates expression of estrogen receptor (60-70% of tumor cells) and progesterone receptor (>95% of tumor cells) and focal WT-1. Immunohistochemistry  performed on the prior core needle biopsy ([id number]) had demonstrated smooth muscle differentiation (positive: SMA, desmin, h-caldesmon; negative: CD34, HMB-45, myogenin, S100, DOG-1, ).      The gross and/or microscopic findings were reviewed in conjunction with pathology resident, Vika Ruiz M.D.      Electronically Signed Out By CIELO AGUIRRE M.D./CIRO   By the signature on this report, the individual or group listed as making the Final Interpretation/Diagnosis certifies that they have reviewed this case.      Assessment and Plan:   Assessment/Plan      Mr. Angie Tobin is a 58 y.o. female with a diagnosis of metastatic leiomyosarcoma. Please see the evolution of the case listed above in the cancer history.     [facility] results- ANNE MARIE stable, TMB- 0 muts/Mb. RB gene mutated. TP53 and PTEN mutated.     Today,   The most recent imaging shows progressive disease in the lung and a new spot in the liver. This is most concerning for progression. I think that systemic treatment is warranted at this point. We will start with Yondelis. Literature was given today.     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   - relapsing in the lung and liver.   - Plan for Yondelis at 1.5mg/m2 IV every 3 weeks  - Port placement request placed today.   - Potential start date on 6/5/24.    # Spots noted on T11 and ribs.   - Will discuss her images in the tumor board. Potentially refer for palliative RT to the two areas.      # B-cell monoclonal lymphocytosis:   - White count came down. Likely Neulasta effect that diminished after chemotherapy      # Fatty infiltration of liver  - Following up with the PCP.     # High PTH levels and hypercalcemia  - Continue follow up with Endocrinology and nephrology      # Tachycardia   - Following with Cardiology      # Diabetes Mellitus  - Followed by PCP.  - Currently on Metformin     DISCLAIMER:   In preparing for this visit and writing this note, I reviewed all the previous electronic medical records (labs, imaging and medical charts) of the patient available in the physician portal. Significant findings which helped in decision making are recorded  in this chart.     The plan was discussed with the patient. We gave him ample opportunities to ask questions. All questions were answered to his satisfaction and he verbalized understanding.       Nico Walsh MD    Division of Hematology and Oncology  [facility]    Co-Director Sarcoma and Cutaneous Oncology  [facility]    Phone: [phone]  Fax: [phone]

## 2024-05-17 ENCOUNTER — TUMOR BOARD CONFERENCE (OUTPATIENT)
Dept: HEMATOLOGY/ONCOLOGY | Facility: HOSPITAL | Age: 59
End: 2024-05-17
Payer: COMMERCIAL

## 2024-05-17 NOTE — TUMOR BOARD NOTE
Tumor Board Documentation    Angie Tobin was presented by Nico Walsh MD at our Tumor Board on 5/17/2024, which included representatives from Medical oncology, Radiology.    Angie currently presents as Current patient diagnosed with leiomyosarcoma of the retroperitoneum with history of the following treatments: Surgical interventions, Palliative chemotherapy.    Additionally, we reviewed previous medical and familial history, history of present illness, and recent lab results along with all available histopathologic and imaging studies. The tumor board considered available treatment options and made the following recommendations:     - Palliative Chemotherapy    The following procedures/referrals were also placed: No orders of the defined types were placed in this encounter.    - None placed.    Clinical Trial Status: None available     National site-specific guidelines were discussed with respect to the case.    Nico Walsh MD    Division of Hematology and Oncology  [facility]    Co-Director Sarcoma and Cutaneous Oncology  [facility]    Phone: [phone]  Fax: [phone]

## 2024-05-23 ENCOUNTER — HOSPITAL ENCOUNTER (OUTPATIENT)
Dept: RADIOLOGY | Facility: HOSPITAL | Age: 59
Discharge: HOME | End: 2024-05-23
Payer: COMMERCIAL

## 2024-05-23 VITALS
TEMPERATURE: 97.7 F | SYSTOLIC BLOOD PRESSURE: 99 MMHG | DIASTOLIC BLOOD PRESSURE: 64 MMHG | HEART RATE: 95 BPM | RESPIRATION RATE: 15 BRPM | OXYGEN SATURATION: 98 %

## 2024-05-23 DIAGNOSIS — D47.9 B-CELL LYMPHOPROLIFERATIVE DISORDER (MULTI): ICD-10-CM

## 2024-05-23 DIAGNOSIS — R91.8 MULTIPLE LUNG NODULES ON CT: ICD-10-CM

## 2024-05-23 DIAGNOSIS — C49.9 LEIOMYOSARCOMA (MULTI): ICD-10-CM

## 2024-05-23 DIAGNOSIS — C49.9 METASTATIC SARCOMA TO LIVER (MULTI): ICD-10-CM

## 2024-05-23 DIAGNOSIS — C78.7 METASTATIC SARCOMA TO LIVER (MULTI): ICD-10-CM

## 2024-05-23 DIAGNOSIS — F41.1 GENERALIZED ANXIETY DISORDER: ICD-10-CM

## 2024-05-23 PROCEDURE — 2500000004 HC RX 250 GENERAL PHARMACY W/ HCPCS (ALT 636 FOR OP/ED): Performed by: RADIOLOGY

## 2024-05-23 PROCEDURE — 2500000004 HC RX 250 GENERAL PHARMACY W/ HCPCS (ALT 636 FOR OP/ED): Mod: JZ

## 2024-05-23 PROCEDURE — 36561 INSERT TUNNELED CV CATH: CPT | Performed by: RADIOLOGY

## 2024-05-23 PROCEDURE — 36010 PLACE CATHETER IN VEIN: CPT | Performed by: RADIOLOGY

## 2024-05-23 PROCEDURE — 99152 MOD SED SAME PHYS/QHP 5/>YRS: CPT

## 2024-05-23 PROCEDURE — 2780000003 HC OR 278 NO HCPCS

## 2024-05-23 PROCEDURE — 99153 MOD SED SAME PHYS/QHP EA: CPT

## 2024-05-23 PROCEDURE — 76937 US GUIDE VASCULAR ACCESS: CPT | Performed by: RADIOLOGY

## 2024-05-23 PROCEDURE — C1788 PORT, INDWELLING, IMP: HCPCS

## 2024-05-23 PROCEDURE — 7100000010 HC PHASE TWO TIME - EACH INCREMENTAL 1 MINUTE

## 2024-05-23 PROCEDURE — 7100000009 HC PHASE TWO TIME - INITIAL BASE CHARGE

## 2024-05-23 PROCEDURE — 99152 MOD SED SAME PHYS/QHP 5/>YRS: CPT | Performed by: RADIOLOGY

## 2024-05-23 PROCEDURE — 77001 FLUOROGUIDE FOR VEIN DEVICE: CPT | Performed by: RADIOLOGY

## 2024-05-23 PROCEDURE — 99153 MOD SED SAME PHYS/QHP EA: CPT | Performed by: RADIOLOGY

## 2024-05-23 RX ORDER — FENTANYL CITRATE 50 UG/ML
INJECTION, SOLUTION INTRAMUSCULAR; INTRAVENOUS
Status: COMPLETED | OUTPATIENT
Start: 2024-05-23 | End: 2024-05-23

## 2024-05-23 RX ORDER — CEFAZOLIN SODIUM 2 G/100ML
2 INJECTION, SOLUTION INTRAVENOUS ONCE
Status: COMPLETED | OUTPATIENT
Start: 2024-05-23 | End: 2024-05-23

## 2024-05-23 RX ORDER — MIDAZOLAM HYDROCHLORIDE 1 MG/ML
INJECTION INTRAMUSCULAR; INTRAVENOUS
Status: COMPLETED | OUTPATIENT
Start: 2024-05-23 | End: 2024-05-23

## 2024-05-23 RX ADMIN — FENTANYL CITRATE 50 MCG: 50 INJECTION, SOLUTION INTRAMUSCULAR; INTRAVENOUS at 11:58

## 2024-05-23 RX ADMIN — CEFAZOLIN SODIUM 2 G: 2 INJECTION, SOLUTION INTRAVENOUS at 11:00

## 2024-05-23 RX ADMIN — FENTANYL CITRATE 50 MCG: 50 INJECTION, SOLUTION INTRAMUSCULAR; INTRAVENOUS at 11:45

## 2024-05-23 RX ADMIN — MIDAZOLAM HYDROCHLORIDE 1 MG: 1 INJECTION, SOLUTION INTRAMUSCULAR; INTRAVENOUS at 11:45

## 2024-05-23 RX ADMIN — MIDAZOLAM HYDROCHLORIDE 1 MG: 1 INJECTION, SOLUTION INTRAMUSCULAR; INTRAVENOUS at 11:58

## 2024-05-23 ASSESSMENT — PAIN - FUNCTIONAL ASSESSMENT
PAIN_FUNCTIONAL_ASSESSMENT: 0-10

## 2024-05-23 ASSESSMENT — PAIN SCALES - GENERAL
PAINLEVEL_OUTOF10: 0 - NO PAIN

## 2024-05-23 NOTE — Clinical Note
R chest mediport placed. Dressing CDI. Total 2mg versed, 100mcg fentanyl given ivp. VSS t/o procedure.

## 2024-05-23 NOTE — POST-PROCEDURE NOTE
Interventional Radiology Brief Postprocedure Note    Attending: Dr. Dang    Assistant: Dr. Gabriel Yao    Diagnosis: Leiomyosarcoma     Description of procedure: Image-guided placement of right internal jugular Mediport    Anesthesia:  Conscious sedation and local     Complications: None    Estimated Blood Loss: minimal    Medications (Filter: Administrations occurring from 1117 to 1221 on 05/23/24) As of 05/23/24 1221      ceFAZolin in dextrose (iso-os) (Ancef) IVPB 2 g (g) Total dose:  Cannot be calculated*   *Administration dose not documented     Date/Time Rate/Dose/Volume Action       05/23/24  1130  (over 30 min) Stopped               midazolam (Versed) injection (mg) Total dose:  2 mg      Date/Time Rate/Dose/Volume Action       05/23/24  1145 1 mg Given      1158 1 mg Given               fentaNYL PF (Sublimaze) injection (mcg) Total dose:  100 mcg      Date/Time Rate/Dose/Volume Action       05/23/24  1145 50 mcg Given      1158 50 mcg Given                   No specimens collected      See detailed result report with images in PACS.    The patient tolerated the procedure well without incident or complication and is in stable condition.

## 2024-05-23 NOTE — DISCHARGE INSTRUCTIONS
You received moderate sedation:  - Do not drive a car, or operate any machinery or power tools of any kind.  - Do not drink any alcoholic drinks.  - Do not take any over the counter medications that may cause drowsiness.  - Do not make any important decisions or sign any legal documents.  - You need to have a responsible adult accompany you home.  - You may resume your normal diet.  - We strongly suggest that a responsible adult be with you for the rest of the day and also during the night. This is for your protection and safety.   Refer to patient information sheet for further discharge instructions.   For questions related to your procedure:  Please call [phone] between the hours of 7:00am-5:00pm Monday through Friday.  Please call [phone] after 5:00pm and on weekends and holidays.     In the event of an emergency call [phone] or go to your nearest emergency room.

## 2024-05-23 NOTE — PRE-PROCEDURE NOTE
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    Relevant review of systems: NA    Relevant Labs:   Lab Results   Component Value Date    CREATININE 0.72 05/14/2024    EGFR >90 05/14/2024    INR 0.8 (L) 12/12/2023    PROTIME 9.3 (L) 12/12/2023       Planned Sedation/Anesthesia: Moderate    Airway assessment: normal    Directed physical examination:    Physical Exam  Constitutional:       Appearance: Normal appearance.   Cardiovascular:      Rate and Rhythm: Normal rate and regular rhythm.   Pulmonary:      Effort: Pulmonary effort is normal. No respiratory distress.   Neurological:      Mental Status: She is alert. Mental status is at baseline.   Psychiatric:         Mood and Affect: Mood normal.         Behavior: Behavior normal.          Mallampati: II (hard and soft palate, upper portion of tonsils and uvula visible)    ASA Score: ASA 3 - Patient with moderate systemic disease with functional limitations    Benefits, risks and alternatives of procedure and planned sedation have been discussed with the patient and/or their representative. All questions answered and they agree to proceed.

## 2024-05-27 NOTE — PROGRESS NOTES
Radiation Oncology Outpatient Consult    Patient Name:  Angie Tobin  MRN:  [id number]  :  1965    Referring Provider: Nico Walsh MD  Primary Care Provider: JUAN Huertas  Care Team: Patient Care Team:  JUAN Huertas as PCP - General  Roxann PAMELA Sidiropoulos, APRN-CNP as PCP - Employee [employer] PCP  Nico Walsh MD as Consulting Physician (Hematology and Oncology)    Date of Service: 2024     Diagnosis: Metastatic leiomyosarcoma with progressive T11 lesion    Previous treatment:  2023: Y90 radioembolization to liver    History of Present Illness:  Ms. Tobin is a 58 y.o. 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 She underwent surgical resection in 2019, with tumor board discussion that recommended observation without adjuvant radiation. Surveillance imaging in 2021 showed new nodules in the lungs and liver, and completed 6 cycles of doxorubicin and dacarbazine in 2021. She developed further lung nodules in 2022, and subsequently underwent wedge resections of 6 areas in 2022, and then continued under surveillance. She developed further liver progression in 2023, and was restarted on chemotherapy 2023. She continued to progress on multiple systemic agents, and underwent Y90 treatment to the liver in 2023. She had a repeat CT chest, as well as an MRI of the abdomen and pelvis on 5/10/2024, which were personally reviewed. CT chest showed a 1.3 cm lytic lesion in the T2 vertebral body, overall unchanged from prior CT imaging in January. There was a new, small, posterior left 8th rib lytic lesion that measured 0.5 cm in size. Numerous subcentimeter pulmonary nodules were seen, mildly increased in size. MRI abdomen and pelvis showed a 2.6 cm epidural soft tissue mass at the level of T11, with flattening of the thecal sac, and abutment of the  spinal cord. There was mixed response within the previously noted liver lesions.    Clinically, she overall feels very well. She denies any new/worsening back or rib pain.. She denies any focal upper or lower extremity weakness. She denies any numbness or tingling in her extremities. She denies any bowel/bladder incontinence. She just had a port placed and is scheduled to start Trabectedin next week.          The patient was seen for an opinion regarding radiation options for the diagnosis above. I personally reviewed the available outside records and imaging studies as detailed in the HPI. The allergies, medications, past medical history, surgical history, social history, family history, and review of systems were reviewed.    Prior Radiotherapy:  No radiation treatments to show. (Treatments may have been administered in another system.)       Past Medical History:    Past Medical History:   Diagnosis Date    Acute sinusitis 09/27/2023    Benign essential HTN 04/19/2023    Body mass index (BMI) 40.0-44.9, adult (Multi) 09/27/2023    Candidiasis, unspecified 01/20/2022    Antibiotic-induced yeast infection    Conjunctivitis 09/27/2023    Contact with and (suspected) exposure to covid-19 09/15/2022    Disorder of liver 07/11/2023    Disorder of thyroid 10/18/2023    Elevated blood-pressure reading, without diagnosis of hypertension 05/26/2017    Elevated BP without diagnosis of hypertension    Gastroesophageal reflux disease 09/15/2022    Herniated lumbar intervertebral disc 09/13/2022    Comment on above: OTHER INTERVERTEBRAL DISC DISPLACEMENT, LUMBAR REGION    Herpes simplex virus (HSV) infection 04/19/2023    Hypercholesterolemia 09/15/2022    Inappropriate sinus tachycardia, so stated (CMS-HCC) 04/19/2023    Leiomyoma 03/11/2024    Lymphoproliferative disorder (Multi) 09/06/2023    Malignant neoplasm metastatic to left lung (Multi) 04/19/2023    Malignant neoplasm of body of uterus (Multi) 09/27/2023    Mass of  pancreas ([facility]) 09/27/2023    Neuropathy 03/11/2024    Never smoked any substance 10/25/2023    Other conditions influencing health status 09/27/2017    History of cough    Personal history of diseases of the blood and blood-forming organs and certain disorders involving the immune mechanism 06/06/2016    History of leukocytosis    Personal history of other benign neoplasm 05/08/2019    History of other benign neoplasm    Personal history of other diseases of the musculoskeletal system and connective tissue 04/18/2018    History of low back pain    Personal history of other diseases of the nervous system and sense organs 10/03/2016    History of neuropathy    Personal history of other diseases of the respiratory system 08/31/2017    History of acute bronchitis    Personal history of other diseases of the respiratory system 01/24/2022    History of acute sinusitis    Personal history of other specified conditions 09/16/2020    History of weight gain    Postoperative pain 03/11/2024    Right lower quadrant abdominal tenderness 03/04/2019    RLQ abdominal tenderness    Right upper quadrant pain 03/13/2015    Abdominal pain, RUQ (right upper quadrant)    Secondary hypertension 10/18/2023    Toenail fungus 04/19/2023    Uterine leiomyoma 09/27/2023    Viral URI 09/27/2023    Vitamin D deficiency 04/19/2023    Weight gain 03/11/2024        Past Surgical History:    Past Surgical History:   Procedure Laterality Date    BREAST SURGERY  05/30/2013    Breast Surgery Reduction Procedure    CHOLECYSTECTOMY  12/02/2014    Cholecystectomy Laparoscopic    CT GUIDED PERCUTANEOUS BIOPSY BONE  10/24/2016    CT GUIDED PERCUTANEOUS BIOPSY BONE 10/24/2016 [facility]    CT GUIDED PERCUTANEOUS BIOPSY LUNG  8/5/2022    CT GUIDED PERCUTANEOUS BIOPSY LUNG 8/5/2022 [facility]    ESOPHAGOGASTRODUODENOSCOPY  04/16/2013    Diagnostic Esophagogastroduodenoscopy    IR INTERVENTION ARTERIAL EMBOLIZATION  12/13/2023    IR INTERVENTION  ARTERIAL EMBOLIZATION 12/13/2023 [facility] [facility]    IR INTERVENTION ARTERIAL EMBOLIZATION  12/19/2023    IR INTERVENTION ARTERIAL EMBOLIZATION 12/19/2023 [facility] [place] [facility]    OTHER SURGICAL HISTORY  04/19/2013    Anal Fistulotomy (Subcutaneous)    OTHER SURGICAL HISTORY  10/25/2022    Lung wedge resection    OTHER SURGICAL HISTORY  10/25/2022    Lung lobectomy    OTHER SURGICAL HISTORY  05/08/2019    Resection    OTHER SURGICAL HISTORY  05/30/2013    Perineal Transplant Of Anovaginal Fistula    US GUIDED NEEDLE LIVER BIOPSY  7/11/2023    US GUIDED NEEDLE LIVER BIOPSY 7/11/2023 [facility]        Family History:  Cancer-related family history includes Breast cancer in her paternal grandmother; Liver cancer in her father; Ovarian cancer in her sister.    Social History:    Social History     Tobacco Use    Smoking status: Never    Smokeless tobacco: Never   Substance Use Topics    Drug use: Never       Allergies:    Allergies   Allergen Reactions    Hydromorphone Other    Codeine Other     Abdominal pain        Medications:    Current Outpatient Medications:     albuterol 108 (90 Base) MCG/ACT inhaler, Inhale 1-2 puffs. Every 4-6 hours as needed, Disp: , Rfl:     benzonatate (Tessalon) 200 mg capsule, Take 1 capsule (200 mg) by mouth 3 times a day as needed for cough. Do not crush or chew., Disp: 42 capsule, Rfl: 1    fluticasone (Flonase) 50 mcg/actuation nasal spray, Administer 1 spray into each nostril once daily. Shake gently. Before first use, prime pump. After use, clean tip and replace cap., Disp: 16 g, Rfl: 0    lidocaine-prilocaine (Emla) 2.5-2.5 % cream, Apply topically if needed for mild pain (1 - 3). Please apply to Mediport prior to access for chemotherapy, Disp: 30 g, Rfl: 3    lisinopril 20 mg tablet, Take 1 tablet (20 mg) by mouth once daily., Disp: 90 tablet, Rfl: 3    LORazepam (Ativan) 0.5 mg tablet, Take 1 tablet (0.5 mg) by mouth every 8 hours if needed (agitation) for up to 10  days., Disp: 30 tablet, Rfl: 0    meclizine (Antivert) 12.5 mg tablet, Take 1-2 tablets (12.5-25 mg) by mouth 3 times a day as needed (vertigo)., Disp: , Rfl:     metFORMIN (Glucophage) 500 mg tablet, Take 1 tablet (500 mg) by mouth 2 times a day., Disp: 180 tablet, Rfl: 3    metoprolol succinate XL (Toprol-XL) 25 mg 24 hr tablet, Take 1 tablet (25 mg) by mouth once daily., Disp: 90 tablet, Rfl: 3    prochlorperazine (Compazine) 10 mg tablet, TAKE 1 TABLET BY MOUTH EVERY 6 HOURS AS NEEDED FOR NAUSEA AND VOMITING WITH CHEMOTHERAPY, Disp: 56 tablet, Rfl: 2    fluconazole (Diflucan) 150 mg tablet, Take 1 tablet once, if still having symptoms take another a week later (Patient not taking: Reported on 5/14/2024), Disp: 2 tablet, Rfl: 0    loratadine (Claritin) 10 mg tablet, Take 1 tablet (10 mg) by mouth once daily. (Patient not taking: Reported on 5/14/2024), Disp: 30 tablet, Rfl: 0      Review of Systems:  Review of Systems - Oncology    Performance Status:  The Karnofsky performance scale today is 90, Able to carry on normal activity; minor signs or symptoms of disease (ECOG equivalent 0).        OBJECTIVE  BP (!) 176/110 (Patient Position: Sitting)   Pulse (!) 132   Temp 35.8 °C (96.4 °F)   Resp 16   Wt 125 kg (275 lb 12.7 oz)   SpO2 96%   BMI 39.89 kg/m²    Physical Exam  Vitals reviewed.   Constitutional:       General: She is not in acute distress.     Appearance: Normal appearance. She is not ill-appearing.   HENT:      Head: Normocephalic and atraumatic.      Mouth/Throat:      Mouth: Mucous membranes are moist.   Eyes:      Conjunctiva/sclera: Conjunctivae normal.   Cardiovascular:      Rate and Rhythm: Normal rate.   Pulmonary:      Effort: Pulmonary effort is normal.   Abdominal:      General: There is no distension.   Musculoskeletal:         General: No tenderness (No tenderness on palpation of the spinous processes or ribs). Normal range of motion.   Skin:     General: Skin is warm and dry.    Neurological:      General: No focal deficit present.      Mental Status: She is alert and oriented to person, place, and time.      Comments: Cranial nerves II through XII grossly intact. Muscle strength 5/5 in bilateral upper/lower extremities. Sensation intact bilaterally.   Psychiatric:         Mood and Affect: Mood normal.         Behavior: Behavior normal.          Laboratory Review:  There are no laboratory contraindications to radiation therapy.    The pertinent lab results were reviewed and discussed with the patient.  Notably, per HPI      Pathology Review:  The pertinent pathology results were reviewed and discussed with the patient.  Notably, per HPI     Imaging:  The pertinent imaging results were reviewed and discussed with the patient.  Notably, per HPI       ASSESSMENT:   Ms. Tobin is a 58 y.o. woman with metastatic leiomyosarcoma with progressive T11 lesion, here for discussion of the role of radiation treatment in management of her disease.    PLAN:   We had an extensive discussion regarding the role for radiation in this setting, particularly to the area of progressive disease at T11, which has started to encroach on the spinal canal. While encouraging that she does not yet have any symptoms, we reviewed that further progression in this area could lead to permanent/irreversible spinal cord damage reviewed that surgical resection would be a possibility, though after discussion with medical oncology, this would be less preferred as it would result in additional delays in initiating her systemic therapy. We would therefore recommend treatment with SBRT, will likely dose of 24-27 Gy in 3 fractions, though final dose will be determined based on constraints to organs at risk, particular the spinal cord in this case. Additionally, we would recommend a thoracic MRI to better evaluate T11; additionally, this will allow for better evaluation of the area of disease at T2, which does not appear as close  in proximity to the spinal cord, though evaluation is limited by visualization only on recent CT. For treatment of the T11 lesion, we would recommend CT myelogram prior to treatment, which would only be available at this time [place] [facility], and she is in agreement to transfer to that location for her simulation treatment, though is more than welcome to return here for follow-up after completion. While new disease was visible in the left rib, given its small size, and that she has been asymptomatic in the setting of multiple areas of progressive disease in the lungs and liver, we would defer treatment to that area at this time.    We reviewed what a typical course of radiation would entail. The acute side effects of radiation and potential late toxicities were reviewed in detail. These could include, but are not limited to, skin irritation, fatigue, pain flare, nerve/spinal cord damage, or bone fractures.    After the discussion, the patient was given the opportunity to ask questions which were subsequently answered, and our contact information was given.    Please contact our department with any further questions regarding the plan of management.    The length of the visit was 60 minutes. We spent more than 50% of this time discussing treatment options with the patient.    Recommendations:  -Radiation treatment to T11, and possibly T2 based on MRI, to a dose of  24-27 Gy in 3 fractions ; defer treatment to left rib given small size and asymptomatic nature, in the setting of progressive metastatic disease  -Thoracic MRI for disease extent evaluation  [place] [facility] [place] the [place]   [facility]N Guidelines were applicable to guide this patients treatment plan.   Cherrie Kirby DO

## 2024-05-28 ENCOUNTER — HOSPITAL ENCOUNTER (OUTPATIENT)
Dept: RADIATION ONCOLOGY | Facility: CLINIC | Age: 59
Setting detail: RADIATION/ONCOLOGY SERIES
Discharge: HOME | End: 2024-05-28
Payer: COMMERCIAL

## 2024-05-28 ENCOUNTER — APPOINTMENT (OUTPATIENT)
Dept: RADIATION ONCOLOGY | Facility: CLINIC | Age: 59
End: 2024-05-28
Payer: COMMERCIAL

## 2024-05-28 VITALS
BODY MASS INDEX: 39.89 KG/M2 | TEMPERATURE: 96.4 F | HEART RATE: 132 BPM | OXYGEN SATURATION: 96 % | SYSTOLIC BLOOD PRESSURE: 176 MMHG | WEIGHT: 275.8 LBS | DIASTOLIC BLOOD PRESSURE: 110 MMHG | RESPIRATION RATE: 16 BRPM

## 2024-05-28 DIAGNOSIS — C78.7 METASTATIC SARCOMA TO LIVER (MULTI): ICD-10-CM

## 2024-05-28 DIAGNOSIS — C79.51: Primary | ICD-10-CM

## 2024-05-28 DIAGNOSIS — F41.1 GENERALIZED ANXIETY DISORDER: ICD-10-CM

## 2024-05-28 DIAGNOSIS — R91.8 MULTIPLE LUNG NODULES ON CT: ICD-10-CM

## 2024-05-28 DIAGNOSIS — C49.9 METASTATIC SARCOMA TO LIVER (MULTI): ICD-10-CM

## 2024-05-28 DIAGNOSIS — D47.9 B-CELL LYMPHOPROLIFERATIVE DISORDER (MULTI): ICD-10-CM

## 2024-05-28 DIAGNOSIS — C49.9 LEIOMYOSARCOMA (MULTI): ICD-10-CM

## 2024-05-28 PROCEDURE — 99215 OFFICE O/P EST HI 40 MIN: CPT | Performed by: STUDENT IN AN ORGANIZED HEALTH CARE EDUCATION/TRAINING PROGRAM

## 2024-05-28 PROCEDURE — 99205 OFFICE O/P NEW HI 60 MIN: CPT | Performed by: STUDENT IN AN ORGANIZED HEALTH CARE EDUCATION/TRAINING PROGRAM

## 2024-05-28 ASSESSMENT — COLUMBIA-SUICIDE SEVERITY RATING SCALE - C-SSRS: 1. IN THE PAST MONTH, HAVE YOU WISHED YOU WERE DEAD OR WISHED YOU COULD GO TO SLEEP AND NOT WAKE UP?: NO

## 2024-05-28 ASSESSMENT — PATIENT HEALTH QUESTIONNAIRE - PHQ9
SUM OF ALL RESPONSES TO PHQ9 QUESTIONS 1 AND 2: 0
2. FEELING DOWN, DEPRESSED OR HOPELESS: NOT AT ALL
1. LITTLE INTEREST OR PLEASURE IN DOING THINGS: NOT AT ALL

## 2024-05-28 ASSESSMENT — PAIN SCALES - GENERAL: PAINLEVEL: 0-NO PAIN

## 2024-05-28 ASSESSMENT — ENCOUNTER SYMPTOMS
OCCASIONAL FEELINGS OF UNSTEADINESS: 0
DEPRESSION: 0
LOSS OF SENSATION IN FEET: 0

## 2024-05-28 NOTE — PROGRESS NOTES
Radiation Oncology Nursing Note    Prior Radiotherapy:  Yes, describe:    No radiation treatments to show. (Treatments may have been administered in another system.)     Current Systemic Treatment:  Yes, describe: start chemo 6/5     Presence of Pacemaker or ICD:  No    History of Autoimmune or Connective Tissue Disorders:  No    Pain: The patient's current pain level was assessed.  They report currently having a pain of 0 out of 10.  They feel their pain is under control with the use of pain medications.    Review of Systems:  Review of Systems - Oncology

## 2024-05-28 NOTE — PROGRESS NOTES
Patient is in today for a consult visit. Today she denies pain, neuropathy, SOB, cough, chest tightness, and N/V/F. She has a history of cholesterol, HTN, GERD, cancer. She had Y-90 radiation to liver in the past. She will see Nico and have infusion 6/5/24. Prior to appointment ending the patient was given education on radiation therapy and how to contact this office. Verbalized understanding. No further concerns for nursing at this time.

## 2024-05-29 DIAGNOSIS — C79.51: Primary | ICD-10-CM

## 2024-05-30 DIAGNOSIS — C79.51: Primary | ICD-10-CM

## 2024-06-03 DIAGNOSIS — C79.51: Primary | ICD-10-CM

## 2024-06-04 ENCOUNTER — LAB (OUTPATIENT)
Dept: LAB | Facility: LAB | Age: 59
End: 2024-06-04
Payer: COMMERCIAL

## 2024-06-04 DIAGNOSIS — F41.1 GENERALIZED ANXIETY DISORDER: ICD-10-CM

## 2024-06-04 DIAGNOSIS — C49.9 LEIOMYOSARCOMA (MULTI): ICD-10-CM

## 2024-06-04 DIAGNOSIS — C49.9 METASTATIC SARCOMA TO LIVER (MULTI): ICD-10-CM

## 2024-06-04 DIAGNOSIS — R79.1 INR (INTERNATIONAL NORMAL RATIO) ABNORMAL: Primary | ICD-10-CM

## 2024-06-04 DIAGNOSIS — C78.7 METASTATIC SARCOMA TO LIVER (MULTI): ICD-10-CM

## 2024-06-04 DIAGNOSIS — R91.8 MULTIPLE LUNG NODULES ON CT: ICD-10-CM

## 2024-06-04 LAB
ALBUMIN SERPL BCP-MCNC: 4.4 G/DL (ref 3.4–5)
ALP SERPL-CCNC: 186 U/L (ref 33–110)
ALT SERPL W P-5'-P-CCNC: 52 U/L (ref 7–45)
ANION GAP SERPL CALC-SCNC: 16 MMOL/L (ref 10–20)
APPEARANCE UR: CLEAR
AST SERPL W P-5'-P-CCNC: 43 U/L (ref 9–39)
BASOPHILS # BLD AUTO: 0.07 X10*3/UL (ref 0–0.1)
BASOPHILS NFR BLD AUTO: 0.6 %
BILIRUB SERPL-MCNC: 1.1 MG/DL (ref 0–1.2)
BILIRUB UR STRIP.AUTO-MCNC: NEGATIVE MG/DL
BUN SERPL-MCNC: 16 MG/DL (ref 6–23)
CALCIUM SERPL-MCNC: 11.6 MG/DL (ref 8.6–10.6)
CHLORIDE SERPL-SCNC: 103 MMOL/L (ref 98–107)
CK SERPL-CCNC: 41 U/L (ref 0–215)
CO2 SERPL-SCNC: 22 MMOL/L (ref 21–32)
COLOR UR: ABNORMAL
CREAT SERPL-MCNC: 0.67 MG/DL (ref 0.5–1.05)
EGFRCR SERPLBLD CKD-EPI 2021: >90 ML/MIN/1.73M*2
EOSINOPHIL # BLD AUTO: 0.4 X10*3/UL (ref 0–0.7)
EOSINOPHIL NFR BLD AUTO: 3.2 %
ERYTHROCYTE [DISTWIDTH] IN BLOOD BY AUTOMATED COUNT: 15.3 % (ref 11.5–14.5)
GGT SERPL-CCNC: 202 U/L (ref 5–55)
GLUCOSE SERPL-MCNC: 226 MG/DL (ref 74–99)
GLUCOSE UR STRIP.AUTO-MCNC: ABNORMAL MG/DL
HCT VFR BLD AUTO: 39 % (ref 36–46)
HGB BLD-MCNC: 12 G/DL (ref 12–16)
IMM GRANULOCYTES # BLD AUTO: 0.1 X10*3/UL (ref 0–0.7)
IMM GRANULOCYTES NFR BLD AUTO: 0.8 % (ref 0–0.9)
KETONES UR STRIP.AUTO-MCNC: NEGATIVE MG/DL
LDH SERPL L TO P-CCNC: 160 U/L (ref 84–246)
LEUKOCYTE ESTERASE UR QL STRIP.AUTO: NEGATIVE
LYMPHOCYTES # BLD AUTO: 2.31 X10*3/UL (ref 1.2–4.8)
LYMPHOCYTES NFR BLD AUTO: 18.5 %
MCH RBC QN AUTO: 27 PG (ref 26–34)
MCHC RBC AUTO-ENTMCNC: 30.8 G/DL (ref 32–36)
MCV RBC AUTO: 88 FL (ref 80–100)
MONOCYTES # BLD AUTO: 0.82 X10*3/UL (ref 0.1–1)
MONOCYTES NFR BLD AUTO: 6.5 %
NEUTROPHILS # BLD AUTO: 8.82 X10*3/UL (ref 1.2–7.7)
NEUTROPHILS NFR BLD AUTO: 70.4 %
NITRITE UR QL STRIP.AUTO: NEGATIVE
NRBC BLD-RTO: 0 /100 WBCS (ref 0–0)
PH UR STRIP.AUTO: 5.5 [PH]
PLATELET # BLD AUTO: 216 X10*3/UL (ref 150–450)
POTASSIUM SERPL-SCNC: 4.4 MMOL/L (ref 3.5–5.3)
PROT SERPL-MCNC: 7 G/DL (ref 6.4–8.2)
PROT UR STRIP.AUTO-MCNC: NEGATIVE MG/DL
RBC # BLD AUTO: 4.45 X10*6/UL (ref 4–5.2)
RBC # UR STRIP.AUTO: NEGATIVE /UL
SODIUM SERPL-SCNC: 137 MMOL/L (ref 136–145)
SP GR UR STRIP.AUTO: 1.02
UROBILINOGEN UR STRIP.AUTO-MCNC: NORMAL MG/DL
WBC # BLD AUTO: 12.5 X10*3/UL (ref 4.4–11.3)

## 2024-06-04 PROCEDURE — RXMED WILLOW AMBULATORY MEDICATION CHARGE

## 2024-06-04 PROCEDURE — 82550 ASSAY OF CK (CPK): CPT

## 2024-06-04 PROCEDURE — 81003 URINALYSIS AUTO W/O SCOPE: CPT

## 2024-06-04 PROCEDURE — 85025 COMPLETE CBC W/AUTO DIFF WBC: CPT

## 2024-06-04 PROCEDURE — 83615 LACTATE (LD) (LDH) ENZYME: CPT

## 2024-06-04 PROCEDURE — 80053 COMPREHEN METABOLIC PANEL: CPT

## 2024-06-04 PROCEDURE — 82977 ASSAY OF GGT: CPT

## 2024-06-05 ENCOUNTER — OFFICE VISIT (OUTPATIENT)
Dept: HEMATOLOGY/ONCOLOGY | Facility: CLINIC | Age: 59
End: 2024-06-05
Payer: COMMERCIAL

## 2024-06-05 ENCOUNTER — INFUSION (OUTPATIENT)
Dept: HEMATOLOGY/ONCOLOGY | Facility: CLINIC | Age: 59
End: 2024-06-05
Payer: COMMERCIAL

## 2024-06-05 VITALS
RESPIRATION RATE: 18 BRPM | WEIGHT: 274.1 LBS | OXYGEN SATURATION: 97 % | SYSTOLIC BLOOD PRESSURE: 163 MMHG | TEMPERATURE: 98.4 F | HEART RATE: 130 BPM | BODY MASS INDEX: 39.65 KG/M2 | DIASTOLIC BLOOD PRESSURE: 95 MMHG

## 2024-06-05 DIAGNOSIS — C49.9 LEIOMYOSARCOMA (MULTI): ICD-10-CM

## 2024-06-05 DIAGNOSIS — D47.9 B-CELL LYMPHOPROLIFERATIVE DISORDER (MULTI): ICD-10-CM

## 2024-06-05 DIAGNOSIS — C78.7 METASTATIC SARCOMA TO LIVER (MULTI): Primary | ICD-10-CM

## 2024-06-05 DIAGNOSIS — C49.9 METASTATIC SARCOMA TO LIVER (MULTI): Primary | ICD-10-CM

## 2024-06-05 DIAGNOSIS — Z09 CHEMOTHERAPY FOLLOW-UP EXAMINATION: ICD-10-CM

## 2024-06-05 PROCEDURE — 2500000004 HC RX 250 GENERAL PHARMACY W/ HCPCS (ALT 636 FOR OP/ED): Performed by: INTERNAL MEDICINE

## 2024-06-05 PROCEDURE — 3080F DIAST BP >= 90 MM HG: CPT | Performed by: INTERNAL MEDICINE

## 2024-06-05 PROCEDURE — 96374 THER/PROPH/DIAG INJ IV PUSH: CPT | Mod: INF

## 2024-06-05 PROCEDURE — 96416 CHEMO PROLONG INFUSE W/PUMP: CPT

## 2024-06-05 PROCEDURE — 96375 TX/PRO/DX INJ NEW DRUG ADDON: CPT | Mod: INF

## 2024-06-05 PROCEDURE — 3077F SYST BP >= 140 MM HG: CPT | Performed by: INTERNAL MEDICINE

## 2024-06-05 PROCEDURE — 99215 OFFICE O/P EST HI 40 MIN: CPT | Performed by: INTERNAL MEDICINE

## 2024-06-05 PROCEDURE — 1036F TOBACCO NON-USER: CPT | Performed by: INTERNAL MEDICINE

## 2024-06-05 RX ORDER — PALONOSETRON 0.05 MG/ML
0.25 INJECTION, SOLUTION INTRAVENOUS ONCE
Status: COMPLETED | OUTPATIENT
Start: 2024-06-05 | End: 2024-06-05

## 2024-06-05 RX ORDER — DIPHENHYDRAMINE HYDROCHLORIDE 50 MG/ML
50 INJECTION INTRAMUSCULAR; INTRAVENOUS AS NEEDED
Status: DISCONTINUED | OUTPATIENT
Start: 2024-06-05 | End: 2024-06-05 | Stop reason: HOSPADM

## 2024-06-05 RX ORDER — ALBUTEROL SULFATE 0.83 MG/ML
3 SOLUTION RESPIRATORY (INHALATION) AS NEEDED
Status: DISCONTINUED | OUTPATIENT
Start: 2024-06-05 | End: 2024-06-05 | Stop reason: HOSPADM

## 2024-06-05 RX ORDER — PROCHLORPERAZINE EDISYLATE 5 MG/ML
10 INJECTION INTRAMUSCULAR; INTRAVENOUS EVERY 6 HOURS PRN
Status: DISCONTINUED | OUTPATIENT
Start: 2024-06-05 | End: 2024-06-05 | Stop reason: HOSPADM

## 2024-06-05 RX ORDER — FAMOTIDINE 10 MG/ML
20 INJECTION INTRAVENOUS ONCE AS NEEDED
Status: DISCONTINUED | OUTPATIENT
Start: 2024-06-05 | End: 2024-06-05 | Stop reason: HOSPADM

## 2024-06-05 RX ORDER — PROCHLORPERAZINE MALEATE 10 MG
10 TABLET ORAL EVERY 6 HOURS PRN
Status: DISCONTINUED | OUTPATIENT
Start: 2024-06-05 | End: 2024-06-05 | Stop reason: HOSPADM

## 2024-06-05 RX ORDER — EPINEPHRINE 0.3 MG/.3ML
0.3 INJECTION SUBCUTANEOUS EVERY 5 MIN PRN
Status: DISCONTINUED | OUTPATIENT
Start: 2024-06-05 | End: 2024-06-05 | Stop reason: HOSPADM

## 2024-06-05 RX ADMIN — DEXAMETHASONE SODIUM PHOSPHATE 20 MG: 10 INJECTION, SOLUTION INTRAMUSCULAR; INTRAVENOUS at 14:27

## 2024-06-05 RX ADMIN — TRABECTEDIN 3.7 MG: 0.05 INJECTION, POWDER, LYOPHILIZED, FOR SOLUTION INTRAVENOUS at 15:17

## 2024-06-05 RX ADMIN — PALONOSETRON HYDROCHLORIDE 250 MCG: 0.25 INJECTION INTRAVENOUS at 14:12

## 2024-06-05 ASSESSMENT — ENCOUNTER SYMPTOMS
BACK PAIN: 0
DYSURIA: 0
DIARRHEA: 0
FEVER: 0
FREQUENCY: 0
FLANK PAIN: 0
CONFUSION: 0
COUGH: 0
HOT FLASHES: 0
SORE THROAT: 0
DIZZINESS: 0
SCLERAL ICTERUS: 0
FATIGUE: 0
CHEST TIGHTNESS: 0
TROUBLE SWALLOWING: 0
NAUSEA: 0
HEMOPTYSIS: 0
ABDOMINAL PAIN: 0
HEMATURIA: 0
NERVOUS/ANXIOUS: 1
CHILLS: 0
SEIZURES: 0
DIFFICULTY URINATING: 0
DEPRESSION: 0
ADENOPATHY: 0
CONSTIPATION: 0
EYE PROBLEMS: 0
VOMITING: 0
MYALGIAS: 0
LEG SWELLING: 0
APPETITE CHANGE: 0
SHORTNESS OF BREATH: 0
PALPITATIONS: 0
EXTREMITY WEAKNESS: 0

## 2024-06-05 ASSESSMENT — VISUAL ACUITY: OU: 1

## 2024-06-05 ASSESSMENT — PAIN SCALES - GENERAL: PAINLEVEL: 0-NO PAIN

## 2024-06-05 NOTE — SIGNIFICANT EVENT
06/05/24 9015   Prechemo Checklist   Has the patient been in the hospital, ED, or urgent care since last date of service No   Chemo/Immuno Consent Completed and Signed Yes   Protocol/Indications Verified Yes   Confirmed to previous date/time of medication Yes   Compared to previous dose Yes   All medications are dated accurately Yes   Pregnancy Test Negative Not applicable  (patient had hysterectomy)   Parameters Met Yes   BSA/Weight-Height Verified Yes   Dose Calculations Verified (current, total, cumulative) Yes

## 2024-06-05 NOTE — PROGRESS NOTES
.Patient ID: Angie Tobin is a 58 y.o. female.  Referring Physician: Nico Walsh MD  [id number] [place] [address]  Primary Care Provider: JUAN Huertas     Chief Complaint   Patient presents with    Follow-up     Signing consent for starting chemotherapy  Review of labs  Toxicity check    Sarcoma     Follow-up for metastatic leiomyosarcoma        Cancer History:   Treatment Synopsis:   Ms. Tobin is a pleasant woman who presented with RUQ pain in March 2019. Further investigations and imaging showed a large heterogeneous mass in the pancreatic  bed. Initially this was thought to be lymphoma, however biopsy showed soft tissue sarcoma. On April 22, 2019 this mass was removed by Dr. Gonzalez via surgery. 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 She was subsequently  seen by Gyn Onc and underwent hysterectomy and bilateral salpingo-oopherectomy on Danae 10, 2019. This specimen did not show any evidence of tumor. We saw her first in August 2019. Scans did not detect any tumor at that point. Her case was discussed in  the tumor board for post op RT. Since there was no focus to be seen, it was decided that RT would be deferred for the future if a recurrence happens. CT scan in April 2021 detected new solitary nodules in lung and liver. MRI done on April 23, 2021 confirmed  recurrence in the liver. Her case was discussed in the tumor board and a decision was made to pursue systemic chemotherapy. We started her on doxorubicin and dacarbazine with zinecard protection. s/p 2 cycles, CT scans shows positive response in tumor  burden. Completed 6 cycles uneventfully on 08/26/2021.     Of note, the patient also has a history of Monoclonal B-cell lymphocytosis. She was worked up in 2016 for this reason (BM Biopsy report present in the physician portal)     03/31/2022- CT scan showed a lung nodule that is 0.6cm (was inconspicuous on the previous scan).  Port removed on Feb 10, 2022.   07/05/2022- CT scan on 7/1/22 showed that the lung nodule has enlarged to 0.9cm. We petitioned for a biopsy of this lung nodule. Interestingly, the liver lesions have disappeared.   08/16/2022- Lung biopsy completed in early august and path reads leiomyosarcoma. Case presented in tumor board on 8/19/22 and decision made to refer her to CT surgery   10/14/2022- Surgery completed- 6 wedges removed. Multiple small nodules of leiomyosarcoma and microscopic foci reported.   12/06/2022- CT scan is [facility]. Repeat staging in 3 months.  03/03/2023- CT scan showed the liver lesion to be stable.   06/02/2023- CT scan shows worsening liver lesion- MRI liver ordered on 06/16/2023 showing progressing liver mets. We petitioned for Docetaxel/Gemcitabine to start 07/17/2023.  08/21/2023- CT scan and MRI done after 2 cycles show progressive disease in the liver and in the lungs.   08/23/2023- Ordered Pazopanib.   08/30/2023- Started pazopanib at 400mg by mouth daily  09/07/2023- Increased to 800mg by mouth daily  11/13/2023- Imaging shows progressive disease in the liver.   11/15/2023- Stopped pazopanib and referred for Y-90.  12/19/2023- Received Y-90 treatment.     2024 01/23/24- Phone visit. Ordered imaging. Recovered well from y-90.  01/31/2024- CT scan done. Very small lung nodules. Radiology reports mild increase in size. I feel this is measurement differences.  02/02/2024- Excellent response to Y-90. However, there is another mass in the dome of the liver that is increasing in size. I spoke to Dr. Gold and he will perform Y-90 to that mass. However, upon review of MRI- Dr. Gold felt to wait for some time and repeat the scan in 3 months.  05/12/2024- Imaging shows progressive disease in the lung and in the liver.   05/14/2024- Met with the patient and discussed Iness. Her images will be discussed in the tumor board. Port placement was ordered.   05/17/2024- Tumor board discussion was  "done and chemotherapy was recommended  06/05/2024- Met with the patient and signed consent for Yondelis     Treatment History:   Systemic treatment:  1. Doxorubicin + Dacarbazine + Zinecard (Day 1-3) of 21 day of cycle.  C1- 05/10/2021 to 05/12/2021  C2- 06/01/2021 to 06/03/2021  C3- 06/22/2021 to 06/24/2021  C4- 07/13/2021 to 07/15/2021  C5- 08/03/2021 to 08/05/2021   C6- 08/24/2021 to 08/26/2021     2. Docetaxel (75mg/m2) + Gemcitabine (900mg/m2) D1, D8 of a 21 day cycle  C1- 07/17/2023   C2- 08/07/2023- Discontinued after progression noted.      3. Pazopanib   Start date 08/30/2023 at 400mg by mouth daily.   Increase to 09/07/2023 at 800mg by mouth daily.   Stopped on 11/15/23 due to progressive disease.    4. Yondelis 1.5 mg/m2  C1- 06/05/2024      Subjective   Please refer to \"Notes/Cancer History\" above for complete History of present illness.     Ms Angie Tobin is doing well. She is here with her friend. She signed consent to start chemotherapy today.    Review of Systems:   Review of Systems   Constitutional:  Negative for appetite change, chills, fatigue and fever.   HENT:   Negative for hearing loss, lump/mass, mouth sores, sore throat and trouble swallowing.    Eyes:  Negative for eye problems and icterus.   Respiratory:  Negative for chest tightness, cough, hemoptysis and shortness of breath.    Cardiovascular:  Negative for chest pain, leg swelling and palpitations.   Gastrointestinal:  Negative for abdominal pain, constipation, diarrhea, nausea and vomiting.   Endocrine: Negative for hot flashes.   Genitourinary:  Negative for difficulty urinating, dysuria, frequency and hematuria.    Musculoskeletal:  Negative for back pain, flank pain, gait problem and myalgias.   Skin:  Negative for itching and rash.   Neurological:  Negative for dizziness, extremity weakness, gait problem and seizures.   Hematological:  Negative for adenopathy.   Psychiatric/Behavioral:  Negative for confusion and depression. " The patient is nervous/anxious.          MEDICAL HISTORY  Past Medical History:   Diagnosis Date    Acute sinusitis 09/27/2023    Benign essential HTN 04/19/2023    Body mass index (BMI) 40.0-44.9, adult (Multi) 09/27/2023    Candidiasis, unspecified 01/20/2022    Antibiotic-induced yeast infection    Conjunctivitis 09/27/2023    Contact with and (suspected) exposure to covid-19 09/15/2022    Disorder of liver 07/11/2023    Disorder of thyroid 10/18/2023    Elevated blood-pressure reading, without diagnosis of hypertension 05/26/2017    Elevated BP without diagnosis of hypertension    Gastroesophageal reflux disease 09/15/2022    Herniated lumbar intervertebral disc 09/13/2022    Comment on above: OTHER INTERVERTEBRAL DISC DISPLACEMENT, LUMBAR REGION    Herpes simplex virus (HSV) infection 04/19/2023    Hypercholesterolemia 09/15/2022    Inappropriate sinus tachycardia, so stated (CMS-HCC) 04/19/2023    Leiomyoma 03/11/2024    Lymphoproliferative disorder (Multi) 09/06/2023    Malignant neoplasm metastatic to left lung (Multi) 04/19/2023    Malignant neoplasm of body of uterus (Multi) 09/27/2023    Mass of pancreas ([facility]) 09/27/2023    Neuropathy 03/11/2024    Never smoked any substance 10/25/2023    Other conditions influencing health status 09/27/2017    History of cough    Personal history of diseases of the blood and blood-forming organs and certain disorders involving the immune mechanism 06/06/2016    History of leukocytosis    Personal history of other benign neoplasm 05/08/2019    History of other benign neoplasm    Personal history of other diseases of the musculoskeletal system and connective tissue 04/18/2018    History of low back pain    Personal history of other diseases of the nervous system and sense organs 10/03/2016    History of neuropathy    Personal history of other diseases of the respiratory system 08/31/2017    History of acute bronchitis    Personal history of other diseases of the  respiratory system 01/24/2022    History of acute sinusitis    Personal history of other specified conditions 09/16/2020    History of weight gain    Postoperative pain 03/11/2024    Right lower quadrant abdominal tenderness 03/04/2019    RLQ abdominal tenderness    Right upper quadrant pain 03/13/2015    Abdominal pain, RUQ (right upper quadrant)    Secondary hypertension 10/18/2023    Toenail fungus 04/19/2023    Uterine leiomyoma 09/27/2023    Viral URI 09/27/2023    Vitamin D deficiency 04/19/2023    Weight gain 03/11/2024       FAMILY HISTORY  Family History   Problem Relation Name Age of Onset    Other (atrial flutter) Mother      Diabetes Mother      Leukemia Father      Liver cancer Father      Ovarian cancer Sister      Hypothyroidism Brother      Breast cancer Paternal Grandmother      No Known Problems Sibling         TOBACCO HISTORY  Tobacco Use: Low Risk  (6/5/2024)    Patient History     Smoking Tobacco Use: Never     Smokeless Tobacco Use: Never     Passive Exposure: Not on file       SOCIAL HISTORY  Social Connections: Not on file        Outpatient Medication Profile:  Current Outpatient Medications on File Prior to Visit   Medication Sig Dispense Refill    albuterol 108 (90 Base) MCG/ACT inhaler Inhale 1-2 puffs. Every 4-6 hours as needed      fluticasone (Flonase) 50 mcg/actuation nasal spray Administer 1 spray into each nostril once daily. Shake gently. Before first use, prime pump. After use, clean tip and replace cap. 16 g 0    lidocaine-prilocaine (Emla) 2.5-2.5 % cream Apply topically if needed for mild pain (1 - 3). Please apply to [facility] prior to access for chemotherapy 30 g 3    lisinopril 20 mg tablet Take 1 tablet (20 mg) by mouth once daily. 90 tablet 3    meclizine (Antivert) 12.5 mg tablet Take 1-2 tablets (12.5-25 mg) by mouth 3 times a day as needed (vertigo).      metFORMIN (Glucophage) 500 mg tablet Take 1 tablet (500 mg) by mouth 2 times a day. 180 tablet 3    metoprolol  succinate XL (Toprol-XL) 25 mg 24 hr tablet Take 1 tablet (25 mg) by mouth once daily. 90 tablet 3    prochlorperazine (Compazine) 10 mg tablet TAKE 1 TABLET BY MOUTH EVERY 6 HOURS AS NEEDED FOR NAUSEA AND VOMITING WITH CHEMOTHERAPY 56 tablet 2    [DISCONTINUED] fluconazole (Diflucan) 150 mg tablet Take 1 tablet once, if still having symptoms take another a week later 2 tablet 0    LORazepam (Ativan) 0.5 mg tablet Take 1 tablet (0.5 mg) by mouth every 8 hours if needed (agitation) for up to 10 days. 30 tablet 0    [DISCONTINUED] loratadine (Claritin) 10 mg tablet Take 1 tablet (10 mg) by mouth once daily. (Patient not taking: Reported on 6/5/2024) 30 tablet 0     No current facility-administered medications on file prior to visit.         Performance Status:  Asymptomatic     Vitals and Measurements:   BP (!) 163/95 (BP Location: Right arm, Patient Position: Sitting, BP Cuff Size: Adult)   Pulse (!) 130   Temp 36.9 °C (98.4 °F) (Core)   Resp 18   Wt 124 kg (274 lb 1.6 oz)   SpO2 97%   BMI 39.65 kg/m²       Physical Exam:   Physical Exam  Constitutional:       General: She is awake.      Appearance: Normal appearance. She is well-developed.   HENT:      Head: Normocephalic and atraumatic.   Eyes:      General: Lids are normal. Vision grossly intact.   Neck:      Thyroid: No thyroid mass.   Cardiovascular:      Rate and Rhythm: Normal rate and regular rhythm.      Heart sounds: Normal heart sounds, S1 normal and S2 normal.   Pulmonary:      Effort: Pulmonary effort is normal.      Breath sounds: Normal breath sounds and air entry. No decreased breath sounds.   Abdominal:      General: Abdomen is flat. Bowel sounds are normal.      Palpations: Abdomen is soft.      Tenderness: There is no abdominal tenderness.   Musculoskeletal:      Cervical back: Normal range of motion and neck supple. No edema or rigidity.   Lymphadenopathy:      Upper Body:      Right upper body: No axillary adenopathy.      Lower Body: No  right inguinal adenopathy. No left inguinal adenopathy.   Skin:     General: Skin is warm and moist.      Capillary Refill: Capillary refill takes less than 2 seconds.   Neurological:      Mental Status: She is alert and oriented to person, place, and time.      Cranial Nerves: Cranial nerves 2-12 are intact.      Sensory: Sensation is intact.      Motor: Motor function is intact.   Psychiatric:         Attention and Perception: Attention normal.         Mood and Affect: Mood and affect normal.         Speech: Speech normal.         Behavior: Behavior is cooperative.              Lab Results:  I have reviewed these laboratory results:     Lab on 06/04/2024   Component Date Value Ref Range Status    WBC 06/04/2024 12.5 (H)  4.4 - 11.3 x10*3/uL Final    nRBC 06/04/2024 0.0  0.0 - 0.0 /100 WBCs Final    RBC 06/04/2024 4.45  4.00 - 5.20 x10*6/uL Final    Hemoglobin 06/04/2024 12.0  12.0 - 16.0 g/dL Final    Hematocrit 06/04/2024 39.0  36.0 - 46.0 % Final    MCV 06/04/2024 88  80 - 100 fL Final    MCH 06/04/2024 27.0  26.0 - 34.0 pg Final    MCHC 06/04/2024 30.8 (L)  32.0 - 36.0 g/dL Final    RDW 06/04/2024 15.3 (H)  11.5 - 14.5 % Final    Platelets 06/04/2024 216  150 - 450 x10*3/uL Final    Neutrophils % 06/04/2024 70.4  40.0 - 80.0 % Final    Immature Granulocytes %, Automated 06/04/2024 0.8  0.0 - 0.9 % Final    Lymphocytes % 06/04/2024 18.5  13.0 - 44.0 % Final    Monocytes % 06/04/2024 6.5  2.0 - 10.0 % Final    Eosinophils % 06/04/2024 3.2  0.0 - 6.0 % Final    Basophils % 06/04/2024 0.6  0.0 - 2.0 % Final    Neutrophils Absolute 06/04/2024 8.82 (H)  1.20 - 7.70 x10*3/uL Final    Immature Granulocytes Absolute, Au* 06/04/2024 0.10  0.00 - 0.70 x10*3/uL Final    Lymphocytes Absolute 06/04/2024 2.31  1.20 - 4.80 x10*3/uL Final    Monocytes Absolute 06/04/2024 0.82  0.10 - 1.00 x10*3/uL Final    Eosinophils Absolute 06/04/2024 0.40  0.00 - 0.70 x10*3/uL Final    Basophils Absolute 06/04/2024 0.07  0.00 - 0.10  x10*3/uL Final    Glucose 06/04/2024 226 (H)  74 - 99 mg/dL Final    Sodium 06/04/2024 137  136 - 145 mmol/L Final    Potassium 06/04/2024 4.4  3.5 - 5.3 mmol/L Final    Chloride 06/04/2024 103  98 - 107 mmol/L Final    Bicarbonate 06/04/2024 22  21 - 32 mmol/L Final    Anion Gap 06/04/2024 16  10 - 20 mmol/L Final    Urea Nitrogen 06/04/2024 16  6 - 23 mg/dL Final    Creatinine 06/04/2024 0.67  0.50 - 1.05 mg/dL Final    eGFR 06/04/2024 >90  >60 mL/min/1.73m*2 Final    Calcium 06/04/2024 11.6 (H)  8.6 - 10.6 mg/dL Final    Albumin 06/04/2024 4.4  3.4 - 5.0 g/dL Final    Alkaline Phosphatase 06/04/2024 186 (H)  33 - 110 U/L Final    Total Protein 06/04/2024 7.0  6.4 - 8.2 g/dL Final    AST 06/04/2024 43 (H)  9 - 39 U/L Final    Bilirubin, Total 06/04/2024 1.1  0.0 - 1.2 mg/dL Final    ALT 06/04/2024 52 (H)  7 - 45 U/L Final    LDH 06/04/2024 160  84 - 246 U/L Final    GGT 06/04/2024 202 (H)  5 - 55 U/L Final    Creatine Kinase 06/04/2024 41  0 - 215 U/L Final    Color, Urine 06/04/2024 Light-Yellow  Light-Yellow, Yellow, Dark-Yellow Final    Appearance, Urine 06/04/2024 Clear  Clear Final    Specific Gravity, Urine 06/04/2024 1.022  1.005 - 1.035 Final    pH, Urine 06/04/2024 5.5  5.0, 5.5, 6.0, 6.5, 7.0, 7.5, 8.0 Final    Protein, Urine 06/04/2024 NEGATIVE  NEGATIVE, 10 (TRACE), 20 (TRACE) mg/dL Final    Glucose, Urine 06/04/2024 500 (3+) (A)  Normal mg/dL Final    Blood, Urine 06/04/2024 NEGATIVE  NEGATIVE Final    Ketones, Urine 06/04/2024 NEGATIVE  NEGATIVE mg/dL Final    Bilirubin, Urine 06/04/2024 NEGATIVE  NEGATIVE Final    Urobilinogen, Urine 06/04/2024 Normal  Normal mg/dL Final    Nitrite, Urine 06/04/2024 NEGATIVE  NEGATIVE Final    Leukocyte Esterase, Urine 06/04/2024 NEGATIVE  NEGATIVE Final   Lab on 05/14/2024   Component Date Value Ref Range Status    BNP 05/14/2024 19  0 - 99 pg/mL Final    WBC 05/14/2024 14.8 (H)  4.4 - 11.3 x10*3/uL Final    nRBC 05/14/2024 0.0  0.0 - 0.0 /100 WBCs Final    RBC  05/14/2024 4.84  4.00 - 5.20 x10*6/uL Final    Hemoglobin 05/14/2024 13.0  12.0 - 16.0 g/dL Final    Hematocrit 05/14/2024 39.9  36.0 - 46.0 % Final    MCV 05/14/2024 82  80 - 100 fL Final    MCH 05/14/2024 26.9  26.0 - 34.0 pg Final    MCHC 05/14/2024 32.6  32.0 - 36.0 g/dL Final    RDW 05/14/2024 15.4 (H)  11.5 - 14.5 % Final    Platelets 05/14/2024 265  150 - 450 x10*3/uL Final    Neutrophils % 05/14/2024 76.1  40.0 - 80.0 % Final    Immature Granulocytes %, Automated 05/14/2024 0.7  0.0 - 0.9 % Final    Lymphocytes % 05/14/2024 14.8  13.0 - 44.0 % Final    Monocytes % 05/14/2024 5.9  2.0 - 10.0 % Final    Eosinophils % 05/14/2024 2.0  0.0 - 6.0 % Final    Basophils % 05/14/2024 0.5  0.0 - 2.0 % Final    Neutrophils Absolute 05/14/2024 11.29 (H)  1.20 - 7.70 x10*3/uL Final    Immature Granulocytes Absolute, Au* 05/14/2024 0.11  0.00 - 0.70 x10*3/uL Final    Lymphocytes Absolute 05/14/2024 2.20  1.20 - 4.80 x10*3/uL Final    Monocytes Absolute 05/14/2024 0.87  0.10 - 1.00 x10*3/uL Final    Eosinophils Absolute 05/14/2024 0.29  0.00 - 0.70 x10*3/uL Final    Basophils Absolute 05/14/2024 0.07  0.00 - 0.10 x10*3/uL Final    Glucose 05/14/2024 168 (H)  74 - 99 mg/dL Final    Sodium 05/14/2024 136  136 - 145 mmol/L Final    Potassium 05/14/2024 4.7  3.5 - 5.3 mmol/L Final    Chloride 05/14/2024 105  98 - 107 mmol/L Final    Bicarbonate 05/14/2024 20 (L)  21 - 32 mmol/L Final    Anion Gap 05/14/2024 16  10 - 20 mmol/L Final    Urea Nitrogen 05/14/2024 18  6 - 23 mg/dL Final    Creatinine 05/14/2024 0.72  0.50 - 1.05 mg/dL Final    eGFR 05/14/2024 >90  >60 mL/min/1.73m*2 Final    Calcium 05/14/2024 11.9 (H)  8.6 - 10.3 mg/dL Final    Albumin 05/14/2024 4.5  3.4 - 5.0 g/dL Final    Alkaline Phosphatase 05/14/2024 179 (H)  33 - 110 U/L Final    Total Protein 05/14/2024 7.7  6.4 - 8.2 g/dL Final    AST 05/14/2024 30  9 - 39 U/L Final    Bilirubin, Total 05/14/2024 1.2  0.0 - 1.2 mg/dL Final    ALT 05/14/2024 42  7 - 45 U/L  Final    LDH 05/14/2024 115  84 - 246 U/L Final    GGT 05/14/2024 181 (H)  5 - 55 U/L Final    Hepatitis B Core AB; IgM 05/14/2024 Nonreactive  Nonreactive Final    Hepatitis B Surface AG 05/14/2024 Nonreactive  Nonreactive Final    Hepatitis B Surface AB 05/14/2024 <3.1  <10.0 mIU/mL Final    Hepatitis C AB 05/14/2024 Nonreactive  Nonreactive Final    HIV 1/2 Antigen/Antibody Screen wi* 05/14/2024 Nonreactive  Nonreactive Final    IgG 05/14/2024 701  700 - 1,600 mg/dL Final    Creatine Kinase 05/14/2024 86  0 - 215 U/L Final    Tobacco Screen, Urine 05/14/2024 Negative  Negative Final    Urine Culture 05/14/2024 No significant growth   Final    Color, Urine 05/14/2024 Light-Yellow  Light-Yellow, Yellow, Dark-Yellow Final    Appearance, Urine 05/14/2024 Clear  Clear Final    Specific Gravity, Urine 05/14/2024 1.013  1.005 - 1.035 Final    pH, Urine 05/14/2024 5.0  5.0, 5.5, 6.0, 6.5, 7.0, 7.5, 8.0 Final    Protein, Urine 05/14/2024 NEGATIVE  NEGATIVE, 10 (TRACE), 20 (TRACE) mg/dL Final    Glucose, Urine 05/14/2024 Normal  Normal mg/dL Final    Blood, Urine 05/14/2024 NEGATIVE  NEGATIVE Final    Ketones, Urine 05/14/2024 NEGATIVE  NEGATIVE mg/dL Final    Bilirubin, Urine 05/14/2024 NEGATIVE  NEGATIVE Final    Urobilinogen, Urine 05/14/2024 Normal  Normal mg/dL Final    Nitrite, Urine 05/14/2024 NEGATIVE  NEGATIVE Final    Leukocyte Esterase, Urine 05/14/2024 NEGATIVE  NEGATIVE Final   Hospital Outpatient Visit on 05/08/2024   Component Date Value Ref Range Status    MV E/A ratio 05/08/2024 0.61   Final    Tricuspid annular plane systolic e* 05/08/2024 2.6  cm Final    RV free wall pk S' 05/08/2024 15.00  cm/s Final    LVIDd 05/08/2024 3.73  cm Final     Radiology Result:  I have reviewed the latest Imaging in PACS and the findings are noted in this note. I discussed the results of the latest imaging with the patient. All previous imaging were reviewed at the time it was completed. Full records are available in the  EMR for review as well.     MR abdomen w and wo IV contrast    Result Date: 5/13/2024  Impression: Compared to 01/31/2024 MRI, decreased size of segment 6 and 7 lesions including dominant segment 7 lesion measuring 41 mm, previously 45 mm. Increased size of segment 8 lesions and stable size segment 2 lesion. No definite new hepatic lesions.   Increased size 21 mm T11 posterior epidural soft tissue mass, concerning for metastasis. Associated moderate to severe canal stenosis without definite evidence of myopathy.   MACRO None   Signed by: Elle Vincent 5/13/2024 11:58 AM Dictation workstation:   [id number]    MR pelvis w and wo IV contrast    Result Date: 5/13/2024  Impression: Compared to 01/31/2024 MRI, decreased size of segment 6 and 7 lesions including dominant segment 7 lesion measuring 41 mm, previously 45 mm. Increased size of segment 8 lesions and stable size segment 2 lesion. No definite new hepatic lesions.   Increased size 21 mm T11 posterior epidural soft tissue mass, concerning for metastasis. Associated moderate to severe canal stenosis without definite evidence of myopathy.   MACRO None   Signed by: Elle Vincent 5/13/2024 11:58 AM Dictation workstation:   [id number]    CT chest wo IV contrast    Result Date: 5/12/2024  Impression: 1.  Mild interval increase in size of scattered subcentimeter pulmonary nodules within the bilateral lungs as described above and compared with most recent prior exam dated 01/31/2024. Findings are again suggestive of worsening metastatic disease, although these again appear to be too small to be assessed on PET-CT at this time. 2. New 0.5 cm lytic lesion within the left posterior 8th rib concerning for a new focus of metastatic disease. 3. Hepatic steatosis. Correlate with serum LFTs. 4. Moderate to severe coronary artery calcifications. 5. Remaining chronic and incidental findings are unchanged as described above.   I personally reviewed the images/study and I agree  with the resident findings as stated. This study was interpreted at [facility], [place], [place].   MACRO: None   Signed by: Issa Panchal 5/12/2024 8:13 PM Dictation workstation:   [id number]      Pathology Results:  I have reviewed the full pathology report recorded in the EMR. The pertinent portions indicating diagnosis are listed here in the note. for details please refer to the full report recorded in the EMR.    Surgical Pathology [Apr 30 2019 4:54PM] ([id number])     Specimens: RETROPERITONEAL MASS ANTERIOR TO VENA CAVA /Received fresh for intraoperative consultation, labeled with the patient's      Name MAGAN SCHULTZ      Accession #: [id number]   Pathologist: CIELO AGUIRRE M.D.   Date of Procedure: 4/22/2019   Date Received: 4/22/2019   Date Reported 4/30/2019   Submitting Physician: KANA MUKHERJEE M.D.   Location: [facility] Other External #         FINAL DIAGNOSIS   A. RETROPERITONEAL MASS, ANTERIOR TO VENA CAVA, EXCISION:   -- LEIOMYOSARCOMA, 8.7 CM, INFILTRATING VESSEL WALL OF INFERIOR VENA CAVA, SEE NOTE   -- DISTANCE TO SOFT TISSUE/ PERIPHERAL RESECTION MARGIN < 0.5 MM, FOCAL INVOLVEMENT CANNOT BY EXCLUDED   -- VASCULAR MARGINS UNINVOLVED BY MALIGNANCY   -- TWO LYMPH NODES WITH NO EVIDENCE OF METASTASIS (0/2)      Note: The spindle cell neoplasm demonstrates moderate to high-grade nuclear atypia, a high mitotic rate (up to 28 mitoses per 10 HPF), and extensively infiltrates the adventitia and  media of the venous vessel wall. Focal (indeterminate-type) necrosis is noted. Immunohistochemistry demonstrates expression of estrogen receptor (60-70% of tumor cells) and progesterone receptor (>95% of tumor cells) and focal WT-1. Immunohistochemistry  performed on the prior core needle biopsy ([id number]) had demonstrated smooth muscle differentiation (positive: SMA, desmin, h-caldesmon; negative: CD34, HMB-45, myogenin, S100, DOG-1, ).      The gross and/or  microscopic findings were reviewed in conjunction with pathology resident, Vika Ruiz M.D.      Electronically Signed Out By CIELO AGUIRRE M.D./SXA   By the signature on this report, the individual or group listed as making the Final Interpretation/Diagnosis certifies that they have reviewed this case.      Assessment and Plan:   Assessment/Plan      Mr. Angie Tobin is a 58 y.o. female with a diagnosis of metastatic leiomyosarcoma. Please see the evolution of the case listed above in the cancer history.     [facility] results- ANNE MARIE stable, TMB- 0 muts/Mb. RB gene mutated. TP53 and PTEN mutated.     Today,   Ms. Tobin signed the consent today for starting Yondelis. We held a long discussion regarding the side effects of chemotherapy. All questions were answered to patients satisfaction. Further contact numbers were provided to the patient. Consent was obtained and placed in the EMR. Literature regarding the chemotherapy agents given to the patient already.    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   - start Cycle 1 today. Will hold neulasta till palliative RT is complete.  - next cycle on 6/26/24.  - Labs via port draw in [facility] on 6/24/24.    # Spots noted on T11 and ribs.   - met Dr. Lujan and plan to radiate the thoracic lesion is underway.     # B-cell monoclonal lymphocytosis:   - Continue monitoring.     # Fatty infiltration of liver  - Following up with the PCP.     # High PTH levels and hypercalcemia  - Continue follow up with Endocrinology and nephrology      # Tachycardia   - Following with Cardiology      # Diabetes Mellitus  - Followed by PCP.  - Currently on Metformin     DISCLAIMER:   In preparing for this visit and writing this note, I reviewed all the previous electronic medical records (labs, imaging and medical charts) of the patient available in the physician portal. Significant findings which helped in decision making are recorded  in this chart.     The  plan was discussed with the patient. We gave him ample opportunities to ask questions. All questions were answered to his satisfaction and he verbalized understanding.       Nico Walsh MD    Division of Hematology and Oncology  [facility] [employer]    Co-Director Sarcoma and Cutaneous Oncology  [facility]    Phone: [phone]  Fax: [phone]

## 2024-06-06 ENCOUNTER — INFUSION (OUTPATIENT)
Dept: HEMATOLOGY/ONCOLOGY | Facility: CLINIC | Age: 59
End: 2024-06-06
Payer: COMMERCIAL

## 2024-06-06 VITALS
RESPIRATION RATE: 18 BRPM | TEMPERATURE: 97.7 F | BODY MASS INDEX: 40.05 KG/M2 | DIASTOLIC BLOOD PRESSURE: 73 MMHG | SYSTOLIC BLOOD PRESSURE: 121 MMHG | OXYGEN SATURATION: 98 % | WEIGHT: 276.9 LBS | HEART RATE: 116 BPM

## 2024-06-06 DIAGNOSIS — C49.9 LEIOMYOSARCOMA (MULTI): ICD-10-CM

## 2024-06-06 DIAGNOSIS — C78.01 MALIGNANT NEOPLASM METASTATIC TO BOTH LUNGS (MULTI): ICD-10-CM

## 2024-06-06 DIAGNOSIS — C78.02 MALIGNANT NEOPLASM METASTATIC TO BOTH LUNGS (MULTI): ICD-10-CM

## 2024-06-06 PROCEDURE — 96523 IRRIG DRUG DELIVERY DEVICE: CPT

## 2024-06-06 PROCEDURE — 2500000004 HC RX 250 GENERAL PHARMACY W/ HCPCS (ALT 636 FOR OP/ED): Performed by: INTERNAL MEDICINE

## 2024-06-06 RX ORDER — HEPARIN SODIUM,PORCINE/PF 10 UNIT/ML
50 SYRINGE (ML) INTRAVENOUS AS NEEDED
Status: DISCONTINUED | OUTPATIENT
Start: 2024-06-06 | End: 2024-06-06 | Stop reason: HOSPADM

## 2024-06-06 RX ORDER — HEPARIN 100 UNIT/ML
500 SYRINGE INTRAVENOUS AS NEEDED
OUTPATIENT
Start: 2024-06-06

## 2024-06-06 RX ORDER — HEPARIN 100 UNIT/ML
500 SYRINGE INTRAVENOUS AS NEEDED
Status: DISCONTINUED | OUTPATIENT
Start: 2024-06-06 | End: 2024-06-06 | Stop reason: HOSPADM

## 2024-06-06 RX ORDER — HEPARIN SODIUM,PORCINE/PF 10 UNIT/ML
50 SYRINGE (ML) INTRAVENOUS AS NEEDED
OUTPATIENT
Start: 2024-06-06

## 2024-06-06 RX ADMIN — HEPARIN 500 UNITS: 100 SYRINGE at 15:27

## 2024-06-06 ASSESSMENT — PAIN SCALES - GENERAL: PAINLEVEL: 0-NO PAIN

## 2024-06-06 NOTE — PROGRESS NOTES
Pt here for pump disconnect. Pt tolerated infusion well. Sent request to send pump back to [facility]. Reviewed follow up appointment, pt verbalized understanding.    Larissa Salinas RN

## 2024-06-07 DIAGNOSIS — C79.51: Primary | ICD-10-CM

## 2024-06-10 PROCEDURE — RXMED WILLOW AMBULATORY MEDICATION CHARGE

## 2024-06-11 ENCOUNTER — NURSE TRIAGE (OUTPATIENT)
Dept: HEMATOLOGY/ONCOLOGY | Facility: HOSPITAL | Age: 59
End: 2024-06-11
Payer: COMMERCIAL

## 2024-06-11 DIAGNOSIS — T45.1X5A CHEMOTHERAPY INDUCED NAUSEA AND VOMITING: ICD-10-CM

## 2024-06-11 DIAGNOSIS — R11.2 CHEMOTHERAPY INDUCED NAUSEA AND VOMITING: ICD-10-CM

## 2024-06-11 DIAGNOSIS — C49.9 METASTATIC SARCOMA TO LIVER (MULTI): Primary | ICD-10-CM

## 2024-06-11 DIAGNOSIS — C78.7 METASTATIC SARCOMA TO LIVER (MULTI): Primary | ICD-10-CM

## 2024-06-11 RX ORDER — ONDANSETRON HYDROCHLORIDE 8 MG/1
8 TABLET, FILM COATED ORAL EVERY 8 HOURS PRN
Qty: 30 TABLET | Refills: 2 | Status: SHIPPED | OUTPATIENT
Start: 2024-06-11 | End: 2024-07-11

## 2024-06-11 NOTE — TELEPHONE ENCOUNTER
Pt has been having intermittent nausea without vomiting since 6/6/24.  No abdominal pain and she is having regular BM's.  Pt denies s/s dehydration and she has no fevers, chest pain or difficulty breathing.  She is using compazine prn which she doesn't feel is very effective.  Pt asked if zofran can be prescribed as well?  Preferred pharmacy is [facility].      Additional Information   Commented on: What else do you want to tell me about this problem?     No fevers, chest pain or difficulty breathing    Protocols used: Nausea/Vomiting

## 2024-06-11 NOTE — TELEPHONE ENCOUNTER
Pt informed that zofran was sent to her pharmacy.  She will call back if no better or symptoms worsen.

## 2024-06-12 ENCOUNTER — INFUSION (OUTPATIENT)
Dept: HEMATOLOGY/ONCOLOGY | Facility: CLINIC | Age: 59
End: 2024-06-12
Payer: COMMERCIAL

## 2024-06-12 ENCOUNTER — APPOINTMENT (OUTPATIENT)
Dept: RADIOLOGY | Facility: HOSPITAL | Age: 59
End: 2024-06-12
Payer: COMMERCIAL

## 2024-06-12 ENCOUNTER — HOSPITAL ENCOUNTER (OUTPATIENT)
Dept: RADIOLOGY | Facility: HOSPITAL | Age: 59
Discharge: HOME | End: 2024-06-12
Payer: COMMERCIAL

## 2024-06-12 VITALS
OXYGEN SATURATION: 96 % | SYSTOLIC BLOOD PRESSURE: 117 MMHG | RESPIRATION RATE: 20 BRPM | DIASTOLIC BLOOD PRESSURE: 82 MMHG | HEART RATE: 115 BPM | TEMPERATURE: 97.2 F

## 2024-06-12 DIAGNOSIS — C79.51: ICD-10-CM

## 2024-06-12 DIAGNOSIS — C78.7 METASTATIC SARCOMA TO LIVER (MULTI): ICD-10-CM

## 2024-06-12 DIAGNOSIS — C78.02 MALIGNANT NEOPLASM METASTATIC TO BOTH LUNGS (MULTI): ICD-10-CM

## 2024-06-12 DIAGNOSIS — C49.9 LEIOMYOSARCOMA (MULTI): ICD-10-CM

## 2024-06-12 DIAGNOSIS — C49.9 METASTATIC SARCOMA TO LIVER (MULTI): ICD-10-CM

## 2024-06-12 DIAGNOSIS — C78.01 MALIGNANT NEOPLASM METASTATIC TO BOTH LUNGS (MULTI): ICD-10-CM

## 2024-06-12 LAB
ALBUMIN SERPL BCP-MCNC: 4.2 G/DL (ref 3.4–5)
ALP SERPL-CCNC: 251 U/L (ref 33–110)
ALT SERPL W P-5'-P-CCNC: 119 U/L (ref 7–45)
ANION GAP SERPL CALC-SCNC: 18 MMOL/L (ref 10–20)
AST SERPL W P-5'-P-CCNC: 69 U/L (ref 9–39)
BASOPHILS # BLD AUTO: 0.04 X10*3/UL (ref 0–0.1)
BASOPHILS NFR BLD AUTO: 0.4 %
BILIRUB SERPL-MCNC: 1.7 MG/DL (ref 0–1.2)
BUN SERPL-MCNC: 22 MG/DL (ref 6–23)
CALCIUM SERPL-MCNC: 10.9 MG/DL (ref 8.6–10.6)
CHLORIDE SERPL-SCNC: 101 MMOL/L (ref 98–107)
CO2 SERPL-SCNC: 22 MMOL/L (ref 21–32)
CREAT SERPL-MCNC: 0.8 MG/DL (ref 0.5–1.05)
EGFRCR SERPLBLD CKD-EPI 2021: 86 ML/MIN/1.73M*2
EOSINOPHIL # BLD AUTO: 0.4 X10*3/UL (ref 0–0.7)
EOSINOPHIL NFR BLD AUTO: 4 %
ERYTHROCYTE [DISTWIDTH] IN BLOOD BY AUTOMATED COUNT: 14.7 % (ref 11.5–14.5)
GLUCOSE SERPL-MCNC: 304 MG/DL (ref 74–99)
HCT VFR BLD AUTO: 34.9 % (ref 36–46)
HGB BLD-MCNC: 11.3 G/DL (ref 12–16)
IMM GRANULOCYTES # BLD AUTO: 0.05 X10*3/UL (ref 0–0.7)
IMM GRANULOCYTES NFR BLD AUTO: 0.5 % (ref 0–0.9)
INR PPP: 1 (ref 0.9–1.1)
LYMPHOCYTES # BLD AUTO: 1.69 X10*3/UL (ref 1.2–4.8)
LYMPHOCYTES NFR BLD AUTO: 17 %
MCH RBC QN AUTO: 27.3 PG (ref 26–34)
MCHC RBC AUTO-ENTMCNC: 32.4 G/DL (ref 32–36)
MCV RBC AUTO: 84 FL (ref 80–100)
MONOCYTES # BLD AUTO: 0.47 X10*3/UL (ref 0.1–1)
MONOCYTES NFR BLD AUTO: 4.7 %
NEUTROPHILS # BLD AUTO: 7.27 X10*3/UL (ref 1.2–7.7)
NEUTROPHILS NFR BLD AUTO: 73.4 %
NRBC BLD-RTO: ABNORMAL /100{WBCS}
PLATELET # BLD AUTO: 274 X10*3/UL (ref 150–450)
POTASSIUM SERPL-SCNC: 4.7 MMOL/L (ref 3.5–5.3)
PROT SERPL-MCNC: 6.8 G/DL (ref 6.4–8.2)
PROTHROMBIN TIME: 10.9 SECONDS (ref 9.8–12.8)
RBC # BLD AUTO: 4.14 X10*6/UL (ref 4–5.2)
SODIUM SERPL-SCNC: 136 MMOL/L (ref 136–145)
WBC # BLD AUTO: 9.9 X10*3/UL (ref 4.4–11.3)

## 2024-06-12 PROCEDURE — A9575 INJ GADOTERATE MEGLUMI 0.1ML: HCPCS | Performed by: STUDENT IN AN ORGANIZED HEALTH CARE EDUCATION/TRAINING PROGRAM

## 2024-06-12 PROCEDURE — 85025 COMPLETE CBC W/AUTO DIFF WBC: CPT

## 2024-06-12 PROCEDURE — 72157 MRI CHEST SPINE W/O & W/DYE: CPT

## 2024-06-12 PROCEDURE — 2500000004 HC RX 250 GENERAL PHARMACY W/ HCPCS (ALT 636 FOR OP/ED): Performed by: INTERNAL MEDICINE

## 2024-06-12 PROCEDURE — 85610 PROTHROMBIN TIME: CPT

## 2024-06-12 PROCEDURE — 36591 DRAW BLOOD OFF VENOUS DEVICE: CPT

## 2024-06-12 PROCEDURE — 80053 COMPREHEN METABOLIC PANEL: CPT

## 2024-06-12 PROCEDURE — 2550000001 HC RX 255 CONTRASTS: Performed by: STUDENT IN AN ORGANIZED HEALTH CARE EDUCATION/TRAINING PROGRAM

## 2024-06-12 RX ORDER — HEPARIN SODIUM,PORCINE/PF 10 UNIT/ML
50 SYRINGE (ML) INTRAVENOUS AS NEEDED
Status: DISCONTINUED | OUTPATIENT
Start: 2024-06-12 | End: 2024-06-12 | Stop reason: HOSPADM

## 2024-06-12 RX ORDER — GADOTERATE MEGLUMINE 376.9 MG/ML
24 INJECTION INTRAVENOUS
Status: COMPLETED | OUTPATIENT
Start: 2024-06-12 | End: 2024-06-12

## 2024-06-12 RX ORDER — HEPARIN 100 UNIT/ML
500 SYRINGE INTRAVENOUS AS NEEDED
Status: DISCONTINUED | OUTPATIENT
Start: 2024-06-12 | End: 2024-06-12 | Stop reason: HOSPADM

## 2024-06-12 RX ORDER — HEPARIN 100 UNIT/ML
500 SYRINGE INTRAVENOUS AS NEEDED
OUTPATIENT
Start: 2024-06-12

## 2024-06-12 RX ORDER — HEPARIN SODIUM,PORCINE/PF 10 UNIT/ML
50 SYRINGE (ML) INTRAVENOUS AS NEEDED
OUTPATIENT
Start: 2024-06-12

## 2024-06-12 ASSESSMENT — PAIN SCALES - GENERAL: PAINLEVEL: 0-NO PAIN

## 2024-06-13 ENCOUNTER — APPOINTMENT (OUTPATIENT)
Dept: RADIATION ONCOLOGY | Facility: HOSPITAL | Age: 59
End: 2024-06-13
Payer: COMMERCIAL

## 2024-06-13 ENCOUNTER — APPOINTMENT (OUTPATIENT)
Dept: RADIOLOGY | Facility: HOSPITAL | Age: 59
End: 2024-06-13
Payer: COMMERCIAL

## 2024-06-17 ENCOUNTER — HOSPITAL ENCOUNTER (OUTPATIENT)
Dept: RADIOLOGY | Facility: HOSPITAL | Age: 59
Discharge: HOME | End: 2024-06-17
Payer: COMMERCIAL

## 2024-06-17 ENCOUNTER — HOSPITAL ENCOUNTER (OUTPATIENT)
Dept: RADIATION ONCOLOGY | Facility: HOSPITAL | Age: 59
Setting detail: RADIATION/ONCOLOGY SERIES
Discharge: HOME | End: 2024-06-17
Payer: COMMERCIAL

## 2024-06-17 ENCOUNTER — HOSPITAL ENCOUNTER (OUTPATIENT)
Dept: RADIOLOGY | Facility: EXTERNAL LOCATION | Age: 59
Discharge: HOME | End: 2024-06-17

## 2024-06-17 VITALS
SYSTOLIC BLOOD PRESSURE: 108 MMHG | WEIGHT: 268 LBS | RESPIRATION RATE: 18 BRPM | TEMPERATURE: 97.3 F | OXYGEN SATURATION: 94 % | DIASTOLIC BLOOD PRESSURE: 67 MMHG | HEART RATE: 96 BPM | HEIGHT: 70 IN | BODY MASS INDEX: 38.37 KG/M2

## 2024-06-17 DIAGNOSIS — C79.51 METASTASIS TO BONE (MULTI): Primary | ICD-10-CM

## 2024-06-17 DIAGNOSIS — C79.51: ICD-10-CM

## 2024-06-17 DIAGNOSIS — C79.51 METASTASIS TO BONE (MULTI): ICD-10-CM

## 2024-06-17 PROCEDURE — 2500000005 HC RX 250 GENERAL PHARMACY W/O HCPCS: Performed by: STUDENT IN AN ORGANIZED HEALTH CARE EDUCATION/TRAINING PROGRAM

## 2024-06-17 PROCEDURE — 7100000010 HC PHASE TWO TIME - EACH INCREMENTAL 1 MINUTE

## 2024-06-17 PROCEDURE — 77470 SPECIAL RADIATION TREATMENT: CPT | Performed by: STUDENT IN AN ORGANIZED HEALTH CARE EDUCATION/TRAINING PROGRAM

## 2024-06-17 PROCEDURE — 62303 MYELOGRAPHY LUMBAR INJECTION: CPT | Performed by: RADIOLOGY

## 2024-06-17 PROCEDURE — 77290 THER RAD SIMULAJ FIELD CPLX: CPT | Performed by: RADIOLOGY

## 2024-06-17 PROCEDURE — 7100000009 HC PHASE TWO TIME - INITIAL BASE CHARGE

## 2024-06-17 PROCEDURE — 62303 MYELOGRAPHY LUMBAR INJECTION: CPT

## 2024-06-17 PROCEDURE — 2550000001 HC RX 255 CONTRASTS: Performed by: STUDENT IN AN ORGANIZED HEALTH CARE EDUCATION/TRAINING PROGRAM

## 2024-06-17 RX ORDER — ONDANSETRON 4 MG/1
TABLET, FILM COATED ORAL
Status: DISCONTINUED
Start: 2024-06-17 | End: 2024-06-18 | Stop reason: HOSPADM

## 2024-06-17 RX ORDER — LIDOCAINE HYDROCHLORIDE 10 MG/ML
5 INJECTION, SOLUTION EPIDURAL; INFILTRATION; INTRACAUDAL; PERINEURAL ONCE
Status: COMPLETED | OUTPATIENT
Start: 2024-06-17 | End: 2024-06-17

## 2024-06-17 RX ORDER — ACETAMINOPHEN 325 MG/1
650 TABLET ORAL ONCE
Status: DISCONTINUED | OUTPATIENT
Start: 2024-06-17 | End: 2024-06-18 | Stop reason: HOSPADM

## 2024-06-17 RX ORDER — ONDANSETRON 8 MG/1
8 TABLET, ORALLY DISINTEGRATING ORAL ONCE
Status: COMPLETED | OUTPATIENT
Start: 2024-06-17 | End: 2024-06-17

## 2024-06-17 ASSESSMENT — PAIN SCALES - GENERAL
PAINLEVEL_OUTOF10: 0 - NO PAIN
PAINLEVEL_OUTOF10: 2

## 2024-06-17 ASSESSMENT — PAIN - FUNCTIONAL ASSESSMENT
PAIN_FUNCTIONAL_ASSESSMENT: 0-10
PAIN_FUNCTIONAL_ASSESSMENT: 0-10

## 2024-06-17 ASSESSMENT — PAIN DESCRIPTION - DESCRIPTORS: DESCRIPTORS: ACHING

## 2024-06-17 NOTE — DISCHARGE INSTRUCTIONS
Follow instructions on Patient Info sheet #903    - You need to have a responsible adult accompany you home.  - You may resume your normal diet.    For questions related to your procedure:  Please call [phone] between the hours of 7:00am-5:00pm Monday through Friday.  Please call [phone] after 5:00pm and on weekends and holidays.     In the event of an emergency call [phone] or go to your nearest emergency room.

## 2024-06-17 NOTE — POST-PROCEDURE NOTE
Fluoroscopic Guided Lumbar Puncture Postprocedure Note    Attending: Dr. Roxann Be    Resident: Dr. Gaston Lynn    Diagnosis:   1. 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 Patient was prepped and draped in the usual sterile fashion. 5 ml 1% lidocaine injected for local anesthesia. Thecal sac at L2/3 vertebral space accessed under fluoroscopic guidance using 22G spinal needle. Received clear CSF return. Total of 10cc of Omnipaque 300 contrast were then injected into the spine via the 22G spinal needle, under fluoroscopic guidance. Intrathecal injection was confirmed with initial small injection, and then the remainder of the contrast was administered with intermittent fluoroscopic guidance/confirmation.    Stylet was replaced and needle was removed. Band-Aid applied.     Patient was then repositioned in multiple positions in order to ensure contrast had extended throughout the spine. Patient was then taken for CT scan.    The patient tolerated procedure well without any immediate or clinically apparent complications.     Estimated Blood Loss: None.    IMPRESSION:   Successful fluoroscopic guided lumbar puncture with myelogram. See detailed result report with images in PACS.    The patient tolerated the procedure well without incident or complication and is in stable condition.

## 2024-06-18 ENCOUNTER — HOSPITAL ENCOUNTER (OUTPATIENT)
Dept: RADIATION ONCOLOGY | Facility: HOSPITAL | Age: 59
Setting detail: RADIATION/ONCOLOGY SERIES
Discharge: HOME | End: 2024-06-18
Payer: COMMERCIAL

## 2024-06-18 ENCOUNTER — APPOINTMENT (OUTPATIENT)
Dept: HEMATOLOGY/ONCOLOGY | Facility: HOSPITAL | Age: 59
End: 2024-06-18
Payer: COMMERCIAL

## 2024-06-18 ENCOUNTER — PHARMACY VISIT (OUTPATIENT)
Dept: PHARMACY | Facility: CLINIC | Age: 59
End: 2024-06-18
Payer: COMMERCIAL

## 2024-06-18 DIAGNOSIS — C79.51: Primary | ICD-10-CM

## 2024-06-18 PROCEDURE — 99214 OFFICE O/P EST MOD 30 MIN: CPT | Performed by: STUDENT IN AN ORGANIZED HEALTH CARE EDUCATION/TRAINING PROGRAM

## 2024-06-18 ASSESSMENT — ENCOUNTER SYMPTOMS
HEADACHES: 1
RESPIRATORY NEGATIVE: 1
NERVOUS/ANXIOUS: 1
CARDIOVASCULAR NEGATIVE: 1
APPETITE CHANGE: 1
ENDOCRINE NEGATIVE: 1
NAUSEA: 1
MUSCULOSKELETAL NEGATIVE: 1
HEMATOLOGIC/LYMPHATIC NEGATIVE: 1
EYES NEGATIVE: 1

## 2024-06-18 NOTE — PROGRESS NOTES
Radiation Oncology Nursing Note    Pain: The patient's current pain level was assessed.  They report currently having a pain of 0 out of 10.  They feel their pain is under control without the use of pain medications.    Review of Systems:  Review of Systems   Constitutional:  Positive for appetite change.   HENT:  Negative.     Eyes: Negative.    Respiratory: Negative.     Cardiovascular: Negative.    Gastrointestinal:  Positive for nausea.   Endocrine: Negative.    Genitourinary: Negative.     Musculoskeletal: Negative.    Skin: Negative.    Neurological:  Positive for headaches.   Hematological: Negative.    Psychiatric/Behavioral:  The patient is nervous/anxious.

## 2024-06-18 NOTE — PROGRESS NOTES
Staff Physician: Yury Lujan MD, MS  Resident Physician: Grady Yancey MD PGY-3  Referring Physician: No ref. provider found  Date of Service: 6/18/2024  RADIATION ONCOLOGY VIRTUAL FOLLOW-UP NOTE  IDENTIFYING DATA:   Cancer Staging   No matching staging information was found for the patient.    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 Tobin is a 58 y.o. 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 She underwent surgical resection in June 2019, with tumor board discussion that recommended observation without adjuvant radiation. Surveillance imaging in April 2021 showed new nodules in the lungs and liver, and completed 6 cycles of doxorubicin and dacarbazine in August 2021. She developed further lung nodules in July 2022, and subsequently underwent wedge resections of 6 areas in October 2022, and then continued under surveillance. She developed further liver progression in June 2023, and was restarted on chemotherapy July 2023. She continued to progress on multiple systemic agents, and underwent Y90 treatment to the liver in December 2023. She had a repeat CT chest, as well as an MRI of the abdomen and pelvis on 5/10/2024, which were personally reviewed. CT chest showed a 1.3 cm lytic lesion in the T2 vertebral body, overall unchanged from prior CT imaging in January. There was a new, small, posterior left 8th rib lytic lesion that measured 0.5 cm in size. Numerous subcentimeter pulmonary nodules were seen, mildly increased in size. MRI abdomen and pelvis showed a 2.6 cm epidural soft tissue mass at the level of T11, with flattening of the thecal sac, and abutment of the spinal cord. There was mixed  response within the previously noted liver lesions.    INTERVAL HISTORY  Since radiation oncology has last seen Angie Tobin in clinic on 5/28/24, she has felt well. She continues to have no focal upper or lower extremity weakness, new numbness or tingling, vision or hearing changes. She has since initiated on trabectedin on 6/5/24, and is currently doing well. She recent completed an MRI T-spine in anticipation for radiation treatment planning to her T11 metastasis, which reveals posterior epidural extension.     She endorses significant headaches secondary to her recent myelogram completed yesterday, but taking motrin at this time with some relief of pain. A 13-point review of systems was completed, all are negative except as stated above.     PAST MEDICAL, SURGICAL, FAMILY, AND SOCIAL HISTORY:  Past Medical History:   Diagnosis Date    Acute sinusitis 09/27/2023    Benign essential HTN 04/19/2023    Body mass index (BMI) 40.0-44.9, adult (Multi) 09/27/2023    Candidiasis, unspecified 01/20/2022    Antibiotic-induced yeast infection    Conjunctivitis 09/27/2023    Contact with and (suspected) exposure to covid-19 09/15/2022    Disorder of liver 07/11/2023    Disorder of thyroid 10/18/2023    Elevated blood-pressure reading, without diagnosis of hypertension 05/26/2017    Elevated BP without diagnosis of hypertension    Gastroesophageal reflux disease 09/15/2022    Herniated lumbar intervertebral disc 09/13/2022    Comment on above: OTHER INTERVERTEBRAL DISC DISPLACEMENT, LUMBAR REGION    Herpes simplex virus (HSV) infection 04/19/2023    Hypercholesterolemia 09/15/2022    Inappropriate sinus tachycardia, so stated (CMS-HCC) 04/19/2023    Leiomyoma 03/11/2024    Lymphoproliferative disorder (Multi) 09/06/2023    Malignant neoplasm metastatic to left lung (Multi) 04/19/2023    Malignant neoplasm of body of uterus (Multi) 09/27/2023    Mass of pancreas ([facility]) 09/27/2023    Neuropathy 03/11/2024    Never smoked  any substance 10/25/2023    Other conditions influencing health status 09/27/2017    History of cough    Personal history of diseases of the blood and blood-forming organs and certain disorders involving the immune mechanism 06/06/2016    History of leukocytosis    Personal history of other benign neoplasm 05/08/2019    History of other benign neoplasm    Personal history of other diseases of the musculoskeletal system and connective tissue 04/18/2018    History of low back pain    Personal history of other diseases of the nervous system and sense organs 10/03/2016    History of neuropathy    Personal history of other diseases of the respiratory system 08/31/2017    History of acute bronchitis    Personal history of other diseases of the respiratory system 01/24/2022    History of acute sinusitis    Personal history of other specified conditions 09/16/2020    History of weight gain    Postoperative pain 03/11/2024    Right lower quadrant abdominal tenderness 03/04/2019    RLQ abdominal tenderness    Right upper quadrant pain 03/13/2015    Abdominal pain, RUQ (right upper quadrant)    Secondary hypertension 10/18/2023    Toenail fungus 04/19/2023    Uterine leiomyoma 09/27/2023    Viral URI 09/27/2023    Vitamin D deficiency 04/19/2023    Weight gain 03/11/2024     Past Surgical History:   Procedure Laterality Date    BREAST SURGERY  05/30/2013    Breast Surgery Reduction Procedure    CHOLECYSTECTOMY  12/02/2014    Cholecystectomy Laparoscopic    CT GUIDED PERCUTANEOUS BIOPSY BONE  10/24/2016    CT GUIDED PERCUTANEOUS BIOPSY BONE 10/24/2016 [facility]    CT GUIDED PERCUTANEOUS BIOPSY LUNG  8/5/2022    CT GUIDED PERCUTANEOUS BIOPSY LUNG 8/5/2022 [facility]    ESOPHAGOGASTRODUODENOSCOPY  04/16/2013    Diagnostic Esophagogastroduodenoscopy    IR INTERVENTION ARTERIAL EMBOLIZATION  12/13/2023    IR INTERVENTION ARTERIAL EMBOLIZATION 12/13/2023 Oj Gold MD [facility]    IR INTERVENTION ARTERIAL EMBOLIZATION   12/19/2023    IR INTERVENTION ARTERIAL EMBOLIZATION 12/19/2023 Oj Gold MD [facility]IO    OTHER SURGICAL HISTORY  04/19/2013    Anal Fistulotomy (Subcutaneous)    OTHER SURGICAL HISTORY  10/25/2022    Lung wedge resection    OTHER SURGICAL HISTORY  10/25/2022    Lung lobectomy    OTHER SURGICAL HISTORY  05/08/2019    Resection    OTHER SURGICAL HISTORY  05/30/2013    Perineal Transplant Of Anovaginal Fistula    US GUIDED NEEDLE LIVER BIOPSY  7/11/2023    US GUIDED NEEDLE LIVER BIOPSY 7/11/2023 [facility]     ALLERGIES:  Allergies   Allergen Reactions    Hydromorphone Other    Codeine Other     Abdominal pain     MEDICATIONS:    Current Outpatient Medications:     albuterol 108 (90 Base) MCG/ACT inhaler, Inhale 1-2 puffs. Every 4-6 hours as needed, Disp: , Rfl:     fluticasone (Flonase) 50 mcg/actuation nasal spray, Administer 1 spray into each nostril once daily. Shake gently. Before first use, prime pump. After use, clean tip and replace cap., Disp: 16 g, Rfl: 0    lidocaine-prilocaine (Emla) 2.5-2.5 % cream, Apply topically if needed for mild pain (1 - 3). Please apply to [facility] prior to access for chemotherapy, Disp: 30 g, Rfl: 3    lisinopril 20 mg tablet, Take 1 tablet (20 mg) by mouth once daily., Disp: 90 tablet, Rfl: 3    meclizine (Antivert) 12.5 mg tablet, Take 1-2 tablets (12.5-25 mg) by mouth 3 times a day as needed (vertigo)., Disp: , Rfl:     metFORMIN (Glucophage) 500 mg tablet, Take 1 tablet (500 mg) by mouth 2 times a day., Disp: 180 tablet, Rfl: 3    metoprolol succinate XL (Toprol-XL) 25 mg 24 hr tablet, Take 1 tablet (25 mg) by mouth once daily., Disp: 90 tablet, Rfl: 3    ondansetron (Zofran) 8 mg tablet, Take 1 tablet (8 mg) by mouth every 8 hours if needed for nausea., Disp: 30 tablet, Rfl: 2    prochlorperazine (Compazine) 10 mg tablet, TAKE 1 TABLET BY MOUTH EVERY 6 HOURS AS NEEDED FOR NAUSEA AND VOMITING WITH CHEMOTHERAPY, Disp: 56 tablet, Rfl: 2    LORazepam (Ativan) 0.5 mg tablet, Take  1 tablet (0.5 mg) by mouth every 8 hours if needed (agitation) for up to 10 days., Disp: 30 tablet, Rfl: 0  No current facility-administered medications for this encounter.     REVIEW OF SYSTEMS:  Except for the symptoms described in the interval history, the review of systems is negative. Specifically, except as noted, when asked the patient expressed no complaints relative to constitutional (fever, weight loss), eyes, ears, nose, mouth, throat, neurologic, cardiovascular, pulmonary, breast, GI, , skin, musculoskeletal, endocrine, hematologic/lymphatic, or immunologic systems.    PERFORMANCE STATUS:  Karnofsky Performance Score/ECO, Normal activity with effort; some signs or symptoms of disease (ECOG equivalent 1)    PHYSICAL EXAMINATION:  There were no vitals taken for this visit.  Pain score: 4/10  Due to the nature of this virtual visit, a limited physical examination was completed.     DIAGNOSTIC REPORTS REVIEWED:  Imaging: All imaging was personally reviewed and interpreted in clinic. Findings as per interval history and EMR.  Laboratory/Pathology:  All pertinent labs and pathology were personally reviewed and interpreted in clinic. Findings as per interval history and EMR.     IMPRESSION:  58 year old woman with high-grade metastatic leiomyosarcoma who was found to have a T11 metastatic osseous lesion with epidural extension with severe canal narrowing without overt cord compression symptoms. She is here to discuss radiation treatment to her T-spine.     We discussed with the patient regarding her clinical, pathological, and imaging related results. She completed a CT simulation with myelogram yesterday, and currently is underway for radiation treatment planning for SBRT in 3 fractions to the T11 spinal metastasis. We reviewed treatment intent, rationale, short-term and long-term side effects to radiation treatment including serious side-effects including damage to nerves/spinal cord, fracture risk,  and pain flares. The patient expressed understanding to the plan of care.     Plan:  -Continue SBRT planning for T11 osseous metastatic disease     PAIN PLAN: The patient reports their pain is well-controlled on their current regimen.  Their pain regimen is currently managed by Medical Oncology and Primary Care.     Thank you for the opportunity to participate in the ongoing care of this pleasant woman.    The patient was evaluated by and discussed with attending physician, Dr. Lujan, who repeated all key aspects of the HPI and physical exam.    Grady Yancey MD  PGY-3 Radiation Oncology

## 2024-06-19 ENCOUNTER — APPOINTMENT (OUTPATIENT)
Dept: HEMATOLOGY/ONCOLOGY | Facility: CLINIC | Age: 59
End: 2024-06-19
Payer: COMMERCIAL

## 2024-06-21 ENCOUNTER — HOSPITAL ENCOUNTER (OUTPATIENT)
Dept: RADIATION ONCOLOGY | Facility: HOSPITAL | Age: 59
Setting detail: RADIATION/ONCOLOGY SERIES
Discharge: HOME | End: 2024-06-21
Payer: COMMERCIAL

## 2024-06-21 PROCEDURE — 77300 RADIATION THERAPY DOSE PLAN: CPT | Performed by: STUDENT IN AN ORGANIZED HEALTH CARE EDUCATION/TRAINING PROGRAM

## 2024-06-21 PROCEDURE — 77295 3-D RADIOTHERAPY PLAN: CPT | Performed by: STUDENT IN AN ORGANIZED HEALTH CARE EDUCATION/TRAINING PROGRAM

## 2024-06-21 PROCEDURE — 77334 RADIATION TREATMENT AID(S): CPT | Performed by: STUDENT IN AN ORGANIZED HEALTH CARE EDUCATION/TRAINING PROGRAM

## 2024-06-24 ENCOUNTER — TELEPHONE (OUTPATIENT)
Dept: RADIATION ONCOLOGY | Facility: HOSPITAL | Age: 59
End: 2024-06-24
Payer: COMMERCIAL

## 2024-06-25 ENCOUNTER — LAB (OUTPATIENT)
Dept: LAB | Facility: LAB | Age: 59
End: 2024-06-25
Payer: COMMERCIAL

## 2024-06-25 DIAGNOSIS — C78.7 METASTATIC SARCOMA TO LIVER (MULTI): ICD-10-CM

## 2024-06-25 DIAGNOSIS — C49.9 LEIOMYOSARCOMA (MULTI): ICD-10-CM

## 2024-06-25 DIAGNOSIS — F41.1 GENERALIZED ANXIETY DISORDER: ICD-10-CM

## 2024-06-25 DIAGNOSIS — R91.8 MULTIPLE LUNG NODULES ON CT: ICD-10-CM

## 2024-06-25 DIAGNOSIS — C49.9 METASTATIC SARCOMA TO LIVER (MULTI): ICD-10-CM

## 2024-06-25 PROCEDURE — 83615 LACTATE (LD) (LDH) ENZYME: CPT

## 2024-06-25 PROCEDURE — 85025 COMPLETE CBC W/AUTO DIFF WBC: CPT

## 2024-06-25 PROCEDURE — 80053 COMPREHEN METABOLIC PANEL: CPT

## 2024-06-25 PROCEDURE — 81001 URINALYSIS AUTO W/SCOPE: CPT

## 2024-06-25 PROCEDURE — 82977 ASSAY OF GGT: CPT

## 2024-06-25 PROCEDURE — 82550 ASSAY OF CK (CPK): CPT

## 2024-06-26 ENCOUNTER — OFFICE VISIT (OUTPATIENT)
Dept: HEMATOLOGY/ONCOLOGY | Facility: CLINIC | Age: 59
End: 2024-06-26
Payer: COMMERCIAL

## 2024-06-26 ENCOUNTER — INFUSION (OUTPATIENT)
Dept: HEMATOLOGY/ONCOLOGY | Facility: CLINIC | Age: 59
End: 2024-06-26
Payer: COMMERCIAL

## 2024-06-26 VITALS
SYSTOLIC BLOOD PRESSURE: 151 MMHG | TEMPERATURE: 98.8 F | DIASTOLIC BLOOD PRESSURE: 81 MMHG | OXYGEN SATURATION: 97 % | WEIGHT: 275.13 LBS | BODY MASS INDEX: 39.48 KG/M2 | HEART RATE: 108 BPM | RESPIRATION RATE: 18 BRPM

## 2024-06-26 DIAGNOSIS — Z51.11 ENCOUNTER FOR ANTINEOPLASTIC CHEMOTHERAPY: ICD-10-CM

## 2024-06-26 DIAGNOSIS — F41.1 GENERALIZED ANXIETY DISORDER: ICD-10-CM

## 2024-06-26 DIAGNOSIS — C49.9 LEIOMYOSARCOMA (MULTI): ICD-10-CM

## 2024-06-26 DIAGNOSIS — C78.01 MALIGNANT NEOPLASM METASTATIC TO BOTH LUNGS (MULTI): ICD-10-CM

## 2024-06-26 DIAGNOSIS — C49.9 LEIOMYOSARCOMA (MULTI): Primary | ICD-10-CM

## 2024-06-26 DIAGNOSIS — C78.02 MALIGNANT NEOPLASM METASTATIC TO BOTH LUNGS (MULTI): ICD-10-CM

## 2024-06-26 DIAGNOSIS — C78.00 MALIGNANT NEOPLASM METASTATIC TO LUNG, UNSPECIFIED LATERALITY (MULTI): ICD-10-CM

## 2024-06-26 LAB
ALBUMIN SERPL BCP-MCNC: 4.2 G/DL (ref 3.4–5)
ALP SERPL-CCNC: 332 U/L (ref 33–110)
ALT SERPL W P-5'-P-CCNC: 84 U/L (ref 7–45)
ANION GAP SERPL CALC-SCNC: 15 MMOL/L (ref 10–20)
APPEARANCE UR: ABNORMAL
AST SERPL W P-5'-P-CCNC: 68 U/L (ref 9–39)
BACTERIA #/AREA URNS AUTO: ABNORMAL /HPF
BASOPHILS # BLD AUTO: 0.07 X10*3/UL (ref 0–0.1)
BASOPHILS NFR BLD AUTO: 0.9 %
BILIRUB SERPL-MCNC: 0.7 MG/DL (ref 0–1.2)
BILIRUB UR STRIP.AUTO-MCNC: NEGATIVE MG/DL
BUN SERPL-MCNC: 11 MG/DL (ref 6–23)
CALCIUM SERPL-MCNC: 11.5 MG/DL (ref 8.6–10.6)
CHLORIDE SERPL-SCNC: 103 MMOL/L (ref 98–107)
CK SERPL-CCNC: 45 U/L (ref 0–215)
CO2 SERPL-SCNC: 25 MMOL/L (ref 21–32)
COLOR UR: ABNORMAL
CREAT SERPL-MCNC: 0.78 MG/DL (ref 0.5–1.05)
EGFRCR SERPLBLD CKD-EPI 2021: 89 ML/MIN/1.73M*2
EOSINOPHIL # BLD AUTO: 0.08 X10*3/UL (ref 0–0.7)
EOSINOPHIL NFR BLD AUTO: 1 %
ERYTHROCYTE [DISTWIDTH] IN BLOOD BY AUTOMATED COUNT: 15.5 % (ref 11.5–14.5)
GGT SERPL-CCNC: 910 U/L (ref 5–55)
GLUCOSE SERPL-MCNC: 211 MG/DL (ref 74–99)
GLUCOSE UR STRIP.AUTO-MCNC: ABNORMAL MG/DL
HCT VFR BLD AUTO: 34.2 % (ref 36–46)
HGB BLD-MCNC: 10.7 G/DL (ref 12–16)
IMM GRANULOCYTES # BLD AUTO: 0.18 X10*3/UL (ref 0–0.7)
IMM GRANULOCYTES NFR BLD AUTO: 2.3 % (ref 0–0.9)
KETONES UR STRIP.AUTO-MCNC: NEGATIVE MG/DL
LDH SERPL L TO P-CCNC: 133 U/L (ref 84–246)
LEUKOCYTE ESTERASE UR QL STRIP.AUTO: ABNORMAL
LYMPHOCYTES # BLD AUTO: 1.8 X10*3/UL (ref 1.2–4.8)
LYMPHOCYTES NFR BLD AUTO: 23 %
MCH RBC QN AUTO: 27.9 PG (ref 26–34)
MCHC RBC AUTO-ENTMCNC: 31.3 G/DL (ref 32–36)
MCV RBC AUTO: 89 FL (ref 80–100)
MONOCYTES # BLD AUTO: 0.9 X10*3/UL (ref 0.1–1)
MONOCYTES NFR BLD AUTO: 11.5 %
MUCOUS THREADS #/AREA URNS AUTO: ABNORMAL /LPF
NEUTROPHILS # BLD AUTO: 4.78 X10*3/UL (ref 1.2–7.7)
NEUTROPHILS NFR BLD AUTO: 61.3 %
NITRITE UR QL STRIP.AUTO: ABNORMAL
NRBC BLD-RTO: 0 /100 WBCS (ref 0–0)
PH UR STRIP.AUTO: 5.5 [PH]
PLATELET # BLD AUTO: 240 X10*3/UL (ref 150–450)
POTASSIUM SERPL-SCNC: 4.5 MMOL/L (ref 3.5–5.3)
PROT SERPL-MCNC: 6.5 G/DL (ref 6.4–8.2)
PROT UR STRIP.AUTO-MCNC: ABNORMAL MG/DL
RBC # BLD AUTO: 3.84 X10*6/UL (ref 4–5.2)
RBC # UR STRIP.AUTO: NEGATIVE /UL
RBC #/AREA URNS AUTO: ABNORMAL /HPF
SODIUM SERPL-SCNC: 138 MMOL/L (ref 136–145)
SP GR UR STRIP.AUTO: 1.02
SQUAMOUS #/AREA URNS AUTO: ABNORMAL /HPF
UROBILINOGEN UR STRIP.AUTO-MCNC: NORMAL MG/DL
WBC # BLD AUTO: 7.8 X10*3/UL (ref 4.4–11.3)
WBC #/AREA URNS AUTO: ABNORMAL /HPF

## 2024-06-26 PROCEDURE — 3077F SYST BP >= 140 MM HG: CPT | Performed by: NURSE PRACTITIONER

## 2024-06-26 PROCEDURE — 36593 DECLOT VASCULAR DEVICE: CPT

## 2024-06-26 PROCEDURE — 99215 OFFICE O/P EST HI 40 MIN: CPT | Mod: 25 | Performed by: NURSE PRACTITIONER

## 2024-06-26 PROCEDURE — 99215 OFFICE O/P EST HI 40 MIN: CPT | Performed by: NURSE PRACTITIONER

## 2024-06-26 PROCEDURE — 1036F TOBACCO NON-USER: CPT | Performed by: NURSE PRACTITIONER

## 2024-06-26 PROCEDURE — 2500000004 HC RX 250 GENERAL PHARMACY W/ HCPCS (ALT 636 FOR OP/ED): Mod: JZ | Performed by: INTERNAL MEDICINE

## 2024-06-26 PROCEDURE — 3079F DIAST BP 80-89 MM HG: CPT | Performed by: NURSE PRACTITIONER

## 2024-06-26 RX ORDER — HEPARIN 100 UNIT/ML
500 SYRINGE INTRAVENOUS AS NEEDED
Status: CANCELLED | OUTPATIENT
Start: 2024-06-26

## 2024-06-26 RX ORDER — PALONOSETRON 0.05 MG/ML
0.25 INJECTION, SOLUTION INTRAVENOUS ONCE
OUTPATIENT
Start: 2024-07-18

## 2024-06-26 RX ORDER — EPINEPHRINE 0.3 MG/.3ML
0.3 INJECTION SUBCUTANEOUS EVERY 5 MIN PRN
OUTPATIENT
Start: 2024-07-18

## 2024-06-26 RX ORDER — PROCHLORPERAZINE MALEATE 10 MG
10 TABLET ORAL EVERY 6 HOURS PRN
OUTPATIENT
Start: 2024-07-18

## 2024-06-26 RX ORDER — HEPARIN SODIUM,PORCINE/PF 10 UNIT/ML
50 SYRINGE (ML) INTRAVENOUS AS NEEDED
Status: CANCELLED | OUTPATIENT
Start: 2024-06-26

## 2024-06-26 RX ORDER — PROCHLORPERAZINE EDISYLATE 5 MG/ML
10 INJECTION INTRAMUSCULAR; INTRAVENOUS EVERY 6 HOURS PRN
OUTPATIENT
Start: 2024-07-18

## 2024-06-26 RX ORDER — FAMOTIDINE 10 MG/ML
20 INJECTION INTRAVENOUS ONCE AS NEEDED
Status: CANCELLED | OUTPATIENT
Start: 2024-06-26

## 2024-06-26 RX ORDER — ALBUTEROL SULFATE 0.83 MG/ML
3 SOLUTION RESPIRATORY (INHALATION) AS NEEDED
Status: CANCELLED | OUTPATIENT
Start: 2024-06-26

## 2024-06-26 RX ORDER — DIPHENHYDRAMINE HYDROCHLORIDE 50 MG/ML
50 INJECTION INTRAMUSCULAR; INTRAVENOUS AS NEEDED
Status: CANCELLED | OUTPATIENT
Start: 2024-06-26

## 2024-06-26 RX ORDER — HEPARIN 100 UNIT/ML
500 SYRINGE INTRAVENOUS AS NEEDED
Status: DISCONTINUED | OUTPATIENT
Start: 2024-06-26 | End: 2024-06-26 | Stop reason: HOSPADM

## 2024-06-26 RX ORDER — PROCHLORPERAZINE MALEATE 10 MG
10 TABLET ORAL EVERY 6 HOURS PRN
Status: CANCELLED | OUTPATIENT
Start: 2024-06-26

## 2024-06-26 RX ORDER — PALONOSETRON 0.05 MG/ML
0.25 INJECTION, SOLUTION INTRAVENOUS ONCE
Status: CANCELLED | OUTPATIENT
Start: 2024-06-26

## 2024-06-26 RX ORDER — DEXAMETHASONE IN 0.9 % SOD CHL 20 MG/50ML
20 INTRAVENOUS SOLUTION, PIGGYBACK (ML) INTRAVENOUS ONCE
OUTPATIENT
Start: 2024-07-18

## 2024-06-26 RX ORDER — DIPHENHYDRAMINE HYDROCHLORIDE 50 MG/ML
50 INJECTION INTRAMUSCULAR; INTRAVENOUS AS NEEDED
OUTPATIENT
Start: 2024-07-18

## 2024-06-26 RX ORDER — FAMOTIDINE 10 MG/ML
20 INJECTION INTRAVENOUS ONCE AS NEEDED
OUTPATIENT
Start: 2024-07-18

## 2024-06-26 RX ORDER — ALBUTEROL SULFATE 0.83 MG/ML
3 SOLUTION RESPIRATORY (INHALATION) AS NEEDED
OUTPATIENT
Start: 2024-07-18

## 2024-06-26 RX ORDER — DEXAMETHASONE IN 0.9 % SOD CHL 20 MG/50ML
20 INTRAVENOUS SOLUTION, PIGGYBACK (ML) INTRAVENOUS ONCE
Status: CANCELLED | OUTPATIENT
Start: 2024-06-26

## 2024-06-26 RX ORDER — HEPARIN SODIUM,PORCINE/PF 10 UNIT/ML
50 SYRINGE (ML) INTRAVENOUS AS NEEDED
Status: DISCONTINUED | OUTPATIENT
Start: 2024-06-26 | End: 2024-06-26 | Stop reason: HOSPADM

## 2024-06-26 RX ORDER — EPINEPHRINE 0.3 MG/.3ML
0.3 INJECTION SUBCUTANEOUS EVERY 5 MIN PRN
Status: CANCELLED | OUTPATIENT
Start: 2024-06-26

## 2024-06-26 RX ORDER — PROCHLORPERAZINE EDISYLATE 5 MG/ML
10 INJECTION INTRAMUSCULAR; INTRAVENOUS EVERY 6 HOURS PRN
Status: CANCELLED | OUTPATIENT
Start: 2024-06-26

## 2024-06-26 ASSESSMENT — ENCOUNTER SYMPTOMS
ENDOCRINE NEGATIVE: 1
RESPIRATORY NEGATIVE: 1
EYES NEGATIVE: 1
HEADACHES: 1
MUSCULOSKELETAL NEGATIVE: 1
NERVOUS/ANXIOUS: 1
NAUSEA: 1
HEMATOLOGIC/LYMPHATIC NEGATIVE: 1
FATIGUE: 1
CARDIOVASCULAR NEGATIVE: 1

## 2024-06-26 ASSESSMENT — PAIN SCALES - GENERAL: PAINLEVEL: 0-NO PAIN

## 2024-06-26 NOTE — PROGRESS NOTES
.Patient ID: Angie Tobin is a 58 y.o. female.  Referring Physician: Nico Walsh MD  [id number] [place] [address]  Primary Care Provider: LUDA Huertas-CNP     Oncology follow up    HPI  Cancer History:   Treatment Synopsis:   Ms. Tobin is a pleasant woman who presented with RUQ pain in March 2019. Further investigations and imaging showed a large heterogeneous mass in the pancreatic  bed. Initially this was thought to be lymphoma, however biopsy showed soft tissue sarcoma. On April 22, 2019 this mass was removed by Dr. Gonzalez via surgery. 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 She was subsequently  seen by Gyn Onc and underwent hysterectomy and bilateral salpingo-oopherectomy on Danae 10, 2019. This specimen did not show any evidence of tumor. We saw her first in August 2019. Scans did not detect any tumor at that point. Her case was discussed in  the tumor board for post op RT. Since there was no focus to be seen, it was decided that RT would be deferred for the future if a recurrence happens. CT scan in April 2021 detected new solitary nodules in lung and liver. MRI done on April 23, 2021 confirmed  recurrence in the liver. Her case was discussed in the tumor board and a decision was made to pursue systemic chemotherapy. We started her on doxorubicin and dacarbazine with zinecard protection. s/p 2 cycles, CT scans shows positive response in tumor  burden. Completed 6 cycles uneventfully on 08/26/2021.     Of note, the patient also has a history of Monoclonal B-cell lymphocytosis. She was worked up in 2016 for this reason (BM Biopsy report present in the physician portal)     03/31/2022- CT scan showed a lung nodule that is 0.6cm (was inconspicuous on the previous scan). Port removed on Feb 10, 2022.   07/05/2022- CT scan on 7/1/22 showed that the lung nodule has enlarged to 0.9cm. We petitioned for a biopsy of this lung nodule.  Interestingly, the liver lesions have disappeared.   08/16/2022- Lung biopsy completed in early august and path reads leiomyosarcoma. Case presented in tumor board on 8/19/22 and decision made to refer her to CT surgery   10/14/2022- Surgery completed- 6 wedges removed. Multiple small nodules of leiomyosarcoma and microscopic foci reported.   12/06/2022- CT scan is [facility]. Repeat staging in 3 months.  03/03/2023- CT scan showed the liver lesion to be stable.   06/02/2023- CT scan shows worsening liver lesion- MRI liver ordered on 06/16/2023 showing progressing liver mets. We petitioned for Docetaxel/Gemcitabine to start 07/17/2023.  08/21/2023- CT scan and MRI done after 2 cycles show progressive disease in the liver and in the lungs.   08/23/2023- Ordered Pazopanib.   08/30/2023- Started pazopanib at 400mg by mouth daily  09/07/2023- Increased to 800mg by mouth daily  11/13/2023- Imaging shows progressive disease in the liver.   11/15/2023- Stopped pazopanib and referred for Y-90.  12/19/2023- Received Y-90 treatment.      2024 01/23/24- Phone visit. Ordered imaging. Recovered well from y-90.  01/31/2024- CT scan done. Very small lung nodules. Radiology reports mild increase in size. I feel this is measurement differences.  02/02/2024- Excellent response to Y-90. However, there is another mass in the dome of the liver that is increasing in size. I spoke to Dr. Gold and he will perform Y-90 to that mass. However, upon review of MRI- Dr. Gold felt to wait for some time and repeat the scan in 3 months.  05/12/2024- Imaging shows progressive disease in the lung and in the liver.   05/14/2024- Met with the patient and discussed Balwinder. Her images will be discussed in the tumor board. Port placement was ordered.   05/17/2024- Tumor board discussion was done and chemotherapy was recommended  06/05/2024- Met with the patient and signed consent for Yondelis     Treatment History:   Systemic treatment:  1.  "Doxorubicin + Dacarbazine + Zinecard (Day 1-3) of 21 day of cycle.  C1- 05/10/2021 to 05/12/2021  C2- 06/01/2021 to 06/03/2021  C3- 06/22/2021 to 06/24/2021  C4- 07/13/2021 to 07/15/2021  C5- 08/03/2021 to 08/05/2021   C6- 08/24/2021 to 08/26/2021     2. Docetaxel (75mg/m2) + Gemcitabine (900mg/m2) D1, D8 of a 21 day cycle  C1- 07/17/2023   C2- 08/07/2023- Discontinued after progression noted.      3. Pazopanib   Start date 08/30/2023 at 400mg by mouth daily.   Increase to 09/07/2023 at 800mg by mouth daily.   Stopped on 11/15/23 due to progressive disease.     4. Yondelis 1.5 mg/m2  C1- 06/05/2024  C2- 06/26/2024    Subjective   Please refer to \"Notes/Cancer History\" above for complete History of present illness.     Ms Angie Tobin     - Presents to clinic alone.  - Overall, tolerated first cycle of Yondelis well.   - Nausea, but controlled with home antiemetics.   - Headache after lumbar puncture and she was taking Tylenol and Motrin frequently.   - Continues to work full time from home.      Review of Systems:   Review of Systems   Constitutional:  Positive for fatigue.   HENT:  Negative.     Eyes: Negative.    Respiratory: Negative.     Cardiovascular: Negative.    Gastrointestinal:  Positive for nausea.   Endocrine: Negative.    Genitourinary: Negative.     Musculoskeletal: Negative.    Skin: Negative.    Neurological:  Positive for headaches.        After lumbar puncture, resolved   Hematological: Negative.    Psychiatric/Behavioral:  The patient is nervous/anxious.          MEDICAL HISTORY  Past Medical History:   Diagnosis Date    Acute sinusitis 09/27/2023    Benign essential HTN 04/19/2023    Body mass index (BMI) 40.0-44.9, adult (Multi) 09/27/2023    Candidiasis, unspecified 01/20/2022    Antibiotic-induced yeast infection    Conjunctivitis 09/27/2023    Contact with and (suspected) exposure to covid-19 09/15/2022    Disorder of liver 07/11/2023    Disorder of thyroid 10/18/2023    Elevated " blood-pressure reading, without diagnosis of hypertension 05/26/2017    Elevated BP without diagnosis of hypertension    Gastroesophageal reflux disease 09/15/2022    Herniated lumbar intervertebral disc 09/13/2022    Comment on above: OTHER INTERVERTEBRAL DISC DISPLACEMENT, LUMBAR REGION    Herpes simplex virus (HSV) infection 04/19/2023    Hypercholesterolemia 09/15/2022    Inappropriate sinus tachycardia, so stated (CMS-HCC) 04/19/2023    Leiomyoma 03/11/2024    Lymphoproliferative disorder (Multi) 09/06/2023    Malignant neoplasm metastatic to left lung (Multi) 04/19/2023    Malignant neoplasm of body of uterus (Multi) 09/27/2023    Mass of pancreas ([facility]-HCC) 09/27/2023    Neuropathy 03/11/2024    Never smoked any substance 10/25/2023    Other conditions influencing health status 09/27/2017    History of cough    Personal history of diseases of the blood and blood-forming organs and certain disorders involving the immune mechanism 06/06/2016    History of leukocytosis    Personal history of other benign neoplasm 05/08/2019    History of other benign neoplasm    Personal history of other diseases of the musculoskeletal system and connective tissue 04/18/2018    History of low back pain    Personal history of other diseases of the nervous system and sense organs 10/03/2016    History of neuropathy    Personal history of other diseases of the respiratory system 08/31/2017    History of acute bronchitis    Personal history of other diseases of the respiratory system 01/24/2022    History of acute sinusitis    Personal history of other specified conditions 09/16/2020    History of weight gain    Postoperative pain 03/11/2024    Right lower quadrant abdominal tenderness 03/04/2019    RLQ abdominal tenderness    Right upper quadrant pain 03/13/2015    Abdominal pain, RUQ (right upper quadrant)    Secondary hypertension 10/18/2023    Toenail fungus 04/19/2023    Uterine leiomyoma 09/27/2023    Viral URI 09/27/2023     Vitamin D deficiency 04/19/2023    Weight gain 03/11/2024       FAMILY HISTORY  Family History   Problem Relation Name Age of Onset    Other (atrial flutter) Mother      Diabetes Mother      Leukemia Father      Liver cancer Father      Ovarian cancer Sister      Hypothyroidism Brother      Breast cancer Paternal Grandmother      No Known Problems Sibling         TOBACCO HISTORY  Tobacco Use: Low Risk  (6/18/2024)    Patient History     Smoking Tobacco Use: Never     Smokeless Tobacco Use: Never     Passive Exposure: Not on file       SOCIAL HISTORY  Social Connections: Not on file   Single, no children. Works full time.      Outpatient Medication Profile:  Current Outpatient Medications on File Prior to Visit   Medication Sig Dispense Refill    albuterol 108 (90 Base) MCG/ACT inhaler Inhale 1-2 puffs. Every 4-6 hours as needed      fluticasone (Flonase) 50 mcg/actuation nasal spray Administer 1 spray into each nostril once daily. Shake gently. Before first use, prime pump. After use, clean tip and replace cap. 16 g 0    lidocaine-prilocaine (Emla) 2.5-2.5 % cream Apply topically if needed for mild pain (1 - 3). Please apply to Mediport prior to access for chemotherapy 30 g 3    lisinopril 20 mg tablet Take 1 tablet (20 mg) by mouth once daily. 90 tablet 3    LORazepam (Ativan) 0.5 mg tablet Take 1 tablet (0.5 mg) by mouth every 8 hours if needed (agitation) for up to 10 days. 30 tablet 0    meclizine (Antivert) 12.5 mg tablet Take 1-2 tablets (12.5-25 mg) by mouth 3 times a day as needed (vertigo).      metFORMIN (Glucophage) 500 mg tablet Take 1 tablet (500 mg) by mouth 2 times a day. 180 tablet 3    metoprolol succinate XL (Toprol-XL) 25 mg 24 hr tablet Take 1 tablet (25 mg) by mouth once daily. 90 tablet 3    ondansetron (Zofran) 8 mg tablet Take 1 tablet (8 mg) by mouth every 8 hours if needed for nausea. 30 tablet 2    prochlorperazine (Compazine) 10 mg tablet TAKE 1 TABLET BY MOUTH EVERY 6 HOURS AS NEEDED FOR  NAUSEA AND VOMITING WITH CHEMOTHERAPY 56 tablet 2     No current facility-administered medications on file prior to visit.         Performance Status:  Symptomatic; fully ambulatory     Vitals and Measurements:   Vitals:    06/26/24 1331 06/26/24 1614   BP: 151/81    Pulse: (!) 124 108   Resp: 18    Temp: 37.1 °C (98.8 °F)    TempSrc: Core    SpO2: 97%    Weight: 125 kg (275 lb 2.2 oz)    PainSc: 0-No pain         Physical Exam:   Physical Exam  Constitutional:       Appearance: Normal appearance.   HENT:      Head: Normocephalic and atraumatic.      Comments: Wearing hair prosthesis       Nose: Nose normal.      Mouth/Throat:      Mouth: Mucous membranes are moist.      Pharynx: Oropharynx is clear.   Eyes:      Conjunctiva/sclera: Conjunctivae normal.   Cardiovascular:      Rate and Rhythm: Regular rhythm. Tachycardia present.      Pulses: Normal pulses.      Heart sounds: Normal heart sounds.   Pulmonary:      Effort: Pulmonary effort is normal.      Breath sounds: Normal breath sounds.   Abdominal:      General: Bowel sounds are normal.      Palpations: Abdomen is soft.   Musculoskeletal:         General: Normal range of motion.      Cervical back: Neck supple.   Skin:     General: Skin is warm and dry.   Neurological:      General: No focal deficit present.      Mental Status: She is alert and oriented to person, place, and time.   Psychiatric:         Mood and Affect: Mood normal.         Behavior: Behavior normal.        Lab Results:  I have reviewed these laboratory results:     Lab on 06/25/2024   Component Date Value Ref Range Status    WBC 06/25/2024 7.8  4.4 - 11.3 x10*3/uL Final    nRBC 06/25/2024 0.0  0.0 - 0.0 /100 WBCs Final    RBC 06/25/2024 3.84 (L)  4.00 - 5.20 x10*6/uL Final    Hemoglobin 06/25/2024 10.7 (L)  12.0 - 16.0 g/dL Final    Hematocrit 06/25/2024 34.2 (L)  36.0 - 46.0 % Final    MCV 06/25/2024 89  80 - 100 fL Final    MCH 06/25/2024 27.9  26.0 - 34.0 pg Final    MCHC 06/25/2024 31.3 (L)   32.0 - 36.0 g/dL Final    RDW 06/25/2024 15.5 (H)  11.5 - 14.5 % Final    Platelets 06/25/2024 240  150 - 450 x10*3/uL Final    Neutrophils % 06/25/2024 61.3  40.0 - 80.0 % Final    Immature Granulocytes %, Automated 06/25/2024 2.3 (H)  0.0 - 0.9 % Final    Lymphocytes % 06/25/2024 23.0  13.0 - 44.0 % Final    Monocytes % 06/25/2024 11.5  2.0 - 10.0 % Final    Eosinophils % 06/25/2024 1.0  0.0 - 6.0 % Final    Basophils % 06/25/2024 0.9  0.0 - 2.0 % Final    Neutrophils Absolute 06/25/2024 4.78  1.20 - 7.70 x10*3/uL Final    Immature Granulocytes Absolute, Au* 06/25/2024 0.18  0.00 - 0.70 x10*3/uL Final    Lymphocytes Absolute 06/25/2024 1.80  1.20 - 4.80 x10*3/uL Final    Monocytes Absolute 06/25/2024 0.90  0.10 - 1.00 x10*3/uL Final    Eosinophils Absolute 06/25/2024 0.08  0.00 - 0.70 x10*3/uL Final    Basophils Absolute 06/25/2024 0.07  0.00 - 0.10 x10*3/uL Final    Glucose 06/25/2024 211 (H)  74 - 99 mg/dL Final    Sodium 06/25/2024 138  136 - 145 mmol/L Final    Potassium 06/25/2024 4.5  3.5 - 5.3 mmol/L Final    Chloride 06/25/2024 103  98 - 107 mmol/L Final    Bicarbonate 06/25/2024 25  21 - 32 mmol/L Final    Anion Gap 06/25/2024 15  10 - 20 mmol/L Final    Urea Nitrogen 06/25/2024 11  6 - 23 mg/dL Final    Creatinine 06/25/2024 0.78  0.50 - 1.05 mg/dL Final    eGFR 06/25/2024 89  >60 mL/min/1.73m*2 Final    Calcium 06/25/2024 11.5 (H)  8.6 - 10.6 mg/dL Final    Albumin 06/25/2024 4.2  3.4 - 5.0 g/dL Final    Alkaline Phosphatase 06/25/2024 332 (H)  33 - 110 U/L Final    Total Protein 06/25/2024 6.5  6.4 - 8.2 g/dL Final    AST 06/25/2024 68 (H)  9 - 39 U/L Final    Bilirubin, Total 06/25/2024 0.7  0.0 - 1.2 mg/dL Final    ALT 06/25/2024 84 (H)  7 - 45 U/L Final    LDH 06/25/2024 133  84 - 246 U/L Final    GGT 06/25/2024 910 (H)  5 - 55 U/L Final    Color, Urine 06/25/2024 Light-Orange (N)  Light-Yellow, Yellow, Dark-Yellow Final    Appearance, Urine 06/25/2024 Ex.Turbid (N)  Clear Final    Specific  Gravity, Urine 06/25/2024 1.020  1.005 - 1.035 Final    pH, Urine 06/25/2024 5.5  5.0, 5.5, 6.0, 6.5, 7.0, 7.5, 8.0 Final    Protein, Urine 06/25/2024 20 (TRACE)  NEGATIVE, 10 (TRACE), 20 (TRACE) mg/dL Final    Glucose, Urine 06/25/2024 500 (3+) (A)  Normal mg/dL Final    Blood, Urine 06/25/2024 NEGATIVE  NEGATIVE Final    Ketones, Urine 06/25/2024 NEGATIVE  NEGATIVE mg/dL Final    Bilirubin, Urine 06/25/2024 NEGATIVE  NEGATIVE Final    Urobilinogen, Urine 06/25/2024 Normal  Normal mg/dL Final    Nitrite, Urine 06/25/2024 2+ (A)  NEGATIVE Final    Leukocyte Esterase, Urine 06/25/2024 75 Christine/µL (A)  NEGATIVE Final    Creatine Kinase 06/25/2024 45  0 - 215 U/L Final    WBC, Urine 06/25/2024 NONE  1-5, NONE /HPF Final    RBC, Urine 06/25/2024 NONE  NONE, 1-2, 3-5 /HPF Final    Squamous Epithelial Cells, Urine 06/25/2024 1-9 (SPARSE)  Reference range not established. /HPF Final    Bacteria, Urine 06/25/2024 3+ (A)  NONE SEEN /HPF Final    Mucus, Urine 06/25/2024 3+  Reference range not established. /LPF Final   Infusion on 06/12/2024   Component Date Value Ref Range Status    WBC 06/12/2024 9.9  4.4 - 11.3 x10*3/uL Final    nRBC 06/12/2024    Final    RBC 06/12/2024 4.14  4.00 - 5.20 x10*6/uL Final    Hemoglobin 06/12/2024 11.3 (L)  12.0 - 16.0 g/dL Final    Hematocrit 06/12/2024 34.9 (L)  36.0 - 46.0 % Final    MCV 06/12/2024 84  80 - 100 fL Final    MCH 06/12/2024 27.3  26.0 - 34.0 pg Final    MCHC 06/12/2024 32.4  32.0 - 36.0 g/dL Final    RDW 06/12/2024 14.7 (H)  11.5 - 14.5 % Final    Platelets 06/12/2024 274  150 - 450 x10*3/uL Final    Neutrophils % 06/12/2024 73.4  40.0 - 80.0 % Final    Immature Granulocytes %, Automated 06/12/2024 0.5  0.0 - 0.9 % Final    Lymphocytes % 06/12/2024 17.0  13.0 - 44.0 % Final    Monocytes % 06/12/2024 4.7  2.0 - 10.0 % Final    Eosinophils % 06/12/2024 4.0  0.0 - 6.0 % Final    Basophils % 06/12/2024 0.4  0.0 - 2.0 % Final    Neutrophils Absolute 06/12/2024 7.27  1.20 - 7.70  x10*3/uL Final    Immature Granulocytes Absolute, Au* 06/12/2024 0.05  0.00 - 0.70 x10*3/uL Final    Lymphocytes Absolute 06/12/2024 1.69  1.20 - 4.80 x10*3/uL Final    Monocytes Absolute 06/12/2024 0.47  0.10 - 1.00 x10*3/uL Final    Eosinophils Absolute 06/12/2024 0.40  0.00 - 0.70 x10*3/uL Final    Basophils Absolute 06/12/2024 0.04  0.00 - 0.10 x10*3/uL Final    Glucose 06/12/2024 304 (H)  74 - 99 mg/dL Final    Sodium 06/12/2024 136  136 - 145 mmol/L Final    Potassium 06/12/2024 4.7  3.5 - 5.3 mmol/L Final    Chloride 06/12/2024 101  98 - 107 mmol/L Final    Bicarbonate 06/12/2024 22  21 - 32 mmol/L Final    Anion Gap 06/12/2024 18  10 - 20 mmol/L Final    Urea Nitrogen 06/12/2024 22  6 - 23 mg/dL Final    Creatinine 06/12/2024 0.80  0.50 - 1.05 mg/dL Final    eGFR 06/12/2024 86  >60 mL/min/1.73m*2 Final    Calcium 06/12/2024 10.9 (H)  8.6 - 10.6 mg/dL Final    Albumin 06/12/2024 4.2  3.4 - 5.0 g/dL Final    Alkaline Phosphatase 06/12/2024 251 (H)  33 - 110 U/L Final    Total Protein 06/12/2024 6.8  6.4 - 8.2 g/dL Final    AST 06/12/2024 69 (H)  9 - 39 U/L Final    Bilirubin, Total 06/12/2024 1.7 (H)  0.0 - 1.2 mg/dL Final    ALT 06/12/2024 119 (H)  7 - 45 U/L Final    Protime 06/12/2024 10.9  9.8 - 12.8 seconds Final    INR 06/12/2024 1.0  0.9 - 1.1 Final   Lab on 06/04/2024   Component Date Value Ref Range Status    WBC 06/04/2024 12.5 (H)  4.4 - 11.3 x10*3/uL Final    nRBC 06/04/2024 0.0  0.0 - 0.0 /100 WBCs Final    RBC 06/04/2024 4.45  4.00 - 5.20 x10*6/uL Final    Hemoglobin 06/04/2024 12.0  12.0 - 16.0 g/dL Final    Hematocrit 06/04/2024 39.0  36.0 - 46.0 % Final    MCV 06/04/2024 88  80 - 100 fL Final    MCH 06/04/2024 27.0  26.0 - 34.0 pg Final    MCHC 06/04/2024 30.8 (L)  32.0 - 36.0 g/dL Final    RDW 06/04/2024 15.3 (H)  11.5 - 14.5 % Final    Platelets 06/04/2024 216  150 - 450 x10*3/uL Final    Neutrophils % 06/04/2024 70.4  40.0 - 80.0 % Final    Immature Granulocytes %, Automated 06/04/2024 0.8   0.0 - 0.9 % Final    Lymphocytes % 06/04/2024 18.5  13.0 - 44.0 % Final    Monocytes % 06/04/2024 6.5  2.0 - 10.0 % Final    Eosinophils % 06/04/2024 3.2  0.0 - 6.0 % Final    Basophils % 06/04/2024 0.6  0.0 - 2.0 % Final    Neutrophils Absolute 06/04/2024 8.82 (H)  1.20 - 7.70 x10*3/uL Final    Immature Granulocytes Absolute, Au* 06/04/2024 0.10  0.00 - 0.70 x10*3/uL Final    Lymphocytes Absolute 06/04/2024 2.31  1.20 - 4.80 x10*3/uL Final    Monocytes Absolute 06/04/2024 0.82  0.10 - 1.00 x10*3/uL Final    Eosinophils Absolute 06/04/2024 0.40  0.00 - 0.70 x10*3/uL Final    Basophils Absolute 06/04/2024 0.07  0.00 - 0.10 x10*3/uL Final    Glucose 06/04/2024 226 (H)  74 - 99 mg/dL Final    Sodium 06/04/2024 137  136 - 145 mmol/L Final    Potassium 06/04/2024 4.4  3.5 - 5.3 mmol/L Final    Chloride 06/04/2024 103  98 - 107 mmol/L Final    Bicarbonate 06/04/2024 22  21 - 32 mmol/L Final    Anion Gap 06/04/2024 16  10 - 20 mmol/L Final    Urea Nitrogen 06/04/2024 16  6 - 23 mg/dL Final    Creatinine 06/04/2024 0.67  0.50 - 1.05 mg/dL Final    eGFR 06/04/2024 >90  >60 mL/min/1.73m*2 Final    Calcium 06/04/2024 11.6 (H)  8.6 - 10.6 mg/dL Final    Albumin 06/04/2024 4.4  3.4 - 5.0 g/dL Final    Alkaline Phosphatase 06/04/2024 186 (H)  33 - 110 U/L Final    Total Protein 06/04/2024 7.0  6.4 - 8.2 g/dL Final    AST 06/04/2024 43 (H)  9 - 39 U/L Final    Bilirubin, Total 06/04/2024 1.1  0.0 - 1.2 mg/dL Final    ALT 06/04/2024 52 (H)  7 - 45 U/L Final    LDH 06/04/2024 160  84 - 246 U/L Final    GGT 06/04/2024 202 (H)  5 - 55 U/L Final    Creatine Kinase 06/04/2024 41  0 - 215 U/L Final    Color, Urine 06/04/2024 Light-Yellow  Light-Yellow, Yellow, Dark-Yellow Final    Appearance, Urine 06/04/2024 Clear  Clear Final    Specific Gravity, Urine 06/04/2024 1.022  1.005 - 1.035 Final    pH, Urine 06/04/2024 5.5  5.0, 5.5, 6.0, 6.5, 7.0, 7.5, 8.0 Final    Protein, Urine 06/04/2024 NEGATIVE  NEGATIVE, 10 (TRACE), 20 (TRACE) mg/dL  Final    Glucose, Urine 06/04/2024 500 (3+) (A)  Normal mg/dL Final    Blood, Urine 06/04/2024 NEGATIVE  NEGATIVE Final    Ketones, Urine 06/04/2024 NEGATIVE  NEGATIVE mg/dL Final    Bilirubin, Urine 06/04/2024 NEGATIVE  NEGATIVE Final    Urobilinogen, Urine 06/04/2024 Normal  Normal mg/dL Final    Nitrite, Urine 06/04/2024 NEGATIVE  NEGATIVE Final    Leukocyte Esterase, Urine 06/04/2024 NEGATIVE  NEGATIVE Final         Radiology Result:  I have reviewed the latest Imaging in PACS and the findings are noted in this note. I discussed the results of the latest imaging with the patient. All previous imaging were reviewed at the time it was completed. Full records are available in the EMR for review as well.     MR ABDOMEN W AND WO IV CONTRAST; MR PELVIS W AND WO IV CONTRAST;  5/10/2024 10:56 am    IMPRESSION:  Compared to 01/31/2024 MRI, decreased size of segment 6 and 7 lesions  including dominant segment 7 lesion measuring 41 mm, previously 45  mm. Increased size of segment 8 lesions and stable size segment 2  lesion. No definite new hepatic lesions.      Increased size 21 mm T11 posterior epidural soft tissue mass,  concerning for metastasis. Associated moderate to severe canal  stenosis without definite evidence of myopathy.  ==============================================  STUDY:  CT CHEST WO IV CONTRAST;  5/10/2024 8:16 am    IMPRESSION:  1.  Mild interval increase in size of scattered subcentimeter  pulmonary nodules within the bilateral lungs as described above and  compared with most recent prior exam dated 01/31/2024. Findings are  again suggestive of worsening metastatic disease, although these  again appear to be too small to be assessed on PET-CT at this time.  2. New 0.5 cm lytic lesion within the left posterior 8th rib  concerning for a new focus of metastatic disease.  3. Hepatic steatosis. Correlate with serum LFTs.  4. Moderate to severe coronary artery calcifications.  5. Remaining chronic and  incidental findings are unchanged as  described above.      Pathology Results:  I have reviewed the full pathology report recorded in the EMR. The pertinent portions indicating diagnosis are listed here in the note. for details please refer to the full report recorded in the EMR.    Surgical Pathology [Apr 30 2019 4:54PM] ([id number])     Specimens: RETROPERITONEAL MASS ANTERIOR TO VENA CAVA   Received fresh for intraoperative consultation, labeled with the patient's      Name MAGAN SCHULTZ      Accession #: [id number]   Pathologist: CIELO AGUIRRE M.D.   Date of Procedure: 4/22/2019   Date Received: 4/22/2019   Date Reported 4/30/2019   Submitting Physician: KANA MUKHERJEE M.D.   Location: [facility] Other External #         FINAL DIAGNOSIS   A. RETROPERITONEAL MASS, ANTERIOR TO VENA CAVA, EXCISION:   -- LEIOMYOSARCOMA, 8.7 CM, INFILTRATING VESSEL WALL OF INFERIOR VENA CAVA, SEE NOTE   -- DISTANCE TO SOFT TISSUE/ PERIPHERAL RESECTION MARGIN < 0.5 MM, FOCAL INVOLVEMENT CANNOT BY EXCLUDED   -- VASCULAR MARGINS UNINVOLVED BY MALIGNANCY   -- TWO LYMPH NODES WITH NO EVIDENCE OF METASTASIS (0/2)      Note: The spindle cell neoplasm demonstrates moderate to high-grade nuclear atypia, a high mitotic rate (up to 28 mitoses per 10 HPF), and extensively infiltrates the adventitia and  media of the venous vessel wall. Focal (indeterminate-type) necrosis is noted. Immunohistochemistry demonstrates expression of estrogen receptor (60-70% of tumor cells) and progesterone receptor (>95% of tumor cells) and focal WT-1. Immunohistochemistry  performed on the prior core needle biopsy ([id number]) had demonstrated smooth muscle differentiation (positive: SMA, desmin, h-caldesmon; negative: CD34, HMB-45, myogenin, S100, DOG-1, ).      The gross and/or microscopic findings were reviewed in conjunction with pathology resident, Vika Ruiz M.D.      Electronically Signed Out By CIEOL AGUIRRE M.D./CIRO   By the  signature on this report, the individual or group listed as making the Final Interpretation/Diagnosis certifies that they have reviewed this case.      Assessment and Plan:   Assessment/Plan   Ms. Angie Tobin is a 58 y.o. female with a diagnosis of metastatic leiomyosarcoma. Please see the evolution of the case listed above in the cancer history.      [facility] results- ANNE MARIE stable, TMB- 0 muts/Mb. RB gene mutated. TP53 and PTEN mutated.      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   - Will hold neulasta until palliative RT is complete.  - Cycle 2 Yondelis today. UA concerning for possible UTI. She is asymptomatic. Most likely not a clean catch.  (202 prior). AST, ALT and bili all improved since starting treatment. Of note, she was taking Tylenol and Motrin around the clock x1 week, which she just stopped. We will follow up on labs in 3 weeks.   - RTC in 3 weeks on 07/17/2024 for assessment and cycle 3  - [facility] at [facility] prior to the visit. She does not want to have a port draw and prefers labs peripherally.      # Spots noted on T11 and ribs.   - Met Dr. Lujan and plan to radiate the thoracic lesion.   - Treatment scheduled for 07/03, 07/05 and 07/08.     # B-cell monoclonal lymphocytosis:   - Continue monitoring.     # High PTH levels and hypercalcemia  - Continue follow up with Endocrinology and nephrology      # Tachycardia   - Following with Cardiology      # Diabetes Mellitus  - Followed by PCP.  - Currently on Metformin    # Anxiety  - Declined antidepressant at this time, but open to seeing onco-psych. Referral placed today.      DISCLAIMER:   In preparing for this visit and writing this note, I reviewed all the previous electronic medical records (labs, imaging and medical charts) of the patient available in the physician portal. Significant findings which helped in decision making are recorded  in this chart.     The plan was discussed with the patient. We gave her  ample opportunities to ask questions. All questions were answered to her satisfaction and she verbalized understanding.      INSTRUCTIONS FOR PATIENT  Ms. Angie Tobin ,  It was a pleasure talking to you today.    As discussed, your chemo disconnect is tomorrow. We will meet again in 3 weeks on 07/17/2024. Please have labs prior to the visit.     Please be advised that you are receiving treatment for your cancer in the form of chemotherapy. Intravenous chemotherapy can increase your risk of infections by suppressing the cells that constitute your immune system. Although this effect is temporary, and full recovery is expected, it does put you at a very high risk of infections. If you develop a fever above 100.4 degree farenheit, or develop chills with rigors, then please report to your nearest emergency room right away. DO NOT stay home in such a scenario. Also, please be aware that chemotherapy can increase your risk of venous thromboembolism (aka blood clot). If you develop chest pain, shortness of breath, swelling in your legs then please report to the nearest Emergency Room as soon as possible or call 911. Please remember, if you are dealing with a situation which you think requires urgent attention, then please report to the nearest ER for evaluation or call 911. For all other non-urgent matters please contact our office.     Please make sure to schedule your appointments as we discussed. Your appointments should also appear in your MyChart.   In case of an emergency please dial 911 or report to your nearest Emergency Room.  For all other questions, please do not hesitate to reach out to us at the number listed below.    Thank you for choosing [facility] at [facility].   We appreciate your visit.     Roxann Phelps CNP   Sarcoma and Cutaneous Oncology  [facility]    Phone: [phone]  Fax: [phone]

## 2024-06-27 ENCOUNTER — APPOINTMENT (OUTPATIENT)
Dept: HEMATOLOGY/ONCOLOGY | Facility: CLINIC | Age: 59
End: 2024-06-27
Payer: COMMERCIAL

## 2024-06-27 ENCOUNTER — INFUSION (OUTPATIENT)
Dept: HEMATOLOGY/ONCOLOGY | Facility: CLINIC | Age: 59
End: 2024-06-27
Payer: COMMERCIAL

## 2024-06-27 VITALS
SYSTOLIC BLOOD PRESSURE: 166 MMHG | TEMPERATURE: 97.3 F | HEART RATE: 116 BPM | BODY MASS INDEX: 39.29 KG/M2 | WEIGHT: 273.81 LBS | RESPIRATION RATE: 18 BRPM | DIASTOLIC BLOOD PRESSURE: 107 MMHG | OXYGEN SATURATION: 98 %

## 2024-06-27 DIAGNOSIS — C78.02 MALIGNANT NEOPLASM METASTATIC TO BOTH LUNGS (MULTI): ICD-10-CM

## 2024-06-27 DIAGNOSIS — C78.01 MALIGNANT NEOPLASM METASTATIC TO BOTH LUNGS (MULTI): ICD-10-CM

## 2024-06-27 DIAGNOSIS — C49.9 LEIOMYOSARCOMA (MULTI): ICD-10-CM

## 2024-06-27 PROCEDURE — 2500000004 HC RX 250 GENERAL PHARMACY W/ HCPCS (ALT 636 FOR OP/ED): Performed by: NURSE PRACTITIONER

## 2024-06-27 PROCEDURE — 96416 CHEMO PROLONG INFUSE W/PUMP: CPT

## 2024-06-27 PROCEDURE — 96375 TX/PRO/DX INJ NEW DRUG ADDON: CPT | Mod: INF

## 2024-06-27 RX ORDER — DIPHENHYDRAMINE HYDROCHLORIDE 50 MG/ML
50 INJECTION INTRAMUSCULAR; INTRAVENOUS AS NEEDED
Status: DISCONTINUED | OUTPATIENT
Start: 2024-06-27 | End: 2024-06-27 | Stop reason: HOSPADM

## 2024-06-27 RX ORDER — ALBUTEROL SULFATE 0.83 MG/ML
3 SOLUTION RESPIRATORY (INHALATION) AS NEEDED
Status: DISCONTINUED | OUTPATIENT
Start: 2024-06-27 | End: 2024-06-27 | Stop reason: HOSPADM

## 2024-06-27 RX ORDER — HEPARIN SODIUM,PORCINE/PF 10 UNIT/ML
50 SYRINGE (ML) INTRAVENOUS AS NEEDED
Status: DISCONTINUED | OUTPATIENT
Start: 2024-06-27 | End: 2024-06-27 | Stop reason: HOSPADM

## 2024-06-27 RX ORDER — HEPARIN 100 UNIT/ML
500 SYRINGE INTRAVENOUS AS NEEDED
OUTPATIENT
Start: 2024-06-27

## 2024-06-27 RX ORDER — HEPARIN 100 UNIT/ML
500 SYRINGE INTRAVENOUS AS NEEDED
Status: DISCONTINUED | OUTPATIENT
Start: 2024-06-27 | End: 2024-06-27 | Stop reason: HOSPADM

## 2024-06-27 RX ORDER — HEPARIN SODIUM,PORCINE/PF 10 UNIT/ML
50 SYRINGE (ML) INTRAVENOUS AS NEEDED
OUTPATIENT
Start: 2024-06-27

## 2024-06-27 RX ORDER — PROCHLORPERAZINE EDISYLATE 5 MG/ML
10 INJECTION INTRAMUSCULAR; INTRAVENOUS EVERY 6 HOURS PRN
Status: DISCONTINUED | OUTPATIENT
Start: 2024-06-27 | End: 2024-06-27 | Stop reason: HOSPADM

## 2024-06-27 RX ORDER — PALONOSETRON 0.05 MG/ML
0.25 INJECTION, SOLUTION INTRAVENOUS ONCE
Status: COMPLETED | OUTPATIENT
Start: 2024-06-27 | End: 2024-06-27

## 2024-06-27 RX ORDER — FAMOTIDINE 10 MG/ML
20 INJECTION INTRAVENOUS ONCE AS NEEDED
Status: DISCONTINUED | OUTPATIENT
Start: 2024-06-27 | End: 2024-06-27 | Stop reason: HOSPADM

## 2024-06-27 RX ORDER — EPINEPHRINE 0.3 MG/.3ML
0.3 INJECTION SUBCUTANEOUS EVERY 5 MIN PRN
Status: DISCONTINUED | OUTPATIENT
Start: 2024-06-27 | End: 2024-06-27 | Stop reason: HOSPADM

## 2024-06-27 RX ORDER — PROCHLORPERAZINE MALEATE 10 MG
10 TABLET ORAL EVERY 6 HOURS PRN
Status: DISCONTINUED | OUTPATIENT
Start: 2024-06-27 | End: 2024-06-27 | Stop reason: HOSPADM

## 2024-06-27 ASSESSMENT — PAIN SCALES - GENERAL: PAINLEVEL: 0-NO PAIN

## 2024-06-27 NOTE — SIGNIFICANT EVENT
06/27/24 1059   Prechemo Checklist   Has the patient been in the hospital, ED, or urgent care since last date of service No   Chemo/Immuno Consent Completed and Signed Yes   Protocol/Indications Verified Yes   Confirmed to previous date/time of medication Yes   Compared to previous dose Yes   All medications are dated accurately Yes   Pregnancy Test Negative Not applicable   Parameters Met (!) No   Provider Notified Yes   Provider Name Roxann Masseytere   Is Patient Proceeding With Treatment? Yes   BSA/Weight-Height Verified Yes   Dose Calculations Verified (current, total, cumulative) Yes

## 2024-06-28 ENCOUNTER — INFUSION (OUTPATIENT)
Dept: HEMATOLOGY/ONCOLOGY | Facility: CLINIC | Age: 59
End: 2024-06-28
Payer: COMMERCIAL

## 2024-06-28 VITALS
OXYGEN SATURATION: 98 % | TEMPERATURE: 96.8 F | HEART RATE: 116 BPM | SYSTOLIC BLOOD PRESSURE: 134 MMHG | DIASTOLIC BLOOD PRESSURE: 77 MMHG | WEIGHT: 273.37 LBS | RESPIRATION RATE: 18 BRPM | BODY MASS INDEX: 39.22 KG/M2

## 2024-06-28 DIAGNOSIS — C49.9 LEIOMYOSARCOMA (MULTI): ICD-10-CM

## 2024-06-28 PROCEDURE — 96523 IRRIG DRUG DELIVERY DEVICE: CPT

## 2024-06-28 ASSESSMENT — PAIN SCALES - GENERAL: PAINLEVEL: 0-NO PAIN

## 2024-07-03 ENCOUNTER — HOSPITAL ENCOUNTER (OUTPATIENT)
Dept: RADIATION ONCOLOGY | Facility: HOSPITAL | Age: 59
Setting detail: RADIATION/ONCOLOGY SERIES
Discharge: HOME | End: 2024-07-03
Payer: COMMERCIAL

## 2024-07-03 DIAGNOSIS — C79.51 SECONDARY MALIGNANT NEOPLASM OF BONE (MULTI): ICD-10-CM

## 2024-07-03 DIAGNOSIS — Z51.0 ENCOUNTER FOR ANTINEOPLASTIC RADIATION THERAPY: ICD-10-CM

## 2024-07-03 DIAGNOSIS — C49.9 MALIGNANT NEOPLASM OF CONNECTIVE AND SOFT TISSUE, UNSPECIFIED (MULTI): ICD-10-CM

## 2024-07-03 LAB
RAD ONC MSQ ACTUAL FRACTIONS DELIVERED: 1
RAD ONC MSQ ACTUAL SESSION DELIVERED DOSE: 900 CGRAY
RAD ONC MSQ ACTUAL TOTAL DOSE: 900 CGRAY
RAD ONC MSQ ELAPSED DAYS: 0
RAD ONC MSQ LAST DATE: NORMAL
RAD ONC MSQ PRESCRIBED FRACTIONAL DOSE: 900 CGRAY
RAD ONC MSQ PRESCRIBED NUMBER OF FRACTIONS: 3
RAD ONC MSQ PRESCRIBED TECHNIQUE: NORMAL
RAD ONC MSQ PRESCRIBED TOTAL DOSE: 2700 CGRAY
RAD ONC MSQ PRESCRIPTION PATTERN COMMENT: NORMAL
RAD ONC MSQ START DATE: NORMAL
RAD ONC MSQ TREATMENT COURSE NUMBER: 1
RAD ONC MSQ TREATMENT SITE: NORMAL

## 2024-07-03 PROCEDURE — 77373 STRTCTC BDY RAD THER TX DLVR: CPT | Performed by: RADIOLOGY

## 2024-07-03 PROCEDURE — 77336 RADIATION PHYSICS CONSULT: CPT | Mod: 59 | Performed by: STUDENT IN AN ORGANIZED HEALTH CARE EDUCATION/TRAINING PROGRAM

## 2024-07-03 PROCEDURE — 77280 THER RAD SIMULAJ FIELD SMPL: CPT | Performed by: RADIOLOGY

## 2024-07-05 ENCOUNTER — HOSPITAL ENCOUNTER (OUTPATIENT)
Dept: RADIATION ONCOLOGY | Facility: HOSPITAL | Age: 59
Setting detail: RADIATION/ONCOLOGY SERIES
Discharge: HOME | End: 2024-07-05
Payer: COMMERCIAL

## 2024-07-05 DIAGNOSIS — C79.51 SECONDARY MALIGNANT NEOPLASM OF BONE (MULTI): ICD-10-CM

## 2024-07-05 DIAGNOSIS — C49.9 MALIGNANT NEOPLASM OF CONNECTIVE AND SOFT TISSUE, UNSPECIFIED (MULTI): ICD-10-CM

## 2024-07-05 DIAGNOSIS — Z51.0 ENCOUNTER FOR ANTINEOPLASTIC RADIATION THERAPY: ICD-10-CM

## 2024-07-05 LAB
RAD ONC MSQ ACTUAL FRACTIONS DELIVERED: 2
RAD ONC MSQ ACTUAL SESSION DELIVERED DOSE: 900 CGRAY
RAD ONC MSQ ACTUAL TOTAL DOSE: 1800 CGRAY
RAD ONC MSQ ELAPSED DAYS: 2
RAD ONC MSQ LAST DATE: NORMAL
RAD ONC MSQ PRESCRIBED FRACTIONAL DOSE: 900 CGRAY
RAD ONC MSQ PRESCRIBED NUMBER OF FRACTIONS: 3
RAD ONC MSQ PRESCRIBED TECHNIQUE: NORMAL
RAD ONC MSQ PRESCRIBED TOTAL DOSE: 2700 CGRAY
RAD ONC MSQ PRESCRIPTION PATTERN COMMENT: NORMAL
RAD ONC MSQ START DATE: NORMAL
RAD ONC MSQ TREATMENT COURSE NUMBER: 1
RAD ONC MSQ TREATMENT SITE: NORMAL

## 2024-07-05 PROCEDURE — 77373 STRTCTC BDY RAD THER TX DLVR: CPT | Performed by: STUDENT IN AN ORGANIZED HEALTH CARE EDUCATION/TRAINING PROGRAM

## 2024-07-08 ENCOUNTER — HOSPITAL ENCOUNTER (OUTPATIENT)
Dept: RADIATION ONCOLOGY | Facility: HOSPITAL | Age: 59
Setting detail: RADIATION/ONCOLOGY SERIES
Discharge: HOME | End: 2024-07-08
Payer: COMMERCIAL

## 2024-07-08 ENCOUNTER — RADIATION ONCOLOGY OTV (OUTPATIENT)
Dept: RADIATION ONCOLOGY | Facility: HOSPITAL | Age: 59
End: 2024-07-08
Payer: COMMERCIAL

## 2024-07-08 ENCOUNTER — DOCUMENTATION (OUTPATIENT)
Dept: RADIATION ONCOLOGY | Facility: HOSPITAL | Age: 59
End: 2024-07-08

## 2024-07-08 VITALS
TEMPERATURE: 97.5 F | HEART RATE: 136 BPM | BODY MASS INDEX: 38.51 KG/M2 | DIASTOLIC BLOOD PRESSURE: 80 MMHG | SYSTOLIC BLOOD PRESSURE: 122 MMHG | WEIGHT: 268.4 LBS | OXYGEN SATURATION: 99 % | RESPIRATION RATE: 18 BRPM

## 2024-07-08 DIAGNOSIS — C49.9 MALIGNANT NEOPLASM OF CONNECTIVE AND SOFT TISSUE, UNSPECIFIED (MULTI): ICD-10-CM

## 2024-07-08 DIAGNOSIS — C79.51: Primary | ICD-10-CM

## 2024-07-08 DIAGNOSIS — C79.51 SECONDARY MALIGNANT NEOPLASM OF BONE (MULTI): ICD-10-CM

## 2024-07-08 DIAGNOSIS — Z51.0 ENCOUNTER FOR ANTINEOPLASTIC RADIATION THERAPY: ICD-10-CM

## 2024-07-08 LAB
RAD ONC MSQ ACTUAL FRACTIONS DELIVERED: 3
RAD ONC MSQ ACTUAL SESSION DELIVERED DOSE: 900 CGRAY
RAD ONC MSQ ACTUAL TOTAL DOSE: 2700 CGRAY
RAD ONC MSQ ELAPSED DAYS: 5
RAD ONC MSQ LAST DATE: NORMAL
RAD ONC MSQ PRESCRIBED FRACTIONAL DOSE: 900 CGRAY
RAD ONC MSQ PRESCRIBED NUMBER OF FRACTIONS: 3
RAD ONC MSQ PRESCRIBED TECHNIQUE: NORMAL
RAD ONC MSQ PRESCRIBED TOTAL DOSE: 2700 CGRAY
RAD ONC MSQ PRESCRIPTION PATTERN COMMENT: NORMAL
RAD ONC MSQ START DATE: NORMAL
RAD ONC MSQ TREATMENT COURSE NUMBER: 1
RAD ONC MSQ TREATMENT SITE: NORMAL

## 2024-07-08 PROCEDURE — 77373 STRTCTC BDY RAD THER TX DLVR: CPT | Performed by: RADIOLOGY

## 2024-07-08 NOTE — PROGRESS NOTES
Radiation Oncology On Treatment Visit    Patient Name:  Angie Tobin  MRN:  [id number]  :  1965    Referring Provider: No ref. 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 Patient is doing well. No new side effects of RT. She does have some diarrhea that is likely from the chemotherapy.       OBJECTIVE:   Vital Signs:  /80   Pulse (!) 136   Temp 36.4 °C (97.5 °F) (Temporal)   Resp 18   Wt 122 kg (268 lb 6.4 oz)   SpO2 99%   BMI 38.51 kg/m²    Pain Scale: The patient's current pain level was assessed.  They report currently having a pain of 0 out of 10.    Other Pertinent Findings:     Toxicity Assessment          2024    16:48   Toxicity Assessment   Adverse Events Reviewed (WDL) Yes (Within Defined Limits)   Treatment Site Thoracic;Bone   Anorexia Grade 0   Anxiety Grade 0   Dehydration Grade 0   Depression Grade 0   Dermatitis Radiation Grade 0   Diarrhea Grade 1   Fatigue Grade 1   Fibrosis Deep Connective Tissue Grade 0   Fracture Grade 0   Nausea Grade 1   Pain Grade 0   Treatment Related Secondary Malignancy Grade 0   Tumor Pain Grade 0    Vomiting Grade 1   Constipation Grade 0   Dyspepsia Grade 0   Dysphagia Grade 0   Esophagitis Grade 0   Mucositis Oral Grade 0   Upper Gastrointestinal Hemorrhage Grade 0   Peripheral Sensory Neuropathy Grade 0   Joint Range of Motion Decreased Grade 0   Brachial Plexopathy Grade 0   Pneumonitis Grade 0   Bone Pain Grade 0   Edema Limbs Grade 0   Aspiration Grade 0   Hoarseness Grade 0   Laryngeal Edema Grade 0   Myocardial Infarction Grade 0   Pericardial Effusion Grade 0   Pericarditis Grade 0   Esophageal Fistula Grade 0   Esophageal Obstruction Grade 0   Esophageal Pain Grade 0   Esophageal Stenosis Grade 0   Esophageal Ulcer Grade 0   Bronchial Obstruction Grade 0   Cough Grade 0   Dyspnea Grade 0   Epistaxis Grade 0   Hiccups Grade 0   Hypoxia Grade 0   Pulmonary Fibrosis Grade 0   Lymphedema Grade 0   Thromboembolic Event Grade 0   Hot Flashes Grade 0   Alopecia Grade 0   Erythroderma Grade 0   Pain of Skin Grade 0   Pruritus Grade 0   Rash Acneiform Grade 0   Skin Hyperpigmentation Grade 0   Skin Hypopigmentation Grade 0   Skin Induration Grade 0   Skin Ulceration Grade 0   Telangiectasia Grade 0        Assessment / Plan:  The patient is tolerating radiation therapy as anticipated.  Continue per current treatment plan.  She will return to clinic in 3 months with a new MRI brain.

## 2024-07-11 ENCOUNTER — DOCUMENTATION (OUTPATIENT)
Dept: RADIATION ONCOLOGY | Facility: CLINIC | Age: 59
End: 2024-07-11

## 2024-07-11 NOTE — PROGRESS NOTES
Radiation Oncology Treatment Summary    Patient Name:  Angie Tobin  MRN:  [id number]  :  1965    Referring Provider: No ref. provider found  Primary Care Provider: LUDA Huertas-CNP    Brief History: Angie Tobin is a 58 y.o. female with No matching staging information was found for the patient..  The patient completed radiotherapy as outlined below.    Radiation Treatment Summary:    SBRT: Not Applicable Thoracic spine    Treatment Period Technique Fraction Dose Fractions Total Dose   Course 1 7/3/2024-2024  (days elapsed: 5)         T11 7/3/2024-2024 SBRT 900 / 900 cGy 3 / 3 2700 / 2,700 cGy       Please see the patient's [facility] chart for further details regarding the radiation plan, including beam energy.    Concurrent Chemotherapy:  Treatment Plans       Name Type Plan Dates Plan Provider         Active    Trabectedin, 21 Day Cycles Oncology Treatment  2024 - Present Nico Walsh MD                    CTCAE Toxicity Overview:   Toxicity Assessment          2024    16:48   Toxicity Assessment   Adverse Events Reviewed (WDL) Yes (Within Defined Limits)   Treatment Site Thoracic;Bone   Anorexia Grade 0   Anxiety Grade 0   Dehydration Grade 0   Depression Grade 0   Dermatitis Radiation Grade 0   Diarrhea Grade 1   Fatigue Grade 1   Fibrosis Deep Connective Tissue Grade 0   Fracture Grade 0   Nausea Grade 1   Pain Grade 0   Treatment Related Secondary Malignancy Grade 0   Tumor Pain Grade 0   Vomiting Grade 1   Constipation Grade 0   Dyspepsia Grade 0   Dysphagia Grade 0   Esophagitis Grade 0   Mucositis Oral Grade 0   Upper Gastrointestinal Hemorrhage Grade 0   Peripheral Sensory Neuropathy Grade 0   Joint Range of Motion Decreased Grade 0   Brachial Plexopathy Grade 0   Pneumonitis Grade 0   Bone Pain Grade 0   Edema Limbs Grade 0   Aspiration Grade 0   Hoarseness Grade 0   Laryngeal Edema Grade 0   Myocardial Infarction Grade 0   Pericardial Effusion Grade 0    Pericarditis Grade 0   Esophageal Fistula Grade 0   Esophageal Obstruction Grade 0   Esophageal Pain Grade 0   Esophageal Stenosis Grade 0   Esophageal Ulcer Grade 0   Bronchial Obstruction Grade 0   Cough Grade 0   Dyspnea Grade 0   Epistaxis Grade 0   Hiccups Grade 0   Hypoxia Grade 0   Pulmonary Fibrosis Grade 0   Lymphedema Grade 0   Thromboembolic Event Grade 0   Hot Flashes Grade 0   Alopecia Grade 0   Erythroderma Grade 0   Pain of Skin Grade 0   Pruritus Grade 0   Rash Acneiform Grade 0   Skin Hyperpigmentation Grade 0   Skin Hypopigmentation Grade 0   Skin Induration Grade 0   Skin Ulceration Grade 0   Telangiectasia Grade 0     Patient Disposition:

## 2024-07-16 ENCOUNTER — LAB (OUTPATIENT)
Dept: LAB | Facility: LAB | Age: 59
End: 2024-07-16
Payer: COMMERCIAL

## 2024-07-16 DIAGNOSIS — C78.7 METASTATIC SARCOMA TO LIVER (MULTI): ICD-10-CM

## 2024-07-16 DIAGNOSIS — C49.9 METASTATIC SARCOMA TO LIVER (MULTI): ICD-10-CM

## 2024-07-16 DIAGNOSIS — R91.8 MULTIPLE LUNG NODULES ON CT: ICD-10-CM

## 2024-07-16 DIAGNOSIS — C49.9 LEIOMYOSARCOMA (MULTI): ICD-10-CM

## 2024-07-16 DIAGNOSIS — F41.1 GENERALIZED ANXIETY DISORDER: ICD-10-CM

## 2024-07-16 LAB
ALBUMIN SERPL BCP-MCNC: 4.2 G/DL (ref 3.4–5)
ALP SERPL-CCNC: 369 U/L (ref 33–110)
ALT SERPL W P-5'-P-CCNC: 42 U/L (ref 7–45)
ANION GAP SERPL CALC-SCNC: 17 MMOL/L (ref 10–20)
AST SERPL W P-5'-P-CCNC: 61 U/L (ref 9–39)
BILIRUB SERPL-MCNC: 0.7 MG/DL (ref 0–1.2)
BUN SERPL-MCNC: 11 MG/DL (ref 6–23)
CALCIUM SERPL-MCNC: 11.3 MG/DL (ref 8.6–10.6)
CHLORIDE SERPL-SCNC: 103 MMOL/L (ref 98–107)
CK SERPL-CCNC: 101 U/L (ref 0–215)
CO2 SERPL-SCNC: 21 MMOL/L (ref 21–32)
CREAT SERPL-MCNC: 0.69 MG/DL (ref 0.5–1.05)
EGFRCR SERPLBLD CKD-EPI 2021: >90 ML/MIN/1.73M*2
GGT SERPL-CCNC: 731 U/L (ref 5–55)
GLUCOSE SERPL-MCNC: 187 MG/DL (ref 74–99)
LDH SERPL L TO P-CCNC: 152 U/L (ref 84–246)
POTASSIUM SERPL-SCNC: 4.3 MMOL/L (ref 3.5–5.3)
PROT SERPL-MCNC: 7 G/DL (ref 6.4–8.2)
SODIUM SERPL-SCNC: 137 MMOL/L (ref 136–145)

## 2024-07-16 PROCEDURE — 82977 ASSAY OF GGT: CPT

## 2024-07-16 PROCEDURE — 83615 LACTATE (LD) (LDH) ENZYME: CPT

## 2024-07-16 PROCEDURE — 80053 COMPREHEN METABOLIC PANEL: CPT

## 2024-07-16 PROCEDURE — 82550 ASSAY OF CK (CPK): CPT

## 2024-07-16 PROCEDURE — 81001 URINALYSIS AUTO W/SCOPE: CPT

## 2024-07-17 ENCOUNTER — DOCUMENTATION (OUTPATIENT)
Dept: HEMATOLOGY/ONCOLOGY | Facility: HOSPITAL | Age: 59
End: 2024-07-17
Payer: COMMERCIAL

## 2024-07-17 ENCOUNTER — INFUSION (OUTPATIENT)
Dept: HEMATOLOGY/ONCOLOGY | Facility: CLINIC | Age: 59
End: 2024-07-17
Payer: COMMERCIAL

## 2024-07-17 ENCOUNTER — APPOINTMENT (OUTPATIENT)
Dept: HEMATOLOGY/ONCOLOGY | Facility: CLINIC | Age: 59
End: 2024-07-17
Payer: COMMERCIAL

## 2024-07-17 ENCOUNTER — OFFICE VISIT (OUTPATIENT)
Dept: HEMATOLOGY/ONCOLOGY | Facility: CLINIC | Age: 59
End: 2024-07-17
Payer: COMMERCIAL

## 2024-07-17 VITALS
TEMPERATURE: 99.7 F | DIASTOLIC BLOOD PRESSURE: 97 MMHG | SYSTOLIC BLOOD PRESSURE: 161 MMHG | WEIGHT: 265.65 LBS | BODY MASS INDEX: 38.12 KG/M2 | HEART RATE: 124 BPM | OXYGEN SATURATION: 98 % | RESPIRATION RATE: 18 BRPM

## 2024-07-17 VITALS — HEART RATE: 126 BPM

## 2024-07-17 DIAGNOSIS — T45.1X5A CHEMOTHERAPY INDUCED NAUSEA AND VOMITING: ICD-10-CM

## 2024-07-17 DIAGNOSIS — C78.7 METASTATIC SARCOMA TO LIVER (MULTI): ICD-10-CM

## 2024-07-17 DIAGNOSIS — C49.9 METASTATIC SARCOMA TO LIVER (MULTI): ICD-10-CM

## 2024-07-17 DIAGNOSIS — R91.8 MULTIPLE LUNG NODULES ON CT: ICD-10-CM

## 2024-07-17 DIAGNOSIS — F41.1 GENERALIZED ANXIETY DISORDER: ICD-10-CM

## 2024-07-17 DIAGNOSIS — C78.00 MALIGNANT NEOPLASM METASTATIC TO LUNG, UNSPECIFIED LATERALITY (MULTI): Primary | ICD-10-CM

## 2024-07-17 DIAGNOSIS — C78.02 MALIGNANT NEOPLASM METASTATIC TO BOTH LUNGS (MULTI): ICD-10-CM

## 2024-07-17 DIAGNOSIS — C78.01 MALIGNANT NEOPLASM METASTATIC TO BOTH LUNGS (MULTI): ICD-10-CM

## 2024-07-17 DIAGNOSIS — R11.2 CHEMOTHERAPY INDUCED NAUSEA AND VOMITING: ICD-10-CM

## 2024-07-17 DIAGNOSIS — C49.9 LEIOMYOSARCOMA (MULTI): ICD-10-CM

## 2024-07-17 LAB
APPEARANCE UR: ABNORMAL
BASOPHILS # BLD AUTO: 0.03 X10*3/UL (ref 0–0.1)
BASOPHILS NFR BLD AUTO: 0.4 %
BILIRUB UR STRIP.AUTO-MCNC: NEGATIVE MG/DL
COLOR UR: ABNORMAL
EOSINOPHIL # BLD AUTO: 0.06 X10*3/UL (ref 0–0.7)
EOSINOPHIL NFR BLD AUTO: 0.8 %
ERYTHROCYTE [DISTWIDTH] IN BLOOD BY AUTOMATED COUNT: 16.5 % (ref 11.5–14.5)
GLUCOSE UR STRIP.AUTO-MCNC: NORMAL MG/DL
HCT VFR BLD AUTO: 34.2 % (ref 36–46)
HGB BLD-MCNC: 11.2 G/DL (ref 12–16)
IMM GRANULOCYTES # BLD AUTO: 0.08 X10*3/UL (ref 0–0.7)
IMM GRANULOCYTES NFR BLD AUTO: 1.1 % (ref 0–0.9)
KETONES UR STRIP.AUTO-MCNC: NEGATIVE MG/DL
LEUKOCYTE ESTERASE UR QL STRIP.AUTO: NEGATIVE
LYMPHOCYTES # BLD AUTO: 1 X10*3/UL (ref 1.2–4.8)
LYMPHOCYTES NFR BLD AUTO: 13.3 %
MCH RBC QN AUTO: 28.1 PG (ref 26–34)
MCHC RBC AUTO-ENTMCNC: 32.7 G/DL (ref 32–36)
MCV RBC AUTO: 86 FL (ref 80–100)
MONOCYTES # BLD AUTO: 0.98 X10*3/UL (ref 0.1–1)
MONOCYTES NFR BLD AUTO: 13 %
MUCOUS THREADS #/AREA URNS AUTO: NORMAL /LPF
NEUTROPHILS # BLD AUTO: 5.36 X10*3/UL (ref 1.2–7.7)
NEUTROPHILS NFR BLD AUTO: 71.4 %
NITRITE UR QL STRIP.AUTO: NEGATIVE
NRBC BLD-RTO: ABNORMAL /100{WBCS}
PH UR STRIP.AUTO: 5.5 [PH]
PLATELET # BLD AUTO: 298 X10*3/UL (ref 150–450)
PROT UR STRIP.AUTO-MCNC: ABNORMAL MG/DL
RBC # BLD AUTO: 3.99 X10*6/UL (ref 4–5.2)
RBC # UR STRIP.AUTO: NEGATIVE /UL
RBC #/AREA URNS AUTO: NORMAL /HPF
SP GR UR STRIP.AUTO: 1.03
UROBILINOGEN UR STRIP.AUTO-MCNC: NORMAL MG/DL
WBC # BLD AUTO: 7.5 X10*3/UL (ref 4.4–11.3)
WBC #/AREA URNS AUTO: NORMAL /HPF

## 2024-07-17 PROCEDURE — 96416 CHEMO PROLONG INFUSE W/PUMP: CPT

## 2024-07-17 PROCEDURE — 99215 OFFICE O/P EST HI 40 MIN: CPT | Mod: 25 | Performed by: NURSE PRACTITIONER

## 2024-07-17 PROCEDURE — 2500000004 HC RX 250 GENERAL PHARMACY W/ HCPCS (ALT 636 FOR OP/ED): Performed by: NURSE PRACTITIONER

## 2024-07-17 PROCEDURE — 85025 COMPLETE CBC W/AUTO DIFF WBC: CPT

## 2024-07-17 PROCEDURE — 99215 OFFICE O/P EST HI 40 MIN: CPT | Performed by: NURSE PRACTITIONER

## 2024-07-17 PROCEDURE — 36593 DECLOT VASCULAR DEVICE: CPT

## 2024-07-17 PROCEDURE — 3077F SYST BP >= 140 MM HG: CPT | Performed by: NURSE PRACTITIONER

## 2024-07-17 PROCEDURE — 96375 TX/PRO/DX INJ NEW DRUG ADDON: CPT | Mod: INF

## 2024-07-17 PROCEDURE — 2500000004 HC RX 250 GENERAL PHARMACY W/ HCPCS (ALT 636 FOR OP/ED): Mod: JW | Performed by: NURSE PRACTITIONER

## 2024-07-17 PROCEDURE — 3080F DIAST BP >= 90 MM HG: CPT | Performed by: NURSE PRACTITIONER

## 2024-07-17 PROCEDURE — 2500000004 HC RX 250 GENERAL PHARMACY W/ HCPCS (ALT 636 FOR OP/ED): Mod: JZ | Performed by: INTERNAL MEDICINE

## 2024-07-17 PROCEDURE — 1036F TOBACCO NON-USER: CPT | Performed by: NURSE PRACTITIONER

## 2024-07-17 RX ORDER — EPINEPHRINE 0.3 MG/.3ML
0.3 INJECTION SUBCUTANEOUS EVERY 5 MIN PRN
Status: DISCONTINUED | OUTPATIENT
Start: 2024-07-17 | End: 2024-07-17 | Stop reason: HOSPADM

## 2024-07-17 RX ORDER — PALONOSETRON 0.05 MG/ML
0.25 INJECTION, SOLUTION INTRAVENOUS ONCE
Status: COMPLETED | OUTPATIENT
Start: 2024-07-17 | End: 2024-07-17

## 2024-07-17 RX ORDER — HEPARIN 100 UNIT/ML
500 SYRINGE INTRAVENOUS AS NEEDED
Status: DISCONTINUED | OUTPATIENT
Start: 2024-07-17 | End: 2024-07-17 | Stop reason: HOSPADM

## 2024-07-17 RX ORDER — PROCHLORPERAZINE MALEATE 10 MG
10 TABLET ORAL EVERY 6 HOURS PRN
Status: DISCONTINUED | OUTPATIENT
Start: 2024-07-17 | End: 2024-07-17 | Stop reason: HOSPADM

## 2024-07-17 RX ORDER — DIPHENHYDRAMINE HYDROCHLORIDE 50 MG/ML
50 INJECTION INTRAMUSCULAR; INTRAVENOUS AS NEEDED
Status: DISCONTINUED | OUTPATIENT
Start: 2024-07-17 | End: 2024-07-17 | Stop reason: HOSPADM

## 2024-07-17 RX ORDER — FAMOTIDINE 10 MG/ML
20 INJECTION INTRAVENOUS ONCE AS NEEDED
Status: DISCONTINUED | OUTPATIENT
Start: 2024-07-17 | End: 2024-07-17 | Stop reason: HOSPADM

## 2024-07-17 RX ORDER — HEPARIN SODIUM,PORCINE/PF 10 UNIT/ML
50 SYRINGE (ML) INTRAVENOUS AS NEEDED
Status: CANCELLED | OUTPATIENT
Start: 2024-07-17

## 2024-07-17 RX ORDER — ALBUTEROL SULFATE 0.83 MG/ML
3 SOLUTION RESPIRATORY (INHALATION) AS NEEDED
Status: DISCONTINUED | OUTPATIENT
Start: 2024-07-17 | End: 2024-07-17 | Stop reason: HOSPADM

## 2024-07-17 RX ORDER — PROCHLORPERAZINE EDISYLATE 5 MG/ML
10 INJECTION INTRAMUSCULAR; INTRAVENOUS EVERY 6 HOURS PRN
Status: DISCONTINUED | OUTPATIENT
Start: 2024-07-17 | End: 2024-07-17 | Stop reason: HOSPADM

## 2024-07-17 RX ORDER — PROCHLORPERAZINE EDISYLATE 5 MG/ML
10 INJECTION INTRAMUSCULAR; INTRAVENOUS ONCE
Status: CANCELLED | OUTPATIENT
Start: 2024-07-18

## 2024-07-17 RX ORDER — HEPARIN 100 UNIT/ML
500 SYRINGE INTRAVENOUS AS NEEDED
Status: CANCELLED | OUTPATIENT
Start: 2024-07-17

## 2024-07-17 ASSESSMENT — ENCOUNTER SYMPTOMS
HEMATOLOGIC/LYMPHATIC NEGATIVE: 1
RESPIRATORY NEGATIVE: 1
CARDIOVASCULAR NEGATIVE: 1
EYES NEGATIVE: 1
NERVOUS/ANXIOUS: 1
DIARRHEA: 1
FATIGUE: 1
ENDOCRINE NEGATIVE: 1
MUSCULOSKELETAL NEGATIVE: 1
NAUSEA: 1

## 2024-07-17 ASSESSMENT — PAIN SCALES - GENERAL: PAINLEVEL: 0-NO PAIN

## 2024-07-17 NOTE — SIGNIFICANT EVENT
07/17/24 1625   Prechemo Checklist   Has the patient been in the hospital, ED, or urgent care since last date of service No   Chemo/Immuno Consent Completed and Signed Yes   Protocol/Indications Verified Yes   Confirmed to previous date/time of medication Yes   Compared to previous dose Yes   All medications are dated accurately Yes   Pregnancy Test Negative Yes   Parameters Met Yes   Provider Notified Yes   Is Patient Proceeding With Treatment? Yes   BSA/Weight-Height Verified Yes   Dose Calculations Verified (current, total, cumulative) Yes

## 2024-07-17 NOTE — PROGRESS NOTES
.Patient ID: Angie Tobin is a 58 y.o. female.  Referring Physician: Nico Walsh MD  [id number] [place] [address]  Primary Care Provider: LUDA Huertas-CNP     Oncology follow up    HPI  Cancer History:   Treatment Synopsis:   Ms. Tobin is a pleasant woman who presented with RUQ pain in March 2019. Further investigations and imaging showed a large heterogeneous mass in the pancreatic  bed. Initially this was thought to be lymphoma, however biopsy showed soft tissue sarcoma. On April 22, 2019 this mass was removed by Dr. Gonzalez via surgery. 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 She was subsequently  seen by Gyn Onc and underwent hysterectomy and bilateral salpingo-oopherectomy on Danae 10, 2019. This specimen did not show any evidence of tumor. We saw her first in August 2019. Scans did not detect any tumor at that point. Her case was discussed in  the tumor board for post op RT. Since there was no focus to be seen, it was decided that RT would be deferred for the future if a recurrence happens. CT scan in April 2021 detected new solitary nodules in lung and liver. MRI done on April 23, 2021 confirmed  recurrence in the liver. Her case was discussed in the tumor board and a decision was made to pursue systemic chemotherapy. We started her on doxorubicin and dacarbazine with zinecard protection. s/p 2 cycles, CT scans shows positive response in tumor  burden. Completed 6 cycles uneventfully on 08/26/2021.     Of note, the patient also has a history of Monoclonal B-cell lymphocytosis. She was worked up in 2016 for this reason (BM Biopsy report present in the physician portal)     03/31/2022- CT scan showed a lung nodule that is 0.6cm (was inconspicuous on the previous scan). Port removed on Feb 10, 2022.   07/05/2022- CT scan on 7/1/22 showed that the lung nodule has enlarged to 0.9cm. We petitioned for a biopsy of this lung nodule.  Interestingly, the liver lesions have disappeared.   08/16/2022- Lung biopsy completed in early august and path reads leiomyosarcoma. Case presented in tumor board on 8/19/22 and decision made to refer her to CT surgery   10/14/2022- Surgery completed- 6 wedges removed. Multiple small nodules of leiomyosarcoma and microscopic foci reported.   12/06/2022- CT scan is [facility]. Repeat staging in 3 months.  03/03/2023- CT scan showed the liver lesion to be stable.   06/02/2023- CT scan shows worsening liver lesion- MRI liver ordered on 06/16/2023 showing progressing liver mets. We petitioned for Docetaxel/Gemcitabine to start 07/17/2023.  08/21/2023- CT scan and MRI done after 2 cycles show progressive disease in the liver and in the lungs.   08/23/2023- Ordered Pazopanib.   08/30/2023- Started pazopanib at 400mg by mouth daily  09/07/2023- Increased to 800mg by mouth daily  11/13/2023- Imaging shows progressive disease in the liver.   11/15/2023- Stopped pazopanib and referred for Y-90.  12/19/2023- Received Y-90 treatment.      2024 01/23/24- Phone visit. Ordered imaging. Recovered well from y-90.  01/31/2024- CT scan done. Very small lung nodules. Radiology reports mild increase in size. I feel this is measurement differences.  02/02/2024- Excellent response to Y-90. However, there is another mass in the dome of the liver that is increasing in size. We spoke to Dr. Gold and he will perform Y-90 to that mass. However, upon review of MRI- Dr. Gold felt to wait for some time and repeat the scan in 3 months.  05/12/2024- Imaging shows progressive disease in the lung and in the liver.   05/14/2024- Met with the patient and discussed Balwinder. Her images will be discussed in the tumor board. Port placement was ordered.   05/17/2024- Tumor board discussion was done and chemotherapy was recommended  06/05/2024- Met with the patient and signed consent for Yondelis     Treatment History:   Systemic treatment:  1.  "Doxorubicin + Dacarbazine + Zinecard (Day 1-3) of 21 day of cycle.  C1- 05/10/2021 to 05/12/2021  C2- 06/01/2021 to 06/03/2021  C3- 06/22/2021 to 06/24/2021  C4- 07/13/2021 to 07/15/2021  C5- 08/03/2021 to 08/05/2021   C6- 08/24/2021 to 08/26/2021     2. Docetaxel (75mg/m2) + Gemcitabine (900mg/m2) D1, D8 of a 21 day cycle  C1- 07/17/2023   C2- 08/07/2023- Discontinued after progression noted.      3. Pazopanib   Start date 08/30/2023 at 400mg by mouth daily.   Increase to 09/07/2023 at 800mg by mouth daily.   Stopped on 11/15/23 due to progressive disease.     4. Yondelis 1.5 mg/m2  C1- 06/05/2024  C2- 06/27/2024  C3- 07/17/2024    Subjective   Please refer to \"Notes/Cancer History\" above for complete History of present illness.     Ms Angie Tobin     - Presents to clinic with a friend.  - Overall, tolerating treatment well.   - Nausea after treatment for a few days. Taking home antiemetics (Zofran and Compazine).   - Fatigue has been worse since completing radiation.   - Intermittent diarrhea since completing radiation.   - Continues to work full time from home.      Review of Systems:   Review of Systems   Constitutional:  Positive for fatigue.   HENT:  Negative.     Eyes: Negative.    Respiratory: Negative.     Cardiovascular: Negative.    Gastrointestinal:  Positive for diarrhea and nausea.        Diarrhea is intermittent, not occurring daily   Endocrine: Negative.    Genitourinary: Negative.     Musculoskeletal: Negative.    Skin: Negative.    Hematological: Negative.    Psychiatric/Behavioral:  The patient is nervous/anxious.          MEDICAL HISTORY  Past Medical History:   Diagnosis Date    Acute sinusitis 09/27/2023    Benign essential HTN 04/19/2023    Body mass index (BMI) 40.0-44.9, adult (Multi) 09/27/2023    Candidiasis, unspecified 01/20/2022    Antibiotic-induced yeast infection    Conjunctivitis 09/27/2023    Contact with and (suspected) exposure to covid-19 09/15/2022    Disorder of liver " 07/11/2023    Disorder of thyroid 10/18/2023    Elevated blood-pressure reading, without diagnosis of hypertension 05/26/2017    Elevated BP without diagnosis of hypertension    Gastroesophageal reflux disease 09/15/2022    Herniated lumbar intervertebral disc 09/13/2022    Comment on above: OTHER INTERVERTEBRAL DISC DISPLACEMENT, LUMBAR REGION    Herpes simplex virus (HSV) infection 04/19/2023    Hypercholesterolemia 09/15/2022    Inappropriate sinus tachycardia, so stated (CMS-HCC) 04/19/2023    Leiomyoma 03/11/2024    Lymphoproliferative disorder (Multi) 09/06/2023    Malignant neoplasm metastatic to left lung (Multi) 04/19/2023    Malignant neoplasm of body of uterus (Multi) 09/27/2023    Mass of pancreas ([facility]-HCC) 09/27/2023    Neuropathy 03/11/2024    Never smoked any substance 10/25/2023    Other conditions influencing health status 09/27/2017    History of cough    Personal history of diseases of the blood and blood-forming organs and certain disorders involving the immune mechanism 06/06/2016    History of leukocytosis    Personal history of other benign neoplasm 05/08/2019    History of other benign neoplasm    Personal history of other diseases of the musculoskeletal system and connective tissue 04/18/2018    History of low back pain    Personal history of other diseases of the nervous system and sense organs 10/03/2016    History of neuropathy    Personal history of other diseases of the respiratory system 08/31/2017    History of acute bronchitis    Personal history of other diseases of the respiratory system 01/24/2022    History of acute sinusitis    Personal history of other specified conditions 09/16/2020    History of weight gain    Postoperative pain 03/11/2024    Right lower quadrant abdominal tenderness 03/04/2019    RLQ abdominal tenderness    Right upper quadrant pain 03/13/2015    Abdominal pain, RUQ (right upper quadrant)    Secondary hypertension 10/18/2023    Toenail fungus 04/19/2023     Uterine leiomyoma 2023    Viral URI 2023    Vitamin D deficiency 2023    Weight gain 2024       FAMILY HISTORY  Family History   Problem Relation Name Age of Onset    Other (atrial flutter) Mother      Diabetes Mother      Leukemia Father      Liver cancer Father      Ovarian cancer Sister      Hypothyroidism Brother      Breast cancer Paternal Grandmother      No Known Problems Sibling         TOBACCO HISTORY  Tobacco Use: Low Risk  (2024)    Patient History     Smoking Tobacco Use: Never     Smokeless Tobacco Use: Never     Passive Exposure: Not on file       SOCIAL HISTORY  Social Connections: Not on file   Single, no children. Works full time.      Outpatient Medication Profile:  Current Outpatient Medications on File Prior to Visit   Medication Sig Dispense Refill    albuterol 108 (90 Base) MCG/ACT inhaler Inhale 1-2 puffs. Every 4-6 hours as needed      fluticasone (Flonase) 50 mcg/actuation nasal spray Administer 1 spray into each nostril once daily. Shake gently. Before first use, prime pump. After use, clean tip and replace cap. 16 g 0    lidocaine-prilocaine (Emla) 2.5-2.5 % cream Apply topically if needed for mild pain (1 - 3). Please apply to Mediport prior to access for chemotherapy 30 g 3    lisinopril 20 mg tablet Take 1 tablet (20 mg) by mouth once daily. 90 tablet 3    LORazepam (Ativan) 0.5 mg tablet Take 1 tablet (0.5 mg) by mouth every 8 hours if needed (agitation) for up to 10 days. 30 tablet 0    meclizine (Antivert) 12.5 mg tablet Take 1-2 tablets (12.5-25 mg) by mouth 3 times a day as needed (vertigo).      metFORMIN (Glucophage) 500 mg tablet Take 1 tablet (500 mg) by mouth 2 times a day. 180 tablet 3    metoprolol succinate XL (Toprol-XL) 25 mg 24 hr tablet Take 1 tablet (25 mg) by mouth once daily. 90 tablet 3    [] ondansetron (Zofran) 8 mg tablet Take 1 tablet (8 mg) by mouth every 8 hours if needed for nausea. 30 tablet 2     No current  facility-administered medications on file prior to visit.         Performance Status:  Symptomatic; fully ambulatory     Vitals and Measurements:   Vitals:    07/17/24 1420   BP: (!) 161/97   Pulse: (!) 145   Resp: 18   Temp: 37.6 °C (99.7 °F)   TempSrc: Core   SpO2: 98%   Weight: 121 kg (265 lb 10.5 oz)   PainSc: 0-No pain        Physical Exam:   Physical Exam  Constitutional:       Appearance: Normal appearance.   HENT:      Head: Normocephalic and atraumatic.      Comments: Wearing hair prosthesis       Nose: Nose normal.      Mouth/Throat:      Mouth: Mucous membranes are moist.      Pharynx: Oropharynx is clear.   Eyes:      Conjunctiva/sclera: Conjunctivae normal.   Cardiovascular:      Rate and Rhythm: Regular rhythm. Tachycardia present.      Pulses: Normal pulses.      Heart sounds: Normal heart sounds.   Pulmonary:      Effort: Pulmonary effort is normal.      Breath sounds: Normal breath sounds.   Abdominal:      General: Bowel sounds are normal.      Palpations: Abdomen is soft.   Musculoskeletal:         General: Normal range of motion.      Cervical back: Neck supple.   Skin:     General: Skin is warm and dry.   Neurological:      General: No focal deficit present.      Mental Status: She is alert and oriented to person, place, and time.   Psychiatric:         Mood and Affect: Mood normal.         Behavior: Behavior normal.      Lab Results:  I have reviewed these laboratory results:     Lab on 07/16/2024   Component Date Value Ref Range Status    Glucose 07/16/2024 187 (H)  74 - 99 mg/dL Final    Sodium 07/16/2024 137  136 - 145 mmol/L Final    Potassium 07/16/2024 4.3  3.5 - 5.3 mmol/L Final    Chloride 07/16/2024 103  98 - 107 mmol/L Final    Bicarbonate 07/16/2024 21  21 - 32 mmol/L Final    Anion Gap 07/16/2024 17  10 - 20 mmol/L Final    Urea Nitrogen 07/16/2024 11  6 - 23 mg/dL Final    Creatinine 07/16/2024 0.69  0.50 - 1.05 mg/dL Final    eGFR 07/16/2024 >90  >60 mL/min/1.73m*2 Final     Calcium 07/16/2024 11.3 (H)  8.6 - 10.6 mg/dL Final    Albumin 07/16/2024 4.2  3.4 - 5.0 g/dL Final    Alkaline Phosphatase 07/16/2024 369 (H)  33 - 110 U/L Final    Total Protein 07/16/2024 7.0  6.4 - 8.2 g/dL Final    AST 07/16/2024 61 (H)  9 - 39 U/L Final    Bilirubin, Total 07/16/2024 0.7  0.0 - 1.2 mg/dL Final    ALT 07/16/2024 42  7 - 45 U/L Final    LDH 07/16/2024 152  84 - 246 U/L Final    GGT 07/16/2024 731 (H)  5 - 55 U/L Final    Color, Urine 07/16/2024 Orange (N)  Light-Yellow, Yellow, Dark-Yellow Final    Appearance, Urine 07/16/2024 Ex.Turbid (N)  Clear Final    Specific Gravity, Urine 07/16/2024 1.031  1.005 - 1.035 Final    pH, Urine 07/16/2024 5.5  5.0, 5.5, 6.0, 6.5, 7.0, 7.5, 8.0 Final    Protein, Urine 07/16/2024 30 (1+) (A)  NEGATIVE, 10 (TRACE), 20 (TRACE) mg/dL Final    Glucose, Urine 07/16/2024 Normal  Normal mg/dL Final    Blood, Urine 07/16/2024 NEGATIVE  NEGATIVE Final    Ketones, Urine 07/16/2024 NEGATIVE  NEGATIVE mg/dL Final    Bilirubin, Urine 07/16/2024 NEGATIVE  NEGATIVE Final    Urobilinogen, Urine 07/16/2024 Normal  Normal mg/dL Final    Nitrite, Urine 07/16/2024 NEGATIVE  NEGATIVE Final    Leukocyte Esterase, Urine 07/16/2024 NEGATIVE  NEGATIVE Final    Creatine Kinase 07/16/2024 101  0 - 215 U/L Final    WBC, Urine 07/16/2024 NONE  1-5, NONE /HPF Final    RBC, Urine 07/16/2024 NONE  NONE, 1-2, 3-5 /HPF Final    Mucus, Urine 07/16/2024 4+  Reference range not established. /LPF Final   Hospital Outpatient Visit on 07/08/2024   Component Date Value Ref Range Status    Treatment Site 07/08/2024 T11   Final    Course Number 07/08/2024 1   Final    Prescribed Fractional Dose 07/08/2024 900  cGray Final    Prescribed Total Dose 07/08/2024 2,700  cGray Final    Actual Fractions Delivered 07/08/2024 3   Final    Prescription Pattern Comment 07/08/2024 Daily CBCT, align to bone   Final    Actual Session Delivered Dose 07/08/2024 900  cGray Final    Actual Total Dose 07/08/2024 2,700   cGray Final    Prescribed Technique 07/08/2024 SBRT   Final    Elapsed Days 07/08/2024 5   Final    Start Date 07/08/2024 7/3/2024   Final    Last Date 07/08/2024 7/8/2024   Final    Prescribed Number of Fractions 07/08/2024 3   Final   Hospital Outpatient Visit on 07/05/2024   Component Date Value Ref Range Status    Treatment Site 07/05/2024 T11   Final    Course Number 07/05/2024 1   Final    Prescribed Fractional Dose 07/05/2024 900  cGray Final    Prescribed Total Dose 07/05/2024 2,700  cGray Final    Actual Fractions Delivered 07/05/2024 2   Final    Prescription Pattern Comment 07/05/2024 Daily CBCT, align to bone   Final    Actual Session Delivered Dose 07/05/2024 900  cGray Final    Actual Total Dose 07/05/2024 1,800  cGray Final    Prescribed Technique 07/05/2024 SBRT   Final    Elapsed Days 07/05/2024 2   Final    Start Date 07/05/2024 7/3/2024   Final    Last Date 07/05/2024 7/5/2024   Final    Prescribed Number of Fractions 07/05/2024 3   Final   Hospital Outpatient Visit on 07/03/2024   Component Date Value Ref Range Status    Treatment Site 07/03/2024 T11   Final    Course Number 07/03/2024 1   Final    Prescribed Fractional Dose 07/03/2024 900  cGray Final    Prescribed Total Dose 07/03/2024 2,700  cGray Final    Actual Fractions Delivered 07/03/2024 1   Final    Prescription Pattern Comment 07/03/2024 Daily CBCT, align to bone   Final    Actual Session Delivered Dose 07/03/2024 900  cGray Final    Actual Total Dose 07/03/2024 900  cGray Final    Prescribed Technique 07/03/2024 SBRT   Final    Elapsed Days 07/03/2024 0   Final    Start Date 07/03/2024 7/3/2024   Final    Last Date 07/03/2024 7/3/2024   Final    Prescribed Number of Fractions 07/03/2024 3   Final   Lab on 06/25/2024   Component Date Value Ref Range Status    WBC 06/25/2024 7.8  4.4 - 11.3 x10*3/uL Final    nRBC 06/25/2024 0.0  0.0 - 0.0 /100 WBCs Final    RBC 06/25/2024 3.84 (L)  4.00 - 5.20 x10*6/uL Final    Hemoglobin 06/25/2024  10.7 (L)  12.0 - 16.0 g/dL Final    Hematocrit 06/25/2024 34.2 (L)  36.0 - 46.0 % Final    MCV 06/25/2024 89  80 - 100 fL Final    MCH 06/25/2024 27.9  26.0 - 34.0 pg Final    MCHC 06/25/2024 31.3 (L)  32.0 - 36.0 g/dL Final    RDW 06/25/2024 15.5 (H)  11.5 - 14.5 % Final    Platelets 06/25/2024 240  150 - 450 x10*3/uL Final    Neutrophils % 06/25/2024 61.3  40.0 - 80.0 % Final    Immature Granulocytes %, Automated 06/25/2024 2.3 (H)  0.0 - 0.9 % Final    Lymphocytes % 06/25/2024 23.0  13.0 - 44.0 % Final    Monocytes % 06/25/2024 11.5  2.0 - 10.0 % Final    Eosinophils % 06/25/2024 1.0  0.0 - 6.0 % Final    Basophils % 06/25/2024 0.9  0.0 - 2.0 % Final    Neutrophils Absolute 06/25/2024 4.78  1.20 - 7.70 x10*3/uL Final    Immature Granulocytes Absolute, Au* 06/25/2024 0.18  0.00 - 0.70 x10*3/uL Final    Lymphocytes Absolute 06/25/2024 1.80  1.20 - 4.80 x10*3/uL Final    Monocytes Absolute 06/25/2024 0.90  0.10 - 1.00 x10*3/uL Final    Eosinophils Absolute 06/25/2024 0.08  0.00 - 0.70 x10*3/uL Final    Basophils Absolute 06/25/2024 0.07  0.00 - 0.10 x10*3/uL Final    Glucose 06/25/2024 211 (H)  74 - 99 mg/dL Final    Sodium 06/25/2024 138  136 - 145 mmol/L Final    Potassium 06/25/2024 4.5  3.5 - 5.3 mmol/L Final    Chloride 06/25/2024 103  98 - 107 mmol/L Final    Bicarbonate 06/25/2024 25  21 - 32 mmol/L Final    Anion Gap 06/25/2024 15  10 - 20 mmol/L Final    Urea Nitrogen 06/25/2024 11  6 - 23 mg/dL Final    Creatinine 06/25/2024 0.78  0.50 - 1.05 mg/dL Final    eGFR 06/25/2024 89  >60 mL/min/1.73m*2 Final    Calcium 06/25/2024 11.5 (H)  8.6 - 10.6 mg/dL Final    Albumin 06/25/2024 4.2  3.4 - 5.0 g/dL Final    Alkaline Phosphatase 06/25/2024 332 (H)  33 - 110 U/L Final    Total Protein 06/25/2024 6.5  6.4 - 8.2 g/dL Final    AST 06/25/2024 68 (H)  9 - 39 U/L Final    Bilirubin, Total 06/25/2024 0.7  0.0 - 1.2 mg/dL Final    ALT 06/25/2024 84 (H)  7 - 45 U/L Final    LDH 06/25/2024 133  84 - 246 U/L Final    GGT  06/25/2024 910 (H)  5 - 55 U/L Final    Color, Urine 06/25/2024 Light-Orange (N)  Light-Yellow, Yellow, Dark-Yellow Final    Appearance, Urine 06/25/2024 Ex.Turbid (N)  Clear Final    Specific Gravity, Urine 06/25/2024 1.020  1.005 - 1.035 Final    pH, Urine 06/25/2024 5.5  5.0, 5.5, 6.0, 6.5, 7.0, 7.5, 8.0 Final    Protein, Urine 06/25/2024 20 (TRACE)  NEGATIVE, 10 (TRACE), 20 (TRACE) mg/dL Final    Glucose, Urine 06/25/2024 500 (3+) (A)  Normal mg/dL Final    Blood, Urine 06/25/2024 NEGATIVE  NEGATIVE Final    Ketones, Urine 06/25/2024 NEGATIVE  NEGATIVE mg/dL Final    Bilirubin, Urine 06/25/2024 NEGATIVE  NEGATIVE Final    Urobilinogen, Urine 06/25/2024 Normal  Normal mg/dL Final    Nitrite, Urine 06/25/2024 2+ (A)  NEGATIVE Final    Leukocyte Esterase, Urine 06/25/2024 75 Christine/µL (A)  NEGATIVE Final    Creatine Kinase 06/25/2024 45  0 - 215 U/L Final    WBC, Urine 06/25/2024 NONE  1-5, NONE /HPF Final    RBC, Urine 06/25/2024 NONE  NONE, 1-2, 3-5 /HPF Final    Squamous Epithelial Cells, Urine 06/25/2024 1-9 (SPARSE)  Reference range not established. /HPF Final    Bacteria, Urine 06/25/2024 3+ (A)  NONE SEEN /HPF Final    Mucus, Urine 06/25/2024 3+  Reference range not established. /LPF Final         Radiology Result:  I have reviewed the latest Imaging in PACS and the findings are noted in this note. I discussed the results of the latest imaging with the patient. All previous imaging were reviewed at the time it was completed. Full records are available in the EMR for review as well.     MR ABDOMEN W AND WO IV CONTRAST; MR PELVIS W AND WO IV CONTRAST;  5/10/2024 10:56 am    IMPRESSION:  Compared to 01/31/2024 MRI, decreased size of segment 6 and 7 lesions  including dominant segment 7 lesion measuring 41 mm, previously 45  mm. Increased size of segment 8 lesions and stable size segment 2  lesion. No definite new hepatic lesions.      Increased size 21 mm T11 posterior epidural soft tissue mass,  concerning for  metastasis. Associated moderate to severe canal  stenosis without definite evidence of myopathy.  ==============================================  STUDY:  CT CHEST WO IV CONTRAST;  5/10/2024 8:16 am    IMPRESSION:  1.  Mild interval increase in size of scattered subcentimeter  pulmonary nodules within the bilateral lungs as described above and  compared with most recent prior exam dated 01/31/2024. Findings are  again suggestive of worsening metastatic disease, although these  again appear to be too small to be assessed on PET-CT at this time.  2. New 0.5 cm lytic lesion within the left posterior 8th rib  concerning for a new focus of metastatic disease.  3. Hepatic steatosis. Correlate with serum LFTs.  4. Moderate to severe coronary artery calcifications.  5. Remaining chronic and incidental findings are unchanged as  described above.      Pathology Results:  I have reviewed the full pathology report recorded in the EMR. The pertinent portions indicating diagnosis are listed here in the note. for details please refer to the full report recorded in the EMR.    Surgical Pathology [Apr 30 2019 4:54PM] ([id number])     Specimens: RETROPERITONEAL MASS ANTERIOR TO VENA CAVA   Received fresh for intraoperative consultation, labeled with the patient's      Name MAGAN SCHULTZ      Accession #: [id number]   Pathologist: CIELO AGUIRRE M.D.   Date of Procedure: 4/22/2019   Date Received: 4/22/2019   Date Reported 4/30/2019   Submitting Physician: KANA MUKHERJEE M.D.   Location: OR Other External #         FINAL DIAGNOSIS   A. RETROPERITONEAL MASS, ANTERIOR TO VENA CAVA, EXCISION:   -- LEIOMYOSARCOMA, 8.7 CM, INFILTRATING VESSEL WALL OF INFERIOR VENA CAVA, SEE NOTE   -- DISTANCE TO SOFT TISSUE/ PERIPHERAL RESECTION MARGIN < 0.5 MM, FOCAL INVOLVEMENT CANNOT BY EXCLUDED   -- VASCULAR MARGINS UNINVOLVED BY MALIGNANCY   -- TWO LYMPH NODES WITH NO EVIDENCE OF METASTASIS (0/2)      Note: The spindle cell neoplasm  demonstrates moderate to high-grade nuclear atypia, a high mitotic rate (up to 28 mitoses per 10 HPF), and extensively infiltrates the adventitia and  media of the venous vessel wall. Focal (indeterminate-type) necrosis is noted. Immunohistochemistry demonstrates expression of estrogen receptor (60-70% of tumor cells) and progesterone receptor (>95% of tumor cells) and focal WT-1. Immunohistochemistry  performed on the prior core needle biopsy ([id number]) had demonstrated smooth muscle differentiation (positive: SMA, desmin, h-caldesmon; negative: CD34, HMB-45, myogenin, S100, DOG-1, ).      The gross and/or microscopic findings were reviewed in conjunction with pathology resident, Vika Ruiz M.D.      Electronically Signed Out By CIELO AGUIRRE M.D./CIRO   By the signature on this report, the individual or group listed as making the Final Interpretation/Diagnosis certifies that they have reviewed this case.      Assessment and Plan:   Assessment/Plan   Ms. Angie Tobin is a 58 y.o. female with a diagnosis of metastatic leiomyosarcoma. Please see the evolution of the case listed above in the cancer history.      [facility] results- ANNE MARIE stable, TMB- 0 muts/Mb. RB gene mutated. TP53 and PTEN mutated.      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   - Cycle 3 Yondelis today.   - RTC in 3 weeks on 08/07/2024  - Labs, CT chest, MRI abdomen and pelvis prior 08/05/2024.    # Nausea  - 1 liter IVF and 10mg IVP Compazine added with pump dc tomorrow.  - Continue home antiemetics as prescribed.   - Will add supportive onc if needed.      # Spots noted on T11 and ribs.   - Met Dr. Lujan and started radiation to the thoracic lesion.      # B-cell monoclonal lymphocytosis:   - Continue monitoring.     # High PTH levels and hypercalcemia  - Continue follow up with Endocrinology and nephrology      # Tachycardia   - Following with Cardiology. Taking Metoprolol.      # Diabetes Mellitus  -  Followed by PCP.  - Currently on Metformin    # Anxiety  - Declined antidepressant at this time, but open to seeing onco-psych. Referral placed.     DISCLAIMER:   In preparing for this visit and writing this note, I reviewed all the previous electronic medical records (labs, imaging and medical charts) of the patient available in the physician portal. Significant findings which helped in decision making are recorded  in this chart.     The plan was discussed with the patient. We gave her ample opportunities to ask questions. All questions were answered to her satisfaction and she verbalized understanding.      INSTRUCTIONS FOR PATIENT  Ms. Angie Tobin ,  It was a pleasure talking to you today.    As discussed, your chemo disconnect is tomorrow. We will meet again in 3 weeks on 08/07/2024. Please have labs, CT scan and MRI prior to the visit on 08/05/2024.    Please be advised that you are receiving treatment for your cancer in the form of chemotherapy. Intravenous chemotherapy can increase your risk of infections by suppressing the cells that constitute your immune system. Although this effect is temporary, and full recovery is expected, it does put you at a very high risk of infections. If you develop a fever above 100.4 degree farenheit, or develop chills with rigors, then please report to your nearest emergency room right away. DO NOT stay home in such a scenario. Also, please be aware that chemotherapy can increase your risk of venous thromboembolism (aka blood clot). If you develop chest pain, shortness of breath, swelling in your legs then please report to the nearest Emergency Room as soon as possible or call 911. Please remember, if you are dealing with a situation which you think requires urgent attention, then please report to the nearest ER for evaluation or call 911. For all other non-urgent matters please contact our office.     Please make sure to schedule your appointments as we discussed. Your  appointments should also appear in your MyChart.   In case of an emergency please dial 911 or report to your nearest Emergency Room.  For all other questions, please do not hesitate to reach out to us at the number listed below.    Thank you for choosing [facility] at [facility].   We appreciate your visit.     Roxann Phelps CNP   Sarcoma and Cutaneous Oncology  [facility]    Phone: [phone]  Fax: [phone]

## 2024-07-17 NOTE — PROGRESS NOTES
Radiation Oncology Treatment Summary    Patient Name:  Angie Tobin  MRN:  [id number]  :  1965    Referring Provider: No ref. provider found  Primary Care Provider: LUDA Huertas-CNP    Brief History: Angie Tobin is a 58 y.o. female with No matching staging information was found for the patient..  The patient completed radiotherapy as outlined below.    Radiation Treatment Summary:    SBRT: Not Applicable Thoracic spine    Treatment Period Technique Fraction Dose Fractions Total Dose   Course 1 7/3/2024-2024  (days elapsed: 5)         T11 7/3/2024-2024 SBRT 900 / 900 cGy 3 / 3 2700 / 2,700 cGy       Please see the patient's Mos[facility] chart for further details regarding the radiation plan, including beam energy.    Concurrent Chemotherapy:  Treatment Plans       Name Type Plan Dates Plan Provider         Active    Trabectedin, 21 Day Cycles Oncology Treatment  2024 - Present Nico Walsh MD                    CTCAE Toxicity Overview:   Toxicity Assessment          2024    16:48   Toxicity Assessment   Adverse Events Reviewed (WDL) Yes (Within Defined Limits)   Treatment Site Thoracic;Bone   Anorexia Grade 0   Anxiety Grade 0   Dehydration Grade 0   Depression Grade 0   Dermatitis Radiation Grade 0   Diarrhea Grade 1   Fatigue Grade 1   Fibrosis Deep Connective Tissue Grade 0   Fracture Grade 0   Nausea Grade 1   Pain Grade 0   Treatment Related Secondary Malignancy Grade 0   Tumor Pain Grade 0   Vomiting Grade 1   Constipation Grade 0   Dyspepsia Grade 0   Dysphagia Grade 0   Esophagitis Grade 0   Mucositis Oral Grade 0   Upper Gastrointestinal Hemorrhage Grade 0   Peripheral Sensory Neuropathy Grade 0   Joint Range of Motion Decreased Grade 0   Brachial Plexopathy Grade 0   Pneumonitis Grade 0   Bone Pain Grade 0   Edema Limbs Grade 0   Aspiration Grade 0   Hoarseness Grade 0   Laryngeal Edema Grade 0   Myocardial Infarction Grade 0   Pericardial Effusion Grade 0    Pericarditis Grade 0   Esophageal Fistula Grade 0   Esophageal Obstruction Grade 0   Esophageal Pain Grade 0   Esophageal Stenosis Grade 0   Esophageal Ulcer Grade 0   Bronchial Obstruction Grade 0   Cough Grade 0   Dyspnea Grade 0   Epistaxis Grade 0   Hiccups Grade 0   Hypoxia Grade 0   Pulmonary Fibrosis Grade 0   Lymphedema Grade 0   Thromboembolic Event Grade 0   Hot Flashes Grade 0   Alopecia Grade 0   Erythroderma Grade 0   Pain of Skin Grade 0   Pruritus Grade 0   Rash Acneiform Grade 0   Skin Hyperpigmentation Grade 0   Skin Hypopigmentation Grade 0   Skin Induration Grade 0   Skin Ulceration Grade 0   Telangiectasia Grade 0     Patient Disposition: Patient will follow-up in clinic 10/7/2024 with scans.

## 2024-07-17 NOTE — PROGRESS NOTES
Notified by Gerald in Lab Services that CBC/diff was canceled d/t no specimen being received in lab. Secure Chat sent to Dr. Walsh and team.

## 2024-07-18 ENCOUNTER — INFUSION (OUTPATIENT)
Dept: HEMATOLOGY/ONCOLOGY | Facility: CLINIC | Age: 59
End: 2024-07-18
Payer: COMMERCIAL

## 2024-07-18 DIAGNOSIS — C78.00 MALIGNANT NEOPLASM METASTATIC TO LUNG, UNSPECIFIED LATERALITY (MULTI): ICD-10-CM

## 2024-07-18 DIAGNOSIS — C49.9 LEIOMYOSARCOMA (MULTI): ICD-10-CM

## 2024-07-18 DIAGNOSIS — C49.9 METASTATIC SARCOMA TO LIVER (MULTI): ICD-10-CM

## 2024-07-18 DIAGNOSIS — C78.01 MALIGNANT NEOPLASM METASTATIC TO BOTH LUNGS (MULTI): ICD-10-CM

## 2024-07-18 DIAGNOSIS — T45.1X5A CHEMOTHERAPY INDUCED NAUSEA AND VOMITING: ICD-10-CM

## 2024-07-18 DIAGNOSIS — C78.7 METASTATIC SARCOMA TO LIVER (MULTI): ICD-10-CM

## 2024-07-18 DIAGNOSIS — R11.2 CHEMOTHERAPY INDUCED NAUSEA AND VOMITING: ICD-10-CM

## 2024-07-18 DIAGNOSIS — C78.02 MALIGNANT NEOPLASM METASTATIC TO BOTH LUNGS (MULTI): ICD-10-CM

## 2024-07-18 PROCEDURE — 2500000004 HC RX 250 GENERAL PHARMACY W/ HCPCS (ALT 636 FOR OP/ED): Performed by: NURSE PRACTITIONER

## 2024-07-18 PROCEDURE — 96374 THER/PROPH/DIAG INJ IV PUSH: CPT | Mod: INF

## 2024-07-18 PROCEDURE — 2500000004 HC RX 250 GENERAL PHARMACY W/ HCPCS (ALT 636 FOR OP/ED): Performed by: INTERNAL MEDICINE

## 2024-07-18 PROCEDURE — 96361 HYDRATE IV INFUSION ADD-ON: CPT | Mod: INF

## 2024-07-18 RX ORDER — HEPARIN SODIUM,PORCINE/PF 10 UNIT/ML
50 SYRINGE (ML) INTRAVENOUS AS NEEDED
Status: DISCONTINUED | OUTPATIENT
Start: 2024-07-18 | End: 2024-07-18 | Stop reason: HOSPADM

## 2024-07-18 RX ORDER — HEPARIN SODIUM,PORCINE/PF 10 UNIT/ML
50 SYRINGE (ML) INTRAVENOUS AS NEEDED
OUTPATIENT
Start: 2024-07-18

## 2024-07-18 RX ORDER — PROCHLORPERAZINE EDISYLATE 5 MG/ML
10 INJECTION INTRAMUSCULAR; INTRAVENOUS ONCE
Status: COMPLETED | OUTPATIENT
Start: 2024-07-18 | End: 2024-07-18

## 2024-07-18 RX ORDER — HEPARIN 100 UNIT/ML
500 SYRINGE INTRAVENOUS AS NEEDED
OUTPATIENT
Start: 2024-07-18

## 2024-07-18 RX ORDER — HEPARIN 100 UNIT/ML
500 SYRINGE INTRAVENOUS AS NEEDED
Status: DISCONTINUED | OUTPATIENT
Start: 2024-07-18 | End: 2024-07-18 | Stop reason: HOSPADM

## 2024-07-22 DIAGNOSIS — Z79.899 ENCOUNTER FOR MONITORING CARDIOTOXIC DRUG THERAPY: Primary | ICD-10-CM

## 2024-07-22 DIAGNOSIS — Z51.81 ENCOUNTER FOR MONITORING CARDIOTOXIC DRUG THERAPY: Primary | ICD-10-CM

## 2024-07-23 ENCOUNTER — NURSE TRIAGE (OUTPATIENT)
Dept: ADMISSION | Facility: HOSPITAL | Age: 59
End: 2024-07-23
Payer: COMMERCIAL

## 2024-07-23 NOTE — TELEPHONE ENCOUNTER
Pt states she has been having diarrhea since Sunday- loose watery brown stool. No bright red blood or black tarry appearance.  2-4 loose stools on average.  Pt denies fever, body aches, chills, dyspnea, nausea/vomiting, headache, dizziness.  Urine is pale yellow   Pt drinking 30-50 oz fluids.   Pt has not been using imodium as discussed with RICO Quintero CNP at last FUV.   Pt in agreement to try imodium, increase fluid intake, and notify team with any new/worsening symptoms.   Pt states she is feeling better today than she has in a few days.   She does not feel need to have IV fluids, but will call team if symptoms worsen or do not improve  Infusion 7/18, next FUV and infusion 8/5

## 2024-07-23 NOTE — TELEPHONE ENCOUNTER
Per RICO Quintero CNP directive- pt will try 2 imodium after 1st loose stool to see if any relief, she will update team if no relief. Team is agreeable to order IV fluids if pt desires.   Pt will update team over next 24 hrs

## 2024-07-24 ENCOUNTER — NURSE TRIAGE (OUTPATIENT)
Dept: HEMATOLOGY/ONCOLOGY | Facility: HOSPITAL | Age: 59
End: 2024-07-24
Payer: COMMERCIAL

## 2024-07-24 DIAGNOSIS — C49.9 LEIOMYOSARCOMA (MULTI): Primary | ICD-10-CM

## 2024-07-24 DIAGNOSIS — C49.9 LEIOMYOSARCOMA (MULTI): ICD-10-CM

## 2024-07-24 DIAGNOSIS — C78.00 MALIGNANT NEOPLASM METASTATIC TO LUNG, UNSPECIFIED LATERALITY (MULTI): Primary | ICD-10-CM

## 2024-07-24 DIAGNOSIS — R19.7 DIARRHEA, UNSPECIFIED TYPE: ICD-10-CM

## 2024-07-24 NOTE — TELEPHONE ENCOUNTER
Pt called to follow up.  She has been taking imodium and has had 2 episodes of diarrhea in past 24 hrs.  Denies blood, fat or mucus in stools.  No nausea, vomiting, fevers, chest pain or difficulty breathing.  She's drinking Pedialyte but still feels very weak and would like to come in for IVF tomorrow morning if possible.  Additional Information   What have you eaten in the last 2 days?     Crackers, banana   Commented on: What helps these problems?     Using imodium as directed   Commented on: What have you been drinking in the last 24 hours?     50 oz. pedialyte    Protocols used: Diarrhea

## 2024-07-24 NOTE — TELEPHONE ENCOUNTER
Pt agreeable to infusion appt 7/25 at 0800 at [facility].  Orders to be placed by Roxann Phelps CNP.

## 2024-07-25 ENCOUNTER — APPOINTMENT (OUTPATIENT)
Dept: HEMATOLOGY/ONCOLOGY | Facility: CLINIC | Age: 59
End: 2024-07-25
Payer: COMMERCIAL

## 2024-07-25 ENCOUNTER — APPOINTMENT (OUTPATIENT)
Dept: RADIOLOGY | Facility: HOSPITAL | Age: 59
End: 2024-07-25
Payer: COMMERCIAL

## 2024-07-25 ENCOUNTER — APPOINTMENT (OUTPATIENT)
Dept: CARDIOLOGY | Facility: HOSPITAL | Age: 59
End: 2024-07-25
Payer: COMMERCIAL

## 2024-07-25 ENCOUNTER — HOSPITAL ENCOUNTER (INPATIENT)
Facility: HOSPITAL | Age: 59
End: 2024-07-25
Attending: INTERNAL MEDICINE | Admitting: INTERNAL MEDICINE
Payer: COMMERCIAL

## 2024-07-25 ENCOUNTER — NURSE TRIAGE (OUTPATIENT)
Dept: ADMISSION | Facility: HOSPITAL | Age: 59
End: 2024-07-25
Payer: COMMERCIAL

## 2024-07-25 ENCOUNTER — HOSPITAL ENCOUNTER (INPATIENT)
Facility: HOSPITAL | Age: 59
LOS: 1 days | Discharge: ANOTHER HOSPITAL | End: 2024-07-26
Attending: STUDENT IN AN ORGANIZED HEALTH CARE EDUCATION/TRAINING PROGRAM | Admitting: INTERNAL MEDICINE
Payer: COMMERCIAL

## 2024-07-25 DIAGNOSIS — N17.9 ACUTE RENAL FAILURE, UNSPECIFIED ACUTE RENAL FAILURE TYPE (CMS-HCC): Primary | ICD-10-CM

## 2024-07-25 DIAGNOSIS — D64.9 ANEMIA, UNSPECIFIED TYPE: ICD-10-CM

## 2024-07-25 DIAGNOSIS — A41.9 SEPTIC SHOCK (MULTI): ICD-10-CM

## 2024-07-25 DIAGNOSIS — C49.9 LEIOMYOSARCOMA (MULTI): ICD-10-CM

## 2024-07-25 DIAGNOSIS — D72.819 LEUKOPENIA, UNSPECIFIED TYPE: ICD-10-CM

## 2024-07-25 DIAGNOSIS — D69.6 THROMBOCYTOPENIA (CMS-HCC): ICD-10-CM

## 2024-07-25 DIAGNOSIS — R53.1 GENERALIZED WEAKNESS: ICD-10-CM

## 2024-07-25 DIAGNOSIS — R65.21 SEPTIC SHOCK (MULTI): ICD-10-CM

## 2024-07-25 DIAGNOSIS — E86.0 DEHYDRATION: ICD-10-CM

## 2024-07-25 DIAGNOSIS — R19.7 DIARRHEA, UNSPECIFIED TYPE: ICD-10-CM

## 2024-07-25 DIAGNOSIS — C78.00 MALIGNANT NEOPLASM METASTATIC TO LUNG, UNSPECIFIED LATERALITY (MULTI): ICD-10-CM

## 2024-07-25 PROBLEM — Z86.79 HISTORY OF HYPERTENSION: Status: ACTIVE | Noted: 2024-07-25

## 2024-07-25 PROBLEM — N30.00 ACUTE CYSTITIS WITHOUT HEMATURIA: Status: ACTIVE | Noted: 2024-07-25

## 2024-07-25 PROBLEM — R79.89 ELEVATED TROPONIN: Status: ACTIVE | Noted: 2024-07-25

## 2024-07-25 PROBLEM — E11.9 DM (DIABETES MELLITUS) (MULTI): Status: ACTIVE | Noted: 2024-07-25

## 2024-07-25 PROBLEM — I95.9 HYPOTENSION: Status: ACTIVE | Noted: 2024-07-25

## 2024-07-25 PROBLEM — E87.5 HYPERKALEMIA: Status: ACTIVE | Noted: 2024-07-25

## 2024-07-25 PROBLEM — E87.20 METABOLIC ACIDOSIS: Status: ACTIVE | Noted: 2024-07-25

## 2024-07-25 LAB
ALBUMIN SERPL-MCNC: 3.2 G/DL (ref 3.5–5)
ALBUMIN SERPL-MCNC: 3.3 G/DL (ref 3.5–5)
ALP BLD-CCNC: 408 U/L (ref 35–125)
ALT SERPL-CCNC: 72 U/L (ref 5–40)
ANION GAP SERPL CALC-SCNC: 17 MMOL/L
ANION GAP SERPL CALC-SCNC: 19 MMOL/L
ANION GAP SERPL CALC-SCNC: >19 MMOL/L
APPEARANCE UR: ABNORMAL
AST SERPL-CCNC: 87 U/L (ref 5–40)
ATRIAL RATE: 106 BPM
BACTERIA #/AREA URNS AUTO: ABNORMAL /HPF
BASOPHILS # BLD MANUAL: 0 X10*3/UL (ref 0–0.1)
BASOPHILS NFR BLD MANUAL: 0 %
BILIRUB SERPL-MCNC: 1.1 MG/DL (ref 0.1–1.2)
BILIRUB UR STRIP.AUTO-MCNC: ABNORMAL MG/DL
BUN SERPL-MCNC: 90 MG/DL (ref 8–25)
BUN SERPL-MCNC: 91 MG/DL (ref 8–25)
BUN SERPL-MCNC: 92 MG/DL (ref 8–25)
BURR CELLS BLD QL SMEAR: ABNORMAL
CALCIUM SERPL-MCNC: 7.8 MG/DL (ref 8.5–10.4)
CALCIUM SERPL-MCNC: 7.8 MG/DL (ref 8.5–10.4)
CALCIUM SERPL-MCNC: 7.9 MG/DL (ref 8.5–10.4)
CHLORIDE SERPL-SCNC: 100 MMOL/L (ref 97–107)
CHLORIDE SERPL-SCNC: 99 MMOL/L (ref 97–107)
CHLORIDE SERPL-SCNC: 99 MMOL/L (ref 97–107)
CK SERPL-CCNC: 468 U/L (ref 24–195)
CO2 SERPL-SCNC: 12 MMOL/L (ref 24–31)
CO2 SERPL-SCNC: 12 MMOL/L (ref 24–31)
CO2 SERPL-SCNC: 14 MMOL/L (ref 24–31)
COLOR UR: ABNORMAL
CREAT SERPL-MCNC: 7.5 MG/DL (ref 0.4–1.6)
CREAT SERPL-MCNC: 7.6 MG/DL (ref 0.4–1.6)
CREAT SERPL-MCNC: 7.7 MG/DL (ref 0.4–1.6)
DACRYOCYTES BLD QL SMEAR: ABNORMAL
EGFRCR SERPLBLD CKD-EPI 2021: 6 ML/MIN/1.73M*2
EOSINOPHIL # BLD MANUAL: 0 X10*3/UL (ref 0–0.7)
EOSINOPHIL NFR BLD MANUAL: 0 %
ERYTHROCYTE [DISTWIDTH] IN BLOOD BY AUTOMATED COUNT: 17.5 % (ref 11.5–14.5)
GLUCOSE BLD MANUAL STRIP-MCNC: 281 MG/DL (ref 74–99)
GLUCOSE BLD MANUAL STRIP-MCNC: 309 MG/DL (ref 74–99)
GLUCOSE SERPL-MCNC: 301 MG/DL (ref 65–99)
GLUCOSE SERPL-MCNC: 316 MG/DL (ref 65–99)
GLUCOSE SERPL-MCNC: 326 MG/DL (ref 65–99)
GLUCOSE UR STRIP.AUTO-MCNC: ABNORMAL MG/DL
HCT VFR BLD AUTO: 27.2 % (ref 36–46)
HGB BLD-MCNC: 8.7 G/DL (ref 12–16)
HOLD SPECIMEN: NORMAL
IMM GRANULOCYTES # BLD AUTO: 0.23 X10*3/UL (ref 0–0.7)
IMM GRANULOCYTES NFR BLD AUTO: 5.9 % (ref 0–0.9)
KETONES UR STRIP.AUTO-MCNC: ABNORMAL MG/DL
LACTATE BLDV-SCNC: 1.9 MMOL/L (ref 0.4–2)
LACTATE BLDV-SCNC: 2.4 MMOL/L (ref 0.4–2)
LEUKOCYTE ESTERASE UR QL STRIP.AUTO: ABNORMAL
LIPASE SERPL-CCNC: 77 U/L (ref 16–63)
LYMPHOCYTES # BLD MANUAL: 0.16 X10*3/UL (ref 1.2–4.8)
LYMPHOCYTES NFR BLD MANUAL: 4 %
MCH RBC QN AUTO: 28.4 PG (ref 26–34)
MCHC RBC AUTO-ENTMCNC: 32 G/DL (ref 32–36)
MCV RBC AUTO: 89 FL (ref 80–100)
MONOCYTES # BLD MANUAL: 0.16 X10*3/UL (ref 0.1–1)
MONOCYTES NFR BLD MANUAL: 4 %
NEUTS SEG # BLD MANUAL: 3.59 X10*3/UL (ref 1.2–7)
NEUTS SEG NFR BLD MANUAL: 92 %
NITRITE UR QL STRIP.AUTO: NEGATIVE
NRBC BLD-RTO: 0 /100 WBCS (ref 0–0)
P AXIS: 34 DEGREES
P OFFSET: 184 MS
P ONSET: 138 MS
PH UR STRIP.AUTO: 5.5 [PH]
PHOSPHATE SERPL-MCNC: 3.4 MG/DL (ref 2.5–4.5)
PHOSPHATE SERPL-MCNC: 3.7 MG/DL (ref 2.5–4.5)
PLATELET # BLD AUTO: 86 X10*3/UL (ref 150–450)
POTASSIUM SERPL-SCNC: 4.9 MMOL/L (ref 3.4–5.1)
POTASSIUM SERPL-SCNC: 5.5 MMOL/L (ref 3.4–5.1)
POTASSIUM SERPL-SCNC: 5.6 MMOL/L (ref 3.4–5.1)
PR INTERVAL: 146 MS
PROT SERPL-MCNC: 6 G/DL (ref 5.9–7.9)
PROT UR STRIP.AUTO-MCNC: ABNORMAL MG/DL
Q ONSET: 211 MS
QRS COUNT: 17 BEATS
QRS DURATION: 88 MS
QT INTERVAL: 376 MS
QTC CALCULATION(BAZETT): 499 MS
QTC FREDERICIA: 454 MS
R AXIS: -4 DEGREES
RBC # BLD AUTO: 3.06 X10*6/UL (ref 4–5.2)
RBC # UR STRIP.AUTO: ABNORMAL /UL
RBC #/AREA URNS AUTO: ABNORMAL /HPF
RBC MORPH BLD: ABNORMAL
SARS-COV-2 RNA RESP QL NAA+PROBE: NOT DETECTED
SODIUM SERPL-SCNC: 129 MMOL/L (ref 133–145)
SODIUM SERPL-SCNC: 130 MMOL/L (ref 133–145)
SODIUM SERPL-SCNC: 133 MMOL/L (ref 133–145)
SP GR UR STRIP.AUTO: 1.02
SQUAMOUS #/AREA URNS AUTO: ABNORMAL /HPF
T AXIS: 8 DEGREES
T OFFSET: 399 MS
TOTAL CELLS COUNTED BLD: 100
TROPONIN T SERPL-MCNC: 78 NG/L
TROPONIN T SERPL-MCNC: 80 NG/L
TROPONIN T SERPL-MCNC: 83 NG/L
URATE SERPL-MCNC: 15.9 MG/DL (ref 2.5–6.8)
UROBILINOGEN UR STRIP.AUTO-MCNC: ABNORMAL MG/DL
VENTRICULAR RATE: 106 BPM
WBC # BLD AUTO: 3.9 X10*3/UL (ref 4.4–11.3)
WBC #/AREA URNS AUTO: >50 /HPF

## 2024-07-25 PROCEDURE — 93005 ELECTROCARDIOGRAM TRACING: CPT

## 2024-07-25 PROCEDURE — 80048 BASIC METABOLIC PNL TOTAL CA: CPT | Mod: CCI | Performed by: CLINICAL NURSE SPECIALIST

## 2024-07-25 PROCEDURE — 84484 ASSAY OF TROPONIN QUANT: CPT | Performed by: CLINICAL NURSE SPECIALIST

## 2024-07-25 PROCEDURE — 99291 CRITICAL CARE FIRST HOUR: CPT | Performed by: CLINICAL NURSE SPECIALIST

## 2024-07-25 PROCEDURE — 2060000001 HC INTERMEDIATE ICU ROOM DAILY

## 2024-07-25 PROCEDURE — 85027 COMPLETE CBC AUTOMATED: CPT | Performed by: CLINICAL NURSE SPECIALIST

## 2024-07-25 PROCEDURE — 96361 HYDRATE IV INFUSION ADD-ON: CPT

## 2024-07-25 PROCEDURE — 83690 ASSAY OF LIPASE: CPT | Performed by: CLINICAL NURSE SPECIALIST

## 2024-07-25 PROCEDURE — 87040 BLOOD CULTURE FOR BACTERIA: CPT | Mod: WESLAB | Performed by: CLINICAL NURSE SPECIALIST

## 2024-07-25 PROCEDURE — 84550 ASSAY OF BLOOD/URIC ACID: CPT | Performed by: CLINICAL NURSE SPECIALIST

## 2024-07-25 PROCEDURE — 2500000002 HC RX 250 W HCPCS SELF ADMINISTERED DRUGS (ALT 637 FOR MEDICARE OP, ALT 636 FOR OP/ED): Performed by: NURSE PRACTITIONER

## 2024-07-25 PROCEDURE — 80053 COMPREHEN METABOLIC PANEL: CPT | Performed by: CLINICAL NURSE SPECIALIST

## 2024-07-25 PROCEDURE — 2500000004 HC RX 250 GENERAL PHARMACY W/ HCPCS (ALT 636 FOR OP/ED): Performed by: NURSE PRACTITIONER

## 2024-07-25 PROCEDURE — 81003 URINALYSIS AUTO W/O SCOPE: CPT | Performed by: CLINICAL NURSE SPECIALIST

## 2024-07-25 PROCEDURE — 71046 X-RAY EXAM CHEST 2 VIEWS: CPT | Performed by: RADIOLOGY

## 2024-07-25 PROCEDURE — 2500000004 HC RX 250 GENERAL PHARMACY W/ HCPCS (ALT 636 FOR OP/ED): Performed by: CLINICAL NURSE SPECIALIST

## 2024-07-25 PROCEDURE — 36415 COLL VENOUS BLD VENIPUNCTURE: CPT | Performed by: CLINICAL NURSE SPECIALIST

## 2024-07-25 PROCEDURE — 2500000005 HC RX 250 GENERAL PHARMACY W/O HCPCS: Performed by: CLINICAL NURSE SPECIALIST

## 2024-07-25 PROCEDURE — 84100 ASSAY OF PHOSPHORUS: CPT | Performed by: CLINICAL NURSE SPECIALIST

## 2024-07-25 PROCEDURE — 96375 TX/PRO/DX INJ NEW DRUG ADDON: CPT

## 2024-07-25 PROCEDURE — 2500000001 HC RX 250 WO HCPCS SELF ADMINISTERED DRUGS (ALT 637 FOR MEDICARE OP): Performed by: CLINICAL NURSE SPECIALIST

## 2024-07-25 PROCEDURE — 85007 BL SMEAR W/DIFF WBC COUNT: CPT | Performed by: CLINICAL NURSE SPECIALIST

## 2024-07-25 PROCEDURE — 99223 1ST HOSP IP/OBS HIGH 75: CPT | Performed by: NURSE PRACTITIONER

## 2024-07-25 PROCEDURE — 87635 SARS-COV-2 COVID-19 AMP PRB: CPT | Performed by: CLINICAL NURSE SPECIALIST

## 2024-07-25 PROCEDURE — 82947 ASSAY GLUCOSE BLOOD QUANT: CPT

## 2024-07-25 PROCEDURE — 83605 ASSAY OF LACTIC ACID: CPT | Performed by: CLINICAL NURSE SPECIALIST

## 2024-07-25 PROCEDURE — 74176 CT ABD & PELVIS W/O CONTRAST: CPT | Performed by: RADIOLOGY

## 2024-07-25 PROCEDURE — 2500000004 HC RX 250 GENERAL PHARMACY W/ HCPCS (ALT 636 FOR OP/ED): Performed by: STUDENT IN AN ORGANIZED HEALTH CARE EDUCATION/TRAINING PROGRAM

## 2024-07-25 PROCEDURE — 71046 X-RAY EXAM CHEST 2 VIEWS: CPT

## 2024-07-25 PROCEDURE — 2500000002 HC RX 250 W HCPCS SELF ADMINISTERED DRUGS (ALT 637 FOR MEDICARE OP, ALT 636 FOR OP/ED): Performed by: CLINICAL NURSE SPECIALIST

## 2024-07-25 PROCEDURE — 80048 BASIC METABOLIC PNL TOTAL CA: CPT | Mod: CCI | Performed by: NURSE PRACTITIONER

## 2024-07-25 PROCEDURE — 87086 URINE CULTURE/COLONY COUNT: CPT | Mod: WESLAB | Performed by: CLINICAL NURSE SPECIALIST

## 2024-07-25 PROCEDURE — 74176 CT ABD & PELVIS W/O CONTRAST: CPT

## 2024-07-25 PROCEDURE — 96365 THER/PROPH/DIAG IV INF INIT: CPT | Mod: 59

## 2024-07-25 PROCEDURE — 82550 ASSAY OF CK (CPK): CPT | Performed by: CLINICAL NURSE SPECIALIST

## 2024-07-25 RX ORDER — ACETAMINOPHEN 650 MG/1
650 SUPPOSITORY RECTAL EVERY 4 HOURS PRN
Status: DISCONTINUED | OUTPATIENT
Start: 2024-07-25 | End: 2024-07-26 | Stop reason: HOSPADM

## 2024-07-25 RX ORDER — PROCHLORPERAZINE 25 MG/1
25 SUPPOSITORY RECTAL EVERY 12 HOURS PRN
Status: DISCONTINUED | OUTPATIENT
Start: 2024-07-25 | End: 2024-07-26 | Stop reason: HOSPADM

## 2024-07-25 RX ORDER — LORAZEPAM 2 MG/ML
0.5 INJECTION INTRAMUSCULAR EVERY 8 HOURS PRN
Status: DISCONTINUED | OUTPATIENT
Start: 2024-07-25 | End: 2024-07-26

## 2024-07-25 RX ORDER — LORAZEPAM 0.5 MG/1
0.5 TABLET ORAL ONCE
Status: COMPLETED | OUTPATIENT
Start: 2024-07-25 | End: 2024-07-25

## 2024-07-25 RX ORDER — HEPARIN SODIUM 5000 [USP'U]/ML
5000 INJECTION, SOLUTION INTRAVENOUS; SUBCUTANEOUS EVERY 8 HOURS
Status: DISCONTINUED | OUTPATIENT
Start: 2024-07-25 | End: 2024-07-26

## 2024-07-25 RX ORDER — CEFTRIAXONE 1 G/50ML
1 INJECTION, SOLUTION INTRAVENOUS EVERY 24 HOURS
Status: DISCONTINUED | OUTPATIENT
Start: 2024-07-26 | End: 2024-07-26

## 2024-07-25 RX ORDER — PROCHLORPERAZINE MALEATE 10 MG
10 TABLET ORAL EVERY 6 HOURS PRN
Status: DISCONTINUED | OUTPATIENT
Start: 2024-07-25 | End: 2024-07-26 | Stop reason: HOSPADM

## 2024-07-25 RX ORDER — ACETAMINOPHEN 160 MG/5ML
650 SOLUTION ORAL EVERY 4 HOURS PRN
Status: DISCONTINUED | OUTPATIENT
Start: 2024-07-25 | End: 2024-07-26 | Stop reason: HOSPADM

## 2024-07-25 RX ORDER — DEXTROSE 50 % IN WATER (D50W) INTRAVENOUS SYRINGE
25 ONCE
Status: COMPLETED | OUTPATIENT
Start: 2024-07-25 | End: 2024-07-25

## 2024-07-25 RX ORDER — PROCHLORPERAZINE MALEATE 10 MG
10 TABLET ORAL EVERY 6 HOURS PRN
COMMUNITY

## 2024-07-25 RX ORDER — PROCHLORPERAZINE EDISYLATE 5 MG/ML
10 INJECTION INTRAMUSCULAR; INTRAVENOUS EVERY 6 HOURS PRN
Status: DISCONTINUED | OUTPATIENT
Start: 2024-07-25 | End: 2024-07-26 | Stop reason: HOSPADM

## 2024-07-25 RX ORDER — CEFTRIAXONE 1 G/50ML
1 INJECTION, SOLUTION INTRAVENOUS ONCE
Status: COMPLETED | OUTPATIENT
Start: 2024-07-25 | End: 2024-07-25

## 2024-07-25 RX ORDER — ACETAMINOPHEN 325 MG/1
650 TABLET ORAL EVERY 4 HOURS PRN
Status: DISCONTINUED | OUTPATIENT
Start: 2024-07-25 | End: 2024-07-26 | Stop reason: HOSPADM

## 2024-07-25 RX ORDER — DEXTROSE 50 % IN WATER (D50W) INTRAVENOUS SYRINGE
12.5
Status: DISCONTINUED | OUTPATIENT
Start: 2024-07-25 | End: 2024-07-26 | Stop reason: SDUPTHER

## 2024-07-25 RX ORDER — METOPROLOL SUCCINATE 25 MG/1
25 TABLET, EXTENDED RELEASE ORAL DAILY
Status: DISCONTINUED | OUTPATIENT
Start: 2024-07-26 | End: 2024-07-26 | Stop reason: HOSPADM

## 2024-07-25 SDOH — ECONOMIC STABILITY: INCOME INSECURITY: HOW HARD IS IT FOR YOU TO PAY FOR THE VERY BASICS LIKE FOOD, HOUSING, MEDICAL CARE, AND HEATING?: NOT HARD AT ALL

## 2024-07-25 SDOH — ECONOMIC STABILITY: TRANSPORTATION INSECURITY
IN THE PAST 12 MONTHS, HAS THE LACK OF TRANSPORTATION KEPT YOU FROM MEDICAL APPOINTMENTS OR FROM GETTING MEDICATIONS?: NO

## 2024-07-25 SDOH — SOCIAL STABILITY: SOCIAL INSECURITY: HAS ANYONE EVER THREATENED TO HURT YOUR FAMILY OR YOUR PETS?: NO

## 2024-07-25 SDOH — HEALTH STABILITY: PHYSICAL HEALTH: ON AVERAGE, HOW MANY DAYS PER WEEK DO YOU ENGAGE IN MODERATE TO STRENUOUS EXERCISE (LIKE A BRISK WALK)?: 0 DAYS

## 2024-07-25 SDOH — ECONOMIC STABILITY: INCOME INSECURITY: IN THE PAST 12 MONTHS, HAS THE ELECTRIC, GAS, OIL, OR WATER COMPANY THREATENED TO SHUT OFF SERVICE IN YOUR HOME?: NO

## 2024-07-25 SDOH — ECONOMIC STABILITY: FOOD INSECURITY: WITHIN THE PAST 12 MONTHS, THE FOOD YOU BOUGHT JUST DIDN'T LAST AND YOU DIDN'T HAVE MONEY TO GET MORE.: NEVER TRUE

## 2024-07-25 SDOH — SOCIAL STABILITY: SOCIAL INSECURITY
WITHIN THE LAST YEAR, HAVE YOU BEEN KICKED, HIT, SLAPPED, OR OTHERWISE PHYSICALLY HURT BY YOUR PARTNER OR EX-PARTNER?: NO

## 2024-07-25 SDOH — ECONOMIC STABILITY: INCOME INSECURITY: IN THE LAST 12 MONTHS, WAS THERE A TIME WHEN YOU WERE NOT ABLE TO PAY THE MORTGAGE OR RENT ON TIME?: NO

## 2024-07-25 SDOH — SOCIAL STABILITY: SOCIAL NETWORK: HOW OFTEN DO YOU GET TOGETHER WITH FRIENDS OR RELATIVES?: MORE THAN THREE TIMES A WEEK

## 2024-07-25 SDOH — ECONOMIC STABILITY: HOUSING INSECURITY: IN THE PAST 12 MONTHS, HOW MANY TIMES HAVE YOU MOVED WHERE YOU WERE LIVING?: 0

## 2024-07-25 SDOH — SOCIAL STABILITY: SOCIAL NETWORK: HOW OFTEN DO YOU ATTEND CHURCH OR RELIGIOUS SERVICES?: PATIENT DECLINED

## 2024-07-25 SDOH — HEALTH STABILITY: MENTAL HEALTH: HOW MANY STANDARD DRINKS CONTAINING ALCOHOL DO YOU HAVE ON A TYPICAL DAY?: PATIENT DOES NOT DRINK

## 2024-07-25 SDOH — HEALTH STABILITY: MENTAL HEALTH: HOW OFTEN DO YOU HAVE 6 OR MORE DRINKS ON ONE OCCASION?: NEVER

## 2024-07-25 SDOH — HEALTH STABILITY: MENTAL HEALTH: HOW OFTEN DO YOU HAVE A DRINK CONTAINING ALCOHOL?: NEVER

## 2024-07-25 SDOH — SOCIAL STABILITY: SOCIAL INSECURITY: HAVE YOU HAD ANY THOUGHTS OF HARMING ANYONE ELSE?: NO

## 2024-07-25 SDOH — HEALTH STABILITY: MENTAL HEALTH
STRESS IS WHEN SOMEONE FEELS TENSE, NERVOUS, ANXIOUS, OR CAN'T SLEEP AT NIGHT BECAUSE THEIR MIND IS TROUBLED. HOW STRESSED ARE YOU?: NOT AT ALL

## 2024-07-25 SDOH — SOCIAL STABILITY: SOCIAL INSECURITY: HAVE YOU HAD THOUGHTS OF HARMING ANYONE ELSE?: NO

## 2024-07-25 SDOH — HEALTH STABILITY: MENTAL HEALTH
HOW OFTEN DO YOU NEED TO HAVE SOMEONE HELP YOU WHEN YOU READ INSTRUCTIONS, PAMPHLETS, OR OTHER WRITTEN MATERIAL FROM YOUR DOCTOR OR PHARMACY?: NEVER

## 2024-07-25 SDOH — SOCIAL STABILITY: SOCIAL NETWORK: ARE YOU MARRIED, WIDOWED, DIVORCED, SEPARATED, NEVER MARRIED, OR LIVING WITH A PARTNER?: NEVER MARRIED

## 2024-07-25 SDOH — SOCIAL STABILITY: SOCIAL NETWORK
IN A TYPICAL WEEK, HOW MANY TIMES DO YOU TALK ON THE PHONE WITH FAMILY, FRIENDS, OR NEIGHBORS?: MORE THAN THREE TIMES A WEEK

## 2024-07-25 SDOH — SOCIAL STABILITY: SOCIAL INSECURITY
WITHIN THE LAST YEAR, HAVE TO BEEN RAPED OR FORCED TO HAVE ANY KIND OF SEXUAL ACTIVITY BY YOUR PARTNER OR EX-PARTNER?: NO

## 2024-07-25 SDOH — SOCIAL STABILITY: SOCIAL INSECURITY: WITHIN THE LAST YEAR, HAVE YOU BEEN AFRAID OF YOUR PARTNER OR EX-PARTNER?: NO

## 2024-07-25 SDOH — ECONOMIC STABILITY: HOUSING INSECURITY: AT ANY TIME IN THE PAST 12 MONTHS, WERE YOU HOMELESS OR LIVING IN A SHELTER (INCLUDING NOW)?: NO

## 2024-07-25 SDOH — SOCIAL STABILITY: SOCIAL NETWORK: HOW OFTEN DO YOU ATTENT MEETINGS OF THE CLUB OR ORGANIZATION YOU BELONG TO?: NEVER

## 2024-07-25 SDOH — ECONOMIC STABILITY: FOOD INSECURITY: WITHIN THE PAST 12 MONTHS, YOU WORRIED THAT YOUR FOOD WOULD RUN OUT BEFORE YOU GOT MONEY TO BUY MORE.: NEVER TRUE

## 2024-07-25 SDOH — SOCIAL STABILITY: SOCIAL INSECURITY: ARE THERE ANY APPARENT SIGNS OF INJURIES/BEHAVIORS THAT COULD BE RELATED TO ABUSE/NEGLECT?: NO

## 2024-07-25 SDOH — SOCIAL STABILITY: SOCIAL INSECURITY: DO YOU FEEL UNSAFE GOING BACK TO THE PLACE WHERE YOU ARE LIVING?: NO

## 2024-07-25 SDOH — SOCIAL STABILITY: SOCIAL NETWORK: HOW OFTEN DO YOU ATTENT MEETINGS OF THE CLUB OR ORGANIZATION YOU BELONG TO?: PATIENT DECLINED

## 2024-07-25 SDOH — ECONOMIC STABILITY: TRANSPORTATION INSECURITY
IN THE PAST 12 MONTHS, HAS LACK OF TRANSPORTATION KEPT YOU FROM MEETINGS, WORK, OR FROM GETTING THINGS NEEDED FOR DAILY LIVING?: NO

## 2024-07-25 SDOH — SOCIAL STABILITY: SOCIAL NETWORK
DO YOU BELONG TO ANY CLUBS OR ORGANIZATIONS SUCH AS CHURCH GROUPS UNIONS, FRATERNAL OR ATHLETIC GROUPS, OR SCHOOL GROUPS?: NO

## 2024-07-25 SDOH — SOCIAL STABILITY: SOCIAL INSECURITY: WITHIN THE LAST YEAR, HAVE YOU BEEN HUMILIATED OR EMOTIONALLY ABUSED IN OTHER WAYS BY YOUR PARTNER OR EX-PARTNER?: NO

## 2024-07-25 SDOH — SOCIAL STABILITY: SOCIAL INSECURITY: DO YOU FEEL ANYONE HAS EXPLOITED OR TAKEN ADVANTAGE OF YOU FINANCIALLY OR OF YOUR PERSONAL PROPERTY?: NO

## 2024-07-25 SDOH — SOCIAL STABILITY: SOCIAL INSECURITY: DOES ANYONE TRY TO KEEP YOU FROM HAVING/CONTACTING OTHER FRIENDS OR DOING THINGS OUTSIDE YOUR HOME?: NO

## 2024-07-25 SDOH — HEALTH STABILITY: PHYSICAL HEALTH: ON AVERAGE, HOW MANY MINUTES DO YOU ENGAGE IN EXERCISE AT THIS LEVEL?: 0 MIN

## 2024-07-25 SDOH — SOCIAL STABILITY: SOCIAL NETWORK
DO YOU BELONG TO ANY CLUBS OR ORGANIZATIONS SUCH AS CHURCH GROUPS UNIONS, FRATERNAL OR ATHLETIC GROUPS, OR SCHOOL GROUPS?: PATIENT DECLINED

## 2024-07-25 SDOH — SOCIAL STABILITY: SOCIAL INSECURITY: ARE YOU OR HAVE YOU BEEN THREATENED OR ABUSED PHYSICALLY, EMOTIONALLY, OR SEXUALLY BY ANYONE?: NO

## 2024-07-25 SDOH — SOCIAL STABILITY: SOCIAL INSECURITY: ABUSE: ADULT

## 2024-07-25 SDOH — SOCIAL STABILITY: SOCIAL NETWORK: HOW OFTEN DO YOU ATTEND CHURCH OR RELIGIOUS SERVICES?: NEVER

## 2024-07-25 SDOH — SOCIAL STABILITY: SOCIAL INSECURITY: WERE YOU ABLE TO COMPLETE ALL THE BEHAVIORAL HEALTH SCREENINGS?: YES

## 2024-07-25 ASSESSMENT — COGNITIVE AND FUNCTIONAL STATUS - GENERAL
TURNING FROM BACK TO SIDE WHILE IN FLAT BAD: A LITTLE
DAILY ACTIVITIY SCORE: 24
PATIENT BASELINE BEDBOUND: NO
MOBILITY SCORE: 22
MOVING FROM LYING ON BACK TO SITTING ON SIDE OF FLAT BED WITH BEDRAILS: A LITTLE

## 2024-07-25 ASSESSMENT — ENCOUNTER SYMPTOMS
SLEEP DISTURBANCE: 0
APNEA: 0
FEVER: 0
WOUND: 0
BLOOD IN STOOL: 0
TREMORS: 0
WEAKNESS: 1
MYALGIAS: 0
DYSURIA: 0
POLYDIPSIA: 0
LIGHT-HEADEDNESS: 1
CHILLS: 0
ADENOPATHY: 0
POLYPHAGIA: 0
ACTIVITY CHANGE: 1
JOINT SWELLING: 0
HEADACHES: 0
EYES NEGATIVE: 1
ARTHRALGIAS: 0
COLOR CHANGE: 0
PSYCHIATRIC NEGATIVE: 1
NECK PAIN: 0
CONSTIPATION: 0
DIZZINESS: 0
SORE THROAT: 0
CONFUSION: 0
ABDOMINAL PAIN: 0
CARDIOVASCULAR NEGATIVE: 1
NAUSEA: 1
ARTHRALGIAS: 1
BACK PAIN: 0
HEMATURIA: 0
PHOTOPHOBIA: 0
WHEEZING: 0
FATIGUE: 1
SPEECH DIFFICULTY: 0
FREQUENCY: 0
RECTAL PAIN: 0
RESPIRATORY NEGATIVE: 1
DIARRHEA: 1
ENDOCRINE NEGATIVE: 1
SINUS PRESSURE: 0
BRUISES/BLEEDS EASILY: 0
COUGH: 0
UNEXPECTED WEIGHT CHANGE: 1
APPETITE CHANGE: 1
LIGHT-HEADEDNESS: 0
VOMITING: 1
HEMATOLOGIC/LYMPHATIC NEGATIVE: 1
SEIZURES: 0
SHORTNESS OF BREATH: 0
HALLUCINATIONS: 0
PALPITATIONS: 0
NUMBNESS: 0

## 2024-07-25 ASSESSMENT — ACTIVITIES OF DAILY LIVING (ADL)
LACK_OF_TRANSPORTATION: NO
HEARING - LEFT EAR: FUNCTIONAL
TOILETING: INDEPENDENT
DRESSING YOURSELF: INDEPENDENT
BATHING: INDEPENDENT
HEARING - RIGHT EAR: FUNCTIONAL
JUDGMENT_ADEQUATE_SAFELY_COMPLETE_DAILY_ACTIVITIES: YES
EFFECT OF PAIN ON DAILY ACTIVITIES: PAIN WITH MOVEMENT
ADEQUATE_TO_COMPLETE_ADL: YES
GROOMING: INDEPENDENT
FEEDING YOURSELF: INDEPENDENT
WALKS IN HOME: INDEPENDENT
LACK_OF_TRANSPORTATION: NO
PATIENT'S MEMORY ADEQUATE TO SAFELY COMPLETE DAILY ACTIVITIES?: YES

## 2024-07-25 ASSESSMENT — PAIN - FUNCTIONAL ASSESSMENT
PAIN_FUNCTIONAL_ASSESSMENT: 0-10

## 2024-07-25 ASSESSMENT — LIFESTYLE VARIABLES
HOW MANY STANDARD DRINKS CONTAINING ALCOHOL DO YOU HAVE ON A TYPICAL DAY: PATIENT DOES NOT DRINK
AUDIT-C TOTAL SCORE: 0
EVER HAD A DRINK FIRST THING IN THE MORNING TO STEADY YOUR NERVES TO GET RID OF A HANGOVER: NO
EVER FELT BAD OR GUILTY ABOUT YOUR DRINKING: NO
AUDIT-C TOTAL SCORE: 0
HAVE YOU EVER FELT YOU SHOULD CUT DOWN ON YOUR DRINKING: NO
SKIP TO QUESTIONS 9-10: 1
AUDIT-C TOTAL SCORE: 0
PRESCIPTION_ABUSE_PAST_12_MONTHS: NO
HAVE PEOPLE ANNOYED YOU BY CRITICIZING YOUR DRINKING: NO
SUBSTANCE_ABUSE_PAST_12_MONTHS: NO
SKIP TO QUESTIONS 9-10: 1
HOW OFTEN DO YOU HAVE A DRINK CONTAINING ALCOHOL: NEVER
TOTAL SCORE: 0
HOW OFTEN DO YOU HAVE 6 OR MORE DRINKS ON ONE OCCASION: NEVER

## 2024-07-25 ASSESSMENT — PATIENT HEALTH QUESTIONNAIRE - PHQ9
SUM OF ALL RESPONSES TO PHQ9 QUESTIONS 1 & 2: 0
2. FEELING DOWN, DEPRESSED OR HOPELESS: NOT AT ALL
1. LITTLE INTEREST OR PLEASURE IN DOING THINGS: NOT AT ALL

## 2024-07-25 ASSESSMENT — PAIN SCALES - GENERAL
PAINLEVEL_OUTOF10: 4
PAINLEVEL_OUTOF10: 0 - NO PAIN

## 2024-07-25 ASSESSMENT — COLUMBIA-SUICIDE SEVERITY RATING SCALE - C-SSRS
6. HAVE YOU EVER DONE ANYTHING, STARTED TO DO ANYTHING, OR PREPARED TO DO ANYTHING TO END YOUR LIFE?: NO
2. HAVE YOU ACTUALLY HAD ANY THOUGHTS OF KILLING YOURSELF?: NO
1. IN THE PAST MONTH, HAVE YOU WISHED YOU WERE DEAD OR WISHED YOU COULD GO TO SLEEP AND NOT WAKE UP?: NO

## 2024-07-25 ASSESSMENT — PAIN DESCRIPTION - PROGRESSION: CLINICAL_PROGRESSION: NOT CHANGED

## 2024-07-25 ASSESSMENT — PAIN DESCRIPTION - DESCRIPTORS: DESCRIPTORS: TENDER

## 2024-07-25 NOTE — PROGRESS NOTES
07/25/24 1341   [facility] Disability Status   Are you deaf or do you have serious difficulty hearing? N   Are you blind or do you have serious difficulty seeing, even when wearing glasses? N   Because of a physical, mental, or emotional condition, do you have serious difficulty concentrating, remembering, or making decisions? (5 years old or older) N   Do you have serious difficulty walking or climbing stairs? N   Do you have serious difficulty dressing or bathing? N   Because of a physical, mental, or emotional condition, do you have serious difficulty doing errands alone such as visiting the doctor? N

## 2024-07-25 NOTE — PROGRESS NOTES
Pharmacy Medication History Review    Angie Tobin is a 58 y.o. female admitted for Acute renal failure, unspecified acute renal failure type (CMS-[facility]). Pharmacy reviewed the patient's [id number] medications and allergies for accuracy.    Medications ADDED:  Prochlorperazine 10mg  Medications CHANGED:  none  Medications REMOVED:   none     The list below reflects the updated PTA list. Comments regarding how patient may be taking medications differently can be found in the Admit Orders Activity  Prior to Admission Medications   Prescriptions Last Dose Informant   albuterol 108 (90 Base) MCG/ACT inhaler Unknown Self   Sig: Inhale 1-2 puffs. Every 4-6 hours as needed   fluticasone (Flonase) 50 mcg/actuation nasal spray Unknown Self   Sig: Administer 1 spray into each nostril once daily. Shake gently. Before first use, prime pump. After use, clean tip and replace cap.   lidocaine-prilocaine (Emla) 2.5-2.5 % cream Past Month Self   Sig: Apply topically if needed for mild pain (1 - 3). Please apply to [facility] prior to access for chemotherapy   lisinopril 20 mg tablet Past Week Self   Sig: Take 1 tablet (20 mg) by mouth once daily.   meclizine (Antivert) 12.5 mg tablet Unknown Self   Sig: Take 1-2 tablets (12.5-25 mg) by mouth 3 times a day as needed (vertigo).   metFORMIN (Glucophage) 500 mg tablet Past Week Self   Sig: Take 1 tablet (500 mg) by mouth 2 times a day.   metoprolol succinate XL (Toprol-XL) 25 mg 24 hr tablet  Self   Sig: Take 1 tablet (25 mg) by mouth once daily.   prochlorperazine (Compazine) 10 mg tablet 7/25/2024 Self   Sig: Take 1 tablet (10 mg) by mouth every 6 hours if needed for nausea or vomiting.      Facility-Administered Medications: None        The list below reflects the updated allergy list. Please review each documented allergy for additional clarification and justification.  Allergies  Reviewed by Elisabeth Melton on 7/25/2024        Severity Reactions Comments    Hydromorphone  High Other     Codeine Not Specified Other Abdominal pain            Pharmacy has been updated to [facility].    Sources used to complete the med history include dispense history, PTA medication list, patient interview. Patient is a fair historian.     Below are additional concerns with the patient's PTA list.  Patient reports non-compliance for the past week with all medications     Elisabeth Melton, CPMayra-ADV  Please reach out via [employer] Secure Chat for questions

## 2024-07-25 NOTE — PROGRESS NOTES
07/25/24 1338   Physical Activity   On average, how many days per week do you engage in moderate to strenuous exercise (like a brisk walk)? 0 days   On average, how many minutes do you engage in exercise at this level? 0 min   Financial Resource Strain   How hard is it for you to pay for the very basics like food, housing, medical care, and heating? Not hard   Housing Stability   In the last 12 months, was there a time when you were not able to pay the mortgage or rent on time? N   In the past 12 months, how many times have you moved where you were living? 0   At any time in the past 12 months, were you homeless or living in a shelter (including now)? N   Transportation Needs   In the past 12 months, has lack of transportation kept you from medical appointments or from getting medications? no   In the past 12 months, has lack of transportation kept you from meetings, work, or from getting things needed for daily living? No   Food Insecurity   Within the past 12 months, you worried that your food would run out before you got the money to buy more. Never true   Within the past 12 months, the food you bought just didn't last and you didn't have money to get more. Never true   Stress   Do you feel stress - tense, restless, nervous, or anxious, or unable to sleep at night because your mind is troubled all the time - these days? Not at all   Social Connections   In a typical week, how many times do you talk on the phone with family, friends, or neighbors? More than 3   How often do you get together with friends or relatives? More than 3   How often do you attend Islam or Rastafarian services? Never   Do you belong to any clubs or organizations such as Islam groups, unions, fraternal or athletic groups, or school groups? No   How often do you attend meetings of the clubs or organizations you belong to? Never   Are you , , , , never , or living with a partner? Never marrie   Intimate  Partner Violence   Within the last year, have you been afraid of your partner or ex-partner? No   Within the last year, have you been humiliated or emotionally abused in other ways by your partner or ex-partner? No   Within the last year, have you been kicked, hit, slapped, or otherwise physically hurt by your partner or ex-partner? No   Within the last year, have you been raped or forced to have any kind of sexual activity by your partner or ex-partner? No   Alcohol Use   Q1: How often do you have a drink containing alcohol? Never   Q2: How many drinks containing alcohol do you have on a typical day when you are drinking? None   Q3: How often do you have six or more drinks on one occasion? Never   Utilities   In the past 12 months has the electric, gas, oil, or water company threatened to shut off services in your home? No   Health Literacy   How often do you need to have someone help you when you read instructions, pamphlets, or other written material from your doctor or pharmacy? Never

## 2024-07-25 NOTE — TELEPHONE ENCOUNTER
Pt called reporting that she was supposed to come to [facility] at 8am today for IV fluids; however, she said she is too weak to come all the way to [facility]. She reports body aches and weakness/fatigue. She said she knows she is dehydrated. Offered to try to get her in to a closer infusion center for fluids or [facility]. She said they are all too far & she would rather go to the [facility] for eval/fluids because it is closer. Denies chest pain, fever, SOB, HA, dizziness, pain. Message sent to [facility] & the team making them aware of the pt's decision. [facility] notified. She said someone will bring her shortly.

## 2024-07-25 NOTE — ED PROVIDER NOTES
Department of Emergency Medicine   ED  Provider Note  Admit Date/RoomTime: 7/25/2024  8:50 AM  [facility]        History of Present Illness:  Chief Complaint   Patient presents with    Weakness, Gen         Angie Tobin is a 58 y.o. 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  Patient states he feels too weak to even drive herself.  She denies nausea or vomiting.  No diarrhea at this time due to her diarrhea at the onset of chemo.  She is not eating and drinking well.  No headache no fever chills cough congestion runny nose sore throat complains of generalized pain in her back and abdomen.  Reports the pain in her abdomen secondary to her hernia.  She denies pressure burning or frequency with urination.    Review of Systems:   Pertinent positives and negatives are stated within HPI, all other systems reviewed and are negative.        --------------------------------------------- PAST HISTORY ---------------------------------------------  Past Medical History:  has a past medical history of Acute sinusitis (09/27/2023), Benign essential HTN (04/19/2023), Body mass index (BMI) 40.0-44.9, adult (Multi) (09/27/2023), Candidiasis, unspecified (01/20/2022), Conjunctivitis (09/27/2023), Contact with and (suspected) exposure to covid-19 (09/15/2022), Disorder of liver (07/11/2023), Disorder of thyroid (10/18/2023), Elevated blood-pressure reading, without diagnosis of hypertension (05/26/2017), Gastroesophageal reflux disease (09/15/2022), Herniated lumbar intervertebral disc (09/13/2022), Herpes simplex virus (HSV) infection (04/19/2023), Hypercholesterolemia (09/15/2022), Inappropriate sinus tachycardia, so stated (CMS-HCC) (04/19/2023), Leiomyoma (03/11/2024), Lymphoproliferative disorder (Multi) (09/06/2023), Malignant neoplasm metastatic to left lung (Multi) (04/19/2023), Malignant  neoplasm of body of uterus (Multi) (09/27/2023), Mass of pancreas (HHS-HCC) (09/27/2023), Neuropathy (03/11/2024), Never smoked any substance (10/25/2023), Other conditions influencing health status (09/27/2017), Personal history of diseases of the blood and blood-forming organs and certain disorders involving the immune mechanism (06/06/2016), Personal history of other benign neoplasm (05/08/2019), Personal history of other diseases of the musculoskeletal system and connective tissue (04/18/2018), Personal history of other diseases of the nervous system and sense organs (10/03/2016), Personal history of other diseases of the respiratory system (08/31/2017), Personal history of other diseases of the respiratory system (01/24/2022), Personal history of other specified conditions (09/16/2020), Postoperative pain (03/11/2024), Right lower quadrant abdominal tenderness (03/04/2019), Right upper quadrant pain (03/13/2015), Secondary hypertension (10/18/2023), Toenail fungus (04/19/2023), Uterine leiomyoma (09/27/2023), Viral URI (09/27/2023), Vitamin D deficiency (04/19/2023), and Weight gain (03/11/2024).  Past Surgical History:  has a past surgical history that includes Esophagogastroduodenoscopy (04/16/2013); Other surgical history (04/19/2013); Other surgical history (10/25/2022); Other surgical history (10/25/2022); Other surgical history (05/08/2019); Cholecystectomy (12/02/2014); Breast surgery (05/30/2013); Other surgical history (05/30/2013); CT guided percutaneous biopsy bone (10/24/2016); CT guided percutaneous biopsy lung (8/5/2022); US guided needle liver biopsy (7/11/2023); IR intervention arterial embolization (12/13/2023); and IR intervention arterial embolization (12/19/2023).  Social History:  reports that she has never smoked. She has never used smokeless tobacco. She reports that she does not use drugs.  Family History: family history includes Breast cancer in her paternal grandmother; Diabetes in her  "mother; Hypothyroidism in her brother; Leukemia in her father; Liver cancer in her father; No Known Problems in her sibling; Ovarian cancer in her sister; atrial flutter in her mother.. Unless otherwise noted, family history is non contributory  The patient’s home medications have been reviewed.  Allergies: Hydromorphone and Codeine        ---------------------------------------------------PHYSICAL EXAM--------------------------------------    GENERAL APPEARANCE: Awake and alert.   VITAL SIGNS: As per the nurses' triage record.  Tachycardic hypotensive  HEENT: Normocephalic, atraumatic. Extraocular muscles are intact. Pupils equal round and reactive to light. Conjunctiva are pink. Negative scleral icterus. Mucous membranes are pale . Lips pale Tongue in the midline. Pharynx was without erythema or exudates, uvula midline  NECK: Soft Nontender and supple, full gross ROM, no meningeal signs.  CHEST: Nontender to palpation. Clear to auscultation bilaterally. No rales, rhonchi, or wheezing.   HEART: S1, S2.  Tachycardic regular rate and rhythm. No murmurs, gallops or rubs.  Strong and equal pulses in the extremities.   ABDOMEN: Obese hernia right upper quadrant soft. soft, diffuse tenderness nondistended, positive bowel sounds, no palpable masses.  MUSCULCSKELETAL: The calves are nontender to palpation. Full gross active range of motion.   NEUROLOGICAL: Awake, alert and oriented x 3. Power intact in the upper and lower extremities. Sensation is intact to light touch in the upper and lower extremities.   IMMUNOLOGICAL: No lymphatic streaking noted   DERM: Pale no petechiae, rashes, or ecchymoses.          ------------------------- NURSING NOTES AND VITALS REVIEWED ---------------------------  The nursing notes within the ED encounter and vital signs as below have been reviewed by myself  BP 97/72   Pulse (!) 104   Temp 36.8 °C (98.2 °F) (Oral)   Resp (!) 22   Ht 1.778 m (5' 10\")   Wt 120 kg (265 lb)   SpO2 95%   " BMI 38.02 kg/m²     Oxygen Saturation Interpretation: 100%.    Sinus tachycardia on monitor    The patient’s available past medical records and past encounters were reviewed.          -----------------------DIAGNOSTIC RESULTS------------------------  LABS:    Labs Reviewed   CBC WITH AUTO DIFFERENTIAL - Abnormal       Result Value    WBC 3.9 (*)     nRBC 0.0      RBC 3.06 (*)     Hemoglobin 8.7 (*)     Hematocrit 27.2 (*)     MCV 89      MCH 28.4      MCHC 32.0      RDW 17.5 (*)     Platelets 86 (*)     Immature Granulocytes %, Automated 5.9 (*)     Immature Granulocytes Absolute, Automated 0.23     COMPREHENSIVE METABOLIC PANEL - Abnormal    Glucose 316 (*)     Sodium 130 (*)     Potassium 5.5 (*)     Chloride 99      Bicarbonate 12 (*)     Urea Nitrogen 90 (*)     Creatinine 7.60 (*)     eGFR 6 (*)     Calcium 7.9 (*)     Albumin 3.3 (*)     Alkaline Phosphatase 408 (*)     Total Protein 6.0      AST 87 (*)     Bilirubin, Total 1.1      ALT 72 (*)     Anion Gap 19     BLOOD GAS LACTIC ACID, VENOUS - Abnormal    POCT Lactate, Venous 2.4 (*)    SERIAL TROPONIN, INITIAL ([facility]) - Abnormal    Troponin T, High Sensitivity 80 (*)    URINALYSIS WITH REFLEX CULTURE AND MICROSCOPIC - Abnormal    Color, Urine Dark-Yellow      Appearance, Urine Ex.Turbid (*)     Specific Gravity, Urine 1.024      pH, Urine 5.5      Protein, Urine 100 (2+) (*)     Glucose, Urine 50 (TRACE) (*)     Blood, Urine 0.1 (1+) (*)     Ketones, Urine TRACE (*)     Bilirubin, Urine 1 (1+) (*)     Urobilinogen, Urine 3 (1+) (*)     Nitrite, Urine NEGATIVE      Leukocyte Esterase, Urine 500 Christine/µL (*)    SERIAL TROPONIN,  2 HOUR ([facility]) - Abnormal    Troponin T, High Sensitivity 78 (*)    CREATINE KINASE - Abnormal    Creatine Kinase 468 (*)    LIPASE - Abnormal    Lipase 77 (*)    SERIAL TROPONIN, 6 HOUR ([facility]) - Abnormal    Troponin T, High Sensitivity 83 (*)    BASIC METABOLIC PANEL - Abnormal    Glucose 326 (*)     Sodium 129 (*)     Potassium 5.6 (*)      Chloride 100      Bicarbonate 12 (*)     Urea Nitrogen 92 (*)     Creatinine 7.70 (*)     eGFR 6 (*)     Calcium 7.8 (*)     Anion Gap 17     URIC ACID - Abnormal    Uric Acid 15.9 (*)    MICROSCOPIC ONLY, URINE - Abnormal    WBC, Urine >50 (*)     RBC, Urine 11-20 (*)     Squamous Epithelial Cells, Urine 26-50 (1+)      Bacteria, Urine 4+ (*)    MANUAL DIFFERENTIAL - Abnormal    Neutrophils %, Manual 92.0      Lymphocytes %, Manual 4.0      Monocytes %, Manual 4.0      Eosinophils %, Manual 0.0      Basophils %, Manual 0.0      Seg Neutrophils Absolute, Manual 3.59      Lymphocytes Absolute, Manual 0.16 (*)     Monocytes Absolute, Manual 0.16      Eosinophils Absolute, Manual 0.00      Basophils Absolute, Manual 0.00      Total Cells Counted 100      RBC Morphology See Below      Teardrop Cells Few      Elaine Cells Few     SARS-COV-2 PCR - Normal    Coronavirus 2019, PCR Not Detected      Narrative:     This assay has received FDA Emergency Use Authorization (EUA) and is only authorized for the duration of time that circumstances exist to justify the authorization of the emergency use of in vitro diagnostic tests for the detection of SARS-CoV-2 virus and/or diagnosis of COVID-19 infection under section 564(b)(1) of the Act, 21 U.S.C. 360bbb-3(b)(1). This assay is an in vitro diagnostic nucleic acid amplification test for the qualitative detection of SARS-CoV-2 from nasopharyngeal specimens and has been validated for use at [facility]. Negative results do not preclude COVID-19 infections and should not be used as the sole basis for diagnosis, treatment, or other management decisions.     BLOOD GAS LACTIC ACID, VENOUS - Normal    POCT Lactate, Venous 1.9     PHOSPHORUS - Normal    Phosphorus 3.7     BLOOD CULTURE   BLOOD CULTURE   URINE CULTURE   TROPONIN T SERIES, HIGH SENSITIVITY (0, 2 HR, 6 HR)    Narrative:     The following orders were created for panel order Troponin T Series, High  Sensitivity (0, 2HR, 6HR).  Procedure                               Abnormality         Status                     ---------                               -----------         ------                     Serial Troponin, Initial...[[id number]]  Abnormal            Final result               Serial Troponin, 2 Hour ...[[id number]]  Abnormal            Final result               Serial Troponin, 6 Hour ...[[id number]]  Abnormal            Final result                 Please view results for these tests on the individual orders.   URINALYSIS WITH REFLEX CULTURE AND MICROSCOPIC    Narrative:     The following orders were created for panel order Urinalysis with Reflex Culture and Microscopic.  Procedure                               Abnormality         Status                     ---------                               -----------         ------                     Urinalysis with Reflex C...[[id number]]  Abnormal            Final result               Extra Urine Gray Tube[[id number]]                            In process                   Please view results for these tests on the individual orders.   EXTRA URINE GRAY TUBE   RENAL FUNCTION PANEL   POCT GLUCOSE METER       As interpreted by me, the above displayed labs are abnormal. All other labs obtained during this visit were within normal range or not returned as of this dictation.      EKG Interpretation  EKG on my interpretation shows sinus tachycardia with rate of 106 bpm.  Normal axis.  QTc of 499 ms, [place] interval 146.  There is no ST elevation or depression, no acute ischemic pattern per no STEMI.  Per attending note      CT abdomen pelvis wo IV contrast   Final Result   1. No evidence of acute abnormality in the abdomen or pelvis in this   limited exam.   2. Metastatic soft tissue lesion involving the posterior elements of   T11 has decreased in size with improvement of previously seen spinal   canal narrowing. Small lytic metastasis in the left T6 vertebral body   also  noted.   3. Evaluation of the known liver metastases is limited given the lack   of IV contrast.   4. Large ventral abdominal wall hernias containing bowel superiorly   and fat inferiorly, as detailed above. No evidence of obstruction or   strangulation.             MACRO:   None        Signed by: Leeanne More 7/25/2024 10:54 AM   Dictation workstation:   [id number]      XR chest 2 views   Final Result   No active disease in the chest identified on hypoventilatory exam.        MACRO:   None        Signed by: Damion Rojas 7/25/2024 9:24 AM   Dictation workstation:   [id number]              CT abdomen pelvis wo IV contrast   Final Result   1. No evidence of acute abnormality in the abdomen or pelvis in this   limited exam.   2. Metastatic soft tissue lesion involving the posterior elements of   T11 has decreased in size with improvement of previously seen spinal   canal narrowing. Small lytic metastasis in the left T6 vertebral body   also noted.   3. Evaluation of the known liver metastases is limited given the lack   of IV contrast.   4. Large ventral abdominal wall hernias containing bowel superiorly   and fat inferiorly, as detailed above. No evidence of obstruction or   strangulation.             MACRO:   None        Signed by: Leeanne More 7/25/2024 10:54 AM   Dictation workstation:   [id number]      XR chest 2 views   Final Result   No active disease in the chest identified on hypoventilatory exam.        MACRO:   None        Signed by: Damion Rojas 7/25/2024 9:24 AM   Dictation workstation:   [id number]              ------------------------------ ED COURSE/MEDICAL DECISION MAKING----------------------  Medical Decision Making:   Exam: A medically appropriate exam performed, outlined above, given the known history and presentation.    History obtained from: Review of medical record nursing manage patient      Social Determinants of Health considered during this visit: Lives at home with family      PAST  MEDICAL HISTORY/Chronic Conditions Affecting Care     has a past medical history of Acute sinusitis (09/27/2023), Benign essential HTN (04/19/2023), Body mass index (BMI) 40.0-44.9, adult (Multi) (09/27/2023), Candidiasis, unspecified (01/20/2022), Conjunctivitis (09/27/2023), Contact with and (suspected) exposure to covid-19 (09/15/2022), Disorder of liver (07/11/2023), Disorder of thyroid (10/18/2023), Elevated blood-pressure reading, without diagnosis of hypertension (05/26/2017), Gastroesophageal reflux disease (09/15/2022), Herniated lumbar intervertebral disc (09/13/2022), Herpes simplex virus (HSV) infection (04/19/2023), Hypercholesterolemia (09/15/2022), Inappropriate sinus tachycardia, so stated (CMS-HCC) (04/19/2023), Leiomyoma (03/11/2024), Lymphoproliferative disorder (Multi) (09/06/2023), Malignant neoplasm metastatic to left lung (Multi) (04/19/2023), Malignant neoplasm of body of uterus (Multi) (09/27/2023), Mass of pancreas ([facility]-HCC) (09/27/2023), Neuropathy (03/11/2024), Never smoked any substance (10/25/2023), Other conditions influencing health status (09/27/2017), Personal history of diseases of the blood and blood-forming organs and certain disorders involving the immune mechanism (06/06/2016), Personal history of other benign neoplasm (05/08/2019), Personal history of other diseases of the musculoskeletal system and connective tissue (04/18/2018), Personal history of other diseases of the nervous system and sense organs (10/03/2016), Personal history of other diseases of the respiratory system (08/31/2017), Personal history of other diseases of the respiratory system (01/24/2022), Personal history of other specified conditions (09/16/2020), Postoperative pain (03/11/2024), Right lower quadrant abdominal tenderness (03/04/2019), Right upper quadrant pain (03/13/2015), Secondary hypertension (10/18/2023), Toenail fungus (04/19/2023), Uterine leiomyoma (09/27/2023), Viral URI (09/27/2023),  Vitamin D deficiency (04/19/2023), and Weight gain (03/11/2024).       CC/HPI Summary, Social Determinants of health, Records Reviewed, DDx, testing done/not done, ED Course, Reassessment, disposition considerations/shared decision making with patient, consults, disposition:   Presents with extreme fatigue concerned she needs fluids  Plan  Sepsis triggered Due to hypotension tachycardia  Normal saline 30 mg/kg for ideal weight  Chest x-ray No active disease in the chest identified on hypoventilatory exam.    Ct Abd:    CT abd 1. No evidence of acute abnormality in the abdomen or pelvis in this  limited exam.  2. Metastatic soft tissue lesion involving the posterior elements of  T11 has decreased in size with improvement of previously seen spinal  canal narrowing. Small lytic metastasis in the left T6 vertebral body  also noted.  3. Evaluation of the known liver metastases is limited given the lack  of IV contrast.  4. Large ventral abdominal wall hernias containing bowel superiorly  and fat inferiorly, as detailed above. No evidence of obstruction or  strangulation.  EKG  Blood cultures  Lactic acid  CBC  CMP  COVID  Troponin  Urine  Antibiotics at this time suspect that her symptoms are secondary to dehydration or possible anemia will monitor closely initiate antibiotics if warranted patient denying any upper respiratory-like symptoms.  No diarrhea no vomiting.  No urinary symptoms    Medical Decision Making/Differential Diagnosis:  Differentials include not limited to dehydration versus anemia versus electrode abnormality versus infectious process   Leukocytes 3.9  Hemoglobin 8.7  Platelets 86  CK4 68  Lactic acid 2.4  COVID-negative  Troponin 80  Glucose 316  Sodium 130  Potassium 5.5  Bicarb 12  BUN 90  Creatinine 7.6  Calcium 7.9  Alkaline phos 408  AST 87  ALT 72   [id number]  Patient presented with fatigue generalized weakness history of Leiomyosarcoma urine consistent with UTI received IV antibiotics 30 mg/kg  IV fluids for ideal weight tachycardic hypotensive acute renal failure concern for septic shock.  COVID-negative chest x-ray showed no active disease.  CT abdomen pelvis showed no evidence of acute abdominal or pelvic abnormality metastic soft tissue lesions known to patient lesion to the lesion to the T11 decreased in size small metastic lesions at T6.  Large ventral hernia no obstruction or strangulation soft on exam.  Concern for liver metastasis limited due to CT without contrast.  White blood cell count 3.9 recent chemotherapy on [place] last dose on 7 7.  Last radiation 7 3-7 8 symptoms improved blood pressure and heart rate improved with fluids.  Cultures pending.  Elevated troponin with no complaints of chest pain EKG per attending note sinus tachycardia no ST elevation but flat or arrhythmia glucose 316 does not appear to be in DKA potassium elevated received dextrose and insulin.  Acute renal failure Case discussed with nephrology bicarb drip uric acid elevated updated nephrologist phosphorus normal.  Lipase elevated history of cholecystectomy for [place] sign [place]'s point tenderness metabolic acidosis noted bicarb 12 on bicarb drip LFTs are elevated lactic acid normal  History of leiomyosarcoma discussed case with her oncologist request admission patient has been accepted as [facility] however no beds are available Case was [facility]  Patient seen evaluated attending physician Dr. Allison   PROCEDURES  Unless otherwise noted below, none      CONSULTS:   IP CONSULT TO NEPHROLOGY      ED Course as of 07/25/24 1324   Thu Jul 25, 2024   0902 EKG on my interpretation shows sinus tachycardia with rate of 106 bpm.  Normal axis.  QTc of 499 ms, [place] interval 146.  There is no ST elevation or depression, no acute ischemic pattern per no STEMI. [TB]   1108 Patient reports she is feeling  better reviewed laboratory data with her.  She remains tachycardic.  Blood pressure improved with IV fluids acute renal failure.  Patient was initially wanting to go home after discussion with her laboratory data and test results recommend admission will touch base with her oncologist Dr. Martinez [TB]   1154 Spoke with patient's oncologist Dr. Robles advise admission and transfer to [facility] currently there is no beds available.  Request repeat BMP IV fluids patient may need emergent dialysis due to no beds available would recommend consult to nephrology and admit to [facility] until bed available at [facility].  Her new medication can also cause muscle breakdown [TB]   1216 Patient seen and evaluated in the emergency department by Dr. Flower for acute renal failure.  Concern for tumor lysis recommend bicarb drip ordered phosphorus and uric acid ordered [TB]   1217 Patient found have a UTI, at 1217 I am concerned for sepsis secondary to UTI.  Rocephin ordered.  She already received a full 30/kg bolus of fluids.  Reperfusion exam shows improvement of her blood pressure, patient otherwise remained stable. [NT]   1221 Spoke with Dr. Hay [facility] has agreed to except the patient under their service telemetry awaiting room placement no further orders at this time [TB]   1221 I performed a sepsis reperfusion exam on Angie Tobin on 7/25/2024 12:22 PM        JUAN Paul   [TB]   1234 Updated on plan of care amenable to plan [TB]   1249 Case discussed with Dr. Cochran has agreed to accept the patient pending no bed available at [facility].  Stepdown.  With plans for transfer once bed available [TB]   1252 Uric acid elevated nephrologist notified no changes in current plan of care at this time he has reported that he is consulting the patient's oncologist for further recommendations patient still stable to go to stepdown [TB]      ED Course User Index  [NT] Pablito Allison DO  [TB] LUDA Paul-CNP          Diagnoses as of 07/25/24 1324   Acute renal failure, unspecified acute renal failure type (CMS-HCC)   Anemia, unspecified type   Thrombocytopenia (CMS-HCC)   Dehydration   Septic shock (Multi)   Leukopenia, unspecified type   Leiomyosarcoma (Multi)   Generalized weakness         This patient has remained hemodynamically stable during their ED course.      Critical Care: 31  Critical Care    Performed by: LUDA Paul-CNP  Authorized by: Pablito Allison DO    Critical care provider statement:     Critical care time (minutes):  45    Critical care time was exclusive of:  Separately billable procedures and treating other patients and teaching time    Critical care was necessary to treat or prevent imminent or life-threatening deterioration of the following conditions:  Sepsis and respiratory failure    Critical care was time spent personally by me on the following activities:  Blood draw for specimens, development of treatment plan with patient or surrogate, evaluation of patient's response to treatment, examination of patient, interpretation of cardiac output measurements, ordering and performing treatments and interventions, ordering and review of laboratory studies, ordering and review of radiographic studies, pulse oximetry, re-evaluation of patient's condition and review of old charts  Critical care time secondary to sepsis bundle to the hypotension tachycardia presence of UTI requiring IV antibiotics IV fluids.  Also acute renal failure      Counseling:  The emergency provider has spoken with the patient family and discussed today’s results, in addition to providing specific details for the plan of care and counseling regarding the diagnosis and prognosis.  Questions are answered at this time and they are agreeable with the plan.         --------------------------------- IMPRESSION AND DISPOSITION ---------------------------------    IMPRESSION  1. Acute renal failure, unspecified acute renal  failure type (CMS-HCC)    2. Anemia, unspecified type    3. Thrombocytopenia (CMS-HCC)    4. Dehydration    5. Septic shock (Multi)    6. Leukopenia, unspecified type    7. Leiomyosarcoma (Multi)    8. Generalized weakness        DISPOSITION  Disposition: Admit stepdown  Patient condition is stable        NOTE: This report was transcribed using voice recognition software. Every effort was made to ensure accuracy; however, inadvertent computerized transcription errors may be present      LUDA Paul-CNP  07/25/24 5214

## 2024-07-25 NOTE — CONSULTS
.Reason For Consult  Acute renal failure severe metabolic acidosis and hyperkalemia    History Of Present Illness  Angie Tobin is a 58 y.o. 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 this was diagnosed about 5 years ago it was found in the pancreatic bed at that time she had surgery also she underwent hysterectomy and bilateral salpingo oophorectomy back in June 2019 patient had received multiple chemotherapeutic agents the years she had recurrent tumor started chemotherapy again a few weeks ago also she started radiation therapy about 3 weeks ago so far she had received 3 radiation therapy treatments she has not been feeling well over the last 10 days she has been complaining of nausea and vomiting and diarrhea her oral intake was very low she also noted that her urine output has been declining so she reported to the emergency room here at [facility] was found to have a new onset of acute renal failure with a creatinine level of 7.6 also she was hyperkalemic with a potassium of 5.5 and low sodium of 130 so I was contacted by the emergency room physician I discussed the case with her in details patient to be admitted here since they have no beds available at this time at [facility] however they have no beds available at this time.  Patient denies any abdominal pain she denies any shortness of breath she denies any fevers or any chills.  Patient had received 2 L of IV normal saline  Review of Systems  Review of Systems   Constitutional:  Positive for activity change, appetite change, fatigue and unexpected weight change. Negative for chills and fever.   HENT:  Negative for sinus pressure, sore throat and tinnitus.    Eyes:  Negative for photophobia and visual disturbance.   Respiratory:  Negative for apnea, cough, shortness of breath and wheezing.    Cardiovascular:  Negative for chest pain, palpitations and leg swelling.    Gastrointestinal:  Positive for diarrhea, nausea and vomiting. Negative for abdominal pain, blood in stool, constipation and rectal pain.   Endocrine: Negative for cold intolerance, heat intolerance, polydipsia, polyphagia and polyuria.   Genitourinary:  Negative for decreased urine volume, dysuria, frequency, hematuria and urgency.   Musculoskeletal:  Negative for arthralgias, back pain, joint swelling, myalgias and neck pain.   Skin:  Negative for color change, pallor, rash and wound.   Neurological:  Positive for weakness. Negative for dizziness, tremors, seizures, syncope, speech difficulty, light-headedness, numbness and headaches.   Hematological:  Negative for adenopathy. Does not bruise/bleed easily.   Psychiatric/Behavioral:  Negative for confusion, hallucinations, sleep disturbance and suicidal ideas.         Past Medical History  She has a past medical history of Acute sinusitis (09/27/2023), Benign essential HTN (04/19/2023), Body mass index (BMI) 40.0-44.9, adult (Multi) (09/27/2023), Candidiasis, unspecified (01/20/2022), Conjunctivitis (09/27/2023), Contact with and (suspected) exposure to covid-19 (09/15/2022), Disorder of liver (07/11/2023), Disorder of thyroid (10/18/2023), Elevated blood-pressure reading, without diagnosis of hypertension (05/26/2017), Gastroesophageal reflux disease (09/15/2022), Herniated lumbar intervertebral disc (09/13/2022), Herpes simplex virus (HSV) infection (04/19/2023), Hypercholesterolemia (09/15/2022), Inappropriate sinus tachycardia, so stated (CMS-HCC) (04/19/2023), Leiomyoma (03/11/2024), Lymphoproliferative disorder (Multi) (09/06/2023), Malignant neoplasm metastatic to left lung (Multi) (04/19/2023), Malignant neoplasm of body of uterus (Multi) (09/27/2023), Mass of pancreas ([facility]) (09/27/2023), Neuropathy (03/11/2024), Never smoked any substance (10/25/2023), Other conditions influencing health status (09/27/2017), Personal history of diseases of the blood  and blood-forming organs and certain disorders involving the immune mechanism (06/06/2016), Personal history of other benign neoplasm (05/08/2019), Personal history of other diseases of the musculoskeletal system and connective tissue (04/18/2018), Personal history of other diseases of the nervous system and sense organs (10/03/2016), Personal history of other diseases of the respiratory system (08/31/2017), Personal history of other diseases of the respiratory system (01/24/2022), Personal history of other specified conditions (09/16/2020), Postoperative pain (03/11/2024), Right lower quadrant abdominal tenderness (03/04/2019), Right upper quadrant pain (03/13/2015), Secondary hypertension (10/18/2023), Toenail fungus (04/19/2023), Uterine leiomyoma (09/27/2023), Viral URI (09/27/2023), Vitamin D deficiency (04/19/2023), and Weight gain (03/11/2024).    Surgical History  She has a past surgical history that includes Esophagogastroduodenoscopy (04/16/2013); Other surgical history (04/19/2013); Other surgical history (10/25/2022); Other surgical history (10/25/2022); Other surgical history (05/08/2019); Cholecystectomy (12/02/2014); Breast surgery (05/30/2013); Other surgical history (05/30/2013); CT guided percutaneous biopsy bone (10/24/2016); CT guided percutaneous biopsy lung (8/5/2022); US guided needle liver biopsy (7/11/2023); IR intervention arterial embolization (12/13/2023); and IR intervention arterial embolization (12/19/2023).     Social History  She reports that she has never smoked. She has never used smokeless tobacco. She reports that she does not use drugs. No history on file for alcohol use.    Family History  Family History   Problem Relation Name Age of Onset    Other (atrial flutter) Mother      Diabetes Mother      Leukemia Father      Liver cancer Father      Ovarian cancer Sister      Hypothyroidism Brother      Breast cancer Paternal Grandmother      No Known Problems Sibling           Current Facility-Administered Medications:     cefTRIAXone (Rocephin) 1 g in dextrose (iso) IV 50 mL, 1 g, intravenous, Once, Pablito Allison,     dextrose 50 % injection 25 g, 25 g, intravenous, Once, JUAN Paul    insulin regular (HumuLIN R,NovoLIN R) injection 10 Units, 10 Units, intravenous, Once, JUAN Paul    sodium bicarbonate 150 mEq in dextrose 5% 1,150 mL infusion, 125 mL/hr, intravenous, Continuous, JUAN Paul, Last Rate: 125 mL/hr at 07/25/24 1217, 125 mL/hr at 07/25/24 1217    Current Outpatient Medications:     albuterol 108 (90 Base) MCG/ACT inhaler, Inhale 1-2 puffs. Every 4-6 hours as needed, Disp: , Rfl:     fluticasone (Flonase) 50 mcg/actuation nasal spray, Administer 1 spray into each nostril once daily. Shake gently. Before first use, prime pump. After use, clean tip and replace cap., Disp: 16 g, Rfl: 0    lidocaine-prilocaine (Emla) 2.5-2.5 % cream, Apply topically if needed for mild pain (1 - 3). Please apply to [facility] prior to access for chemotherapy, Disp: 30 g, Rfl: 3    lisinopril 20 mg tablet, Take 1 tablet (20 mg) by mouth once daily., Disp: 90 tablet, Rfl: 3    LORazepam (Ativan) 0.5 mg tablet, Take 1 tablet (0.5 mg) by mouth every 8 hours if needed (agitation) for up to 10 days., Disp: 30 tablet, Rfl: 0    meclizine (Antivert) 12.5 mg tablet, Take 1-2 tablets (12.5-25 mg) by mouth 3 times a day as needed (vertigo)., Disp: , Rfl:     metFORMIN (Glucophage) 500 mg tablet, Take 1 tablet (500 mg) by mouth 2 times a day., Disp: 180 tablet, Rfl: 3    metoprolol succinate XL (Toprol-XL) 25 mg 24 hr tablet, Take 1 tablet (25 mg) by mouth once daily., Disp: 90 tablet, Rfl: 3   Allergies  Hydromorphone and Codeine         Physical Exam  Physical Exam  Constitutional:       General: She is not in acute distress.     Appearance: She is not toxic-appearing.   HENT:      Head: Normocephalic and atraumatic.   Eyes:      Extraocular Movements:  "Extraocular movements intact.      Pupils: Pupils are equal, round, and reactive to light.   Neck:      Vascular: No carotid bruit.   Cardiovascular:      Rate and Rhythm: Regular rhythm. Bradycardia present.   Pulmonary:      Effort: No respiratory distress.      Breath sounds: No stridor. No wheezing, rhonchi or rales.   Chest:      Chest wall: No tenderness.   Abdominal:      General: There is no distension.      Palpations: There is no mass.      Tenderness: There is no abdominal tenderness. There is no right CVA tenderness, left CVA tenderness or guarding.      Hernia: No hernia is present.   Musculoskeletal:         General: No swelling or tenderness.      Cervical back: No rigidity.      Right lower leg: No edema.      Left lower leg: No edema.   Lymphadenopathy:      Cervical: No cervical adenopathy.   Skin:     General: Skin is warm and dry.      Coloration: Skin is not jaundiced or pale.      Findings: No bruising or erythema.   Neurological:      General: No focal deficit present.      Mental Status: She is alert and oriented to person, place, and time.   Psychiatric:         Mood and Affect: Mood normal.         Behavior: Behavior normal.              I&O 24HR  No intake or output data in the 24 hours ending 07/25/24 1223    Vitals 24HR  Heart Rate:  [104-118]   Temperature:  [36.8 °C (98.2 °F)]   Respirations:  [20-25]   BP: ()/(52-85)   Height:  [177.8 cm (5' 10\")]   Weight:  [120 kg (265 lb)]   Pulse Ox:  [95 %-100 %]     Relevant Results        Results for orders placed or performed during the hospital encounter of 07/25/24 (from the past 96 hour(s))   ECG 12 lead   Result Value Ref Range    Ventricular Rate 106 BPM    Atrial Rate 106 BPM    CT Interval 146 ms    QRS Duration 88 ms    QT Interval 376 ms    QTC Calculation(Bazett) 499 ms    P Axis 34 degrees    R Axis -4 degrees    T Axis 8 degrees    QRS Count 17 beats    Q Onset 211 ms    P Onset 138 ms    P Offset 184 ms    T Offset 399 ms    " QTC Fredericia 454 ms   CBC and Auto Differential   Result Value Ref Range    WBC 3.9 (L) 4.4 - 11.3 x10*3/uL    nRBC 0.0 0.0 - 0.0 /100 WBCs    RBC 3.06 (L) 4.00 - 5.20 x10*6/uL    Hemoglobin 8.7 (L) 12.0 - 16.0 g/dL    Hematocrit 27.2 (L) 36.0 - 46.0 %    MCV 89 80 - 100 fL    MCH 28.4 26.0 - 34.0 pg    MCHC 32.0 32.0 - 36.0 g/dL    RDW 17.5 (H) 11.5 - 14.5 %    Platelets 86 (L) 150 - 450 x10*3/uL    Immature Granulocytes %, Automated 5.9 (H) 0.0 - 0.9 %    Immature Granulocytes Absolute, Automated 0.23 0.00 - 0.70 x10*3/uL   Comprehensive metabolic panel   Result Value Ref Range    Glucose 316 (H) 65 - 99 mg/dL    Sodium 130 (L) 133 - 145 mmol/L    Potassium 5.5 (H) 3.4 - 5.1 mmol/L    Chloride 99 97 - 107 mmol/L    Bicarbonate 12 (L) 24 - 31 mmol/L    Urea Nitrogen 90 (H) 8 - 25 mg/dL    Creatinine 7.60 (H) 0.40 - 1.60 mg/dL    eGFR 6 (L) >60 mL/min/1.73m*2    Calcium 7.9 (L) 8.5 - 10.4 mg/dL    Albumin 3.3 (L) 3.5 - 5.0 g/dL    Alkaline Phosphatase 408 (H) 35 - 125 U/L    Total Protein 6.0 5.9 - 7.9 g/dL    AST 87 (H) 5 - 40 U/L    Bilirubin, Total 1.1 0.1 - 1.2 mg/dL    ALT 72 (H) 5 - 40 U/L    Anion Gap 19 <=19 mmol/L   BLOOD GAS LACTIC ACID, VENOUS   Result Value Ref Range    POCT Lactate, Venous 2.4 (H) 0.4 - 2.0 mmol/L   Serial Troponin, Initial ([facility])   Result Value Ref Range    Troponin T, High Sensitivity 80 (HH) <=14 ng/L   Cardiac Enzymes - CPK   Result Value Ref Range    Creatine Kinase 468 (H) 24 - 195 U/L   Manual Differential   Result Value Ref Range    Neutrophils %, Manual 92.0 40.0 - 80.0 %    Lymphocytes %, Manual 4.0 13.0 - 44.0 %    Monocytes %, Manual 4.0 2.0 - 10.0 %    Eosinophils %, Manual 0.0 0.0 - 6.0 %    Basophils %, Manual 0.0 0.0 - 2.0 %    Seg Neutrophils Absolute, Manual 3.59 1.20 - 7.00 x10*3/uL    Lymphocytes Absolute, Manual 0.16 (L) 1.20 - 4.80 x10*3/uL    Monocytes Absolute, Manual 0.16 0.10 - 1.00 x10*3/uL    Eosinophils Absolute, Manual 0.00 0.00 - 0.70 x10*3/uL     Basophils Absolute, Manual 0.00 0.00 - 0.10 x10*3/uL    Total Cells Counted 100     RBC Morphology See Below     Teardrop Cells Few     Elaine Cells Few    Lipase   Result Value Ref Range    Lipase 77 (H) 16 - 63 U/L   Sars-CoV-2 PCR   Result Value Ref Range    Coronavirus 2019, PCR Not Detected Not Detected   Serial Troponin, 2 Hour ([facility])   Result Value Ref Range    Troponin T, High Sensitivity 78 (HH) <=14 ng/L   Blood Gas Lactic Acid, Venous   Result Value Ref Range    POCT Lactate, Venous 1.9 0.4 - 2.0 mmol/L   Urinalysis with Reflex Culture and Microscopic   Result Value Ref Range    Color, Urine Dark-Yellow Light-Yellow, Yellow, Dark-Yellow    Appearance, Urine Ex.Turbid (N) Clear    Specific Gravity, Urine 1.024 1.005 - 1.035    pH, Urine 5.5 5.0, 5.5, 6.0, 6.5, 7.0, 7.5, 8.0    Protein, Urine 100 (2+) (A) NEGATIVE, 10 (TRACE), 20 (TRACE) mg/dL    Glucose, Urine 50 (TRACE) (A) Normal mg/dL    Blood, Urine 0.1 (1+) (A) NEGATIVE    Ketones, Urine TRACE (A) NEGATIVE mg/dL    Bilirubin, Urine 1 (1+) (A) NEGATIVE    Urobilinogen, Urine 3 (1+) (A) Normal mg/dL    Nitrite, Urine NEGATIVE NEGATIVE    Leukocyte Esterase, Urine 500 Christine/µL (A) NEGATIVE   Microscopic Only, Urine   Result Value Ref Range    WBC, Urine >50 (A) 1-5, NONE /HPF    RBC, Urine 11-20 (A) NONE, 1-2, 3-5 /HPF    Squamous Epithelial Cells, Urine 26-50 (1+) Reference range not established. /HPF    Bacteria, Urine 4+ (A) NONE SEEN /HPF          Assessment/Plan     ECG 12 lead    Result Date: 7/25/2024  Sinus tachycardia Low voltage QRS Poor R-wave progression Abnormal ECG When compared with ECG of 25-OCT-2023 10:33, No significant change was found Confirmed by Theo Jones ([id number]) on 7/25/2024 12:19:52 PM    CT abdomen pelvis wo IV contrast    Result Date: 7/25/2024  Interpreted By:  Leeanne More, STUDY: CT ABDOMEN PELVIS WO IV CONTRAST;  7/25/2024 10:15 am   INDICATION: Signs/Symptoms:acute renal failure, unknown etiology. History of metastatic  leiomyosarcoma status post Y90 radioembolization of the right hepatic artery anterior branch December 2023.   COMPARISON: CT abdomen and pelvis 06/02/2023. MRI abdomen and pelvis 05/10/2024. MRI thoracic spine 06/12/2024.   ACCESSION NUMBER(S): [id number]   ORDERING CLINICIAN: FRANCIS CHERY   TECHNIQUE: CT of the abdomen and pelvis was performed. Contiguous axial images were obtained at 3 mm slice thickness through the abdomen and pelvis. Coronal and sagittal reconstructions at 3 mm slice thickness were performed.  No intravenous contrast was administered; positive oral contrast was given.   FINDINGS: Please note that the evaluation of vessels, lymph nodes and organs is limited without intravenous contrast. Evaluation is also limited due to respiratory motion artifact as well as streak artifact from scanning with the patient's arms down.   LOWER CHEST: Lung bases are clear aside from minimal atelectasis.   ABDOMEN:   LIVER: Atrophy of the right liver lobe segments 6 and 7 is again seen and consistent with posttreatment changes. Evaluation of the patient's known liver metastases is limited without the benefit of IV contrast.   BILE DUCTS: The intrahepatic and extrahepatic ducts are not dilated.   GALLBLADDER: Prior cholecystectomy.   PANCREAS: Unenhanced pancreas is grossly stable in appearance and unremarkable.   SPLEEN: Normal spleen size.   ADRENAL GLANDS: Unremarkable adrenal glands.   KIDNEYS AND URETERS: Unenhanced kidneys are unremarkable. No renal stone or hydronephrosis.   PELVIS:   BLADDER: Urinary bladder is collapsed, limiting its evaluation.   REPRODUCTIVE ORGANS: Prior hysterectomy.   BOWEL: Stomach, small bowel loops and colon are normal in caliber without wall thickening or adjacent inflammatory stranding. Sigmoid colon diverticulosis noted. Large bowel containing ventral abdominal wall hernia measures up to 14 cm in transverse dimension with rectus diastasis measuring 9.9 cm. This contains the  midportion of the transverse colon with no evidence of obstruction or strangulation. Appendix is normal.   VESSELS: Abdominal aorta is normal in caliber with mild atherosclerotic calcifications.   PERITONEUM/RETROPERITONEUM/LYMPH NODES: No ascites or free air, no fluid collection.   Evaluation for lymphadenopathy is limited without contrast, however no obvious lymphadenopathy is seen.   ABDOMINAL WALL: Upper anterior abdominal wall bowel containing hernia noted, as detailed above. There is also a fat containing low anterior abdominal wall hernia measuring up to 12.5 cm in transverse dimension with rectus diastasis measuring 6.2 cm.   BONES: The metastatic soft tissue lesion involving the posterior elements of T11 is again seen and appears decreased in size 2.5 x 1.1 cm in the axial plane compared to 2.8 x 2 cm in June 2024, with improvement of the previously seen spinal canal narrowing. A small lytic metastatic lesion in the left aspect of the T6 vertebral body is also seen and measures 4 mm. No acute bony abnormality is seen. Multilevel thoracolumbar spine degenerative changes noted.       1. No evidence of acute abnormality in the abdomen or pelvis in this limited exam. 2. Metastatic soft tissue lesion involving the posterior elements of T11 has decreased in size with improvement of previously seen spinal canal narrowing. Small lytic metastasis in the left T6 vertebral body also noted. 3. Evaluation of the known liver metastases is limited given the lack of IV contrast. 4. Large ventral abdominal wall hernias containing bowel superiorly and fat inferiorly, as detailed above. No evidence of obstruction or strangulation.     MACRO: None   Signed by: Leeanne More 7/25/2024 10:54 AM Dictation workstation:   [id number]    XR chest 2 views    Result Date: 7/25/2024  Interpreted By:  Damion Rojas, STUDY: XR CHEST 2 VIEWS;  7/25/2024 9:20 am   INDICATION: Signs/Symptoms:weakness.   COMPARISON: None.   ACCESSION  NUMBER(S): [id number]   ORDERING CLINICIAN: CATIA RODNEY   FINDINGS: Small lung volumes. No definite focal infiltrates. No visualized pleural effusion. Cardiomediastinal silhouette unchanged. Right IJ chest port catheter in place. No pulmonary vascular congestion identified.       No active disease in the chest identified on hypoventilatory exam.   MACRO: None   Signed by: Damion Rojas 7/25/2024 9:24 AM Dictation workstation:   [id number]       Impression:  Acute renal failure I believe this is most likely secondary to severe intravascular volume depletion associated with her chemotherapy from nausea vomiting and diarrhea she may very well developed acute tubular necrosis rule out tumor lysis syndrome there is no evidence of obstruction by the CAT scan of the abdomen pelvis there is no hydronephrosis however the CAT scan did show lytic lesions in the bone  Hyperkalemia secondary to acute renal failure  Metabolic acidosis  Leiomyosarcoma with metastatic disease  B-cell monoclonal lymphocytosis  Beatties mellitus type II  Anxiety disorder      Immunizations:  Vigorous IV hydration  Start patient on sodium bicarbonate drip  Obtain uric acid level  Obtain phosphorus level  Hyperkalemia with D50 and insulin  No immediate indication for dialysis therapy unless renal function does not improve and hyperkalemia and metabolic acidosis do not improve  Transfer to [facility] when bed is available  Hold off all chemotherapeutic agents    Thank  you for your consultation    Carlton Flower MDInpatient consult to Nephrology  Consult performed by: Carlton Flower MD  Consult ordered by: Catia Rodney, APRN-CNP

## 2024-07-25 NOTE — PROGRESS NOTES
07/25/24 1340   Discharge Planning   Living Arrangements Alone   Support Systems Friends/neighbors   Type of Residence Private residence   Number of Stairs to Enter Residence 2   Number of Stairs Within Residence 24  (2 STORY HOME WITH A BASEMENT)   Do you have animals or pets at home? No   Who is requesting discharge planning? Provider   Home or Post Acute Services None   Expected Discharge Disposition Home   Does the patient need discharge transport arranged? No   Financial Resource Strain   How hard is it for you to pay for the very basics like food, housing, medical care, and heating? Not hard   Housing Stability   In the last 12 months, was there a time when you were not able to pay the mortgage or rent on time? N   In the past 12 months, how many times have you moved where you were living? 0   At any time in the past 12 months, were you homeless or living in a shelter (including now)? N   Transportation Needs   In the past 12 months, has lack of transportation kept you from medical appointments or from getting medications? no   In the past 12 months, has lack of transportation kept you from meetings, work, or from getting things needed for daily living? No   Patient Choice   Patient / Family choosing to utilize agency / facility established prior to hospitalization No

## 2024-07-25 NOTE — ED PROCEDURE NOTE
Procedure  Critical Care    Performed by: Pablito Allison DO  Authorized by: Pablito Allison DO    Critical care provider statement:     Critical care time (minutes):  22    Critical care time was exclusive of:  Separately billable procedures and treating other patients    Critical care was necessary to treat or prevent imminent or life-threatening deterioration of the following conditions:  Renal failure, sepsis and metabolic crisis    Critical care was time spent personally by me on the following activities:  Ordering and performing treatments and interventions, ordering and review of laboratory studies, ordering and review of radiographic studies, evaluation of patient's response to treatment, review of old charts, examination of patient, obtaining history from patient or surrogate and development of treatment plan with patient or surrogate               Pablito Allison DO  07/25/24 1548

## 2024-07-25 NOTE — CARE PLAN
" Pt does not have a POA or Living Will  ADOD: 2-3 days     Pt lives at home alone in a 2 story home with a basement and 2 steps to climb to enter the home  Pt still works Full time, she drives, she uses [employer] for her groceries, she cooks, cleans. She said that her diabetes is \"controlled\" Present hx of Leiomyosarcoma (mets); pt uses the [facility] for her cancer treatment  She does not use home 02, no cpap or bipap. She does not use any assistive device for ambulation  She wears glasses, no hearing aids. She said that she is able to read, write and comprehend what she reads.  Pt is here for weakness and dehydration  No anticipated discharge needs    DISCHARGE PLAN: HOME    "

## 2024-07-25 NOTE — ED PROVIDER NOTES
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  She states that she has felt like this many times in the course of her chemotherapy for the past 5 years.  She states that she was going in to the infusion center for fluids but felt like her symptoms were too severe and she was unable to make it all the way out to the infusion center.  She feels like she is just dehydrated and needs fluids.  She denies any vomiting or diarrhea, bloody stools, fevers or chills, cough or congestion, sore throat associated with her symptoms.  She states that she does have a decreased oral intake due to her other chronic medical conditions.  She is not drinking fluids well at home.  Her last chemotherapy treatment was about a week ago.    On exam she is awake and alert and in no acute distress, she is mildly tachycardic, blood pressure 75/52.  I suspect that this is due to her severe dehydration, testing ordered to screen for evidence of infection including UTI or pneumonia, COVID could cause some of the symptoms as well.  Abdominal exam is benign without any focal tenderness.  She does have a history of abdominal hernias, there is no evidence of strangulation or incarceration of the hernias on exam.  I do not see any evidence of acute surgical abdomen or acute infectious or inflammatory process in the abdomen based on my exam and her symptoms.    Due to tachycardia and borderline hypotension, 30 cc/KG bolus ordered and sepsis protocol ordered to screen for signs of sepsis given she is immunocompromised and has abnormal vital signs although it is more likely these are due to dehydration.    Blood pressure did improve with IV fluids.  Initially she was found to be in acute renal failure with creatinine of 7.7 and potassium of 5.6.  There were no EKG changes due to the hyperkalemia.  CK was slightly elevated as  well.  Troponin is elevated but there are no acute ischemic changes on EKG, I suspect this is likely due to her renal failure rather than acute cardiac dysfunction or ischemia.    Case was discussed with oncology who accept the patient for transfer, however there are no beds available at this time at [facility].  Case was discussed with nephrology Dr. Flower by the nurse practitioner working with me.  He recommended starting the patient on IV sodium bicarbonate and giving dextrose and insulin for treatment of her hyperkalemia.  This was done.    1217 the patient was found to have a UTI which raises concerns for possible sepsis.  She was treated with IV Rocephin empirically.  Patient was admitted to hospital to the stepdown unit pending bed availability at [facility] for continuity of care with her oncology team.  Additional labs added on to screen for tumor lysis syndrome including uric acid and phosphorus level.  Uric acid level was elevated which is suggestive that this may be the possibility Elgie of her acute renal failure.  Abdominal CT was obtained showing no evidence of acute obstructive pathology.    Patient was admitted to the hospital to the stepdown unit for further medical management pending transfer to [facility].    Physical Exam  General: well developed, obese adult female who is awake and alert, oriented x 4, in no apparent distress.  Family member at bedside.  Eyes: sclera clear bilaterally  HENT: normocephalic, atraumatic.   CV: tachycardic, regular rhythm, no murmur, no gallops, or rubs. radial and dorsalis pedis pulses +2/4 bilaterally  Resp: clear to ascultation bilaterally, no wheezes, rales, or rhonchi  GI: abdomen soft, nontender without rigidity or guarding, no peritoneal signs, abdomen is nondistended, ventral abdominal hernia palpated, easily reducible, there is no overlying tenderness, no peritoneal signs.  No skin changes overlying the  hernia.  MSK: strength +5/5 to upper and lower extremities bilaterally, no swelling of the extremities.  Psych: appropriate mood and affect, cooperative with exam  Skin: warm, dry, without evidence of rash or abrasions       Pablito Allison,   07/25/24 1542

## 2024-07-25 NOTE — ED TRIAGE NOTES
Pt was instructed to get IV fluids at [facility] today, Pt felt to weak to drive to that location. Pt c.o weakness and dehydration.

## 2024-07-25 NOTE — PROGRESS NOTES
07/25/24 1342   Current Planned Discharge Disposition   Current Planned Discharge Disposition Home

## 2024-07-26 ENCOUNTER — HOSPITAL ENCOUNTER (INPATIENT)
Age: 59
End: 2024-07-26
Payer: COMMERCIAL

## 2024-07-26 ENCOUNTER — HOSPITAL ENCOUNTER (INPATIENT)
Facility: HOSPITAL | Age: 59
End: 2024-07-26
Attending: STUDENT IN AN ORGANIZED HEALTH CARE EDUCATION/TRAINING PROGRAM | Admitting: INTERNAL MEDICINE
Payer: COMMERCIAL

## 2024-07-26 ENCOUNTER — APPOINTMENT (OUTPATIENT)
Dept: RADIOLOGY | Facility: HOSPITAL | Age: 59
End: 2024-07-26
Payer: COMMERCIAL

## 2024-07-26 VITALS
WEIGHT: 279.54 LBS | HEIGHT: 70 IN | DIASTOLIC BLOOD PRESSURE: 119 MMHG | RESPIRATION RATE: 25 BRPM | TEMPERATURE: 98.1 F | HEART RATE: 110 BPM | BODY MASS INDEX: 40.02 KG/M2 | SYSTOLIC BLOOD PRESSURE: 135 MMHG | OXYGEN SATURATION: 100 %

## 2024-07-26 DIAGNOSIS — R57.9 SHOCK (MULTI): ICD-10-CM

## 2024-07-26 DIAGNOSIS — A41.9 SEPSIS (MULTI): Primary | ICD-10-CM

## 2024-07-26 DIAGNOSIS — M79.89 OTHER SPECIFIED SOFT TISSUE DISORDERS: ICD-10-CM

## 2024-07-26 DIAGNOSIS — N17.9 ACUTE RENAL FAILURE, UNSPECIFIED ACUTE RENAL FAILURE TYPE (CMS-HCC): ICD-10-CM

## 2024-07-26 LAB
ALBUMIN SERPL-MCNC: 3 G/DL (ref 3.5–5)
ALBUMIN SERPL-MCNC: 3.7 G/DL (ref 3.5–5)
ALP BLD-CCNC: 383 U/L (ref 35–125)
ALT SERPL-CCNC: 63 U/L (ref 5–40)
ANION GAP BLDA CALCULATED.4IONS-SCNC: 16 MMO/L (ref 10–25)
ANION GAP BLDA CALCULATED.4IONS-SCNC: 16 MMO/L (ref 10–25)
ANION GAP SERPL CALC-SCNC: 18 MMOL/L
ANION GAP SERPL CALC-SCNC: >19 MMOL/L
ANION GAP SERPL CALC-SCNC: >19 MMOL/L
APPARATUS: ABNORMAL
APPEARANCE UR: ABNORMAL
ARTERIAL PATENCY WRIST A: NEGATIVE
AST SERPL-CCNC: 89 U/L (ref 5–40)
B-OH-BUTYR+ACETOACET BLD-SCNC: 0.7 MMOL/L (ref 0.1–0.3)
BACTERIA #/AREA URNS AUTO: ABNORMAL /HPF
BASE EXCESS BLDA CALC-SCNC: -11.9 MMOL/L (ref -2–3)
BASE EXCESS BLDA CALC-SCNC: -6.2 MMOL/L (ref -2–3)
BASOPHILS # BLD MANUAL: 0.03 X10*3/UL (ref 0–0.1)
BASOPHILS NFR BLD MANUAL: 1 %
BILIRUB SERPL-MCNC: 0.7 MG/DL (ref 0.1–1.2)
BILIRUB UR STRIP.AUTO-MCNC: NEGATIVE MG/DL
BODY TEMPERATURE: 37 DEGREES CELSIUS
BODY TEMPERATURE: 37 DEGREES CELSIUS
BUN SERPL-MCNC: 64 MG/DL (ref 8–25)
BUN SERPL-MCNC: 88 MG/DL (ref 8–25)
BUN SERPL-MCNC: 94 MG/DL (ref 8–25)
BURR CELLS BLD QL SMEAR: ABNORMAL
CA-I BLD-SCNC: 0.97 MMOL/L (ref 1.1–1.33)
CA-I BLDA-SCNC: 1 MMOL/L (ref 1.1–1.33)
CA-I BLDA-SCNC: 1.03 MMOL/L (ref 1.1–1.33)
CALCIUM SERPL-MCNC: 7.3 MG/DL (ref 8.5–10.4)
CALCIUM SERPL-MCNC: 7.4 MG/DL (ref 8.5–10.4)
CALCIUM SERPL-MCNC: 8 MG/DL (ref 8.5–10.4)
CHLORIDE BLDA-SCNC: 103 MMOL/L (ref 98–107)
CHLORIDE BLDA-SCNC: 106 MMOL/L (ref 98–107)
CHLORIDE SERPL-SCNC: 100 MMOL/L (ref 97–107)
CHLORIDE SERPL-SCNC: 101 MMOL/L (ref 97–107)
CHLORIDE SERPL-SCNC: 96 MMOL/L (ref 97–107)
CK SERPL-CCNC: 1092 U/L (ref 24–195)
CO2 SERPL-SCNC: 11 MMOL/L (ref 24–31)
CO2 SERPL-SCNC: 13 MMOL/L (ref 24–31)
CO2 SERPL-SCNC: 13 MMOL/L (ref 24–31)
COLOR UR: YELLOW
CREAT SERPL-MCNC: 4.6 MG/DL (ref 0.4–1.6)
CREAT SERPL-MCNC: 6.7 MG/DL (ref 0.4–1.6)
CREAT SERPL-MCNC: 7.2 MG/DL (ref 0.4–1.6)
DACRYOCYTES BLD QL SMEAR: ABNORMAL
EGFRCR SERPLBLD CKD-EPI 2021: 11 ML/MIN/1.73M*2
EGFRCR SERPLBLD CKD-EPI 2021: 6 ML/MIN/1.73M*2
EGFRCR SERPLBLD CKD-EPI 2021: 7 ML/MIN/1.73M*2
EOSINOPHIL # BLD MANUAL: 0 X10*3/UL (ref 0–0.7)
EOSINOPHIL NFR BLD MANUAL: 0 %
ERYTHROCYTE [DISTWIDTH] IN BLOOD BY AUTOMATED COUNT: 17.5 % (ref 11.5–14.5)
EST. AVERAGE GLUCOSE BLD GHB EST-MCNC: 217 MG/DL
GLUCOSE BLD MANUAL STRIP-MCNC: 146 MG/DL (ref 74–99)
GLUCOSE BLD MANUAL STRIP-MCNC: 167 MG/DL (ref 74–99)
GLUCOSE BLD MANUAL STRIP-MCNC: 192 MG/DL (ref 74–99)
GLUCOSE BLD MANUAL STRIP-MCNC: 229 MG/DL (ref 74–99)
GLUCOSE BLD MANUAL STRIP-MCNC: 232 MG/DL (ref 74–99)
GLUCOSE BLD MANUAL STRIP-MCNC: 239 MG/DL (ref 74–99)
GLUCOSE BLD MANUAL STRIP-MCNC: 242 MG/DL (ref 74–99)
GLUCOSE BLD MANUAL STRIP-MCNC: 254 MG/DL (ref 74–99)
GLUCOSE BLD MANUAL STRIP-MCNC: 260 MG/DL (ref 74–99)
GLUCOSE BLD MANUAL STRIP-MCNC: 302 MG/DL (ref 74–99)
GLUCOSE BLD MANUAL STRIP-MCNC: 327 MG/DL (ref 74–99)
GLUCOSE BLDA-MCNC: 248 MG/DL (ref 74–99)
GLUCOSE BLDA-MCNC: 259 MG/DL (ref 74–99)
GLUCOSE SERPL-MCNC: 253 MG/DL (ref 65–99)
GLUCOSE SERPL-MCNC: 261 MG/DL (ref 65–99)
GLUCOSE SERPL-MCNC: 290 MG/DL (ref 65–99)
GLUCOSE UR STRIP.AUTO-MCNC: NORMAL MG/DL
HAV IGM SER QL: NONREACTIVE
HBA1C MFR BLD: 9.2 %
HBV CORE IGM SER QL: NONREACTIVE
HBV SURFACE AB SER-ACNC: <3.1 MIU/ML
HBV SURFACE AG SERPL QL IA: NONREACTIVE
HCO3 BLDA-SCNC: 12.7 MMOL/L (ref 22–26)
HCO3 BLDA-SCNC: 16 MMOL/L (ref 22–26)
HCT VFR BLD AUTO: 25.2 % (ref 36–46)
HCT VFR BLD EST: 25 % (ref 36–46)
HCT VFR BLD EST: 26 % (ref 36–46)
HCV AB SER QL: NONREACTIVE
HGB BLD-MCNC: 8 G/DL (ref 12–16)
HGB BLDA-MCNC: 8.4 G/DL (ref 12–16)
HGB BLDA-MCNC: 8.7 G/DL (ref 12–16)
IMM GRANULOCYTES # BLD AUTO: 0.11 X10*3/UL (ref 0–0.7)
IMM GRANULOCYTES NFR BLD AUTO: 4.2 % (ref 0–0.9)
INHALED O2 CONCENTRATION: 32 %
INHALED O2 CONCENTRATION: 44 %
KETONES UR STRIP.AUTO-MCNC: NEGATIVE MG/DL
LACTATE BLDA-SCNC: 1.8 MMOL/L (ref 0.4–2)
LACTATE BLDA-SCNC: 2.8 MMOL/L (ref 0.4–2)
LDH SERPL-CCNC: 295 U/L (ref 91–227)
LEUKOCYTE ESTERASE UR QL STRIP.AUTO: ABNORMAL
LYMPHOCYTES # BLD MANUAL: 0.68 X10*3/UL (ref 1.2–4.8)
LYMPHOCYTES NFR BLD MANUAL: 26 %
MAGNESIUM SERPL-MCNC: 1.5 MG/DL (ref 1.6–3.1)
MAGNESIUM SERPL-MCNC: 1.7 MG/DL (ref 1.6–3.1)
MCH RBC QN AUTO: 27.9 PG (ref 26–34)
MCHC RBC AUTO-ENTMCNC: 31.7 G/DL (ref 32–36)
MCV RBC AUTO: 88 FL (ref 80–100)
MONOCYTES # BLD MANUAL: 0.05 X10*3/UL (ref 0.1–1)
MONOCYTES NFR BLD MANUAL: 2 %
NEUTS SEG # BLD MANUAL: 1.82 X10*3/UL (ref 1.2–7)
NEUTS SEG NFR BLD MANUAL: 70 %
NITRITE UR QL STRIP.AUTO: NEGATIVE
NRBC BLD-RTO: 0 /100 WBCS (ref 0–0)
OXYHGB MFR BLDA: 87.8 % (ref 94–98)
OXYHGB MFR BLDA: 97 % (ref 94–98)
PCO2 BLDA: 21 MM HG (ref 38–42)
PCO2 BLDA: 24 MM HG (ref 38–42)
PH BLDA: 7.33 PH (ref 7.38–7.42)
PH BLDA: 7.49 PH (ref 7.38–7.42)
PH UR STRIP.AUTO: 5 [PH]
PHOSPHATE SERPL-MCNC: 3 MG/DL (ref 2.5–4.5)
PHOSPHATE SERPL-MCNC: 3.4 MG/DL (ref 2.5–4.5)
PLATELET # BLD AUTO: 85 X10*3/UL (ref 150–450)
PO2 BLDA: 117 MM HG (ref 85–95)
PO2 BLDA: 59 MM HG (ref 85–95)
POLYCHROMASIA BLD QL SMEAR: ABNORMAL
POTASSIUM BLDA-SCNC: 4.1 MMOL/L (ref 3.5–5.3)
POTASSIUM BLDA-SCNC: 5.8 MMOL/L (ref 3.5–5.3)
POTASSIUM SERPL-SCNC: 4 MMOL/L (ref 3.4–5.1)
POTASSIUM SERPL-SCNC: 5.7 MMOL/L (ref 3.4–5.1)
POTASSIUM SERPL-SCNC: 5.9 MMOL/L (ref 3.4–5.1)
PROT SERPL-MCNC: 5.7 G/DL (ref 5.9–7.9)
PROT UR STRIP.AUTO-MCNC: ABNORMAL MG/DL
RBC # BLD AUTO: 2.87 X10*6/UL (ref 4–5.2)
RBC # UR STRIP.AUTO: ABNORMAL /UL
RBC #/AREA URNS AUTO: ABNORMAL /HPF
RBC MORPH BLD: ABNORMAL
SAO2 % BLDA: 90 % (ref 94–100)
SAO2 % BLDA: 99 % (ref 94–100)
SODIUM BLDA-SCNC: 129 MMOL/L (ref 136–145)
SODIUM BLDA-SCNC: 131 MMOL/L (ref 136–145)
SODIUM SERPL-SCNC: 131 MMOL/L (ref 133–145)
SODIUM SERPL-SCNC: 132 MMOL/L (ref 133–145)
SODIUM SERPL-SCNC: 134 MMOL/L (ref 133–145)
SP GR UR STRIP.AUTO: 1.02
SPECIMEN DRAWN FROM PATIENT: ABNORMAL
SPECIMEN DRAWN FROM PATIENT: ABNORMAL
SQUAMOUS #/AREA URNS AUTO: ABNORMAL /HPF
TOTAL CELLS COUNTED BLD: 100
URATE SERPL-MCNC: 15.3 MG/DL (ref 2.5–6.8)
UROBILINOGEN UR STRIP.AUTO-MCNC: NORMAL MG/DL
VARIANT LYMPHS # BLD MANUAL: 0.03 X10*3/UL (ref 0–0.5)
VARIANT LYMPHS NFR BLD: 1 %
WBC # BLD AUTO: 2.6 X10*3/UL (ref 4.4–11.3)
WBC #/AREA URNS AUTO: ABNORMAL /HPF

## 2024-07-26 PROCEDURE — 2500000004 HC RX 250 GENERAL PHARMACY W/ HCPCS (ALT 636 FOR OP/ED): Mod: JZ | Performed by: INTERNAL MEDICINE

## 2024-07-26 PROCEDURE — 82947 ASSAY GLUCOSE BLOOD QUANT: CPT

## 2024-07-26 PROCEDURE — 81001 URINALYSIS AUTO W/SCOPE: CPT

## 2024-07-26 PROCEDURE — 85027 COMPLETE CBC AUTOMATED: CPT

## 2024-07-26 PROCEDURE — 85007 BL SMEAR W/DIFF WBC COUNT: CPT

## 2024-07-26 PROCEDURE — 84520 ASSAY OF UREA NITROGEN: CPT

## 2024-07-26 PROCEDURE — 2020000001 HC ICU ROOM DAILY

## 2024-07-26 PROCEDURE — 82330 ASSAY OF CALCIUM: CPT

## 2024-07-26 PROCEDURE — 71045 X-RAY EXAM CHEST 1 VIEW: CPT

## 2024-07-26 PROCEDURE — 36600 WITHDRAWAL OF ARTERIAL BLOOD: CPT

## 2024-07-26 PROCEDURE — 84132 ASSAY OF SERUM POTASSIUM: CPT

## 2024-07-26 PROCEDURE — 8010000001 HC DIALYSIS - HEMODIALYSIS PER DAY

## 2024-07-26 PROCEDURE — 37799 UNLISTED PX VASCULAR SURGERY: CPT

## 2024-07-26 PROCEDURE — 37799 UNLISTED PX VASCULAR SURGERY: CPT | Performed by: INTERNAL MEDICINE

## 2024-07-26 PROCEDURE — 84550 ASSAY OF BLOOD/URIC ACID: CPT | Performed by: INTERNAL MEDICINE

## 2024-07-26 PROCEDURE — 99292 CRITICAL CARE ADDL 30 MIN: CPT

## 2024-07-26 PROCEDURE — 2500000002 HC RX 250 W HCPCS SELF ADMINISTERED DRUGS (ALT 637 FOR MEDICARE OP, ALT 636 FOR OP/ED)

## 2024-07-26 PROCEDURE — 83615 LACTATE (LD) (LDH) ENZYME: CPT

## 2024-07-26 PROCEDURE — 83036 HEMOGLOBIN GLYCOSYLATED A1C: CPT

## 2024-07-26 PROCEDURE — 2500000004 HC RX 250 GENERAL PHARMACY W/ HCPCS (ALT 636 FOR OP/ED): Performed by: INTERNAL MEDICINE

## 2024-07-26 PROCEDURE — 82010 KETONE BODYS QUAN: CPT

## 2024-07-26 PROCEDURE — 82960 TEST FOR G6PD ENZYME: CPT | Mod: WESLAB | Performed by: INTERNAL MEDICINE

## 2024-07-26 PROCEDURE — 2500000004 HC RX 250 GENERAL PHARMACY W/ HCPCS (ALT 636 FOR OP/ED)

## 2024-07-26 PROCEDURE — 2500000005 HC RX 250 GENERAL PHARMACY W/O HCPCS

## 2024-07-26 PROCEDURE — 36620 INSERTION CATHETER ARTERY: CPT

## 2024-07-26 PROCEDURE — 36556 INSERT NON-TUNNEL CV CATH: CPT

## 2024-07-26 PROCEDURE — 87086 URINE CULTURE/COLONY COUNT: CPT | Mod: WESLAB

## 2024-07-26 PROCEDURE — 99291 CRITICAL CARE FIRST HOUR: CPT

## 2024-07-26 PROCEDURE — 03HY32Z INSERTION OF MONITORING DEVICE INTO UPPER ARTERY, PERCUTANEOUS APPROACH: ICD-10-PCS | Performed by: INTERNAL MEDICINE

## 2024-07-26 PROCEDURE — 86706 HEP B SURFACE ANTIBODY: CPT | Mod: WESLAB | Performed by: INTERNAL MEDICINE

## 2024-07-26 PROCEDURE — 83735 ASSAY OF MAGNESIUM: CPT

## 2024-07-26 PROCEDURE — 82550 ASSAY OF CK (CPK): CPT

## 2024-07-26 PROCEDURE — 80074 ACUTE HEPATITIS PANEL: CPT | Mod: WESLAB | Performed by: INTERNAL MEDICINE

## 2024-07-26 PROCEDURE — 84100 ASSAY OF PHOSPHORUS: CPT

## 2024-07-26 PROCEDURE — 3E033XZ INTRODUCTION OF VASOPRESSOR INTO PERIPHERAL VEIN, PERCUTANEOUS APPROACH: ICD-10-PCS | Performed by: INTERNAL MEDICINE

## 2024-07-26 PROCEDURE — 06HN33Z INSERTION OF INFUSION DEVICE INTO LEFT FEMORAL VEIN, PERCUTANEOUS APPROACH: ICD-10-PCS | Performed by: INTERNAL MEDICINE

## 2024-07-26 PROCEDURE — P9047 ALBUMIN (HUMAN), 25%, 50ML: HCPCS | Mod: JZ | Performed by: INTERNAL MEDICINE

## 2024-07-26 PROCEDURE — 90937 HEMODIALYSIS REPEATED EVAL: CPT

## 2024-07-26 PROCEDURE — 2500000004 HC RX 250 GENERAL PHARMACY W/ HCPCS (ALT 636 FOR OP/ED): Performed by: STUDENT IN AN ORGANIZED HEALTH CARE EDUCATION/TRAINING PROGRAM

## 2024-07-26 PROCEDURE — P9047 ALBUMIN (HUMAN), 25%, 50ML: HCPCS

## 2024-07-26 RX ORDER — MIDAZOLAM HYDROCHLORIDE 1 MG/ML
1 INJECTION, SOLUTION INTRAMUSCULAR; INTRAVENOUS ONCE
Status: COMPLETED | OUTPATIENT
Start: 2024-07-26 | End: 2024-07-26

## 2024-07-26 RX ORDER — ALBUMIN HUMAN 250 G/1000ML
25 SOLUTION INTRAVENOUS ONCE
Status: COMPLETED | OUTPATIENT
Start: 2024-07-26 | End: 2024-07-26

## 2024-07-26 RX ORDER — NOREPINEPHRINE BITARTRATE/D5W 8 MG/250ML
PLASTIC BAG, INJECTION (ML) INTRAVENOUS
Status: COMPLETED
Start: 2024-07-26 | End: 2024-07-26

## 2024-07-26 RX ORDER — HEPARIN SODIUM 1000 [USP'U]/ML
1000 INJECTION, SOLUTION INTRAVENOUS; SUBCUTANEOUS
Status: CANCELLED | OUTPATIENT
Start: 2024-07-26

## 2024-07-26 RX ORDER — HEPARIN SODIUM 5000 [USP'U]/ML
7500 INJECTION, SOLUTION INTRAVENOUS; SUBCUTANEOUS EVERY 8 HOURS SCHEDULED
Status: DISCONTINUED | OUTPATIENT
Start: 2024-07-26 | End: 2024-07-26 | Stop reason: HOSPADM

## 2024-07-26 RX ORDER — INSULIN GLARGINE 100 [IU]/ML
10 INJECTION, SOLUTION SUBCUTANEOUS NIGHTLY
Status: DISCONTINUED | OUTPATIENT
Start: 2024-07-26 | End: 2024-07-26 | Stop reason: HOSPADM

## 2024-07-26 RX ORDER — MIDAZOLAM HYDROCHLORIDE 1 MG/ML
INJECTION, SOLUTION INTRAMUSCULAR; INTRAVENOUS
Status: COMPLETED
Start: 2024-07-26 | End: 2024-07-26

## 2024-07-26 RX ORDER — DOCUSATE SODIUM 100 MG/1
100 CAPSULE, LIQUID FILLED ORAL NIGHTLY
Status: DISCONTINUED | OUTPATIENT
Start: 2024-07-26 | End: 2024-07-26 | Stop reason: HOSPADM

## 2024-07-26 RX ORDER — MAGNESIUM SULFATE 1 G/100ML
1 INJECTION INTRAVENOUS ONCE
Status: COMPLETED | OUTPATIENT
Start: 2024-07-26 | End: 2024-07-26

## 2024-07-26 RX ORDER — HEPARIN SODIUM 1000 [USP'U]/ML
1400 INJECTION, SOLUTION INTRAVENOUS; SUBCUTANEOUS AS NEEDED
Status: DISCONTINUED | OUTPATIENT
Start: 2024-07-26 | End: 2024-07-26 | Stop reason: HOSPADM

## 2024-07-26 RX ORDER — DEXTROSE 50 % IN WATER (D50W) INTRAVENOUS SYRINGE
12.5 ONCE
Status: COMPLETED | OUTPATIENT
Start: 2024-07-26 | End: 2024-07-26

## 2024-07-26 RX ORDER — DEXTROSE 50 % IN WATER (D50W) INTRAVENOUS SYRINGE
12.5
Status: DISCONTINUED | OUTPATIENT
Start: 2024-07-26 | End: 2024-07-26 | Stop reason: HOSPADM

## 2024-07-26 RX ORDER — NOREPINEPHRINE BITARTRATE/D5W 8 MG/250ML
0-.5 PLASTIC BAG, INJECTION (ML) INTRAVENOUS CONTINUOUS
Status: DISCONTINUED | OUTPATIENT
Start: 2024-07-26 | End: 2024-07-26

## 2024-07-26 RX ORDER — INSULIN LISPRO 100 [IU]/ML
0-10 INJECTION, SOLUTION INTRAVENOUS; SUBCUTANEOUS EVERY 4 HOURS
Status: DISCONTINUED | OUTPATIENT
Start: 2024-07-26 | End: 2024-07-26

## 2024-07-26 RX ORDER — DEXTROSE 50 % IN WATER (D50W) INTRAVENOUS SYRINGE
25
Status: DISCONTINUED | OUTPATIENT
Start: 2024-07-26 | End: 2024-07-26 | Stop reason: HOSPADM

## 2024-07-26 RX ORDER — NOREPINEPHRINE BITARTRATE/D5W 8 MG/250ML
0-.2 PLASTIC BAG, INJECTION (ML) INTRAVENOUS CONTINUOUS
Status: DISCONTINUED | OUTPATIENT
Start: 2024-07-26 | End: 2024-07-26

## 2024-07-26 RX ORDER — LORAZEPAM 2 MG/ML
0.5 INJECTION INTRAMUSCULAR EVERY 8 HOURS PRN
Status: DISCONTINUED | OUTPATIENT
Start: 2024-07-26 | End: 2024-07-26 | Stop reason: HOSPADM

## 2024-07-26 RX ORDER — NOREPINEPHRINE BITARTRATE/D5W 8 MG/250ML
0-.5 PLASTIC BAG, INJECTION (ML) INTRAVENOUS CONTINUOUS
Status: DISCONTINUED | OUTPATIENT
Start: 2024-07-26 | End: 2024-07-26 | Stop reason: HOSPADM

## 2024-07-26 SDOH — SOCIAL STABILITY: SOCIAL INSECURITY: ARE YOU OR HAVE YOU BEEN THREATENED OR ABUSED PHYSICALLY, EMOTIONALLY, OR SEXUALLY BY ANYONE?: NO

## 2024-07-26 SDOH — SOCIAL STABILITY: SOCIAL INSECURITY: HAS ANYONE EVER THREATENED TO HURT YOUR FAMILY OR YOUR PETS?: NO

## 2024-07-26 SDOH — SOCIAL STABILITY: SOCIAL INSECURITY: ABUSE: ADULT

## 2024-07-26 SDOH — SOCIAL STABILITY: SOCIAL INSECURITY: ARE THERE ANY APPARENT SIGNS OF INJURIES/BEHAVIORS THAT COULD BE RELATED TO ABUSE/NEGLECT?: NO

## 2024-07-26 SDOH — SOCIAL STABILITY: SOCIAL INSECURITY: WERE YOU ABLE TO COMPLETE ALL THE BEHAVIORAL HEALTH SCREENINGS?: YES

## 2024-07-26 SDOH — SOCIAL STABILITY: SOCIAL INSECURITY: HAVE YOU HAD THOUGHTS OF HARMING ANYONE ELSE?: NO

## 2024-07-26 SDOH — SOCIAL STABILITY: SOCIAL INSECURITY: DO YOU FEEL UNSAFE GOING BACK TO THE PLACE WHERE YOU ARE LIVING?: NO

## 2024-07-26 SDOH — SOCIAL STABILITY: SOCIAL INSECURITY: DO YOU FEEL ANYONE HAS EXPLOITED OR TAKEN ADVANTAGE OF YOU FINANCIALLY OR OF YOUR PERSONAL PROPERTY?: NO

## 2024-07-26 SDOH — SOCIAL STABILITY: SOCIAL INSECURITY: DOES ANYONE TRY TO KEEP YOU FROM HAVING/CONTACTING OTHER FRIENDS OR DOING THINGS OUTSIDE YOUR HOME?: NO

## 2024-07-26 ASSESSMENT — PATIENT HEALTH QUESTIONNAIRE - PHQ9
1. LITTLE INTEREST OR PLEASURE IN DOING THINGS: NOT AT ALL
2. FEELING DOWN, DEPRESSED OR HOPELESS: NOT AT ALL
SUM OF ALL RESPONSES TO PHQ9 QUESTIONS 1 & 2: 0

## 2024-07-26 ASSESSMENT — COGNITIVE AND FUNCTIONAL STATUS - GENERAL
MOBILITY SCORE: 24
DAILY ACTIVITIY SCORE: 24
DAILY ACTIVITIY SCORE: 24
MOVING FROM LYING ON BACK TO SITTING ON SIDE OF FLAT BED WITH BEDRAILS: A LITTLE
MOBILITY SCORE: 22
TURNING FROM BACK TO SIDE WHILE IN FLAT BAD: A LITTLE
PATIENT BASELINE BEDBOUND: NO

## 2024-07-26 ASSESSMENT — PAIN - FUNCTIONAL ASSESSMENT
PAIN_FUNCTIONAL_ASSESSMENT: 0-10

## 2024-07-26 ASSESSMENT — PAIN SCALES - GENERAL
PAINLEVEL_OUTOF10: 0 - NO PAIN
PAINLEVEL_OUTOF10: 0 - NO PAIN
PAINLEVEL_OUTOF10: 7
PAINLEVEL_OUTOF10: 0 - NO PAIN
PAINLEVEL_OUTOF10: 0 - NO PAIN

## 2024-07-26 ASSESSMENT — ACTIVITIES OF DAILY LIVING (ADL)
LACK_OF_TRANSPORTATION: PATIENT DECLINED
DRESSING YOURSELF: INDEPENDENT
HEARING - LEFT EAR: FUNCTIONAL
JUDGMENT_ADEQUATE_SAFELY_COMPLETE_DAILY_ACTIVITIES: YES
GROOMING: INDEPENDENT
BATHING: INDEPENDENT
ADEQUATE_TO_COMPLETE_ADL: YES
WALKS IN HOME: INDEPENDENT
HEARING - RIGHT EAR: FUNCTIONAL
TOILETING: INDEPENDENT
PATIENT'S MEMORY ADEQUATE TO SAFELY COMPLETE DAILY ACTIVITIES?: YES
FEEDING YOURSELF: INDEPENDENT

## 2024-07-26 ASSESSMENT — LIFESTYLE VARIABLES
AUDIT-C TOTAL SCORE: 0
HOW MANY STANDARD DRINKS CONTAINING ALCOHOL DO YOU HAVE ON A TYPICAL DAY: PATIENT DOES NOT DRINK
SKIP TO QUESTIONS 9-10: 1
HOW OFTEN DO YOU HAVE A DRINK CONTAINING ALCOHOL: NEVER
AUDIT-C TOTAL SCORE: 0
HOW OFTEN DO YOU HAVE 6 OR MORE DRINKS ON ONE OCCASION: NEVER

## 2024-07-26 NOTE — H&P
[facility] Critical Care Medicine       Date:  7/26/2024  Patient:  Angie Tobin  YOB: 1965  MRN:  [id number]   Admit Date:  7/25/2024      Chief Complaint   Patient presents with    Weakness, Gen         History of Present Illness:  Angie Tobin is a 58 y.o. 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  Patient stated she started chemotherapy again last week and over the past week has experienced nausea, and diarrhea and has not been eating or drinking well.  She presented to the  ED for evaluation of extreme fatigue.  She denied fever, chills, chest pain, shortness of breath, abdominal pain, dysuria or frequency. She did state she wasn't urinating a lot and noticed that her urine was darker than normal. On arrival to ED her BP was low 75/52 and she was found to be tachycardic. She was found to be in acute renal failure with creatinine of 7.7 and potassium of 5.6. She received IV fluids in ED and Nephrology was consulted.  She was also found to have a urinary tract infection and was started on Ceftriaxone. Patient did not appear septic at the time of my exam. She was accepted under Dr. Cochran service and was admitted to step down unit for close monitoring.     Overnight Events:   Overnight the patient experienced worsening hypotension that was fluid responsive.  She did not produce any urine according to the nurse overnight and fluid boluses were being given per cautiously.  Patient's persistent hypotension patient will be transferred to critical care service.  On further history patient explained that symptoms began soon after chemotherapy.  She explains that her diet and thirst were no different prior to chemotherapy.     Interval ICU Events:  7/26: Pt originally on bicarb infusion was  then given 3L NS for hypotension that was fluid responsive initially, but hypotension persisted soon after. Pt being transferred to ICU and started on norepinephrine infusion. Will recheck potassium.     Subjective   Medical History:  Past Medical History:   Diagnosis Date    Acute sinusitis 09/27/2023    Benign essential HTN 04/19/2023    Body mass index (BMI) 40.0-44.9, adult (Multi) 09/27/2023    Candidiasis, unspecified 01/20/2022    Antibiotic-induced yeast infection    Conjunctivitis 09/27/2023    Contact with and (suspected) exposure to covid-19 09/15/2022    Disorder of liver 07/11/2023    Disorder of thyroid 10/18/2023    Elevated blood-pressure reading, without diagnosis of hypertension 05/26/2017    Elevated BP without diagnosis of hypertension    Gastroesophageal reflux disease 09/15/2022    Herniated lumbar intervertebral disc 09/13/2022    Comment on above: OTHER INTERVERTEBRAL DISC DISPLACEMENT, LUMBAR REGION    Herpes simplex virus (HSV) infection 04/19/2023    Hypercholesterolemia 09/15/2022    Inappropriate sinus tachycardia, so stated (CMS-HCC) 04/19/2023    Leiomyoma 03/11/2024    Lymphoproliferative disorder (Multi) 09/06/2023    Malignant neoplasm metastatic to left lung (Multi) 04/19/2023    Malignant neoplasm of body of uterus (Multi) 09/27/2023    Mass of [facility] ([facility]) 09/27/2023    Neuropathy 03/11/2024    Never smoked any substance 10/25/2023    Other conditions influencing health status 09/27/2017    History of cough    Personal history of diseases of the blood and blood-forming organs and certain disorders involving the immune mechanism 06/06/2016    History of leukocytosis    Personal history of other benign neoplasm 05/08/2019    History of other benign neoplasm    Personal history of other diseases of the musculoskeletal system and connective tissue 04/18/2018    History of low back pain    Personal history of other diseases of the nervous system and sense organs 10/03/2016     History of neuropathy    Personal history of other diseases of the respiratory system 08/31/2017    History of acute bronchitis    Personal history of other diseases of the respiratory system 01/24/2022    History of acute sinusitis    Personal history of other specified conditions 09/16/2020    History of weight gain    Postoperative pain 03/11/2024    Right lower quadrant abdominal tenderness 03/04/2019    RLQ abdominal tenderness    Right upper quadrant pain 03/13/2015    Abdominal pain, RUQ (right upper quadrant)    Secondary hypertension 10/18/2023    Toenail fungus 04/19/2023    Uterine leiomyoma 09/27/2023    Viral URI 09/27/2023    Vitamin D deficiency 04/19/2023    Weight gain 03/11/2024     Past Surgical History:   Procedure Laterality Date    BREAST SURGERY  05/30/2013    Breast Surgery Reduction Procedure    CHOLECYSTECTOMY  12/02/2014    Cholecystectomy Laparoscopic    CT GUIDED PERCUTANEOUS BIOPSY BONE  10/24/2016    CT GUIDED PERCUTANEOUS BIOPSY BONE 10/24/2016 [facility]    CT GUIDED PERCUTANEOUS BIOPSY LUNG  8/5/2022    CT GUIDED PERCUTANEOUS BIOPSY LUNG 8/5/2022 [facility]    ESOPHAGOGASTRODUODENOSCOPY  04/16/2013    Diagnostic Esophagogastroduodenoscopy    IR INTERVENTION ARTERIAL EMBOLIZATION  12/13/2023    IR INTERVENTION ARTERIAL EMBOLIZATION 12/13/2023 O[facility] Gold [facility]    IR INTERVENTION ARTERIAL EMBOLIZATION  12/19/2023    IR INTERVENTION ARTERIAL EMBOLIZATION 12/19/2023 Oj Gold [facility] [place] [facility]    OTHER SURGICAL HISTORY  04/19/2013    Anal Fistulotomy (Subcutaneous)    OTHER SURGICAL HISTORY  10/25/2022    Lung wedge resection    OTHER SURGICAL HISTORY  10/25/2022    Lung lobectomy    OTHER SURGICAL HISTORY  05/08/2019    Resection    OTHER SURGICAL HISTORY  05/30/2013    Perineal Transplant Of Anovaginal Fistula    US GUIDED NEEDLE LIVER BIOPSY  7/11/2023    US GUIDED NEEDLE LIVER BIOPSY 7/11/2023 [facility]     Medications Prior to Admission   Medication Sig Dispense  Refill Last Dose    lidocaine-prilocaine (Emla) 2.5-2.5 % cream Apply topically if needed for mild pain (1 - 3). Please apply to Mediport prior to access for chemotherapy 30 g 3 Past Month    lisinopril 20 mg tablet Take 1 tablet (20 mg) by mouth once daily. 90 tablet 3 Past Week    meclizine (Antivert) 12.5 mg tablet Take 1-2 tablets (12.5-25 mg) by mouth 3 times a day as needed (vertigo).   Unknown    metFORMIN (Glucophage) 500 mg tablet Take 1 tablet (500 mg) by mouth 2 times a day. 180 tablet 3 Past Week    metoprolol succinate XL (Toprol-XL) 25 mg 24 hr tablet Take 1 tablet (25 mg) by mouth once daily. 90 tablet 3 Past Week    prochlorperazine (Compazine) 10 mg tablet Take 1 tablet (10 mg) by mouth every 6 hours if needed for nausea or vomiting.   7/25/2024    albuterol 108 (90 Base) MCG/ACT inhaler Inhale 1-2 puffs. Every 4-6 hours as needed   Unknown    fluticasone (Flonase) 50 mcg/actuation nasal spray Administer 1 spray into each nostril once daily. Shake gently. Before first use, prime pump. After use, clean tip and replace cap. 16 g 0 Unknown    LORazepam (Ativan) 0.5 mg tablet Take 1 tablet (0.5 mg) by mouth every 8 hours if needed (agitation) for up to 10 days. 30 tablet 0      Hydromorphone and Codeine  Social History     Tobacco Use    Smoking status: Never    Smokeless tobacco: Never   Substance Use Topics    Drug use: Never     Family History   Problem Relation Name Age of Onset    Other (atrial flutter) Mother      Diabetes Mother      Leukemia Father      Liver cancer Father      Ovarian cancer Sister      Hypothyroidism Brother      Breast cancer Paternal Grandmother      No Known Problems Sibling            Objective   Hospital Medications:    norepinephrine, 0-0.2 mcg/kg/min          Current Facility-Administered Medications:     acetaminophen (Tylenol) tablet 650 mg, 650 mg, oral, q4h PRN **OR** acetaminophen (Tylenol) oral liquid 650 mg, 650 mg, oral, q4h PRN **OR** acetaminophen (Tylenol)  "suppository 650 mg, 650 mg, rectal, q4h PRN, JUAN Murray DNP    cefTRIAXone (Rocephin) 1 g in dextrose (iso) IV 50 mL, 1 g, intravenous, q24h, JUAN Murray DNP    dextrose 50 % injection 12.5 g, 12.5 g, intravenous, q15 min PRN, JUAN Murray DNP    glucagon (Glucagen) injection 1 mg, 1 mg, intramuscular, q15 min PRN, JUAN Murray DNP    heparin (porcine) injection 7,500 Units, 7,500 Units, subcutaneous, q8h [facility], [place] PA-C    insulin regular (HumuLIN R,NovoLIN R) injection 0-5 Units, 0-5 Units, subcutaneous, TID, JUAN Murray DNP, 4 Units at 07/25/24 1957    LORazepam (Ativan) injection 0.5 mg, 0.5 mg, intravenous, q8h PRN, JUAN Murray DNP, 0.5 mg at 07/25/24 2111    metoprolol succinate XL (Toprol-XL) 24 hr tablet 25 mg, 25 mg, oral, Daily, JUAN Murray DNP    norepinephrine (Levophed) 8 mg in dextrose 5% 250 mL (0.032 mg/mL) infusion (premix), 0-0.2 mcg/kg/min, intravenous, Continuous, [place] PA-C    norepinephrine in dextrose 5 % (Levophed) infusion  - [employer] Override Pull, , , ,     oxygen (O2) therapy, , inhalation, Continuous PRN - O2/gases, [place] PA-C    prochlorperazine (Compazine) tablet 10 mg, 10 mg, oral, q6h PRN **OR** prochlorperazine (Compazine) injection 10 mg, 10 mg, intravenous, q6h PRN **OR** prochlorperazine (Compazine) suppository 25 mg, 25 mg, rectal, q12h PRN, Hope R Kucinski, APRN-CNP, DNP    sodium chloride 0.9 % bolus 1,000 mL, 1,000 mL, intravenous, Once, Hortensia Jordan MD, Last Rate: 999 mL/hr at 07/26/24 0319, 1,000 mL at 07/26/24 0319    Review of Systems:  14 point review of systems was completed and negative except for those specially mention in my HPI    Objective     Physical Exam:    Heart Rate:  [102-118]   Temp:  [36.1 °C (97 °F)-36.8 °C (98.2 °F)]   Resp:  [20-33]   BP: ()/(32-85)   Height:  [177.8 cm (5' 10\")]   Weight:  [120 kg (265 lb)-126 kg " (278 lb 14.1 oz)]   SpO2:  [76 %-100 %]     Physical Exam  Vitals and nursing note reviewed.   Constitutional:       Appearance: She is obese. She is ill-appearing. She is not diaphoretic.   HENT:      Head: Normocephalic and atraumatic.      Nose: Nose normal. No congestion.      Mouth/Throat:      Mouth: Mucous membranes are dry.      Pharynx: Oropharynx is clear.   Eyes:      General: No scleral icterus.     Extraocular Movements: Extraocular movements intact.      Pupils: Pupils are equal, round, and reactive to light.   Cardiovascular:      Rate and Rhythm: Regular rhythm. Tachycardia present.      Pulses: Normal pulses.      Comments: Bedside cardiac pocus: RV and LV hyperdynamic   Pulmonary:      Effort: Pulmonary effort is normal. No respiratory distress.      Breath sounds: No wheezing.   Abdominal:      General: Abdomen is flat.      Palpations: Abdomen is soft.      Hernia: A hernia is present.   Musculoskeletal:         General: No swelling or deformity.      Cervical back: Normal range of motion. No rigidity.   Skin:     General: Skin is warm and dry.      Capillary Refill: Capillary refill takes 2 to 3 seconds.      Coloration: Skin is not jaundiced.      Findings: No bruising.      Comments: Tenting appreciated    Neurological:      General: No focal deficit present.      Mental Status: She is alert and oriented to person, place, and time. Mental status is at baseline.      Cranial Nerves: No cranial nerve deficit.   Psychiatric:         Mood and Affect: Mood normal.         Objective:    I have reviewed all medications, laboratory results, and imaging pertinent for today's encounter.      Intake/Output Summary (Last 24 hours) at 7/26/2024 0406  Last data filed at 7/26/2024 0124  Gross per 24 hour   Intake 3539.1 ml   Output --   Net 3539.1 ml       Daily Labs:  CBC:   Results from last 7 days   Lab Units 07/25/24  0911   WBC AUTO x10*3/uL 3.9*   HEMOGLOBIN g/dL 8.7*   HEMATOCRIT % 27.2*   MCV fL 89      BMP:    Results from last 7 days   Lab Units 07/25/24  1808   SODIUM mmol/L 133   POTASSIUM mmol/L 4.9   CHLORIDE mmol/L 99   CO2 mmol/L 14*   BUN mg/dL 91*   CREATININE mg/dL 7.50*   CALCIUM mg/dL 7.8*   GLUCOSE mg/dL 301*         Assessment/Plan:    I am currently managing this critically ill patient for the following problems:  Neuro/Psych/Pain Ctrl/Sedation:  -Pain Control: Acetaminophen  -CAM Assessment every shift, Promote Sleep/wake hygiene    Respiratory/ENT:  No acute concerns   -Maintain SPO2 > 92%  -Promote Pulm Hygiene Daily     Cardiovascular:  Hx of HTN   -Holding lopressor in setting of hypotension   -Levophed gtt, Maintain MAP > 65   -Continuous Cardiac Monitoring     GI:  Diet: Renal   Ulcer Prophylaxis: none  Bowel Prophylaxis: Lokelma     Renal/Volume Status (Intra & Extravascular):  Anion Gap Metabolic Acidosis   Severe ALDEN, Oligouric, potentially tumor lysis syndrome as pt received chemotherapy 1 week prior   Hyperkalemia Pt received hyperkalemia cocktail x1    -Nephrology c/s, Will continue bicarb infusion   --Consider sevelamer, Allopurinol   -Maintain UOP 0.5-1.0 mL/kg/hr, notify provider if less than ideal  -Daily BMP, Mag & Phos, Replete electrolytes as indicated   -Pending Lactic acid and Beta-hydroxybutyrate     Endocrine  DM A1c 7.4 3/31/22   -POCT Glucose q4, SSI #3     Infectious Disease:  Urinary tract infection   -Continue ceftriaxone for 7 days  -Monitor for SIRS    Heme/Onc:  B-cell monoclonal lymphocytosis   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 The details of this note are correct and have been reviewed, and corrected to the best of my ability. Some grammatical areas may persist related to the Dragon software.     I have reviewed all medications, laboratory results, and imaging pertinent for today's encounter.  Plan Discussed with Dr. Blue   Critical Care Time: 70 minutes  Critical Care [facility] [place]  [place] PA-C

## 2024-07-26 NOTE — PROGRESS NOTES
[facility] Critical Care Medicine       Date:  7/26/2024  Patient:  Angie Tobin  YOB: 1965  MRN:  [id number]   Admit Date:  7/25/2024    Chief Complaint   Patient presents with    Weakness, Gen         History of Present Illness:  Angie Tobin is a 58 y.o. 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  Patient stated she started chemotherapy again last week and over the past week has experienced nausea, and diarrhea and has not been eating or drinking well.  She presented to the  ED for evaluation of extreme fatigue.  She denied fever, chills, chest pain, shortness of breath, abdominal pain, dysuria or frequency. She did state she wasn't urinating a lot and noticed that her urine was darker than normal. On arrival to ED her BP was low 75/52 and she was found to be tachycardic. She was found to be in acute renal failure with creatinine of 7.7 and potassium of 5.6. She received IV fluids in ED and Nephrology was consulted.  She was also found to have a urinary tract infection and was started on Ceftriaxone. Patient did not appear septic at the time of my exam. She was accepted under Dr. Cochran service and was admitted to step down unit for close monitoring.      Overnight Events:   Overnight the patient experienced worsening hypotension that was fluid responsive.  She did not produce any urine according to the nurse overnight and fluid boluses were being given per cautiously.  Patient's persistent hypotension patient will be transferred to critical care service.  On further history patient explained that symptoms began soon after chemotherapy.  She explains that her diet and thirst were no different prior to chemotherapy.      Interval ICU Events:  7/26: Pt originally on bicarb infusion was  then given 3L NS for hypotension that was fluid responsive initially, but hypotension persisted soon after. Pt being transferred to ICU and started on norepinephrine infusion. Will recheck potassium.   Morning/afternoon update: K up to 5.9 this am, given K cocktail, recheck 5.7. Persistently hypotensive with increasing levophed requirements, started vasopressin. Discussed with nephrology, will start tablo to clear solutes. Tablo running with increasing vasopressor requirements, now switched to CVVH per nephrology recs. Given 2 doses of rasburicase for hyperuricemia. Tolerating CVVH well as vasopressor requirement starting to downtrend and UOP starting to increase.     Medical History:  Past Medical History:   Diagnosis Date    Acute sinusitis 09/27/2023    Benign essential HTN 04/19/2023    Body mass index (BMI) 40.0-44.9, adult (Multi) 09/27/2023    Candidiasis, unspecified 01/20/2022    Antibiotic-induced yeast infection    Conjunctivitis 09/27/2023    Contact with and (suspected) exposure to covid-19 09/15/2022    Disorder of liver 07/11/2023    Disorder of thyroid 10/18/2023    Elevated blood-pressure reading, without diagnosis of hypertension 05/26/2017    Elevated BP without diagnosis of hypertension    Gastroesophageal reflux disease 09/15/2022    Herniated lumbar intervertebral disc 09/13/2022    Comment on above: OTHER INTERVERTEBRAL DISC DISPLACEMENT, LUMBAR REGION    Herpes simplex virus (HSV) infection 04/19/2023    Hypercholesterolemia 09/15/2022    Inappropriate sinus tachycardia, so stated (CMS-HCC) 04/19/2023    Leiomyoma 03/11/2024    Lymphoproliferative disorder (Multi) 09/06/2023    Malignant neoplasm metastatic to left lung (Multi) 04/19/2023    Malignant neoplasm of body of uterus (Multi) 09/27/2023    Mass of pancreas ([facility]-HCC) 09/27/2023    Neuropathy 03/11/2024    Never smoked any substance 10/25/2023    Other conditions influencing health status 09/27/2017    History of cough    Personal  history of diseases of the blood and blood-forming organs and certain disorders involving the immune mechanism 06/06/2016    History of leukocytosis    Personal history of other benign neoplasm 05/08/2019    History of other benign neoplasm    Personal history of other diseases of the musculoskeletal system and connective tissue 04/18/2018    History of low back pain    Personal history of other diseases of the nervous system and sense organs 10/03/2016    History of neuropathy    Personal history of other diseases of the respiratory system 08/31/2017    History of acute bronchitis    Personal history of other diseases of the respiratory system 01/24/2022    History of acute sinusitis    Personal history of other specified conditions 09/16/2020    History of weight gain    Postoperative pain 03/11/2024    Right lower quadrant abdominal tenderness 03/04/2019    RLQ abdominal tenderness    Right upper quadrant pain 03/13/2015    Abdominal pain, RUQ (right upper quadrant)    Secondary hypertension 10/18/2023    Toenail fungus 04/19/2023    Uterine leiomyoma 09/27/2023    Viral URI 09/27/2023    Vitamin D deficiency 04/19/2023    Weight gain 03/11/2024     Past Surgical History:   Procedure Laterality Date    BREAST SURGERY  05/30/2013    Breast Surgery Reduction Procedure    CHOLECYSTECTOMY  12/02/2014    Cholecystectomy Laparoscopic    CT GUIDED PERCUTANEOUS BIOPSY BONE  10/24/2016    CT GUIDED PERCUTANEOUS BIOPSY BONE 10/24/2016 [facility]    CT GUIDED PERCUTANEOUS BIOPSY LUNG  8/5/2022    CT GUIDED PERCUTANEOUS BIOPSY LUNG 8/5/2022 [facility]    ESOPHAGOGASTRODUODENOSCOPY  04/16/2013    Diagnostic Esophagogastroduodenoscopy    IR INTERVENTION ARTERIAL EMBOLIZATION  12/13/2023    IR INTERVENTION ARTERIAL EMBOLIZATION 12/13/2023 Oj Gold MD [facility] [place] [facility]GIO    IR INTERVENTION ARTERIAL EMBOLIZATION  12/19/2023    IR INTERVENTION ARTERIAL EMBOLIZATION 12/19/2023 Oj Gold MD [facility] [place] [facility]GIO    OTHER SURGICAL  HISTORY  04/19/2013    Anal Fistulotomy (Subcutaneous)    OTHER SURGICAL HISTORY  10/25/2022    Lung wedge resection    OTHER SURGICAL HISTORY  10/25/2022    Lung lobectomy    OTHER SURGICAL HISTORY  05/08/2019    Resection    OTHER SURGICAL HISTORY  05/30/2013    Perineal Transplant Of Anovaginal Fistula    US GUIDED NEEDLE LIVER BIOPSY  7/11/2023    US GUIDED NEEDLE LIVER BIOPSY 7/11/2023 [facility]     Medications Prior to Admission   Medication Sig Dispense Refill Last Dose    lidocaine-prilocaine (Emla) 2.5-2.5 % cream Apply topically if needed for mild pain (1 - 3). Please apply to Mediport prior to access for chemotherapy 30 g 3 Past Month    lisinopril 20 mg tablet Take 1 tablet (20 mg) by mouth once daily. 90 tablet 3 Past Week    meclizine (Antivert) 12.5 mg tablet Take 1-2 tablets (12.5-25 mg) by mouth 3 times a day as needed (vertigo).   Unknown    metFORMIN (Glucophage) 500 mg tablet Take 1 tablet (500 mg) by mouth 2 times a day. 180 tablet 3 Past Week    metoprolol succinate XL (Toprol-XL) 25 mg 24 hr tablet Take 1 tablet (25 mg) by mouth once daily. 90 tablet 3 Past Week    prochlorperazine (Compazine) 10 mg tablet Take 1 tablet (10 mg) by mouth every 6 hours if needed for nausea or vomiting.   7/25/2024    albuterol 108 (90 Base) MCG/ACT inhaler Inhale 1-2 puffs. Every 4-6 hours as needed   Unknown    fluticasone (Flonase) 50 mcg/actuation nasal spray Administer 1 spray into each nostril once daily. Shake gently. Before first use, prime pump. After use, clean tip and replace cap. 16 g 0 Unknown    LORazepam (Ativan) 0.5 mg tablet Take 1 tablet (0.5 mg) by mouth every 8 hours if needed (agitation) for up to 10 days. 30 tablet 0      Hydromorphone and Codeine  Social History     Tobacco Use    Smoking status: Never    Smokeless tobacco: Never   Substance Use Topics    Drug use: Never     Family History   Problem Relation Name Age of Onset    Other (atrial flutter) Mother      Diabetes Mother      Leukemia  Father      Liver cancer Father      Ovarian cancer Sister      Hypothyroidism Brother      Breast cancer Paternal Grandmother      No Known Problems Sibling         Hospital Medications:    norepinephrine, 0-0.2 mcg/kg/min, Last Rate: 0.12 mcg/kg/min (07/26/24 0700)  SODIUM BICARBONATE INFUSION 150 MEQ/1000 ML D5W-SIMPLE 150 mEq, 150 mEq, Last Rate: Stopped (07/26/24 0630)          Current Facility-Administered Medications:     acetaminophen (Tylenol) tablet 650 mg, 650 mg, oral, q4h PRN **OR** acetaminophen (Tylenol) oral liquid 650 mg, 650 mg, oral, q4h PRN **OR** acetaminophen (Tylenol) suppository 650 mg, 650 mg, rectal, q4h PRN, [place] PA-C    cefTRIAXone (Rocephin) 1 g in dextrose (iso) IV 50 mL, 1 g, intravenous, q24h, MELVIN Anna-C    dextrose 50 % injection 12.5 g, 12.5 g, intravenous, q15 min PRN, MELVIN Anna-C    dextrose 50 % injection 25 g, 25 g, intravenous, q15 min PRN, MELVIN Anna-C    docusate sodium (Colace) capsule 100 mg, 100 mg, oral, Nightly, MELVIN Anna-NOLAN    glucagon (Glucagen) injection 1 mg, 1 mg, intramuscular, q15 min PRN, [place] PA-C    glucagon (Glucagen) injection 1 mg, 1 mg, intramuscular, q15 min PRN, [place] PA-C    heparin (porcine) injection 7,500 Units, 7,500 Units, subcutaneous, q8h MALIKA, MELVIN Anna-C, 7,500 Units at 07/26/24 0646    insulin glargine (Lantus) injection 10 Units, 10 Units, subcutaneous, Nightly, MELVIN Anna-NOLAN    insulin lispro (HumaLOG) injection 0-10 Units, 0-10 Units, subcutaneous, q4h, [place] PA-C    lactated Ringer's bolus 500 mL, 500 mL, intravenous, Once, [place] PA-C, Last Rate: 250 mL/hr at 07/26/24 0700, Rate Verify at 07/26/24 0700    magnesium sulfate 1 g in dextrose 5%  mL, 1 g, intravenous, Once, [place] PA-C    [Held by provider] metoprolol succinate XL (Toprol-XL) 24 hr tablet 25 mg, 25 mg, oral, Daily, Hortensia Jordan MD    norepinephrine (Levophed) 8 mg in dextrose 5% 250 mL  "(0.032 mg/mL) infusion (premix), 0-0.2 mcg/kg/min, intravenous, Continuous, [place] PA-C, Last Rate: 28.6 mL/hr at 07/26/24 0700, 0.12 mcg/kg/min at 07/26/24 0700    oxygen (O2) therapy, , inhalation, Continuous PRN - O2/gases, [place] PA-C    prochlorperazine (Compazine) tablet 10 mg, 10 mg, oral, q6h PRN **OR** prochlorperazine (Compazine) injection 10 mg, 10 mg, intravenous, q6h PRN **OR** prochlorperazine (Compazine) suppository 25 mg, 25 mg, rectal, q12h PRN, [place] PA-C    SODIUM BICARBONATE INFUSION 150 MEQ/1000 ML D5W-SIMPLE 150 mEq, 150 mEq, intravenous, Continuous, Ishan Rojas, PharmD, Stopped at 07/26/24 0630    sodium zirconium cyclosilicate (Lokelma) packet 10 g, 10 g, oral, TID, [place] PA-C    Review of Systems:  14 point review of systems was completed and negative except for those specially mention in my HPI    Physical Exam:    Heart Rate:  [102-118]   Temp:  [36.1 °C (97 °F)-36.8 °C (98.2 °F)]   Resp:  [17-34]   BP: ()/(32-85)   Height:  [177.8 cm (5' 10\")]   Weight:  [120 kg (265 lb)-127 kg (279 lb 8.7 oz)]   SpO2:  [76 %-100 %]     Physical Exam  Constitutional:       General: She is not in acute distress.     Appearance: She is toxic-appearing.   HENT:      Head: Normocephalic and atraumatic.      Mouth/Throat:      Mouth: Mucous membranes are dry.   Eyes:      Extraocular Movements: Extraocular movements intact.      Conjunctiva/sclera: Conjunctivae normal.      Pupils: Pupils are equal, round, and reactive to light.   Pulmonary:      Effort: Tachypnea present.   Abdominal:      General: There is distension.      Tenderness: There is no abdominal tenderness.   Musculoskeletal:      Right lower leg: No edema.      Left lower leg: No edema.   Skin:     General: Skin is warm and dry.      Coloration: Skin is pale.   Neurological:      General: No focal deficit present.      Mental Status: She is alert and oriented to person, place, and time. Mental status is at " baseline.         Objective:    I have reviewed all medications, laboratory results, and imaging pertinent for today's encounter.           Intake/Output Summary (Last 24 hours) at 7/26/2024 0748  Last data filed at 7/26/2024 0700  Gross per 24 hour   Intake 5293.19 ml   Output 0 ml   Net 5293.19 ml         Assessment/Plan:    I am currently managing this critically ill patient for the following problems:    Neuro/Psych/Pain Ctrl/Sedation: AOx4   No active concerns   - Pain Control: Acetaminophen  - CAM Assessment every shift, Promote Sleep/wake hygiene     Respiratory/ENT:  No acute concerns   - Supplemental O2: 3L NC  - Maintain SpO2 > 92%  - Promote Pulm Hygiene Daily      Cardiovascular:  Hx of HTN   Septic shock   - Holding lopressor in setting of hypotension   - Levophed gtt, and vasopressin added   - Maintain MAP > 65   - Continuous cardiac monitoring per ICU protocol      GI:  - Diet: Renal   - Ulcer Prophylaxis: none  - Bowel Prophylaxis: Lokelma      Renal/Volume Status (Intra & Extravascular):  Anion Gap Metabolic Acidosis   Severe ALDEN, Oligouric, potentially tumor lysis syndrome as pt received chemotherapy 1 week prior   Hyperkalemia Pt received hyperkalemia cocktail x1    Hyperuricemia   - Nephrology c/s, Will continue bicarb infusion x 1L  - S/p 6mg rasbuticase   - Pt did not tolerate tablo due to increasing vasopressor requirements  - Started CVVH   - Continue lokelma x3 doses   - Maintain UOP 0.5-1.0 mL/kg/hr, notify provider if less than ideal  - Daily BMP, Mag & Phos, Replete electrolytes as indicated     Endocrine  DM A1c 7.4 3/31/22   - Insulin gtt for persistent hyperglycermia   - POCT glucose q1 hrs   - Can transition to long-acting + SSI once glucose reasonably controlled   - A1c 9.2      Infectious Disease:  Urinary tract infection   - Rocephin broadened to zosyn in the setting of worsening septic shock   - Blood cultures pending   - Urine culture: >100k enteric bacilli   - Monitor for  ongoing SIRS     Heme/Onc:  B-cell monoclonal lymphocytosis   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 labs, imaging, etc.), documentation, discussion plan of care with patient/family/caregiver, and/or coordination of care with multidisciplinary team including the attending. Time does not include completion of procedure time.     Plan discussed with Dr. Blue.     JUDY PatrickC  Pulmonary & Critical Care Medicine   [facility]

## 2024-07-26 NOTE — CONSULTS
Nutrition Assessement Note    Nutrition Assessment    Reason for Assessment: Provider consult order    Pt had dialysis catheter placed this morning. Pt busy with other providers at time of visit. Per chart, pt reported decreased appetite/ PO intake due to nausea, vomiting and diarrhea for the past 10 days. Will provide mighty shake TID.     Reason for Hospital Admission:  Angie Tobin is a 58 y.o. female who is admitted for acute renal failure.     Past Medical History:   Diagnosis Date    Acute sinusitis 09/27/2023    Benign essential HTN 04/19/2023    Body mass index (BMI) 40.0-44.9, adult (Multi) 09/27/2023    Candidiasis, unspecified 01/20/2022    Antibiotic-induced yeast infection    Conjunctivitis 09/27/2023    Contact with and (suspected) exposure to covid-19 09/15/2022    Disorder of liver 07/11/2023    Disorder of thyroid 10/18/2023    Elevated blood-pressure reading, without diagnosis of hypertension 05/26/2017    Elevated BP without diagnosis of hypertension    Gastroesophageal reflux disease 09/15/2022    Herniated lumbar intervertebral disc 09/13/2022    Comment on above: OTHER INTERVERTEBRAL DISC DISPLACEMENT, LUMBAR REGION    Herpes simplex virus (HSV) infection 04/19/2023    Hypercholesterolemia 09/15/2022    Inappropriate sinus tachycardia, so stated (CMS-HCC) 04/19/2023    Leiomyoma 03/11/2024    Lymphoproliferative disorder (Multi) 09/06/2023    Malignant neoplasm metastatic to left lung (Multi) 04/19/2023    Malignant neoplasm of body of uterus (Multi) 09/27/2023    Mass of pancreas ([facility]) 09/27/2023    Neuropathy 03/11/2024    Never smoked any substance 10/25/2023    Other conditions influencing health status 09/27/2017    History of cough    Personal history of diseases of the blood and blood-forming organs and certain disorders involving the immune mechanism 06/06/2016    History of leukocytosis    Personal history of other benign neoplasm 05/08/2019    History of other benign neoplasm     Personal history of other diseases of the musculoskeletal system and connective tissue 04/18/2018    History of low back pain    Personal history of other diseases of the nervous system and sense organs 10/03/2016    History of neuropathy    Personal history of other diseases of the respiratory system 08/31/2017    History of acute bronchitis    Personal history of other diseases of the respiratory system 01/24/2022    History of acute sinusitis    Personal history of other specified conditions 09/16/2020    History of weight gain    Postoperative pain 03/11/2024    Right lower quadrant abdominal tenderness 03/04/2019    RLQ abdominal tenderness    Right upper quadrant pain 03/13/2015    Abdominal pain, RUQ (right upper quadrant)    Secondary hypertension 10/18/2023    Toenail fungus 04/19/2023    Uterine leiomyoma 09/27/2023    Viral URI 09/27/2023    Vitamin D deficiency 04/19/2023    Weight gain 03/11/2024      Past Surgical History:   Procedure Laterality Date    BREAST SURGERY  05/30/2013    Breast Surgery Reduction Procedure    CHOLECYSTECTOMY  12/02/2014    Cholecystectomy Laparoscopic    CT GUIDED PERCUTANEOUS BIOPSY BONE  10/24/2016    CT GUIDED PERCUTANEOUS BIOPSY BONE 10/24/2016 [facility]    CT GUIDED PERCUTANEOUS BIOPSY LUNG  8/5/2022    CT GUIDED PERCUTANEOUS BIOPSY LUNG 8/5/2022 [facility]    ESOPHAGOGASTRODUODENOSCOPY  04/16/2013    Diagnostic Esophagogastroduodenoscopy    IR INTERVENTION ARTERIAL EMBOLIZATION  12/13/2023    IR INTERVENTION ARTERIAL EMBOLIZATION 12/13/2023 Oj Gold MD [facility] [place] [facility]    IR INTERVENTION ARTERIAL EMBOLIZATION  12/19/2023    IR INTERVENTION ARTERIAL EMBOLIZATION 12/19/2023 Oj Gold MD [facility] [place] [facility]    OTHER SURGICAL HISTORY  04/19/2013    Anal Fistulotomy (Subcutaneous)    OTHER SURGICAL HISTORY  10/25/2022    Lung wedge resection    OTHER SURGICAL HISTORY  10/25/2022    Lung lobectomy    OTHER SURGICAL HISTORY  05/08/2019    Resection    OTHER SURGICAL  "HISTORY  05/30/2013    Perineal Transplant Of Anovaginal Fistula    US GUIDED NEEDLE LIVER BIOPSY  7/11/2023    US GUIDED NEEDLE LIVER BIOPSY 7/11/2023 [facility]       Nutrition History:  Food and Nutrient History: Per chart, pt reported decreased PO intake for 10 days     Food Allergies/Intolerances:  None  GI Symptoms: Diarrhea, Nausea, and Vomiting    Anthropometrics:  Ht: 177.8 cm (5' 10\"), Wt: 127 kg (279 lb 8.7 oz), BMI: 40.11  IBW/kg (Dietitian Calculated): 68.1 kg  Percent of IBW: 186 %  Adjusted Body Weight (kg): 82.8 kg    Weight Change:  Daily Weight  07/26/24 : 127 kg (279 lb 8.7 oz)  07/17/24 : 121 kg (265 lb 10.5 oz)  07/08/24 : 122 kg (268 lb 6.4 oz)  06/28/24 : 124 kg (273 lb 5.9 oz)  06/27/24 : 124 kg (273 lb 13 oz)  06/26/24 : 125 kg (275 lb 2.2 oz)  06/17/24 : 122 kg (268 lb)  06/06/24 : 126 kg (276 lb 14.4 oz)  06/05/24 : 124 kg (274 lb 1.6 oz)  05/28/24 : 125 kg (275 lb 12.7 oz)     Weight History / % Weight Change: weight has been stable for 2 months             Nutrition Focused Physical Exam Findings:                       Nutrition Significant Labs:  Lab Results   Component Value Date    WBC 2.6 (L) 07/26/2024    HGB 8.0 (L) 07/26/2024    HCT 25.2 (L) 07/26/2024    PLT 85 (L) 07/26/2024    ALT 63 (H) 07/26/2024    AST 89 (H) 07/26/2024     07/26/2024    K 5.7 (H) 07/26/2024     07/26/2024    CREATININE 6.70 (H) 07/26/2024    BUN 88 (H) 07/26/2024    CO2 11 (L) 07/26/2024    TSH 2.12 02/05/2024    INR 1.0 06/12/2024    HGBA1C 9.2 (H) 07/26/2024     Nutrition Specific Medications:  albumin human, 25 g, intravenous, Once  docusate sodium, 100 mg, oral, Nightly  heparin (porcine), 7,500 Units, subcutaneous, q8h MALIKA  hydrocortisone sodium succinate, 100 mg, intravenous, Once  [Held by provider] insulin glargine, 10 Units, subcutaneous, Nightly  [Held by provider] metoprolol succinate XL, 25 mg, oral, Daily  piperacillin-tazobactam, 2.25 g, intravenous, q8h  rasburicase, 3 mg, " intravenous, Once  sodium zirconium cyclosilicate, 10 g, oral, TID      insulin regular infusion, 0-20 Units/hr, Last Rate: 5 Units/hr (07/26/24 1413)  norepinephrine, 0-0.5 mcg/kg/min, Last Rate: 0.45 mcg/kg/min (07/26/24 1400)  PrismaSol BGK 2/3.5, 25 mL/kg/hr  SODIUM BICARBONATE INFUSION 150 MEQ/1000 ML D5W-SIMPLE 150 mEq, 150 mEq, Last Rate: Stopped (07/26/24 0630)  vasopressin, 0.03 Units/min, Last Rate: 0.03 Units/min (07/26/24 1200)      Dietary Orders (From admission, onward)       Start     Ordered    07/26/24 1052  Adult diet Renal; Potassium Restricted 2 gm (50mEq); 2 - 3 grams Sodium  Diet effective now        Question Answer Comment   Diet type Renal    Potassium restriction: Potassium Restricted 2 gm (50mEq)    Sodium restriction: 2 - 3 grams Sodium        07/26/24 1051                    Estimated Needs:   Estimated Energy Needs  Total Energy Estimated Needs (kCal): 2484 kCal  Total Estimated Energy Need per Day (kCal/kg): 30 kCal/kg  Method for Estimating Needs: ABW    Estimated Protein Needs  Total Protein Estimated Needs (g): 99 g  Total Protein Estimated Needs (g/kg): 1.2 g/kg  Method for Estimating Needs: ABW    Estimated Fluid Needs  Method for Estimating Needs: Urine output + 500-1000 mL/d or per nephrology        Nutrition Diagnosis   Nutrition Diagnosis:       Nutrition Diagnosis  Patient has Nutrition Diagnosis: Yes  Diagnosis Status (1): New  Nutrition Diagnosis 1: Increased nutrient needs  Related to (1): increased demand for nutrient  As Evidenced by (1): hemodialysis, active cancer treatment       Nutrition Interventions/Recommendations   Nutrition Interventions and Recommendations:    Nutrition Prescription:  Individualized Nutrition Prescription Provided for : 2484 kcals, 99 g protein via diet    Nutrition Interventions:   Food and/or Nutrient Delivery Interventions  Interventions: Meals and snacks, Medical food supplement  Meals and Snacks: Mineral-modified diet  Goal: provide diet as  ordered  Medical Food Supplement: Modified beverage  Goal: mighty shake TID to provide 200 kcals and 7g protein each    Education Documentation  No documentation found.           Nutrition Monitoring and Evaluation   Monitoring/Evaluation:   Food/Nutrient Related History Monitoring  Monitoring and Evaluation Plan: Energy intake  Energy Intake: Estimated energy intake  Criteria: pt to consume >/= 75% estimated needs         Time Spent/Follow-up:   Follow Up  Time Spent (min): 30 minutes  Last Date of Nutrition Visit: 07/26/24  Nutrition Follow-Up Needed?: 3-5 days  Follow up Comment: 7/30/24

## 2024-07-26 NOTE — PROGRESS NOTES
"Angie Tobin is a 58 y.o. female on day 1 of admission presenting with Acute renal failure, unspecified acute renal failure type (CMS-HCC).    Subjective   The patient was seen yesterday for acute renal failure severe metabolic acidosis as well as severe hyperkalemia most likely secondary to tubular necrosis patient's are noted initially the patient was admitted to stepdown unit however she had severe hypotension overnight blood pressure did drop into the 70s she had received total of 2 L of normal saline intravenously and she was transferred to the intensive care unit also she was started on norepinephrine to support blood pressure calcium went up again to 5.9 this morning I discussed the case with the intensive care unit team this morning she did get cocktail for hyperkalemia and dialysis will be arranged very shortly awake and responsive she said she feels little bit better today       Objective     Physical Exam  Neck:      Vascular: No carotid bruit.   Cardiovascular:      Rate and Rhythm: Normal rate and regular rhythm.      Heart sounds: No murmur heard.     No friction rub. No gallop.   Pulmonary:      Breath sounds: No wheezing, rhonchi or rales.   Chest:      Chest wall: No tenderness.   Abdominal:      General: There is no distension.      Tenderness: There is no abdominal tenderness. There is no guarding or rebound.   Musculoskeletal:         General: No swelling or tenderness.      Cervical back: Neck supple.      Right lower leg: No edema.      Left lower leg: No edema.   Lymphadenopathy:      Cervical: No cervical adenopathy.         Last Recorded Vitals  Blood pressure 79/55, pulse 108, temperature 36.5 °C (97.7 °F), temperature source Temporal, resp. rate (!) 28, height 1.778 m (5' 10\"), weight 127 kg (279 lb 8.7 oz), SpO2 97%.    Intake/Output last 3 Shifts:  I/O last 3 completed shifts:  In: 5099.7 (40.2 mL/kg) [I.V.:1051.7 (8.3 mL/kg); IV Piggyback:4048]  Out: 0 (0 mL/kg)   Weight: 126.8 kg "     Current Facility-Administered Medications:     acetaminophen (Tylenol) tablet 650 mg, 650 mg, oral, q4h PRN **OR** acetaminophen (Tylenol) oral liquid 650 mg, 650 mg, oral, q4h PRN **OR** acetaminophen (Tylenol) suppository 650 mg, 650 mg, rectal, q4h PRN, [place], PA-C    dextrose 50 % injection 12.5 g, 12.5 g, intravenous, q15 min PRN, [place], PA-C    dextrose 50 % injection 25 g, 25 g, intravenous, q15 min PRN, [place], PA-C    docusate sodium (Colace) capsule 100 mg, 100 mg, oral, Nightly, [place] PA-C    glucagon (Glucagen) injection 1 mg, 1 mg, intramuscular, q15 min PRN, [place], PA-C    glucagon (Glucagen) injection 1 mg, 1 mg, intramuscular, q15 min PRN, [place] PA-C    heparin (porcine) injection 7,500 Units, 7,500 Units, subcutaneous, q8h MALIKA, [place] PA-C, 7,500 Units at 07/26/24 0646    insulin glargine (Lantus) injection 10 Units, 10 Units, subcutaneous, Nightly, [place] PA-C    insulin lispro (HumaLOG) injection 0-10 Units, 0-10 Units, subcutaneous, q4h, [place] PA-C    lactated Ringer's bolus 500 mL, 500 mL, intravenous, Once, MELVIN Anna-C, Stopped at 07/26/24 0820    magnesium sulfate 1 g in dextrose 5%  mL, 1 g, intravenous, Once, [place] PA-C    [Held by provider] metoprolol succinate XL (Toprol-XL) 24 hr tablet 25 mg, 25 mg, oral, Daily, Hortensia Jordan MD    norepinephrine (Levophed) 8 mg in dextrose 5% 250 mL (0.032 mg/mL) infusion (premix), 0-0.2 mcg/kg/min, intravenous, Continuous, MELVIN Anna-C, Last Rate: 38.1 mL/hr at 07/26/24 0745, 0.16 mcg/kg/min at 07/26/24 0745    oxygen (O2) therapy, , inhalation, Continuous PRN - O2/gases, eNw Yancey PA-C    piperacillin-tazobactam (Zosyn) 2.25 g in dextrose (iso) IV 50 mL, 2.25 g, intravenous, q8h, Kyle Schafer PA-C    prochlorperazine (Compazine) tablet 10 mg, 10 mg, oral, q6h PRN **OR** prochlorperazine (Compazine) injection 10 mg, 10 mg, intravenous, q6h PRN **OR**  prochlorperazine (Compazine) suppository 25 mg, 25 mg, rectal, q12h PRN, [place] PA-C    rasburicase (Elitek) 3 mg in sodium chloride 0.9% 50 mL IV, 3 mg, intravenous, Once, Afshin Blue,     SODIUM BICARBONATE INFUSION 150 MEQ/1000 ML D5W-SIMPLE 150 mEq, 150 mEq, intravenous, Continuous, Ishan Rojas, PharmD, Stopped at 07/26/24 0630    sodium zirconium cyclosilicate (Lokelma) packet 10 g, 10 g, oral, TID, [place] PA-C    vasopressin (Vasostrict) 0.2 unit/mL in 5% dextrose 100 mL IV, 0.03 Units/min, intravenous, Continuous, Kyle Schafer PA-C   Relevant Results    Results for orders placed or performed during the hospital encounter of 07/25/24 (from the past 96 hour(s))   ECG 12 lead   Result Value Ref Range    Ventricular Rate 106 BPM    Atrial Rate 106 BPM    WV Interval 146 ms    QRS Duration 88 ms    QT Interval 376 ms    QTC Calculation(Bazett) 499 ms    P Axis 34 degrees    R Axis -4 degrees    T Axis 8 degrees    QRS Count 17 beats    Q Onset 211 ms    P Onset 138 ms    P Offset 184 ms    T Offset 399 ms    QTC Fredericia 454 ms   CBC and Auto Differential   Result Value Ref Range    WBC 3.9 (L) 4.4 - 11.3 x10*3/uL    nRBC 0.0 0.0 - 0.0 /100 WBCs    RBC 3.06 (L) 4.00 - 5.20 x10*6/uL    Hemoglobin 8.7 (L) 12.0 - 16.0 g/dL    Hematocrit 27.2 (L) 36.0 - 46.0 %    MCV 89 80 - 100 fL    MCH 28.4 26.0 - 34.0 pg    MCHC 32.0 32.0 - 36.0 g/dL    RDW 17.5 (H) 11.5 - 14.5 %    Platelets 86 (L) 150 - 450 x10*3/uL    Immature Granulocytes %, Automated 5.9 (H) 0.0 - 0.9 %    Immature Granulocytes Absolute, Automated 0.23 0.00 - 0.70 x10*3/uL   Comprehensive metabolic panel   Result Value Ref Range    Glucose 316 (H) 65 - 99 mg/dL    Sodium 130 (L) 133 - 145 mmol/L    Potassium 5.5 (H) 3.4 - 5.1 mmol/L    Chloride 99 97 - 107 mmol/L    Bicarbonate 12 (L) 24 - 31 mmol/L    Urea Nitrogen 90 (H) 8 - 25 mg/dL    Creatinine 7.60 (H) 0.40 - 1.60 mg/dL    eGFR 6 (L) >60 mL/min/1.73m*2    Calcium 7.9 (L) 8.5 -  10.4 mg/dL    Albumin 3.3 (L) 3.5 - 5.0 g/dL    Alkaline Phosphatase 408 (H) 35 - 125 U/L    Total Protein 6.0 5.9 - 7.9 g/dL    AST 87 (H) 5 - 40 U/L    Bilirubin, Total 1.1 0.1 - 1.2 mg/dL    ALT 72 (H) 5 - 40 U/L    Anion Gap 19 <=19 mmol/L   BLOOD GAS LACTIC ACID, VENOUS   Result Value Ref Range    POCT Lactate, Venous 2.4 (H) 0.4 - 2.0 mmol/L   Serial Troponin, Initial ([facility])   Result Value Ref Range    Troponin T, High Sensitivity 80 (HH) <=14 ng/L   Cardiac Enzymes - CPK   Result Value Ref Range    Creatine Kinase 468 (H) 24 - 195 U/L   Manual Differential   Result Value Ref Range    Neutrophils %, Manual 92.0 40.0 - 80.0 %    Lymphocytes %, Manual 4.0 13.0 - 44.0 %    Monocytes %, Manual 4.0 2.0 - 10.0 %    Eosinophils %, Manual 0.0 0.0 - 6.0 %    Basophils %, Manual 0.0 0.0 - 2.0 %    Seg Neutrophils Absolute, Manual 3.59 1.20 - 7.00 x10*3/uL    Lymphocytes Absolute, Manual 0.16 (L) 1.20 - 4.80 x10*3/uL    Monocytes Absolute, Manual 0.16 0.10 - 1.00 x10*3/uL    Eosinophils Absolute, Manual 0.00 0.00 - 0.70 x10*3/uL    Basophils Absolute, Manual 0.00 0.00 - 0.10 x10*3/uL    Total Cells Counted 100     RBC Morphology See Below     Teardrop Cells Few     [place] Cells Few    Lipase   Result Value Ref Range    Lipase 77 (H) 16 - 63 U/L   Blood Culture    Specimen: Peripheral Venipuncture; Blood culture   Result Value Ref Range    Blood Culture Loaded on Instrument - Culture in progress    Blood Culture    Specimen: Peripheral Venipuncture; Blood culture   Result Value Ref Range    Blood Culture Loaded on Instrument - Culture in progress    Sars-CoV-2 PCR   Result Value Ref Range    Coronavirus 2019, PCR Not Detected Not Detected   Serial Troponin, 2 Hour ([facility])   Result Value Ref Range    Troponin T, High Sensitivity 78 (HH) <=14 ng/L   Blood Gas Lactic Acid, Venous   Result Value Ref Range    POCT Lactate, Venous 1.9 0.4 - 2.0 mmol/L   Uric acid   Result Value Ref Range    Uric Acid 15.9 (H) 2.5 - 6.8 mg/dL    Phosphorus   Result Value Ref Range    Phosphorus 3.7 2.5 - 4.5 mg/dL   Urinalysis with Reflex Culture and Microscopic   Result Value Ref Range    Color, Urine Dark-Yellow Light-Yellow, Yellow, Dark-Yellow    Appearance, Urine Ex.Turbid (N) Clear    Specific Gravity, Urine 1.024 1.005 - 1.035    pH, Urine 5.5 5.0, 5.5, 6.0, 6.5, 7.0, 7.5, 8.0    Protein, Urine 100 (2+) (A) NEGATIVE, 10 (TRACE), 20 (TRACE) mg/dL    Glucose, Urine 50 (TRACE) (A) Normal mg/dL    Blood, Urine 0.1 (1+) (A) NEGATIVE    Ketones, Urine TRACE (A) NEGATIVE mg/dL    Bilirubin, Urine 1 (1+) (A) NEGATIVE    Urobilinogen, Urine 3 (1+) (A) Normal mg/dL    Nitrite, Urine NEGATIVE NEGATIVE    Leukocyte Esterase, Urine 500 Christine/µL (A) NEGATIVE   Extra Urine Gray Tube   Result Value Ref Range    Extra Tube Hold for add-ons.    Microscopic Only, Urine   Result Value Ref Range    WBC, Urine >50 (A) 1-5, NONE /HPF    RBC, Urine 11-20 (A) NONE, 1-2, 3-5 /HPF    Squamous Epithelial Cells, Urine 26-50 (1+) Reference range not established. /HPF    Bacteria, Urine 4+ (A) NONE SEEN /HPF   Serial Troponin, 6 Hour ([facility])   Result Value Ref Range    Troponin T, High Sensitivity 83 (HH) <=14 ng/L   Basic metabolic panel   Result Value Ref Range    Glucose 326 (H) 65 - 99 mg/dL    Sodium 129 (L) 133 - 145 mmol/L    Potassium 5.6 (H) 3.4 - 5.1 mmol/L    Chloride 100 97 - 107 mmol/L    Bicarbonate 12 (L) 24 - 31 mmol/L    Urea Nitrogen 92 (H) 8 - 25 mg/dL    Creatinine 7.70 (H) 0.40 - 1.60 mg/dL    eGFR 6 (L) >60 mL/min/1.73m*2    Calcium 7.8 (L) 8.5 - 10.4 mg/dL    Anion Gap 17 <=19 mmol/L   POCT GLUCOSE   Result Value Ref Range    POCT Glucose 309 (H) 74 - 99 mg/dL   Renal Function Panel   Result Value Ref Range    Glucose 301 (H) 65 - 99 mg/dL    Sodium 133 133 - 145 mmol/L    Potassium 4.9 3.4 - 5.1 mmol/L    Chloride 99 97 - 107 mmol/L    Bicarbonate 14 (L) 24 - 31 mmol/L    Urea Nitrogen 91 (H) 8 - 25 mg/dL    Creatinine 7.50 (H) 0.40 - 1.60 mg/dL    eGFR 6  (L) >60 mL/min/1.73m*2    Calcium 7.8 (L) 8.5 - 10.4 mg/dL    Phosphorus 3.4 2.5 - 4.5 mg/dL    Albumin 3.2 (L) 3.5 - 5.0 g/dL    Anion Gap >19 (H) <=19 mmol/L   POCT GLUCOSE   Result Value Ref Range    POCT Glucose 281 (H) 74 - 99 mg/dL   Uric Acid   Result Value Ref Range    Uric Acid 15.3 (H) 2.5 - 6.8 mg/dL   Comprehensive metabolic panel   Result Value Ref Range    Glucose 253 (H) 65 - 99 mg/dL    Sodium 131 (L) 133 - 145 mmol/L    Potassium 5.9 (H) 3.4 - 5.1 mmol/L    Chloride 100 97 - 107 mmol/L    Bicarbonate 13 (L) 24 - 31 mmol/L    Urea Nitrogen 94 (H) 8 - 25 mg/dL    Creatinine 7.20 (H) 0.40 - 1.60 mg/dL    eGFR 6 (L) >60 mL/min/1.73m*2    Calcium 7.3 (L) 8.5 - 10.4 mg/dL    Albumin 3.0 (L) 3.5 - 5.0 g/dL    Alkaline Phosphatase 383 (H) 35 - 125 U/L    Total Protein 5.7 (L) 5.9 - 7.9 g/dL    AST 89 (H) 5 - 40 U/L    Bilirubin, Total 0.7 0.1 - 1.2 mg/dL    ALT 63 (H) 5 - 40 U/L    Anion Gap 18 <=19 mmol/L   Phosphorus   Result Value Ref Range    Phosphorus 3.4 2.5 - 4.5 mg/dL   Magnesium   Result Value Ref Range    Magnesium 1.50 (L) 1.60 - 3.10 mg/dL   CBC and Auto Differential   Result Value Ref Range    WBC 2.6 (L) 4.4 - 11.3 x10*3/uL    nRBC 0.0 0.0 - 0.0 /100 WBCs    RBC 2.87 (L) 4.00 - 5.20 x10*6/uL    Hemoglobin 8.0 (L) 12.0 - 16.0 g/dL    Hematocrit 25.2 (L) 36.0 - 46.0 %    MCV 88 80 - 100 fL    MCH 27.9 26.0 - 34.0 pg    MCHC 31.7 (L) 32.0 - 36.0 g/dL    RDW 17.5 (H) 11.5 - 14.5 %    Platelets 85 (L) 150 - 450 x10*3/uL    Immature Granulocytes %, Automated 4.2 (H) 0.0 - 0.9 %    Immature Granulocytes Absolute, Automated 0.11 0.00 - 0.70 x10*3/uL   Lactate dehydrogenase   Result Value Ref Range     (H) 91 - 227 U/L   Beta Hydroxybutyrate   Result Value Ref Range    Beta-Hydroxybutyrate 0.70 (H) 0.10 - 0.30 mmol/L   Creatine Kinase   Result Value Ref Range    Creatine Kinase 1,092 (H) 24 - 195 U/L   Hemoglobin A1c   Result Value Ref Range    Hemoglobin A1C 9.2 (H) See below %    Estimated  Average Glucose 217 Not Established mg/dL   Manual Differential   Result Value Ref Range    Neutrophils %, Manual 70.0 40.0 - 80.0 %    Lymphocytes %, Manual 26.0 13.0 - 44.0 %    Monocytes %, Manual 2.0 2.0 - 10.0 %    Eosinophils %, Manual 0.0 0.0 - 6.0 %    Basophils %, Manual 1.0 0.0 - 2.0 %    Atypical Lymphocytes %, Manual 1.0 0.0 - 2.0 %    Seg Neutrophils Absolute, Manual 1.82 1.20 - 7.00 x10*3/uL    Lymphocytes Absolute, Manual 0.68 (L) 1.20 - 4.80 x10*3/uL    Monocytes Absolute, Manual 0.05 (L) 0.10 - 1.00 x10*3/uL    Eosinophils Absolute, Manual 0.00 0.00 - 0.70 x10*3/uL    Basophils Absolute, Manual 0.03 0.00 - 0.10 x10*3/uL    Atypical Lymphs Absolute, Manual 0.03 0.00 - 0.50 x10*3/uL    Total Cells Counted 100     RBC Morphology See Below     Polychromasia Mild     Teardrop Cells Few     [place] Cells Many    Blood Gas Arterial Full Panel   Result Value Ref Range    POCT pH, Arterial 7.33 (L) 7.38 - 7.42 pH    POCT pCO2, Arterial 24 (L) 38 - 42 mm Hg    POCT pO2, Arterial 59 (L) 85 - 95 mm Hg    POCT SO2, Arterial 90 (L) 94 - 100 %    POCT Oxy Hemoglobin, Arterial 87.8 (L) 94.0 - 98.0 %    POCT Hematocrit Calculated, Arterial 26.0 (L) 36.0 - 46.0 %    POCT Sodium, Arterial 129 (L) 136 - 145 mmol/L    POCT Potassium, Arterial 5.8 (H) 3.5 - 5.3 mmol/L    POCT Chloride, Arterial 106 98 - 107 mmol/L    POCT Ionized Calcium, Arterial 1.00 (L) 1.10 - 1.33 mmol/L    POCT Glucose, Arterial 259 (H) 74 - 99 mg/dL    POCT Lactate, Arterial 1.8 0.4 - 2.0 mmol/L    POCT Base Excess, Arterial -11.9 (L) -2.0 - 3.0 mmol/L    POCT HCO3 Calculated, Arterial 12.7 (L) 22.0 - 26.0 mmol/L    POCT Hemoglobin, Arterial 8.7 (L) 12.0 - 16.0 g/dL    POCT Anion Gap, Arterial 16 10 - 25 mmo/L    Patient Temperature 37.0 degrees Celsius    FiO2 32 %    Site of Arterial Puncture Radial Right    POCT GLUCOSE   Result Value Ref Range    POCT Glucose 302 (H) 74 - 99 mg/dL       Assessment/Plan   Acute renal failure renal function did not  improve still fairly oliguric my plan because of hyperkalemia severe metabolic acidosis and no improvement in renal function and uremia patient needs dialysis therapy discussed with her  in details including side effects and she agreed to proceed and gave consent I will use tablo machine because of soft blood pressure we are still waiting for a bed at [facility] and she was accepted by her oncologist and will transfer her as soon as the bed is available  Severe hyperuricemia severe malignancy we will start her on rasburicase  Hyperkalemia secondary to acute renal failure is getting worse was treated medically and will proceed with dialysis therapy  Metabolic acidosis is not improving dialysis will help  Leiomyosarcoma with metastatic disease  B-cell monoclonal lymphocytosis  Diabetes mellitus type II  Anxiety disorder                  Carlton Flower MD

## 2024-07-26 NOTE — H&P
History Of Present Illness  Angie Tobin is a 58 y.o. 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  Patient stated she started chemotherapy again last week and over the past week has experienced nausea, and diarrhea and has not been eating or drinking well.  She presented to the  ED for evaluation of extreme fatigue.  She denied fever, chills, chest pain, shortness of breath, abdominal pain, dysuria or frequency. She did state she wasn't urinating a lot and noticed that her urine was darker than normal. On arrival to ED her BP was low 75/52 and she was found to be tachycardic. She was found to be in acute renal failure with creatinine of 7.7 and potassium of 5.6. She received IV fluids in ED and Nephrology was consulted.  She was also found to have a urinary tract infection and was started on Ceftriaxone. Patient did not appear septic at the time of my exam. She was accepted under Dr. Cochran service and was admitted to step down unit for close monitoring.      Past Medical History  Past Medical History:   Diagnosis Date    Acute sinusitis 09/27/2023    Benign essential HTN 04/19/2023    Body mass index (BMI) 40.0-44.9, adult (Multi) 09/27/2023    Candidiasis, unspecified 01/20/2022    Antibiotic-induced yeast infection    Conjunctivitis 09/27/2023    Contact with and (suspected) exposure to covid-19 09/15/2022    Disorder of liver 07/11/2023    Disorder of thyroid 10/18/2023    Elevated blood-pressure reading, without diagnosis of hypertension 05/26/2017    Elevated BP without diagnosis of hypertension    Gastroesophageal reflux disease 09/15/2022    Herniated lumbar intervertebral disc 09/13/2022    Comment on above: OTHER INTERVERTEBRAL DISC DISPLACEMENT, LUMBAR REGION    Herpes simplex virus (HSV) infection  04/19/2023    Hypercholesterolemia 09/15/2022    Inappropriate sinus tachycardia, so stated (CMS-HCC) 04/19/2023    Leiomyoma 03/11/2024    Lymphoproliferative disorder (Multi) 09/06/2023    Malignant neoplasm metastatic to left lung (Multi) 04/19/2023    Malignant neoplasm of body of uterus (Multi) 09/27/2023    Mass of pancreas ([facility]) 09/27/2023    Neuropathy 03/11/2024    Never smoked any substance 10/25/2023    Other conditions influencing health status 09/27/2017    History of cough    Personal history of diseases of the blood and blood-forming organs and certain disorders involving the immune mechanism 06/06/2016    History of leukocytosis    Personal history of other benign neoplasm 05/08/2019    History of other benign neoplasm    Personal history of other diseases of the musculoskeletal system and connective tissue 04/18/2018    History of low back pain    Personal history of other diseases of the nervous system and sense organs 10/03/2016    History of neuropathy    Personal history of other diseases of the respiratory system 08/31/2017    History of acute bronchitis    Personal history of other diseases of the respiratory system 01/24/2022    History of acute sinusitis    Personal history of other specified conditions 09/16/2020    History of weight gain    Postoperative pain 03/11/2024    Right lower quadrant abdominal tenderness 03/04/2019    RLQ abdominal tenderness    Right upper quadrant pain 03/13/2015    Abdominal pain, RUQ (right upper quadrant)    Secondary hypertension 10/18/2023    Toenail fungus 04/19/2023    Uterine leiomyoma 09/27/2023    Viral URI 09/27/2023    Vitamin D deficiency 04/19/2023    Weight gain 03/11/2024       Surgical History  Past Surgical History:   Procedure Laterality Date    BREAST SURGERY  05/30/2013    Breast Surgery Reduction Procedure    CHOLECYSTECTOMY  12/02/2014    Cholecystectomy Laparoscopic    CT GUIDED PERCUTANEOUS BIOPSY BONE  10/24/2016    CT GUIDED  PERCUTANEOUS BIOPSY BONE 10/24/2016 [facility]    CT GUIDED PERCUTANEOUS BIOPSY LUNG  8/5/2022    CT GUIDED PERCUTANEOUS BIOPSY LUNG 8/5/2022 [facility]    ESOPHAGOGASTRODUODENOSCOPY  04/16/2013    Diagnostic Esophagogastroduodenoscopy    IR INTERVENTION ARTERIAL EMBOLIZATION  12/13/2023    IR INTERVENTION ARTERIAL EMBOLIZATION 12/13/2023 [facility] [place] [facility]    IR INTERVENTION ARTERIAL EMBOLIZATION  12/19/2023    IR INTERVENTION ARTERIAL EMBOLIZATION 12/19/2023 [facility] [place] [facility] [place]    OTHER SURGICAL HISTORY  04/19/2013    Anal Fistulotomy (Subcutaneous)    OTHER SURGICAL HISTORY  10/25/2022    Lung wedge resection    OTHER SURGICAL HISTORY  10/25/2022    Lung lobectomy    OTHER SURGICAL HISTORY  05/08/2019    Resection    OTHER SURGICAL HISTORY  05/30/2013    Perineal Transplant Of Anovaginal Fistula    US GUIDED NEEDLE LIVER BIOPSY  7/11/2023    US GUIDED NEEDLE LIVER BIOPSY 7/11/2023 [facility]        Social History  She reports that she has never smoked. She has never used smokeless tobacco. She reports that she does not use drugs. No history on file for alcohol use.    Family History  Family History   Problem Relation Name Age of Onset    Other (atrial flutter) Mother      Diabetes Mother      Leukemia Father      Liver cancer Father      Ovarian cancer Sister      Hypothyroidism Brother      Breast cancer Paternal Grandmother      No Known Problems Sibling          Allergies  Hydromorphone and Codeine    Review of Systems   Constitutional:  Positive for activity change, appetite change and fatigue.   HENT: Negative.     Eyes: Negative.    Respiratory: Negative.     Cardiovascular: Negative.    Gastrointestinal:  Positive for diarrhea, nausea and vomiting.   Endocrine: Negative.    Genitourinary: Negative.    Musculoskeletal:  Positive for arthralgias.   Skin: Negative.    Allergic/Immunologic: Positive for immunocompromised state.   Neurological:  Positive for weakness and light-headedness.  "  Hematological: Negative.    Psychiatric/Behavioral: Negative.          Physical Exam  Constitutional:       Appearance: Normal appearance. She is obese.   HENT:      Head: Normocephalic and atraumatic.      Nose: Nose normal.      Mouth/Throat:      Mouth: Mucous membranes are moist.      Pharynx: Oropharynx is clear.   Eyes:      Extraocular Movements: Extraocular movements intact.      Conjunctiva/sclera: Conjunctivae normal.      Pupils: Pupils are equal, round, and reactive to light.   Cardiovascular:      Rate and Rhythm: Tachycardia present.      Pulses: Normal pulses.      Heart sounds: Normal heart sounds.   Pulmonary:      Effort: Pulmonary effort is normal.      Breath sounds: Normal breath sounds.   Abdominal:      General: Bowel sounds are normal.      Palpations: Abdomen is soft.   Musculoskeletal:         General: Normal range of motion.      Cervical back: Normal range of motion and neck supple.   Skin:     General: Skin is warm and dry.      Capillary Refill: Capillary refill takes less than 2 seconds.   Neurological:      General: No focal deficit present.      Mental Status: She is alert and oriented to person, place, and time.   Psychiatric:         Mood and Affect: Mood normal.         Behavior: Behavior normal.          Last Recorded Vitals  Blood pressure (!) 100/45, pulse (!) 112, temperature 36.8 °C (98.2 °F), temperature source Temporal, resp. rate 25, height 1.778 m (5' 10\"), weight 120 kg (265 lb), SpO2 100%.    Relevant Results      ECG 12 lead    Result Date: 7/25/2024  Sinus tachycardia Low voltage QRS Poor R-wave progression Abnormal ECG When compared with ECG of 25-OCT-2023 10:33, No significant change was found Confirmed by Theo Jones ([id number]) on 7/25/2024 12:19:52 PM    CT abdomen pelvis wo IV contrast    Result Date: 7/25/2024  Interpreted By:  Leeanne More, STUDY: CT ABDOMEN PELVIS WO IV CONTRAST;  7/25/2024 10:15 am   INDICATION: Signs/Symptoms:acute renal failure, unknown " etiology. History of metastatic leiomyosarcoma status post Y90 radioembolization of the right hepatic artery anterior branch December 2023.   COMPARISON: CT abdomen and pelvis 06/02/2023. MRI abdomen and pelvis 05/10/2024. MRI thoracic spine 06/12/2024.   ACCESSION NUMBER(S): [id number]   ORDERING CLINICIAN: FRANCIS CHERY   TECHNIQUE: CT of the abdomen and pelvis was performed. Contiguous axial images were obtained at 3 mm slice thickness through the abdomen and pelvis. Coronal and sagittal reconstructions at 3 mm slice thickness were performed.  No intravenous contrast was administered; positive oral contrast was given.   FINDINGS: Please note that the evaluation of vessels, lymph nodes and organs is limited without intravenous contrast. Evaluation is also limited due to respiratory motion artifact as well as streak artifact from scanning with the patient's arms down.   LOWER CHEST: Lung bases are clear aside from minimal atelectasis.   ABDOMEN:   LIVER: Atrophy of the right liver lobe segments 6 and 7 is again seen and consistent with posttreatment changes. Evaluation of the patient's known liver metastases is limited without the benefit of IV contrast.   BILE DUCTS: The intrahepatic and extrahepatic ducts are not dilated.   GALLBLADDER: Prior cholecystectomy.   PANCREAS: Unenhanced pancreas is grossly stable in appearance and unremarkable.   SPLEEN: Normal spleen size.   ADRENAL GLANDS: Unremarkable adrenal glands.   KIDNEYS AND URETERS: Unenhanced kidneys are unremarkable. No renal stone or hydronephrosis.   PELVIS:   BLADDER: Urinary bladder is collapsed, limiting its evaluation.   REPRODUCTIVE ORGANS: Prior hysterectomy.   BOWEL: Stomach, small bowel loops and colon are normal in caliber without wall thickening or adjacent inflammatory stranding. Sigmoid colon diverticulosis noted. Large bowel containing ventral abdominal wall hernia measures up to 14 cm in transverse dimension with rectus diastasis  measuring 9.9 cm. This contains the midportion of the transverse colon with no evidence of obstruction or strangulation. Appendix is normal.   VESSELS: Abdominal aorta is normal in caliber with mild atherosclerotic calcifications.   PERITONEUM/RETROPERITONEUM/LYMPH NODES: No ascites or free air, no fluid collection.   Evaluation for lymphadenopathy is limited without contrast, however no obvious lymphadenopathy is seen.   ABDOMINAL WALL: Upper anterior abdominal wall bowel containing hernia noted, as detailed above. There is also a fat containing low anterior abdominal wall hernia measuring up to 12.5 cm in transverse dimension with rectus diastasis measuring 6.2 cm.   BONES: The metastatic soft tissue lesion involving the posterior elements of T11 is again seen and appears decreased in size 2.5 x 1.1 cm in the axial plane compared to 2.8 x 2 cm in June 2024, with improvement of the previously seen spinal canal narrowing. A small lytic metastatic lesion in the left aspect of the T6 vertebral body is also seen and measures 4 mm. No acute bony abnormality is seen. Multilevel thoracolumbar spine degenerative changes noted.       1. No evidence of acute abnormality in the abdomen or pelvis in this limited exam. 2. Metastatic soft tissue lesion involving the posterior elements of T11 has decreased in size with improvement of previously seen spinal canal narrowing. Small lytic metastasis in the left T6 vertebral body also noted. 3. Evaluation of the known liver metastases is limited given the lack of IV contrast. 4. Large ventral abdominal wall hernias containing bowel superiorly and fat inferiorly, as detailed above. No evidence of obstruction or strangulation.     MACRO: None   Signed by: Leeanne More 7/25/2024 10:54 AM Dictation workstation:   [id number]    XR chest 2 views    Result Date: 7/25/2024  Interpreted By:  Damion Rojas, STUDY: XR CHEST 2 VIEWS;  7/25/2024 9:20 am   INDICATION: Signs/Symptoms:weakness.    COMPARISON: None.   ACCESSION NUMBER(S): [id number]   ORDERING CLINICIAN: CATIA RODNEY   FINDINGS: Small lung volumes. No definite focal infiltrates. No visualized pleural effusion. Cardiomediastinal silhouette unchanged. Right IJ chest port catheter in place. No pulmonary vascular congestion identified.       No active disease in the chest identified on hypoventilatory exam.   MACRO: None   Signed by: Damion Rojas 7/25/2024 9:24 AM Dictation workstation:   [id number]      Results for orders placed or performed during the hospital encounter of 07/25/24 (from the past 24 hour(s))   ECG 12 lead   Result Value Ref Range    Ventricular Rate 106 BPM    Atrial Rate 106 BPM    DC Interval 146 ms    QRS Duration 88 ms    QT Interval 376 ms    QTC Calculation(Bazett) 499 ms    P Axis 34 degrees    R Axis -4 degrees    T Axis 8 degrees    QRS Count 17 beats    Q Onset 211 ms    P Onset 138 ms    P Offset 184 ms    T Offset 399 ms    QTC Fredericia 454 ms   CBC and Auto Differential   Result Value Ref Range    WBC 3.9 (L) 4.4 - 11.3 x10*3/uL    nRBC 0.0 0.0 - 0.0 /100 WBCs    RBC 3.06 (L) 4.00 - 5.20 x10*6/uL    Hemoglobin 8.7 (L) 12.0 - 16.0 g/dL    Hematocrit 27.2 (L) 36.0 - 46.0 %    MCV 89 80 - 100 fL    MCH 28.4 26.0 - 34.0 pg    MCHC 32.0 32.0 - 36.0 g/dL    RDW 17.5 (H) 11.5 - 14.5 %    Platelets 86 (L) 150 - 450 x10*3/uL    Immature Granulocytes %, Automated 5.9 (H) 0.0 - 0.9 %    Immature Granulocytes Absolute, Automated 0.23 0.00 - 0.70 x10*3/uL   Comprehensive metabolic panel   Result Value Ref Range    Glucose 316 (H) 65 - 99 mg/dL    Sodium 130 (L) 133 - 145 mmol/L    Potassium 5.5 (H) 3.4 - 5.1 mmol/L    Chloride 99 97 - 107 mmol/L    Bicarbonate 12 (L) 24 - 31 mmol/L    Urea Nitrogen 90 (H) 8 - 25 mg/dL    Creatinine 7.60 (H) 0.40 - 1.60 mg/dL    eGFR 6 (L) >60 mL/min/1.73m*2    Calcium 7.9 (L) 8.5 - 10.4 mg/dL    Albumin 3.3 (L) 3.5 - 5.0 g/dL    Alkaline Phosphatase 408 (H) 35 - 125 U/L    Total Protein 6.0  5.9 - 7.9 g/dL    AST 87 (H) 5 - 40 U/L    Bilirubin, Total 1.1 0.1 - 1.2 mg/dL    ALT 72 (H) 5 - 40 U/L    Anion Gap 19 <=19 mmol/L   BLOOD GAS LACTIC ACID, VENOUS   Result Value Ref Range    POCT Lactate, Venous 2.4 (H) 0.4 - 2.0 mmol/L   Serial Troponin, Initial ([facility])   Result Value Ref Range    Troponin T, High Sensitivity 80 (HH) <=14 ng/L   Cardiac Enzymes - CPK   Result Value Ref Range    Creatine Kinase 468 (H) 24 - 195 U/L   Manual Differential   Result Value Ref Range    Neutrophils %, Manual 92.0 40.0 - 80.0 %    Lymphocytes %, Manual 4.0 13.0 - 44.0 %    Monocytes %, Manual 4.0 2.0 - 10.0 %    Eosinophils %, Manual 0.0 0.0 - 6.0 %    Basophils %, Manual 0.0 0.0 - 2.0 %    Seg Neutrophils Absolute, Manual 3.59 1.20 - 7.00 x10*3/uL    Lymphocytes Absolute, Manual 0.16 (L) 1.20 - 4.80 x10*3/uL    Monocytes Absolute, Manual 0.16 0.10 - 1.00 x10*3/uL    Eosinophils Absolute, Manual 0.00 0.00 - 0.70 x10*3/uL    Basophils Absolute, Manual 0.00 0.00 - 0.10 x10*3/uL    Total Cells Counted 100     RBC Morphology See Below     Teardrop Cells Few     [place] Cells Few    Lipase   Result Value Ref Range    Lipase 77 (H) 16 - 63 U/L   Blood Culture    Specimen: Peripheral Venipuncture; Blood culture   Result Value Ref Range    Blood Culture Loaded on Instrument - Culture in progress    Blood Culture    Specimen: Peripheral Venipuncture; Blood culture   Result Value Ref Range    Blood Culture Loaded on Instrument - Culture in progress    Sars-CoV-2 PCR   Result Value Ref Range    Coronavirus 2019, PCR Not Detected Not Detected   Serial Troponin, 2 Hour ([facility])   Result Value Ref Range    Troponin T, High Sensitivity 78 (HH) <=14 ng/L   Blood Gas Lactic Acid, Venous   Result Value Ref Range    POCT Lactate, Venous 1.9 0.4 - 2.0 mmol/L   Uric acid   Result Value Ref Range    Uric Acid 15.9 (H) 2.5 - 6.8 mg/dL   Phosphorus   Result Value Ref Range    Phosphorus 3.7 2.5 - 4.5 mg/dL   Urinalysis with Reflex Culture and  Microscopic   Result Value Ref Range    Color, Urine Dark-Yellow Light-Yellow, Yellow, Dark-Yellow    Appearance, Urine Ex.Turbid (N) Clear    Specific Gravity, Urine 1.024 1.005 - 1.035    pH, Urine 5.5 5.0, 5.5, 6.0, 6.5, 7.0, 7.5, 8.0    Protein, Urine 100 (2+) (A) NEGATIVE, 10 (TRACE), 20 (TRACE) mg/dL    Glucose, Urine 50 (TRACE) (A) Normal mg/dL    Blood, Urine 0.1 (1+) (A) NEGATIVE    Ketones, Urine TRACE (A) NEGATIVE mg/dL    Bilirubin, Urine 1 (1+) (A) NEGATIVE    Urobilinogen, Urine 3 (1+) (A) Normal mg/dL    Nitrite, Urine NEGATIVE NEGATIVE    Leukocyte Esterase, Urine 500 Christine/µL (A) NEGATIVE   Microscopic Only, Urine   Result Value Ref Range    WBC, Urine >50 (A) 1-5, NONE /HPF    RBC, Urine 11-20 (A) NONE, 1-2, 3-5 /HPF    Squamous Epithelial Cells, Urine 26-50 (1+) Reference range not established. /HPF    Bacteria, Urine 4+ (A) NONE SEEN /HPF   Serial Troponin, 6 Hour ([facility])   Result Value Ref Range    Troponin T, High Sensitivity 83 (HH) <=14 ng/L   Basic metabolic panel   Result Value Ref Range    Glucose 326 (H) 65 - 99 mg/dL    Sodium 129 (L) 133 - 145 mmol/L    Potassium 5.6 (H) 3.4 - 5.1 mmol/L    Chloride 100 97 - 107 mmol/L    Bicarbonate 12 (L) 24 - 31 mmol/L    Urea Nitrogen 92 (H) 8 - 25 mg/dL    Creatinine 7.70 (H) 0.40 - 1.60 mg/dL    eGFR 6 (L) >60 mL/min/1.73m*2    Calcium 7.8 (L) 8.5 - 10.4 mg/dL    Anion Gap 17 <=19 mmol/L   POCT GLUCOSE   Result Value Ref Range    POCT Glucose 309 (H) 74 - 99 mg/dL   Renal Function Panel   Result Value Ref Range    Glucose 301 (H) 65 - 99 mg/dL    Sodium 133 133 - 145 mmol/L    Potassium 4.9 3.4 - 5.1 mmol/L    Chloride 99 97 - 107 mmol/L    Bicarbonate 14 (L) 24 - 31 mmol/L    Urea Nitrogen 91 (H) 8 - 25 mg/dL    Creatinine 7.50 (H) 0.40 - 1.60 mg/dL    eGFR 6 (L) >60 mL/min/1.73m*2    Calcium 7.8 (L) 8.5 - 10.4 mg/dL    Phosphorus 3.4 2.5 - 4.5 mg/dL    Albumin 3.2 (L) 3.5 - 5.0 g/dL    Anion Gap >19 (H) <=19 mmol/L         Assessment/Plan    Principal Problem:    Acute renal failure, unspecified acute renal failure type (CMS-HCC)  Hyperkalemia, Metabolic acidosis  -2/2 severe intravascular volume depletion assoc with side effects of chemo (nausea, vomiting and diarrhea)  -CAT scan of abdomen with no hydronephrosis  -Nephrology consulted  -vigorous IV hydration, bicarb gtt  -D50 and insulin in ED  -no dialysis at this time per nephrology  -EKG as above  -tele monitoring  -continue to monitor    Active Problems:  B-cell monoclonal lymphocytosis   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   -chemotherapy last week - holding for now  -CAT scan revealed lytic lesions to bone  -accepted for transfer to [facility] at  when bed available      Thrombocytopenia (CMS-HCC)    Anemia    Leukopenia  -2/2 recent chemotherapy for above  -no active signs of bleeding  -continue to monitor      Hypotension    History of hypertension  -hypotension in Ed - ?2/2 dehydration  -holding lisinopril for now  -IV fluid as above - per nephrology  -continue PTA Metoprolol with parameters starting 7/26  -tele monitoring  -continue to monitor      Acute cystitis without hematuria  -U/A as above  -afebrile, no leukocytosis, asymptomatic  -Ceftriaxone started in ED - continued  -awaiting urine culture  -consider ID consultation as needed 2/2 immunocompromised state    Elevated troponin  -in setting of ARF  -denied chest pain  -EKG as above  -tele monitoring  -consider cardiology consultation as needed    DM  -holding Metformin 2/2 ARF above  -carb controlled diet  -BG monitoring and SSI      Generalized weakness  -2/2 ALDEN and chemotherapy  -fall precautions  -PT/OT consulted        Luciana Morrow, APRN-CNP, DNP

## 2024-07-26 NOTE — PROGRESS NOTES
Patient not medically clear. Patient in the ICU. At this time the discharge plan will be for patient to return home. Will continue to follow.      07/26/24 1342   Discharge Planning   Home or Post Acute Services None   Expected Discharge Disposition Home   Does the patient need discharge transport arranged? No

## 2024-07-26 NOTE — CARE PLAN
Pt blood pressures kept dropping. Notified Dr. Cochran. Informed of pt BP. Stated to notify hospitalist.   Care transferred to ICU nurse. No other issues at this time. Pt stable at time of transfer.     The patient's goals for the shift include manage blood pressure.      The clinical goals for the shift include rehydratioin      Problem: Nutrition  Goal: Less than 5 days NPO/clear liquids  Outcome: Progressing  Goal: Oral intake greater than 50%  Outcome: Progressing  Goal: Oral intake greater 75%  Outcome: Progressing  Goal: Consume prescribed supplement  Outcome: Progressing  Goal: Adequate PO fluid intake  Outcome: Progressing  Goal: Nutrition support goals are met within 48 hrs  Outcome: Progressing  Goal: Nutrition support is meeting 75% of nutrient needs  Outcome: Progressing  Goal: Tube feed tolerance  Outcome: Progressing  Goal: BG  mg/dL  Outcome: Progressing  Goal: Lab values WNL  Outcome: Progressing  Goal: Electrolytes WNL  Outcome: Progressing  Goal: Promote healing  Outcome: Progressing  Goal: Maintain stable weight  Outcome: Progressing  Goal: Reduce weight from edema/fluid  Outcome: Progressing  Goal: Gradual weight gain  Outcome: Progressing  Goal: Improve ostomy output  Outcome: Progressing

## 2024-07-26 NOTE — DISCHARGE SUMMARY
Discharge Diagnosis  Acute renal failure, unspecified acute renal failure type (CMS-HCC)  Hyperkalemia  Hyperuricemia   Shock Most likely distributive     Issues Requiring Follow-Up  ALDEN on CVVHD  Shock on Vasopressor therapy     Test Results Pending At Discharge  Pending Labs       Order Current Status    Extra Urine Gray Tube In process    Glucose 6 Phosphate Dehydrogenase Screen In process    Hepatitis Panel, Acute In process    Urinalysis with Reflex Culture and Microscopic In process    Urine Culture In process    Blood Culture Preliminary result    Blood Culture Preliminary result    Urine Culture Preliminary result            Hospital Course  Angie Tobin is a 58 y.o. 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  Patient stated she started chemotherapy again last week and over the past week has experienced nausea, and diarrhea and has not been eating or drinking well.  She presented to the  ED for evaluation of extreme fatigue.  She denied fever, chills, chest pain, shortness of breath, abdominal pain, dysuria or frequency. She did state she wasn't urinating a lot and noticed that her urine was darker than normal. On arrival to ED her BP was low 75/52 and she was found to be tachycardic. She was found to be in acute renal failure with creatinine of 7.7 and potassium of 5.6. She received IV fluids in ED and Nephrology was consulted.  She was also found to have a urinary tract infection and was started on Ceftriaxone. Patient did not appear septic at the time of my exam. She was accepted under Dr. Cochran service and was admitted to step down unit for close monitoring.      Overnight Events:   Overnight the patient experienced worsening hypotension that was fluid responsive.  She did not produce any urine  according to the nurse overnight and fluid boluses were being given per cautiously.  Patient's persistent hypotension patient will be transferred to critical care service.  On further history patient explained that symptoms began soon after chemotherapy.  She explains that her diet and thirst were no different prior to chemotherapy.      Interval ICU Events:  7/26: Pt originally on bicarb infusion was then given 3L NS for hypotension that was fluid responsive initially, but hypotension persisted soon after. Pt being transferred to ICU and started on norepinephrine infusion. Will recheck potassium.   Morning/afternoon update: K up to 5.9 this am, given K cocktail, recheck 5.7. Persistently hypotensive with increasing levophed requirements, started vasopressin. Discussed with nephrology, will start tablo to clear solutes. Tablo running with increasing vasopressor requirements, now switched to CVVH per nephrology recs. Given 2 doses of rasburicase for hyperuricemia. Tolerating CVVH well as vasopressor requirement starting to downtrend and UOP starting to increase.     Pertinent Physical Exam At Time of Discharge  Physical Exam  Constitutional:       General: She is not in acute distress.     Appearance: She is toxic-appearing.   HENT:      Head: Normocephalic and atraumatic.      Mouth/Throat:      Mouth: Mucous membranes are dry.   Eyes:      Extraocular Movements: Extraocular movements intact.      Conjunctiva/sclera: Conjunctivae normal.      Pupils: Pupils are equal, round, and reactive to light.   Pulmonary:      Effort: Tachypnea present.   Abdominal:      General: There is distension.      Tenderness: There is no abdominal tenderness.   Musculoskeletal:      Right lower leg: No edema.      Left lower leg: No edema.   Skin:     General: Skin is warm and dry.      Coloration: Skin is pale.   Neurological:      General: No focal deficit present.      Mental Status: She is alert and oriented to person, place, and  time. Mental status is at baseline.     Home Medications     Medication List      ASK your doctor about these medications     albuterol 108 (90 Base) MCG/ACT inhaler   fluticasone 50 mcg/actuation nasal spray; Commonly known as: Flonase;   Administer 1 spray into each nostril once daily. Shake gently. Before   first use, prime pump. After use, clean tip and replace cap.   lidocaine-prilocaine 2.5-2.5 % cream; Commonly known as: Emla; Apply   topically if needed for mild pain (1 - 3). Please apply to [facility] prior   to access for chemotherapy   lisinopril 20 mg tablet; Take 1 tablet (20 mg) by mouth once daily.   LORazepam 0.5 mg tablet; Commonly known as: Ativan; Take 1 tablet (0.5   mg) by mouth every 8 hours if needed (agitation) for up to 10 days.   meclizine 12.5 mg tablet; Commonly known as: Antivert   metFORMIN 500 mg tablet; Commonly known as: Glucophage; Take 1 tablet   (500 mg) by mouth 2 times a day.   metoprolol succinate XL 25 mg 24 hr tablet; Commonly known as:   Toprol-XL; Take 1 tablet (25 mg) by mouth once daily.   prochlorperazine 10 mg tablet; Commonly known as: Compazine       Outpatient Follow-Up  Future Appointments   Date Time Provider Department [place]   8/5/2024 11:30 AM INF 12 MINOFF [id number] [facility]   [facility]24 [facility]   8/5/2024  1:30 PM [facility] Rad   8/5/2024  2:45 PM [facility] [id number] CT 1 [id number] [facility] Minoff H   8/7/2024 12:30 PM Roxann Phelps APRN-CNP [id number] [facility]   8/7/2024  2:00 PM INF 11 MINOFF [id number] [facility]   9/12/2024  8:00 AM JUAN Jones [id number] [facility]   10/8/2024  7:15 AM [facility] MRI [facility] Rad Cent   10/8/2024  9:15 AM Yury Lujan MD, MS [id number] Academic       [facility]     This note was prepared using voice recognition software. The details of this note are correct and have been reviewed, and corrected to the best of my ability. Some  grammatical areas may persist related to the Dragon software.     Critical Care [facility]   [place] PA-C

## 2024-07-26 NOTE — PROGRESS NOTES
The patient had been hypotensive with systolic blood pressures in the 70s. A 1 liter bolus of normal saline was ordered.   When seen the patient was awake and alert not in acute distress.  Heart rhythm was regular with no lung crackles.  There was no leg edema.  She transiently improved with blood pressure up to 86/48 and then it fell to 59/45. A second liter of normal saline was ordered.  The patient was seen and blood pressure was rechecked.  Systolic blood pressure was again in the 70s.      Assessment  Hypotension/shock    Plan  Transferred to the ICU for vasopressor support.  Discussed with [place] PA-C in the ICU.

## 2024-07-26 NOTE — CARE PLAN
The patient's goals for the shift include      The clinical goals for the shift include dialysis line placement      Problem: Nutrition  Goal: Less than 5 days NPO/clear liquids  Outcome: Progressing  Goal: Oral intake greater than 50%  Outcome: Progressing  Goal: Oral intake greater 75%  Outcome: Progressing  Goal: Consume prescribed supplement  Outcome: Progressing  Goal: Adequate PO fluid intake  Outcome: Progressing  Goal: Nutrition support goals are met within 48 hrs  Outcome: Progressing  Goal: Nutrition support is meeting 75% of nutrient needs  Outcome: Progressing  Goal: Tube feed tolerance  Outcome: Progressing  Goal: BG  mg/dL  Outcome: Progressing  Goal: Lab values WNL  Outcome: Progressing  Goal: Electrolytes WNL  Outcome: Progressing  Goal: Promote healing  Outcome: Progressing  Goal: Maintain stable weight  Outcome: Progressing  Goal: Reduce weight from edema/fluid  Outcome: Progressing  Goal: Gradual weight gain  Outcome: Progressing  Goal: Improve ostomy output  Outcome: Progressing     Problem: Pain - Adult  Goal: Verbalizes/displays adequate comfort level or baseline comfort level  Outcome: Progressing     Problem: Safety - Adult  Goal: Free from fall injury  Outcome: Progressing     Problem: Discharge Planning  Goal: Discharge to home or other facility with appropriate resources  Outcome: Progressing     Problem: Chronic Conditions and Co-morbidities  Goal: Patient's chronic conditions and co-morbidity symptoms are monitored and maintained or improved  Outcome: Progressing

## 2024-07-26 NOTE — PROCEDURES
Central Line    Date/Time: 7/26/2024 4:52 PM    Performed by: Kyle Schafer PA-C  Authorized by: Kyle Schafer PA-C    Consent:     Consent obtained:  Written and verbal    Consent given by:  Patient    Risks, benefits, and alternatives were discussed: yes      Risks discussed:  Arterial puncture, bleeding and infection    Alternatives discussed:  No treatment and delayed treatment  Universal protocol:     Procedure explained and questions answered to patient or proxy's satisfaction: yes      Relevant documents present and verified: yes      Test results available: yes      Imaging studies available: yes      Required blood products, implants, devices, and special equipment available: yes      Site/side marked: yes      Immediately prior to procedure, a time out was called: yes      Patient identity confirmed:  Verbally with patient, hospital-assigned identification number and arm band  Pre-procedure details:     Indication(s): central venous access and insufficient peripheral access      Hand hygiene: Hand hygiene performed prior to insertion      Sterile barrier technique: All elements of maximal sterile technique followed    Anesthesia:     Anesthesia method:  Local infiltration    Local anesthetic:  Lidocaine 2% w/o epi  Procedure details:     Location:  L femoral    Patient position:  Supine    Procedural supplies:  Triple lumen    Catheter size: 13F.    Ultrasound guidance: yes      Sterile ultrasound techniques: Sterile gel and sterile probe covers were used      Number of attempts:  2    Successful placement: yes    Post-procedure details:     Post-procedure:  Dressing applied and line sutured    Assessment:  Blood return through all ports    Procedure completion:  Tolerated

## 2024-07-26 NOTE — PROCEDURES
"Arterial Puncture/Cannulation    Date/Time: 7/26/2024 2:25 PM    Performed by: JUAN Fonseca  Authorized by: JUAN Fonseca  Consent: Verbal consent obtained. Written consent obtained.  Risks and benefits: risks, benefits and alternatives were discussed  Consent given by: patient  Patient understanding: patient states understanding of the procedure being performed  Patient consent: the patient's understanding of the procedure matches consent given  Procedure consent: procedure consent matches procedure scheduled  Relevant documents: relevant documents present and verified  Test results: test results available and properly labeled  Site marked: the operative site was marked  Imaging studies: imaging studies available  Required items: required blood products, implants, devices, and special equipment available  Patient identity confirmed: verbally with patient and arm band  Time out: Immediately prior to procedure a \"time out\" was called to verify the correct patient, procedure, equipment, support staff and site/side marked as required.  Preparation: Patient was prepped and draped in the usual sterile fashion.  Local anesthesia used: yes  Anesthesia: local infiltration    Anesthesia:  Local anesthesia used: yes  Local Anesthetic: lidocaine 2% without epinephrine  Anesthetic total: 1 mL  Patient tolerance: patient tolerated the procedure well with no immediate complications              "

## 2024-07-26 NOTE — NURSING NOTE
Pt has an accessed R chest Mediport, drsg dry and intact without any redness, swelling or drainage, fluids infusing through without any difficulty.

## 2024-07-27 ENCOUNTER — APPOINTMENT (OUTPATIENT)
Dept: RADIOLOGY | Facility: HOSPITAL | Age: 59
DRG: 871 | End: 2024-07-27
Payer: COMMERCIAL

## 2024-07-27 ENCOUNTER — APPOINTMENT (OUTPATIENT)
Dept: CARDIOLOGY | Facility: HOSPITAL | Age: 59
End: 2024-07-27
Payer: COMMERCIAL

## 2024-07-27 PROBLEM — A41.9 SEPSIS (MULTI): Status: ACTIVE | Noted: 2024-07-27

## 2024-07-27 LAB
ABO GROUP (TYPE) IN BLOOD: NORMAL
ALBUMIN SERPL BCP-MCNC: 3 G/DL (ref 3.4–5)
ALBUMIN SERPL BCP-MCNC: 3.5 G/DL (ref 3.4–5)
ALP SERPL-CCNC: 329 U/L (ref 33–110)
ALT SERPL W P-5'-P-CCNC: 53 U/L (ref 7–45)
ANION GAP BLDA CALCULATED.4IONS-SCNC: 17 MMO/L (ref 10–25)
ANION GAP SERPL CALC-SCNC: 18 MMOL/L (ref 10–20)
ANION GAP SERPL CALC-SCNC: 22 MMOL/L (ref 10–20)
ANTIBODY SCREEN: NORMAL
AORTIC VALVE MEAN GRADIENT: 11 MMHG
AORTIC VALVE PEAK VELOCITY: 2.56 M/S
AST SERPL W P-5'-P-CCNC: 83 U/L (ref 9–39)
AV PEAK GRADIENT: 26.2 MMHG
AVA (PEAK VEL): 2.26 CM2
AVA (VTI): 2.23 CM2
BACTERIA UR CULT: ABNORMAL
BASE EXCESS BLDA CALC-SCNC: -5.1 MMOL/L (ref -2–3)
BASE EXCESS BLDA CALC-SCNC: -5.5 MMOL/L (ref -2–3)
BASOPHILS # BLD MANUAL: 0 X10*3/UL (ref 0–0.1)
BASOPHILS NFR BLD MANUAL: 0 %
BILIRUB SERPL-MCNC: 1.3 MG/DL (ref 0–1.2)
BLOOD EXPIRATION DATE: NORMAL
BODY TEMPERATURE: 37 DEGREES CELSIUS
BODY TEMPERATURE: 37 DEGREES CELSIUS
BUN SERPL-MCNC: 49 MG/DL (ref 6–23)
BUN SERPL-MCNC: 51 MG/DL (ref 6–23)
BURR CELLS BLD QL SMEAR: ABNORMAL
CA-I BLD-SCNC: 1.06 MMOL/L (ref 1.1–1.33)
CA-I BLD-SCNC: 1.13 MMOL/L (ref 1.1–1.33)
CA-I BLDA-SCNC: 1.11 MMOL/L (ref 1.1–1.33)
CA-I DIAL FLD-SCNC: 1.16 MMOL/L
CALCIUM SERPL-MCNC: 8.2 MG/DL (ref 8.6–10.6)
CALCIUM SERPL-MCNC: 8.5 MG/DL (ref 8.6–10.6)
CARDIAC TROPONIN I PNL SERPL HS: 56 NG/L (ref 0–34)
CARDIAC TROPONIN I PNL SERPL HS: 64 NG/L (ref 0–34)
CHLORIDE BLDA-SCNC: 100 MMOL/L (ref 98–107)
CHLORIDE SERPL-SCNC: 95 MMOL/L (ref 98–107)
CHLORIDE SERPL-SCNC: 97 MMOL/L (ref 98–107)
CK SERPL-CCNC: 1288 U/L (ref 0–215)
CO2 SERPL-SCNC: 18 MMOL/L (ref 21–32)
CO2 SERPL-SCNC: 20 MMOL/L (ref 21–32)
CREAT SERPL-MCNC: 3.19 MG/DL (ref 0.5–1.05)
CREAT SERPL-MCNC: 3.27 MG/DL (ref 0.5–1.05)
DISPENSE STATUS: NORMAL
EGFRCR SERPLBLD CKD-EPI 2021: 16 ML/MIN/1.73M*2
EGFRCR SERPLBLD CKD-EPI 2021: 16 ML/MIN/1.73M*2
EJECTION FRACTION APICAL 4 CHAMBER: 63.6
EJECTION FRACTION: 68 %
EOSINOPHIL # BLD MANUAL: 0 X10*3/UL (ref 0–0.7)
EOSINOPHIL NFR BLD MANUAL: 0 %
ERYTHROCYTE [DISTWIDTH] IN BLOOD BY AUTOMATED COUNT: 16.9 % (ref 11.5–14.5)
ERYTHROCYTE [DISTWIDTH] IN BLOOD BY AUTOMATED COUNT: 17.2 % (ref 11.5–14.5)
G6PD RBC QL: NORMAL
GLUCOSE BLD MANUAL STRIP-MCNC: 345 MG/DL (ref 74–99)
GLUCOSE BLD MANUAL STRIP-MCNC: 346 MG/DL (ref 74–99)
GLUCOSE BLD MANUAL STRIP-MCNC: 349 MG/DL (ref 74–99)
GLUCOSE BLD MANUAL STRIP-MCNC: 359 MG/DL (ref 74–99)
GLUCOSE BLD MANUAL STRIP-MCNC: 381 MG/DL (ref 74–99)
GLUCOSE BLD MANUAL STRIP-MCNC: 404 MG/DL (ref 74–99)
GLUCOSE BLD MANUAL STRIP-MCNC: 414 MG/DL (ref 74–99)
GLUCOSE BLD MANUAL STRIP-MCNC: 431 MG/DL (ref 74–99)
GLUCOSE BLDA-MCNC: 244 MG/DL (ref 74–99)
GLUCOSE SERPL-MCNC: 271 MG/DL (ref 74–99)
GLUCOSE SERPL-MCNC: 465 MG/DL (ref 74–99)
HCO3 BLDA-SCNC: 17.5 MMOL/L (ref 22–26)
HCO3 BLDA-SCNC: 18.1 MMOL/L (ref 22–26)
HCT VFR BLD AUTO: 20.6 % (ref 36–46)
HCT VFR BLD AUTO: 21.7 % (ref 36–46)
HCT VFR BLD EST: 24 % (ref 36–46)
HGB BLD-MCNC: 6.9 G/DL (ref 12–16)
HGB BLD-MCNC: 7.5 G/DL (ref 12–16)
HGB BLDA-MCNC: 8 G/DL (ref 12–16)
HGB RETIC QN: 36 PG (ref 28–38)
HOLD SPECIMEN: NORMAL
HOLD SPECIMEN: NORMAL
IMM GRANULOCYTES # BLD AUTO: 0.04 X10*3/UL (ref 0–0.7)
IMM GRANULOCYTES NFR BLD AUTO: 3.8 % (ref 0–0.9)
IMMATURE RETIC FRACTION: 15.9 %
INHALED O2 CONCENTRATION: 36 %
INHALED O2 CONCENTRATION: 50 %
LACTATE BLDA-SCNC: 1.9 MMOL/L (ref 0.4–2)
LACTATE SERPL-SCNC: 1.8 MMOL/L (ref 0.4–2)
LDH SERPL L TO P-CCNC: 247 U/L (ref 84–246)
LEFT VENTRICLE INTERNAL DIMENSION DIASTOLE: 5.2 CM (ref 3.5–6)
LEFT VENTRICULAR OUTFLOW TRACT DIAMETER: 2 CM
LYMPHOCYTES # BLD MANUAL: 0.34 X10*3/UL (ref 1.2–4.8)
LYMPHOCYTES NFR BLD MANUAL: 30.5 %
MAGNESIUM SERPL-MCNC: 1.55 MG/DL (ref 1.6–2.4)
MAGNESIUM SERPL-MCNC: 1.84 MG/DL (ref 1.6–2.4)
MCH RBC QN AUTO: 27.7 PG (ref 26–34)
MCH RBC QN AUTO: 27.9 PG (ref 26–34)
MCHC RBC AUTO-ENTMCNC: 33.5 G/DL (ref 32–36)
MCHC RBC AUTO-ENTMCNC: 34.6 G/DL (ref 32–36)
MCV RBC AUTO: 80 FL (ref 80–100)
MCV RBC AUTO: 83 FL (ref 80–100)
MITRAL VALVE E/A RATIO: 1.03
MITRAL VALVE E/E' RATIO: 8.52
MONOCYTES # BLD MANUAL: 0.06 X10*3/UL (ref 0.1–1)
MONOCYTES NFR BLD MANUAL: 5.9 %
NEUTS SEG # BLD MANUAL: 0.69 X10*3/UL (ref 1.2–7)
NEUTS SEG NFR BLD MANUAL: 62.7 %
NRBC BLD-RTO: 0 /100 WBCS (ref 0–0)
NRBC BLD-RTO: 0 /100 WBCS (ref 0–0)
OXYHGB MFR BLDA: 96.6 % (ref 94–98)
OXYHGB MFR BLDA: 96.7 % (ref 94–98)
PCO2 BLDA: 26 MM HG (ref 38–42)
PCO2 BLDA: 27 MM HG (ref 38–42)
PH BLDA: 7.42 PH (ref 7.38–7.42)
PH BLDA: 7.45 PH (ref 7.38–7.42)
PHOSPHATE SERPL-MCNC: 3.3 MG/DL (ref 2.5–4.9)
PHOSPHATE SERPL-MCNC: 3.4 MG/DL (ref 2.5–4.9)
PLATELET # BLD AUTO: 37 X10*3/UL (ref 150–450)
PLATELET # BLD AUTO: 46 X10*3/UL (ref 150–450)
PO2 BLDA: 103 MM HG (ref 85–95)
PO2 BLDA: 103 MM HG (ref 85–95)
POTASSIUM BLDA-SCNC: 5.1 MMOL/L (ref 3.5–5.3)
POTASSIUM SERPL-SCNC: 4.7 MMOL/L (ref 3.5–5.3)
POTASSIUM SERPL-SCNC: 4.9 MMOL/L (ref 3.5–5.3)
PROCALCITONIN SERPL-MCNC: 3.07 NG/ML
PRODUCT BLOOD TYPE: 7300
PRODUCT CODE: NORMAL
PROT SERPL-MCNC: 5.9 G/DL (ref 6.4–8.2)
RBC # BLD AUTO: 2.47 X10*6/UL (ref 4–5.2)
RBC # BLD AUTO: 2.71 X10*6/UL (ref 4–5.2)
RBC MORPH BLD: ABNORMAL
RETICS #: 0.01 X10*6/UL (ref 0.02–0.08)
RETICS/RBC NFR AUTO: 0.5 % (ref 0.5–2)
RH FACTOR (ANTIGEN D): NORMAL
RIGHT VENTRICLE FREE WALL PEAK S': 19 CM/S
SAO2 % BLDA: 99 % (ref 94–100)
SAO2 % BLDA: 99 % (ref 94–100)
SODIUM BLDA-SCNC: 130 MMOL/L (ref 136–145)
SODIUM SERPL-SCNC: 128 MMOL/L (ref 136–145)
SODIUM SERPL-SCNC: 132 MMOL/L (ref 136–145)
TOTAL CELLS COUNTED BLD: 118
TRICUSPID ANNULAR PLANE SYSTOLIC EXCURSION: 2.1 CM
UNIT ABO: NORMAL
UNIT NUMBER: NORMAL
UNIT RH: NORMAL
UNIT VOLUME: 350
URATE SERPL-MCNC: <1.5 MG/DL (ref 2.3–6.7)
URATE SERPL-MCNC: <1.5 MG/DL (ref 2.3–6.7)
VARIANT LYMPHS # BLD MANUAL: 0.01 X10*3/UL (ref 0–0.5)
VARIANT LYMPHS NFR BLD: 0.9 %
WBC # BLD AUTO: 0.8 X10*3/UL (ref 4.4–11.3)
WBC # BLD AUTO: 1.1 X10*3/UL (ref 4.4–11.3)
XM INTEP: NORMAL

## 2024-07-27 PROCEDURE — 2500000004 HC RX 250 GENERAL PHARMACY W/ HCPCS (ALT 636 FOR OP/ED)

## 2024-07-27 PROCEDURE — 83735 ASSAY OF MAGNESIUM: CPT

## 2024-07-27 PROCEDURE — 83605 ASSAY OF LACTIC ACID: CPT

## 2024-07-27 PROCEDURE — 85027 COMPLETE CBC AUTOMATED: CPT

## 2024-07-27 PROCEDURE — 82550 ASSAY OF CK (CPK): CPT

## 2024-07-27 PROCEDURE — 2020000001 HC ICU ROOM DAILY

## 2024-07-27 PROCEDURE — 37799 UNLISTED PX VASCULAR SURGERY: CPT

## 2024-07-27 PROCEDURE — 2500000001 HC RX 250 WO HCPCS SELF ADMINISTERED DRUGS (ALT 637 FOR MEDICARE OP)

## 2024-07-27 PROCEDURE — 86901 BLOOD TYPING SEROLOGIC RH(D): CPT

## 2024-07-27 PROCEDURE — 90937 HEMODIALYSIS REPEATED EVAL: CPT

## 2024-07-27 PROCEDURE — 36430 TRANSFUSION BLD/BLD COMPNT: CPT

## 2024-07-27 PROCEDURE — 83615 LACTATE (LD) (LDH) ENZYME: CPT

## 2024-07-27 PROCEDURE — 2500000002 HC RX 250 W HCPCS SELF ADMINISTERED DRUGS (ALT 637 FOR MEDICARE OP, ALT 636 FOR OP/ED)

## 2024-07-27 PROCEDURE — 93306 TTE W/DOPPLER COMPLETE: CPT

## 2024-07-27 PROCEDURE — 84484 ASSAY OF TROPONIN QUANT: CPT

## 2024-07-27 PROCEDURE — P9040 RBC LEUKOREDUCED IRRADIATED: HCPCS

## 2024-07-27 PROCEDURE — 84550 ASSAY OF BLOOD/URIC ACID: CPT

## 2024-07-27 PROCEDURE — 99254 IP/OBS CNSLTJ NEW/EST MOD 60: CPT | Performed by: INTERNAL MEDICINE

## 2024-07-27 PROCEDURE — 2500000005 HC RX 250 GENERAL PHARMACY W/O HCPCS

## 2024-07-27 PROCEDURE — 71045 X-RAY EXAM CHEST 1 VIEW: CPT

## 2024-07-27 PROCEDURE — 82330 ASSAY OF CALCIUM: CPT | Performed by: INTERNAL MEDICINE

## 2024-07-27 PROCEDURE — 87040 BLOOD CULTURE FOR BACTERIA: CPT

## 2024-07-27 PROCEDURE — 85045 AUTOMATED RETICULOCYTE COUNT: CPT

## 2024-07-27 PROCEDURE — 2500000004 HC RX 250 GENERAL PHARMACY W/ HCPCS (ALT 636 FOR OP/ED): Mod: JZ

## 2024-07-27 PROCEDURE — 3E043XZ INTRODUCTION OF VASOPRESSOR INTO CENTRAL VEIN, PERCUTANEOUS APPROACH: ICD-10-PCS | Performed by: INTERNAL MEDICINE

## 2024-07-27 PROCEDURE — 86923 COMPATIBILITY TEST ELECTRIC: CPT

## 2024-07-27 PROCEDURE — 93306 TTE W/DOPPLER COMPLETE: CPT | Performed by: INTERNAL MEDICINE

## 2024-07-27 PROCEDURE — 84132 ASSAY OF SERUM POTASSIUM: CPT

## 2024-07-27 PROCEDURE — 71045 X-RAY EXAM CHEST 1 VIEW: CPT | Performed by: RADIOLOGY

## 2024-07-27 PROCEDURE — 36415 COLL VENOUS BLD VENIPUNCTURE: CPT

## 2024-07-27 PROCEDURE — 84100 ASSAY OF PHOSPHORUS: CPT

## 2024-07-27 PROCEDURE — 99291 CRITICAL CARE FIRST HOUR: CPT

## 2024-07-27 PROCEDURE — 82330 ASSAY OF CALCIUM: CPT

## 2024-07-27 PROCEDURE — 84145 PROCALCITONIN (PCT): CPT

## 2024-07-27 PROCEDURE — 82805 BLOOD GASES W/O2 SATURATION: CPT

## 2024-07-27 PROCEDURE — 85007 BL SMEAR W/DIFF WBC COUNT: CPT

## 2024-07-27 PROCEDURE — 82947 ASSAY GLUCOSE BLOOD QUANT: CPT

## 2024-07-27 RX ORDER — DEXTROSE 50 % IN WATER (D50W) INTRAVENOUS SYRINGE
12.5
Status: DISCONTINUED | OUTPATIENT
Start: 2024-07-27 | End: 2024-07-31

## 2024-07-27 RX ORDER — DEXTROSE 50 % IN WATER (D50W) INTRAVENOUS SYRINGE
25
Status: DISCONTINUED | OUTPATIENT
Start: 2024-07-27 | End: 2024-07-29 | Stop reason: WASHOUT

## 2024-07-27 RX ORDER — SENNOSIDES 8.6 MG/1
1 TABLET ORAL NIGHTLY
Status: DISCONTINUED | OUTPATIENT
Start: 2024-07-27 | End: 2024-07-30

## 2024-07-27 RX ORDER — INSULIN LISPRO 100 [IU]/ML
0-10 INJECTION, SOLUTION INTRAVENOUS; SUBCUTANEOUS
Status: DISCONTINUED | OUTPATIENT
Start: 2024-07-27 | End: 2024-07-27

## 2024-07-27 RX ORDER — VANCOMYCIN HYDROCHLORIDE 1 G/200ML
1000 INJECTION, SOLUTION INTRAVENOUS EVERY 12 HOURS
Status: DISCONTINUED | OUTPATIENT
Start: 2024-07-27 | End: 2024-07-28

## 2024-07-27 RX ORDER — MAGNESIUM SULFATE HEPTAHYDRATE 40 MG/ML
2 INJECTION, SOLUTION INTRAVENOUS ONCE
Status: COMPLETED | OUTPATIENT
Start: 2024-07-27 | End: 2024-07-27

## 2024-07-27 RX ORDER — INSULIN LISPRO 100 [IU]/ML
0-5 INJECTION, SOLUTION INTRAVENOUS; SUBCUTANEOUS
Status: DISCONTINUED | OUTPATIENT
Start: 2024-07-27 | End: 2024-07-27

## 2024-07-27 RX ORDER — LORAZEPAM 0.5 MG/1
0.5 TABLET ORAL EVERY 8 HOURS PRN
COMMUNITY

## 2024-07-27 RX ORDER — DEXTROSE 50 % IN WATER (D50W) INTRAVENOUS SYRINGE
12.5
Status: DISCONTINUED | OUTPATIENT
Start: 2024-07-27 | End: 2024-07-29 | Stop reason: WASHOUT

## 2024-07-27 RX ORDER — ONDANSETRON HYDROCHLORIDE 8 MG/1
8 TABLET, FILM COATED ORAL EVERY 8 HOURS PRN
COMMUNITY

## 2024-07-27 RX ORDER — CALCIUM GLUCONATE 20 MG/ML
2 INJECTION, SOLUTION INTRAVENOUS ONCE
Status: COMPLETED | OUTPATIENT
Start: 2024-07-27 | End: 2024-07-27

## 2024-07-27 RX ORDER — MEROPENEM 1 G/1
1 INJECTION, POWDER, FOR SOLUTION INTRAVENOUS EVERY 8 HOURS
Status: DISCONTINUED | OUTPATIENT
Start: 2024-07-27 | End: 2024-07-29

## 2024-07-27 RX ORDER — ACETAMINOPHEN 500 MG
5 TABLET ORAL NIGHTLY PRN
Status: DISCONTINUED | OUTPATIENT
Start: 2024-07-27 | End: 2024-08-03 | Stop reason: HOSPADM

## 2024-07-27 RX ORDER — POLYETHYLENE GLYCOL 3350 17 G/17G
17 POWDER, FOR SOLUTION ORAL 2 TIMES DAILY
Status: DISCONTINUED | OUTPATIENT
Start: 2024-07-27 | End: 2024-07-30

## 2024-07-27 RX ORDER — NOREPINEPHRINE BITARTRATE/D5W 8 MG/250ML
0-.5 PLASTIC BAG, INJECTION (ML) INTRAVENOUS CONTINUOUS
Status: DISCONTINUED | OUTPATIENT
Start: 2024-07-27 | End: 2024-07-27

## 2024-07-27 RX ORDER — VANCOMYCIN HYDROCHLORIDE 1 G/20ML
INJECTION, POWDER, LYOPHILIZED, FOR SOLUTION INTRAVENOUS DAILY PRN
Status: DISCONTINUED | OUTPATIENT
Start: 2024-07-27 | End: 2024-07-28

## 2024-07-27 RX ORDER — MAGNESIUM SULFATE 1 G/100ML
1 INJECTION INTRAVENOUS ONCE
Status: COMPLETED | OUTPATIENT
Start: 2024-07-27 | End: 2024-07-27

## 2024-07-27 RX ORDER — LORATADINE 10 MG/1
10 TABLET ORAL DAILY PRN
COMMUNITY

## 2024-07-27 RX ORDER — LIDOCAINE 560 MG/1
1 PATCH PERCUTANEOUS; TOPICAL; TRANSDERMAL DAILY
Status: DISCONTINUED | OUTPATIENT
Start: 2024-07-27 | End: 2024-08-03 | Stop reason: HOSPADM

## 2024-07-27 RX ORDER — DEXTROSE 50 % IN WATER (D50W) INTRAVENOUS SYRINGE
25
Status: DISCONTINUED | OUTPATIENT
Start: 2024-07-27 | End: 2024-07-31

## 2024-07-27 RX ADMIN — INSULIN LISPRO 5 UNITS: 100 INJECTION, SOLUTION INTRAVENOUS; SUBCUTANEOUS at 08:12

## 2024-07-27 RX ADMIN — MAGNESIUM SULFATE HEPTAHYDRATE 1 G: 1 INJECTION, SOLUTION INTRAVENOUS at 22:15

## 2024-07-27 RX ADMIN — NOREPINEPHRINE BITARTRATE 0.4 MCG/KG/MIN: 8 INJECTION, SOLUTION INTRAVENOUS at 01:32

## 2024-07-27 RX ADMIN — MAGNESIUM SULFATE HEPTAHYDRATE 2 G: 40 INJECTION, SOLUTION INTRAVENOUS at 02:29

## 2024-07-27 RX ADMIN — NOREPINEPHRINE BITARTRATE 0.28 MCG/KG/MIN: 1 SOLUTION INTRAVENOUS at 11:44

## 2024-07-27 RX ADMIN — VASOPRESSIN 0.03 UNITS/MIN: 0.2 INJECTION INTRAVENOUS at 01:25

## 2024-07-27 RX ADMIN — MEROPENEM 1 G: 1 INJECTION, POWDER, FOR SOLUTION INTRAVENOUS at 21:15

## 2024-07-27 RX ADMIN — HYDROCORTISONE SODIUM SUCCINATE 50 MG: 100 INJECTION, POWDER, FOR SOLUTION INTRAMUSCULAR; INTRAVENOUS at 01:05

## 2024-07-27 RX ADMIN — HYDROCORTISONE SODIUM SUCCINATE 50 MG: 100 INJECTION, POWDER, FOR SOLUTION INTRAMUSCULAR; INTRAVENOUS at 12:54

## 2024-07-27 RX ADMIN — INSULIN LISPRO 10 UNITS: 100 INJECTION, SOLUTION INTRAVENOUS; SUBCUTANEOUS at 12:53

## 2024-07-27 RX ADMIN — NOREPINEPHRINE BITARTRATE 0.36 MCG/KG/MIN: 8 INJECTION, SOLUTION INTRAVENOUS at 03:54

## 2024-07-27 RX ADMIN — SENNOSIDES 8.6 MG: 8.6 TABLET, FILM COATED ORAL at 21:15

## 2024-07-27 RX ADMIN — SODIUM CHLORIDE, POTASSIUM CHLORIDE, SODIUM LACTATE AND CALCIUM CHLORIDE 500 ML: 600; 310; 30; 20 INJECTION, SOLUTION INTRAVENOUS at 10:04

## 2024-07-27 RX ADMIN — CALCIUM CHLORIDE, MAGNESIUM CHLORIDE, DEXTROSE MONOHYDRATE, LACTIC ACID, SODIUM CHLORIDE, SODIUM BICARBONATE AND POTASSIUM CHLORIDE 2700 ML/HR: 3.68; 3.05; 22; 5.4; 6.46; 3.09; .314 INJECTION INTRAVENOUS at 18:10

## 2024-07-27 RX ADMIN — NOREPINEPHRINE BITARTRATE 0.3 MCG/KG/MIN: 1 SOLUTION INTRAVENOUS at 19:26

## 2024-07-27 RX ADMIN — VASOPRESSIN 0.03 UNITS/MIN: 0.2 INJECTION INTRAVENOUS at 10:28

## 2024-07-27 RX ADMIN — PIPERACILLIN SODIUM AND TAZOBACTAM SODIUM 2.25 G: 2; .25 INJECTION, SOLUTION INTRAVENOUS at 01:06

## 2024-07-27 RX ADMIN — INSULIN HUMAN 6 UNITS/HR: 1 INJECTION, SOLUTION INTRAVENOUS at 19:41

## 2024-07-27 RX ADMIN — INSULIN HUMAN 6 UNITS/HR: 1 INJECTION, SOLUTION INTRAVENOUS at 22:15

## 2024-07-27 RX ADMIN — VASOPRESSIN 0.03 UNITS/MIN: 0.2 INJECTION INTRAVENOUS at 22:17

## 2024-07-27 RX ADMIN — POLYETHYLENE GLYCOL 3350 17 G: 17 POWDER, FOR SOLUTION ORAL at 10:04

## 2024-07-27 RX ADMIN — INSULIN HUMAN 6 UNITS/HR: 1 INJECTION, SOLUTION INTRAVENOUS at 22:05

## 2024-07-27 RX ADMIN — FILGRASTIM-SNDZ 480 MCG: 480 INJECTION, SOLUTION INTRAVENOUS; SUBCUTANEOUS at 19:25

## 2024-07-27 RX ADMIN — SODIUM CHLORIDE, POTASSIUM CHLORIDE, SODIUM LACTATE AND CALCIUM CHLORIDE 1000 ML: 600; 310; 30; 20 INJECTION, SOLUTION INTRAVENOUS at 02:17

## 2024-07-27 RX ADMIN — PIPERACILLIN SODIUM AND TAZOBACTAM SODIUM 2.25 G: 2; .25 INJECTION, SOLUTION INTRAVENOUS at 12:54

## 2024-07-27 RX ADMIN — INSULIN HUMAN 4 UNITS/HR: 1 INJECTION, SOLUTION INTRAVENOUS at 15:59

## 2024-07-27 RX ADMIN — VANCOMYCIN HYDROCHLORIDE 2000 MG: 1 INJECTION, POWDER, LYOPHILIZED, FOR SOLUTION INTRAVENOUS at 02:15

## 2024-07-27 RX ADMIN — CALCIUM GLUCONATE 2 G: 20 INJECTION, SOLUTION INTRAVENOUS at 03:22

## 2024-07-27 RX ADMIN — VANCOMYCIN HYDROCHLORIDE 1000 MG: 1 INJECTION, SOLUTION INTRAVENOUS at 19:26

## 2024-07-27 RX ADMIN — PERFLUTREN 1.5 ML OF DILUTION: 6.52 INJECTION, SUSPENSION INTRAVENOUS at 10:47

## 2024-07-27 RX ADMIN — HYDROCORTISONE SODIUM SUCCINATE 50 MG: 100 INJECTION, POWDER, FOR SOLUTION INTRAMUSCULAR; INTRAVENOUS at 05:04

## 2024-07-27 RX ADMIN — PIPERACILLIN SODIUM AND TAZOBACTAM SODIUM 2.25 G: 2; .25 INJECTION, SOLUTION INTRAVENOUS at 18:18

## 2024-07-27 RX ADMIN — HYDROCORTISONE SODIUM SUCCINATE 50 MG: 100 INJECTION, POWDER, FOR SOLUTION INTRAMUSCULAR; INTRAVENOUS at 23:13

## 2024-07-27 RX ADMIN — LIDOCAINE 1 PATCH: 4 PATCH TOPICAL at 16:03

## 2024-07-27 RX ADMIN — NOREPINEPHRINE BITARTRATE 0.32 MCG/KG/MIN: 8 INJECTION, SOLUTION INTRAVENOUS at 06:50

## 2024-07-27 RX ADMIN — Medication 5 MG: at 22:37

## 2024-07-27 RX ADMIN — NOREPINEPHRINE BITARTRATE 0.3 MCG/KG/MIN: 8 INJECTION, SOLUTION INTRAVENOUS at 10:28

## 2024-07-27 RX ADMIN — PIPERACILLIN SODIUM AND TAZOBACTAM SODIUM 2.25 G: 2; .25 INJECTION, SOLUTION INTRAVENOUS at 05:03

## 2024-07-27 RX ADMIN — HYDROCORTISONE SODIUM SUCCINATE 50 MG: 100 INJECTION, POWDER, FOR SOLUTION INTRAMUSCULAR; INTRAVENOUS at 18:18

## 2024-07-27 ASSESSMENT — PAIN - FUNCTIONAL ASSESSMENT
PAIN_FUNCTIONAL_ASSESSMENT: 0-10
PAIN_FUNCTIONAL_ASSESSMENT: CPOT (CRITICAL CARE PAIN OBSERVATION TOOL)
PAIN_FUNCTIONAL_ASSESSMENT: 0-10
PAIN_FUNCTIONAL_ASSESSMENT: CPOT (CRITICAL CARE PAIN OBSERVATION TOOL)

## 2024-07-27 ASSESSMENT — PAIN SCALES - GENERAL
PAINLEVEL_OUTOF10: 0 - NO PAIN
PAINLEVEL_OUTOF10: 2
PAINLEVEL_OUTOF10: 0 - NO PAIN

## 2024-07-27 ASSESSMENT — PAIN DESCRIPTION - DESCRIPTORS: DESCRIPTORS: DISCOMFORT

## 2024-07-27 NOTE — CONSULTS
"NEPHROLOGY NEW CONSULT NOTE   Angie Tobin   58 y.o.    @WT@  MRN/Room: [id number]/27/27-A    Reason for consult: Oliguria and CVVH    HPI:    Angie Tobin is a 58 y.o. female history of hypertension,History of B cell monoclonal lymphocytosis,leiomyosarcoma on chemotherapy last dose 1 week ago diagnosed cancer 5 years ago presents with generalized weakness fatigue. She started radiation therapy about 3 weeks ago so far she had received 3 radiation therapy treatments  . Patient states he feels too weak to even drive herself.  She denies nausea or vomiting.  No diarrhea at this time .Due to her diarrhea at the onset of chemo she is not eating and drinking well.     per chart review, on 7/25 pt was hypotensive to 75/52 at [facility] ED and tachycardic. She was found to be in acute renal failure with cr 7.7 and K 5.6. Pt received UA showed c/f UTI and she was given CTX. However, overnight pt was found to be hypotensive. Pt was give 3L normal saline and her pressures were initially fluid responsive but then hypotension persisted requiring norepinephrine and so was transferred to ICU service on 7/26. In the ICU she was also started on vasopressin and per discussion with Nephrology was started on CVVH. She was found to be hyperuricemic s/p 2x rasburicase. She also received hyperK cocktail at [facility].   Otherwise, she also received a bicarb infusion, was on insulin gtt, broadened CTX to zosyn, received 1L LR, 25% 25g albumin x2, compazine, 100mg solumedrol, ativan .5mg, mag, versed 1mg     Upon admission to [facility] MICU, pt was on .35 levo +  0.03 vaso and on 4L NC. Pt is oriented, but falls asleep quickly. Vitals 116/60, pulse 110, afebrile.      Nephrology consulted for Oliguria and resuming CVVH          In The ER: /60   Pulse 108   Temp 36.2 °C (97.2 °F) (Temporal)   Resp 16   Ht 1.778 m (5' 10\")   Wt 128 kg (282 lb 6.6 oz)   SpO2 94%   BMI 40.52 kg/m²      Past Medical History:   Diagnosis Date    " Acute sinusitis 09/27/2023    Benign essential HTN 04/19/2023    Body mass index (BMI) 40.0-44.9, adult (Multi) 09/27/2023    Candidiasis, unspecified 01/20/2022    Antibiotic-induced yeast infection    Conjunctivitis 09/27/2023    Contact with and (suspected) exposure to covid-19 09/15/2022    Disorder of liver 07/11/2023    Disorder of thyroid 10/18/2023    Elevated blood-pressure reading, without diagnosis of hypertension 05/26/2017    Elevated BP without diagnosis of hypertension    Gastroesophageal reflux disease 09/15/2022    Herniated lumbar intervertebral disc 09/13/2022    Comment on above: OTHER INTERVERTEBRAL DISC DISPLACEMENT, LUMBAR REGION    Herpes simplex virus (HSV) infection 04/19/2023    Hypercholesterolemia 09/15/2022    Inappropriate sinus tachycardia, so stated (CMS-HCC) 04/19/2023    Leiomyoma 03/11/2024    Lymphoproliferative disorder (Multi) 09/06/2023    Malignant neoplasm metastatic to left lung (Multi) 04/19/2023    Malignant neoplasm of body of uterus (Multi) 09/27/2023    Mass of pancreas ([facility]) 09/27/2023    Neuropathy 03/11/2024    Never smoked any substance 10/25/2023    Other conditions influencing health status 09/27/2017    History of cough    Personal history of diseases of the blood and blood-forming organs and certain disorders involving the immune mechanism 06/06/2016    History of leukocytosis    Personal history of other benign neoplasm 05/08/2019    History of other benign neoplasm    Personal history of other diseases of the musculoskeletal system and connective tissue 04/18/2018    History of low back pain    Personal history of other diseases of the nervous system and sense organs 10/03/2016    History of neuropathy    Personal history of other diseases of the respiratory system 08/31/2017    History of acute bronchitis    Personal history of other diseases of the respiratory system 01/24/2022    History of acute sinusitis    Personal history of other specified  conditions 09/16/2020    History of weight gain    Postoperative pain 03/11/2024    Right lower quadrant abdominal tenderness 03/04/2019    RLQ abdominal tenderness    Right upper quadrant pain 03/13/2015    Abdominal pain, RUQ (right upper quadrant)    Secondary hypertension 10/18/2023    Toenail fungus 04/19/2023    Uterine leiomyoma 09/27/2023    Viral URI 09/27/2023    Vitamin D deficiency 04/19/2023    Weight gain 03/11/2024      Past Surgical History:   Procedure Laterality Date    BREAST SURGERY  05/30/2013    Breast Surgery Reduction Procedure    CHOLECYSTECTOMY  12/02/2014    Cholecystectomy Laparoscopic    CT GUIDED PERCUTANEOUS BIOPSY BONE  10/24/2016    CT GUIDED PERCUTANEOUS BIOPSY BONE 10/24/2016 [facility]    CT GUIDED PERCUTANEOUS BIOPSY LUNG  8/5/2022    CT GUIDED PERCUTANEOUS BIOPSY LUNG 8/5/2022 [facility]    ESOPHAGOGASTRODUODENOSCOPY  04/16/2013    Diagnostic Esophagogastroduodenoscopy    IR INTERVENTION ARTERIAL EMBOLIZATION  12/13/2023    IR INTERVENTION ARTERIAL EMBOLIZATION 12/13/2023 Oj Gold MD [facility] ANGIO    IR INTERVENTION ARTERIAL EMBOLIZATION  12/19/2023    IR INTERVENTION ARTERIAL EMBOLIZATION 12/19/2023 Oj Gold MD [facility] ANGIO    OTHER SURGICAL HISTORY  04/19/2013    Anal Fistulotomy (Subcutaneous)    OTHER SURGICAL HISTORY  10/25/2022    Lung wedge resection    OTHER SURGICAL HISTORY  10/25/2022    Lung lobectomy    OTHER SURGICAL HISTORY  05/08/2019    Resection    OTHER SURGICAL HISTORY  05/30/2013    Perineal Transplant Of Anovaginal Fistula    US GUIDED NEEDLE LIVER BIOPSY  7/11/2023    US GUIDED NEEDLE LIVER BIOPSY 7/11/2023 [facility] US      Family History   Problem Relation Name Age of Onset    Other (atrial flutter) Mother      Diabetes Mother      Leukemia Father      Liver cancer Father      Ovarian cancer Sister      Hypothyroidism Brother      Breast cancer Paternal Grandmother      No Known Problems Sibling       Social History     Socioeconomic History     Marital status: Single     Spouse name: Not on file    Number of children: Not on file    Years of education: Not on file    Highest education level: Not on file   Occupational History    Not on file   Tobacco Use    Smoking status: Never    Smokeless tobacco: Never   Substance and Sexual Activity    Alcohol use: Not on file    Drug use: Never    Sexual activity: Not on file   Other Topics Concern    Not on file   Social History Narrative    Not on file     Social Determinants of Health     Financial Resource Strain: Patient Declined (7/26/2024)    Overall Financial Resource Strain (CARDIA)     Difficulty of Paying Living Expenses: Patient declined   Food Insecurity: No Food Insecurity (7/25/2024)    Hunger Vital Sign     Worried About Running Out of Food in the Last Year: Never true     Ran Out of Food in the Last Year: Never true   Transportation Needs: Patient Declined (7/26/2024)    PRAPARE - Transportation     Lack of Transportation (Medical): Patient declined     Lack of Transportation (Non-Medical): Patient declined   Physical Activity: Inactive (7/25/2024)    Exercise Vital Sign     Days of Exercise per Week: 0 days     Minutes of Exercise per Session: 0 min   Stress: No Stress Concern Present (7/25/2024)    [employer] [place] [employer] - Occupational Stress Questionnaire     Feeling of Stress : Not at all   Social Connections: Unknown (7/25/2024)    Social Connection and Isolation Panel [NHANES]     Frequency of Communication with Friends and Family: More than three times a week     Frequency of Social Gatherings with Friends and Family: More than three times a week     Attends Jewish Services: Patient declined     Active Member of Clubs or Organizations: Patient declined     Attends Club or Organization Meetings: Patient declined     Marital Status: Never    Recent Concern: Social Connections - Socially Isolated (7/25/2024)    Social Connection and Isolation Panel [NHANES]      Frequency of Communication with Friends and Family: More than three times a week     Frequency of Social Gatherings with Friends and Family: More than three times a week     Attends Worship Services: Never     Active Member of Clubs or Organizations: No     Attends Club or Organization Meetings: Never     Marital Status: Never    Intimate Partner Violence: Not At Risk (7/25/2024)    Humiliation, Afraid, Rape, and Kick questionnaire     Fear of Current or Ex-Partner: No     Emotionally Abused: No     Physically Abused: No     Sexually Abused: No   Housing Stability: Patient Declined (7/26/2024)    Housing Stability Vital Sign     Unable to Pay for Housing in the Last Year: Patient declined     Number of Times Moved in the Last Year: 1     Homeless in the Last Year: Patient declined       Allergies   Allergen Reactions    Hydromorphone Other    Codeine Other     Abdominal pain        Medications Prior to Admission   Medication Sig Dispense Refill Last Dose    albuterol 108 (90 Base) MCG/ACT inhaler Inhale 1-2 puffs. Every 4-6 hours as needed       fluticasone (Flonase) 50 mcg/actuation nasal spray Administer 1 spray into each nostril once daily. Shake gently. Before first use, prime pump. After use, clean tip and replace cap. 16 g 0     lidocaine-prilocaine (Emla) 2.5-2.5 % cream Apply topically if needed for mild pain (1 - 3). Please apply to Mediport prior to access for chemotherapy 30 g 3     lisinopril 20 mg tablet Take 1 tablet (20 mg) by mouth once daily. 90 tablet 3     loratadine (Claritin) 10 mg tablet Take 1 tablet (10 mg) by mouth once daily as needed for allergies.       LORazepam (Ativan) 0.5 mg tablet Take 1 tablet (0.5 mg) by mouth every 8 hours if needed for anxiety.       meclizine (Antivert) 12.5 mg tablet Take 1-2 tablets (12.5-25 mg) by mouth 3 times a day as needed (vertigo).       metFORMIN (Glucophage) 500 mg tablet Take 1 tablet (500 mg) by mouth 2 times a day. 180 tablet 3      metoprolol succinate XL (Toprol-XL) 25 mg 24 hr tablet Take 1 tablet (25 mg) by mouth once daily. 90 tablet 3     ondansetron (Zofran) 8 mg tablet Take 1 tablet (8 mg) by mouth every 8 hours if needed for nausea or vomiting.       prochlorperazine (Compazine) 10 mg tablet Take 1 tablet (10 mg) by mouth every 6 hours if needed for nausea or vomiting.           Meds:   hydrocortisone sodium succinate, 50 mg, q6h  insulin lispro, 0-10 Units, TID  piperacillin-tazobactam, 2.25 g, q6h  polyethylene glycol, 17 g, BID  sennosides, 1 tablet, Nightly      norepinephrine, Last Rate: 0.3 mcg/kg/min (07/27/24 1430)  vasopressin, Last Rate: 0.03 Units/min (07/27/24 1028)      dextrose, 12.5 g, q15 min PRN  dextrose, 25 g, q15 min PRN  glucagon, 1 mg, q15 min PRN  glucagon, 1 mg, q15 min PRN  vancomycin, , Daily PRN        Vitals:    07/27/24 1149   BP:    Pulse: 108   Resp: 16   Temp: 36.2 °C (97.2 °F)   SpO2: 94%        07/25 1900 - 07/27 0659  In: 2314.4 [P.O.:90; I.V.:484.4]  Out: 310 [Urine:310]   Weight change:      General appearance: AAOx3. No distress  Eyes: non-icteric  Skin: no apparent rash  Heart: RRR  Lungs: CTA bilat.  Mild crackles  Abdomen: soft, nt/nd  Extremities: mild edema bilat  Neuro: No FND, no asterixis   ACCESS: Trialysis catheter in Left femoral      ASSESSMENT:  Angie Tobin is a 58 y.o. female history of hypertension,History of B cell monoclonal lymphocytosis,leiomyosarcoma on chemotherapy last dose 1 week ago diagnosed cancer 5 years ago was transferred to [facility] MICU on 07/27/24 for septic shock requiring pressors, acute hypercapnic respiratory failure,  and TLS 2/2 recent chemotherapy .    Nephrology is consulted about CVVH and ALDEN with oliguria     #ALDEN oliguric Stage III  - Baseline creatinine:0.7  - Etiology: Hypotensive acute kidney injury 2/2 sepsis  - UA showed:  Turbid, 1+ proteinuria,hematuria 6-10, WBC 6-10 and LE 25   - Clinical volume status:Hypervolemic    #TLS 2/2 recent  chemo  -Hyperkalemia is settling , down to 4.9 for today  -uric acid trending down (already received 2 doses of rasburicase at [facility])    Recommendations:  - Recommend resuming CVVH  - Avoid nephrotoxins, contrast if possible  - strict Is/Os.  -Daily bladder scan.  -Follow labs daily with bp monitoring.  - Renal dosing for medications for latest eGFR, follow medication trough as appropriate  - Avoid hypotension/rapid fluctuations in BPs    Vashti Booker MD  Nephrology Fellow  24 hour Renal Pager - [phone]    Discussed with attending nephrologist

## 2024-07-27 NOTE — CARE PLAN
Problem: Pain - Adult  Goal: Verbalizes/displays adequate comfort level or baseline comfort level  7/27/2024 0459 by Aleksandra Roberts RN  Outcome: Progressing  7/27/2024 0458 by Aleksandra Roberts RN  Outcome: Progressing  Flowsheets (Taken 7/27/2024 0458)  Verbalizes/displays adequate comfort level or baseline comfort level:   Encourage patient to monitor pain and request assistance   Assess pain using appropriate pain scale   Implement non-pharmacological measures as appropriate and evaluate response     Problem: Safety - Adult  Goal: Free from fall injury  7/27/2024 0459 by Aleksandra Roberts RN  Outcome: Progressing  7/27/2024 0458 by Aleksandra Roberts RN  Outcome: Progressing  Flowsheets (Taken 7/27/2024 0458)  Free from fall injury: Instruct family/caregiver on patient safety     Problem: Skin  Goal: Participates in plan/prevention/treatment measures  Outcome: Progressing  Goal: Prevent/manage excess moisture  7/27/2024 0459 by Aleksandra Roberts RN  Outcome: Progressing  7/27/2024 0458 by Aleksandra Roberts RN  Flowsheets (Taken 7/27/2024 0458)  Prevent/manage excess moisture: Cleanse incontinence/protect with barrier cream  Goal: Prevent/minimize sheer/friction injuries  7/27/2024 0459 by Aleksandra Roberts RN  Outcome: Progressing  7/27/2024 0458 by Aleksandra Roberts RN  Flowsheets (Taken 7/27/2024 0458)  Prevent/minimize sheer/friction injuries:   Complete micro-shifts as needed if patient unable. Adjust patient position to relieve pressure points, not a full turn   HOB 30 degrees or less   Turn/reposition every 2 hours/use positioning/transfer devices   Use pull sheet  Goal: Promote/optimize nutrition  Outcome: Progressing  Goal: Promote skin healing  7/27/2024 0459 by Aleksandra Roberts RN  Outcome: Progressing  7/27/2024 0458 by Aleksandra Roberts RN  Flowsheets (Taken 7/27/2024 0458)  Promote skin healing:   Turn/reposition every 2 hours/use positioning/transfer devices   Protective dressings over bony  prominences      The patient's goals for the shift include      The clinical goals for the shift include patient will maintain pulse ox greater than 90% throughout the shift

## 2024-07-27 NOTE — CONSULTS
Vancomycin Dosing by Pharmacy- INITIAL    Angie Tobin is a 58 y.o. year old female who Pharmacy has been consulted for vancomycin dosing for complicated UTI with sepsis concern. Based on the patient's indication and renal status this patient will be dosed based on a goal trough/random level of 15-20.     Renal function is currently unstable-- renal plan unclear at this time, was receiving CVVH at OSH, will dose per level for now until renal plans confirmed.    Visit Vitals  /60   Pulse 104   Temp 35.8 °C (96.4 °F) (Temporal)   Resp 19        Lab Results   Component Value Date    CREATININE 4.60 (H) 2024    CREATININE 6.70 (H) 2024    CREATININE 7.20 (H) 2024    CREATININE 7.50 (H) 2024        Patient weight is as follows:   Vitals:    24 2341   Weight: 128 kg (282 lb 6.6 oz)       Cultures:  No results found for the encounter in last 14 days.        No intake/output data recorded.  I/O during current shift:  I/O this shift:  In: -   Out: 10 [Urine:10]    Temp (24hrs), Av.3 °C (97.4 °F), Min:35.8 °C (96.4 °F), Max:36.7 °C (98.1 °F)         Assessment/Plan     Patient will be given a loading dose of 2000 mg    Will initiate vancomycin maintenance, once renal plan confirmed.    Follow-up level will be ordered on , unless clinically indicated sooner.  Will continue to monitor renal function daily while on vancomycin and order serum creatinine at least every 48 hours if not already ordered.  Follow for continued vancomycin needs, clinical response, and signs/symptoms of toxicity.       Silva Stephens, PharmD

## 2024-07-27 NOTE — HOSPITAL COURSE
Ms. 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 She presented 7/27 as a transfer from [facility] for septic shock requiring vasopressors, acute hypoxic hypercapnic respiratory failure, and possible TLS.     Per chart review, on 7/25 at pt presented to [facility] ED for nausea, vomit, and weakness. She was found tp be hypotensive to 75/52, in acute hypoxic hypercapnic respiratory failure requiring oxygen, and tachycardic. She was also found to be in acute renal failure with BUN 90, Cr 7.7, and Oliguria. In addition she was suspected to be in TLS with K 5.6, Ca 7.9, and Uric Acid 16.9. Her UA was concerning for UTI , so Ceftriaxone was begun. Overnight pt was found to be increasingly hypotensive. She was give 3L normal saline and her pressures were initially fluid responsive, but then hypotension persisted requiring norepinephrine, prompting transfer to the ICU service on 7/26. In the ICU she was also started on vasopressin for persistent hypotension and per discussion with Nephrology was started on CVVH. Her hyperuricemia was treated with 2* Rasburicase and Fluids. She was given an Insulin drip, Lokelma 10g *1, Sodium Bicarbonate infusion, and Calcium gluconate for her hyperkalemia. Her UCX demonstrated >100,000 Enteric Bacilli, prompting changing of her ABX to Zosyn. In total she received 5L NS, 1L LR, 50g Albumin, 100mg solumedrol. She was also given Ativan and Versed for anxiety.      Upon admission to [facility] MICU, she was on 0.35 levo, 0.03 vaso, and on 4L She was oriented, but lethargic with tachycardia. She was noted to be neutropenic (ANC 0.69) with pancytopenia and her antibiotic was broadened to Vanc/Zosyn. Nephrology and Oncology were consulted. She was hyperglycemic, prompting  restarting of the Insulin drip. Nephrology placed patient back on CVVH. Oncology recommended Neupogen 480 daily until ANC >1500 and to check CK level since her treatment can cause rhabdomyolysis. CK came back [phone].

## 2024-07-27 NOTE — PROGRESS NOTES
"Medical Intensive Care - Daily Progress Note   Subjective    Angie Tobin is a 58 y.o. year old female patient admitted on 7/26/2024 with following ICU needs: Septic shock requiring vasopressors, possible TLS    Interval History:  Patient complains of weakness this morning. She has not had a bowel movement. She notes that she feels better than her presentation, but not at her baseline. She was able to eat one piece of toast for breakfast.    Of note, patient endorses SOB when laying flat. This is a new issue that began a few days ago.    Meds    Scheduled medications  hydrocortisone sodium succinate, 50 mg, intravenous, q6h  insulin lispro, 0-10 Units, subcutaneous, TID  piperacillin-tazobactam, 2.25 g, intravenous, q6h  polyethylene glycol, 17 g, oral, BID  sennosides, 1 tablet, oral, Nightly      Continuous medications  norepinephrine, 0-0.5 mcg/kg/min, Last Rate: 0.3 mcg/kg/min (07/27/24 1430)  vasopressin, 0-0.03 Units/min, Last Rate: 0.03 Units/min (07/27/24 1028)      PRN medications  PRN medications: dextrose, dextrose, glucagon, glucagon, vancomycin     Objective    VITALS  Visit Vitals  /60   Pulse 108   Temp 36.2 °C (97.2 °F) (Temporal)   Resp 16   Ht 1.778 m (5' 10\")   Wt 128 kg (282 lb 6.6 oz)   SpO2 94%   BMI 40.52 kg/m²   Smoking Status Never   BSA 2.51 m²        Physical Exam  Vitals and nursing note reviewed.   Constitutional:       General: She is not in acute distress.     Appearance: She is obese. She is diaphoretic.   HENT:      Head: Normocephalic and atraumatic.      Nose: Nose normal.      Mouth/Throat:      Mouth: Mucous membranes are moist.   Eyes:      Extraocular Movements: Extraocular movements intact.      Pupils: Pupils are equal, round, and reactive to light.   Cardiovascular:      Rate and Rhythm: Tachycardia present.      Pulses: Normal pulses.      Heart sounds: No murmur heard.     No friction rub. No gallop.   Pulmonary:      Effort: Pulmonary effort is normal. No " respiratory distress.      Breath sounds: No wheezing, rhonchi or rales.   Abdominal:      General: Bowel sounds are normal. There is no distension.      Palpations: Abdomen is soft. There is no mass.      Tenderness: There is abdominal tenderness. There is no guarding.      Hernia: A hernia is present.   Musculoskeletal:      Cervical back: Normal range of motion.   Skin:     General: Skin is warm.      Capillary Refill: Capillary refill takes less than 2 seconds.      Findings: Bruising (abdomen) present.   Neurological:      General: No focal deficit present.      Mental Status: She is oriented to person, place, and time. She is lethargic.   Psychiatric:         Mood and Affect: Mood normal.          Intake/Output Summary (Last 24 hours) at 7/27/2024 1507  Last data filed at 7/27/2024 1204  Gross per 24 hour   Intake 2755.37 ml   Output 510 ml   Net 2245.37 ml     General Chemistry Labs  Results from last 72 hours   Lab Units 07/27/24  0117 07/26/24  1513 07/26/24  0748 07/26/24  0417 07/25/24  1131 07/25/24  0911   GLUCOSE mg/dL 271* 261* 290* 253*   < > 316*   SODIUM mmol/L 132* 132* 134 131*   < > 130*   POTASSIUM mmol/L 4.9 4.0 5.7* 5.9*   < > 5.5*   CHLORIDE mmol/L 97* 96* 101 100   < > 99   CO2 mmol/L 18* 13* 11* 13*   < > 12*   BUN mg/dL 49* 64* 88* 94*   < > 90*   CREATININE mg/dL 3.27* 4.60* 6.70* 7.20*   < > 7.60*   ANION GAP mmol/L 22* >19* >19* 18   < > 19   EGFR mL/min/1.73m*2 16* 11* 7* 6*   < > 6*   CALCIUM mg/dL 8.5* 8.0* 7.4* 7.3*   < > 7.9*   PHOSPHORUS mg/dL 3.3 3.0  --  3.4   < >  --    MAGNESIUM mg/dL 1.55* 1.70  --  1.50*  --   --    ALBUMIN g/dL 3.5 3.7  --  3.0*   < > 3.3*   ALK PHOS U/L 329*  --   --  383*  --  408*   ALT U/L 53*  --   --  63*  --  72*   AST U/L 83*  --   --  89*  --  87*   BILIRUBIN TOTAL mg/dL 1.3*  --   --  0.7  --  1.1   PROTEIN TOTAL g/dL 5.9*  --   --  5.7*  --  6.0   LIPASE U/L  --   --   --   --   --  77*   LD U/L  --   --   --  295*  --   --     < > = values in  "this interval not displayed.      CBC  Results from last 72 hours   Lab Units 07/27/24  0117 07/26/24  0417 07/25/24  0911   WBC AUTO x10*3/uL 1.1* 2.6* 3.9*   HEMOGLOBIN g/dL 7.5* 8.0* 8.7*   HEMATOCRIT % 21.7* 25.2* 27.2*   MCV fL 80 88 89   MCH pg 27.7 27.9 28.4   MCHC g/dL 34.6 31.7* 32.0   RDW % 17.2* 17.5* 17.5*   PLATELETS AUTO x10*3/uL 46* 85* 86*   LYMPHO PCT MAN % 30.5 26.0 4.0   MONO PCT MAN % 5.9 2.0 4.0   EOSINO PCT MAN % 0.0 0.0 0.0     Coagulation Labs        No lab exists for component: \"RETICHBG\"  Cardiac Labs  Results from last 72 hours   Lab Units 07/27/24  1012 07/27/24  0117   TROPHSCMC ng/L 56* 64*     MELD 3.0: 10 at 6/12/2024  8:00 AM  MELD-Na: 8 at 6/12/2024  8:00 AM  Calculated from:  Serum Creatinine: 0.80 mg/dL (Using min of 1 mg/dL) at 6/12/2024  8:00 AM  Serum Sodium: 136 mmol/L at 6/12/2024  8:00 AM  Total Bilirubin: 1.7 mg/dL at 6/12/2024  8:00 AM  Serum Albumin: 4.2 g/dL (Using max of 3.5 g/dL) at 6/12/2024  8:00 AM  INR(ratio): 1.0 at 6/12/2024  8:00 AM  Age at listing (hypothetical): 58 years  Sex: Female at 6/12/2024  8:00 AM    Micro/ID:   Lab Results   Component Value Date    URINECULTURE >100,000 Klebsiella pneumoniae/variicola (A) 07/25/2024    BLOODCULT Loaded on Instrument - Culture in progress 07/27/2024    BLOODCULT Loaded on Instrument - Culture in progress 07/27/2024     Results from last 72 hours   Lab Units 07/26/24  1446 07/25/24  1143   COLOR U  Yellow Dark-Yellow   APPEARANCE U  Turbid* Ex.Turbid*   SPEC GRAV UR  1.017 1.024   PH U  5.0 5.5   PROTEIN U mg/dL 50 (1+)* 100 (2+)*   GLUCOSE U mg/dL Normal 50 (TRACE)*   BLOOD UR  0.2 (2+)* 0.1 (1+)*   KETONES UR mg/dL NEGATIVE TRACE*   BILIRUBIN U  NEGATIVE 1 (1+)*   UROBILINOGEN UR mg/dL Normal 3 (1+)*   NITRITE U  NEGATIVE NEGATIVE   LEUKOCYTES U  25 Christine/µL* 500 Christine/µL*   WBC UR /HPF 6-10* >50*   RBC UR HPF /HPF 6-10* 11-20*     Summary of key imaging results from the last 24 hours  7/27/2024 TTE  1. Poorly visualized " anatomical structures due to suboptimal image quality.   2. Left ventricular ejection fraction is normal, by visual estimate at 65-70%.   3. There is normal right ventricular global systolic function.   4. Compared with study dated 5/8/2024, no significant change.    7/27/2024 CXR  1. Low lung volumes with resultant bronchovascular crowding and  superimposed mild pulmonary edema.  2. New small left pleural effusion.  3. Stable positioning of a right chest wall MediPort.    Assessment and Plan   Assessment:   Angie Tobin is a 58 y.o. year old female patient who presented to OSH 7/25/2024 and was transferred to [facility] MICU on 07/27/24 for septic shock requiring pressors, acute hypercapnic respiratory failure,  and TLS 2/2 recent chemotherapy.      Mechanical Ventilation: none  Sedation/Analgesia:  none  Restraints: no    Summary for 07/27/24  :  -Titrate Vasopressors to maintain MAP >65  -Consulted Nephrology about CVVH and ALDEN with oliguria  -Consulted Oncology about TLS and Neutropenia  -Broadened ABX regimen to Vanc/Zosyn  -Repleted fluids PRN   -Insulin Drip Resumed    Plan:  NEUROLOGY/PSYCH:  #Anxiety  -Lorazepam 0.5m *2 given at OSH on presentation for anxiety  -Patient denies anxiety at present  Plan:  -Will monitor     CARDIOVASCULAR:  #Shock, Likely Septic in Origin   -Etiology: Likely septic from UTI, cultures growing enteric bacilli. Given troponin trend (64 -> 56) and TTE (normal right ventricular function), unlikely cardiac in origin. Given hypotension continuing s/p aggressive fluid resuscitation, unlikely hypovolemic in origin.   -7/26 OSH: Received 5L NS, 1L LR, 50g Albumin, 100mg solumedrol  -7/26 OSH: Norepinephrine and Vasopressin begun for persistent hypotension   -7/27 began Stress Dose Steroids Hydrocortisone 50mg Q6H   -7/27 1.5L LR   -Lactate downtrend: 7/26 2.8 -> 7/27 1.9  Plan:  -Continue Norepinephrine, titration per patients ability  -Continue Hydrocortisone  -Continue Vasopressin  0.03  -Will replete fluids prn, careful not to overload due to ALDEN  -ABXs per ID section    #Hypertension  -Home medication: Metoprolol Succinate XL 25mg, Lisinopril 20mg  Plan  -Holding metoprolol in setting of shock  -Holding Lisinopril in setting of ALDEN     PULMONARY:  #Acute Hypoxic Hypercapnic Respiratory Failure   -Etiology: Likely secondary to atelectasis vs pulmonary edema vs pneumonia given CXR findings  -2022 6 Wedge lung resection 2/2 metastasis  -New O2 requirement; no O2 requirement at baseline  -Pt on 4L NC at present  -7/27 Pro calcitonin: 3.07  7/27 ABG: Ph 7.25, o2 26, co2 103  Plan:  -Continue Oxygen supplementation  -Monitor for changes in symptoms     RENAL/GENITOURINARY:  #UTI   -See ID section     #ALDEN w/ Oliguria, required CVVH  -Baseline Cr: 0.7  -Presentation: BUN 90, Cr 7.6  -7/26 CVVH at OSH -> 310 mL urine  -King placed at OSH. Removed 7/27 upon admission to [facility]. -7/27: BUN 49, Cr 3.27  Plan:  -Nephrology Consulted regarding CVVH   -BID RFP     #Hyperkalemia, likely in the setting of TLS  -Presentation: 5.5. Peaked 5.9.   -OSH: Insulin drip, Lokelma 10g *1, Sodium Bicarbonate infusion, Calcium gluconate  -7/27: 4.9. Additional 2g Calcium Gluconate given  Plan:  -BID RFP, replete PRN    #Hypocalcemia, likely in the setting of TLS  -Presentation: 7.9. Tough 7.3  -7/27: 8.5  Plan  -BID RFP, replete PRN    #Hypomagnesemia  -7/27 <1.55  Plan  -BID RFP, replete PRN    GASTROENTEROLOGY:  #Constipation   -No BM since presentation at OSH  -Abdomen soft  Plan  -Miralax, Senna  -Avoid narcotics, replete electrolytes PRN, treat sepsis, replete fluids prn     ENDOCRINOLOGY:  #DM   -7/26 Hemoglobin A1C: 9.2 (7.4 in 2022)  -Home meds: Metformin 500mg BID  -Given Insulin Drip on presentation at OSH  -Patient receiving high dose steroids since 7/27  Plan:  -Holding home metformin in inpatient setting  -Resumed Insulin Drip for persistent hyperglycemia  -Hypoglycemia protocol   -Accuchecks ACHS       HEMATOLOGY/ONCOLOGY:  #Leiomyosarcoma, with metastasis to liver, lungs, and spine  -Originally found in the pancreatic bed 2019. Hysterectomy and bilateral salpingo- oophorectomy 2019. H/o chemotherapy and radiation. Multiple remissions. Began Yondelis 06/05/2024.   -Last chemotherapy: Yondelis C3 7/17/2024  Plan  -Oncology consulted  -No chemotherapy pending at present     #Elevated liver enzymes  -, ALT 53, AST 83  -Likely secondary to metastasis to liver  Plan  -Will monitor     #Tumor Lysis Syndrome, improving  -On presentation: Hyperuricemia 15.9, hypocalcemia, hyperkalemia, nausea, vomit, diarrhea  -7/26 OSH: 2x rasburicase, 5L NS, 1L LR, 50g Albumin, 100mg solumedrol  -G6PD negative  -7/27 Uric Acid: <1.5  Plan:  -Oncology consulted  -Trending TLS labs     #Pancytopenia  #Neutropenia  -WBC 3.9 -> 1.1, RBC 3.06 -> 2.71, Hemoglobin 7.5, Platelets 46  -ANC 0.69  -Etiology: secondary to sepsis vs post chemotherapy  -Heparin given at OSH  Plan  -Holding heparin in setting of thrombocytopenia  -Treat Underlying Condition    #Monoclonal B-cell lymphocytosis   -Diagnosed 2016.  Plan:  -Monitor     MUSCULOSKELETAL/ SKIN:  FREDERICK     INFECTIOUS DISEASE:  # UTI   -7/25 UCX: >100,000 Enteric Bacilli  -7/25 BCX * 2: NG2D  -7/26 Repeat UCX: Pending  -7/27 Repeat BCX: Pending  -S/P Ceftriaxone 1g 7/25  -7/27 Pro calcitonin: 3.07  Plan  -Continue Zosyn (7/26- present)  -Continue Vancomycin (7/27-present)  -Pending MRSA Nares    ICU Check List   FEN  Fluids: S/P 1.5L fluid 7/27. PRN   Electrolytes: Will replete PRN   Nutrition: Regular Diet   Prophylaxis:  DVT ppx: SCDs (Holding medical anticoagulation due to thrombocytopenia)   GI ppx: n/a  Bowel care: Miralax 17g BID, Sennosides, 8.6mg nightly  Hardware:  Catheter: Hemodialysis triple lumen left femoral catheter- placed 7/26 at OSH  Access: PIV, Mediport    Drains: King removed from OSH. Current External Urinary catheter  Lines: A line left radial- placed 7/26 at  OS  Social:  Code: Full Code    NOK: Loulou Benavides (cousin) [phone]; Tatyana Yanez (niece) [phone]  Disposition: ONELIA Mckeon MD MPH   07/27/24 at 3:07 PM     Disclaimer: Documentation completed with the information available at the time of input. The times in the chart may not be reflective of actual patient care times, interventions, or procedures. Documentation occurs after the physical care of the patient.

## 2024-07-27 NOTE — H&P
Medical Intensive Care - History and Physical   Subjective    Angie Tobin is a 58 y.o. 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     Pt states that she had her last chemotherapy session ~1week ago, and since then she has had nausea and vomiting persistently. She states that it has not improved which is why she is presenting to the hospital. She has had chemotherapy in the past, but never had these symptoms.      Per chart review, on 7/25 pt was hypotensive to 75/52 at [facility] ED and tachycardic. She was found to be in acute renal failure with cr 7.7 and K 5.6. Pt received UA showed c/f UTI and she was given CTX. However, overnight pt was found to be hypotensive. Pt was give 3L normal saline and her pressures were initially fluid responsive but then hypotension persisted requiring norepinephrine and so was transferred to ICU service on 7/26. In the ICU she was also started on vasopressin and per discussion with Nephrology was started on CVVH. She was found to be hyperuricemic s/p 2x rasburicase. She also received hyperK cocktail at [facility].   Otherwise, she also received a bicarb infusion, was on insulin gtt, broadened CTX to zosyn, received 1L LR, 25% 25g albumin x2, compazine, 100mg solumedrol, ativan .5mg, mag, versed 1mg    Upon admission to [facility] MICU, pt was on .35 levo +  0.03 vaso and on 4L NC. Pt is oriented, but falls asleep quickly. Vitals 116/60, pulse 110, afebrile.       Meds    Home medications:  Current Outpatient Medications   Medication Instructions   • albuterol 108  "(90 Base) MCG/ACT inhaler 1-2 puffs, inhalation, Every 4-6 hours as needed   • fluticasone (Flonase) 50 mcg/actuation nasal spray 1 spray, Each Nostril, Daily, Shake gently. Before first use, prime pump. After use, clean tip and replace cap.   • lidocaine-prilocaine (Emla) 2.5-2.5 % cream Topical, As needed, Please apply to Mediport prior to access for chemotherapy   • lisinopril 20 mg, oral, Daily   • meclizine (ANTIVERT) 12.5-25 mg, oral, 3 times daily PRN   • metFORMIN (GLUCOPHAGE) 500 mg, oral, 2 times daily   • metoprolol succinate XL (TOPROL-XL) 25 mg, oral, Daily   • prochlorperazine (COMPAZINE) 10 mg, oral, Every 6 hours PRN        Inpatient medications:  Scheduled medications  hydrocortisone sodium succinate, 50 mg, intravenous, q6h  piperacillin-tazobactam, 2.25 g, intravenous, q6h  vancomycin, 2,000 mg, intravenous, Once      Continuous medications  norepinephrine, 0-0.5 mcg/kg/min, Last Rate: 0.4 mcg/kg/min (07/27/24 0132)  vasopressin, 0-0.03 Units/min, Last Rate: 0.03 Units/min (07/27/24 0125)      PRN medications  PRN medications: vancomycin     Objective    Blood pressure 116/60, pulse 110, temperature 35.8 °C (96.4 °F), temperature source Temporal, resp. rate 21, height 1.778 m (5' 10\"), weight 128 kg (282 lb 6.6 oz), SpO2 99%.     Physical Exam  Constitutional:       General: She is not in acute distress.     Comments: Pt is oriented, she is quick to fall asleep    HENT:      Head: Normocephalic and atraumatic.   Cardiovascular:      Rate and Rhythm: Regular rhythm. Tachycardia present.   Pulmonary:      Effort: Pulmonary effort is normal.      Comments: Pt on 4L NC  Abdominal:      Palpations: Abdomen is soft.      Tenderness: There is no abdominal tenderness.   Skin:     General: Skin is warm and dry.   Neurological:      General: No focal deficit present.      Mental Status: She is oriented to person, place, and time.      Comments: sleepy   Psychiatric:         Mood and Affect: Mood normal. "            Intake/Output Summary (Last 24 hours) at 7/27/2024 0145  Last data filed at 7/27/2024 0106  Gross per 24 hour   Intake 50 ml   Output 10 ml   Net 40 ml     Labs:   Results from last 72 hours   Lab Units 07/26/24  1513 07/26/24  0748 07/26/24  0417 07/25/24  1808   SODIUM mmol/L 132* 134 131* 133   POTASSIUM mmol/L 4.0 5.7* 5.9* 4.9   CHLORIDE mmol/L 96* 101 100 99   CO2 mmol/L 13* 11* 13* 14*   BUN mg/dL 64* 88* 94* 91*   CREATININE mg/dL 4.60* 6.70* 7.20* 7.50*   GLUCOSE mg/dL 261* 290* 253* 301*   CALCIUM mg/dL 8.0* 7.4* 7.3* 7.8*   ANION GAP mmol/L >19* >19* 18 >19*   EGFR mL/min/1.73m*2 11* 7* 6* 6*   PHOSPHORUS mg/dL 3.0  --  3.4 3.4      Results from last 72 hours   Lab Units 07/26/24  0417 07/25/24  0911   WBC AUTO x10*3/uL 2.6* 3.9*   HEMOGLOBIN g/dL 8.0* 8.7*   HEMATOCRIT % 25.2* 27.2*   PLATELETS AUTO x10*3/uL 85* 86*   LYMPHO PCT MAN % 26.0 4.0   MONO PCT MAN % 2.0 4.0   EOSINO PCT MAN % 0.0 0.0        Micro/ID:     Lab Results   Component Value Date    URINECULTURE >100,000 Enteric bacilli (A) 07/25/2024    BLOODCULT No growth at 1 day 07/25/2024    BLOODCULT No growth at 1 day 07/25/2024       ONC HISTORY (per latest note 7/17/24):     Leiomyosarcoma, 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 (diagnosed 2019 and surgically removed in 2019) s/p hysterectomy and bilateral salpingo- oophorectomy (June 2019) with metastasis to the lungs and liver (detected via CT scan in 2021)      CT scan in April 2021 detected new solitary nodules in lung and liver. MRI done on April 23, 2021 confirmed recurrence in the liver. Her case was discussed in the tumor board and a decision was made to pursue systemic chemotherapy. We started her on doxorubicin and dacarbazine with zinecard protection. s/p 2 cycles, CT scans shows positive response in tumor burden. Completed 6 cycles uneventfully on 08/26/2021.     03/31/2022- CT scan showed a lung nodule that is 0.6cm (was inconspicuous on the previous scan). Port  removed on Feb 10, 2022.   07/05/2022- CT scan on 7/1/22 showed that the lung nodule has enlarged to 0.9cm. We petitioned for a biopsy of this lung nodule. Interestingly, the liver lesions have disappeared.   08/16/2022- Lung biopsy completed in early august and path reads leiomyosarcoma. Case presented in tumor board on 8/19/22 and decision made to refer her to CT surgery   10/14/2022- Surgery completed- 6 wedges removed. Multiple small nodules of leiomyosarcoma and microscopic foci reported.   12/06/2022- CT scan is LINH. Repeat staging in 3 months.  03/03/2023- CT scan showed the liver lesion to be stable.   06/02/2023- CT scan shows worsening liver lesion- MRI liver ordered on 06/16/2023 showing progressing liver mets. We petitioned for Docetaxel/Gemcitabine to start 07/17/2023.  08/21/2023- CT scan and MRI done after 2 cycles show progressive disease in the liver and in the lungs.   08/23/2023- Ordered Pazopanib.   08/30/2023- Started pazopanib at 400mg by mouth daily  09/07/2023- Increased to 800mg by mouth daily  11/13/2023- Imaging shows progressive disease in the liver.   11/15/2023- Stopped pazopanib and referred for Y-90.  12/19/2023- Received Y-90 treatment.      2024 01/23/24- Phone visit. Ordered imaging. Recovered well from y-90.  01/31/2024- CT scan done. Very small lung nodules. Radiology reports mild increase in size. I feel this is measurement differences.  02/02/2024- Excellent response to Y-90. However, there is another mass in the dome of the liver that is increasing in size. We spoke to Dr. Gold and he will perform Y-90 to that mass. However, upon review of MRI- Dr. Gold felt to wait for some time and repeat the scan in 3 months.  05/12/2024- Imaging shows progressive disease in the lung and in the liver.   05/14/2024- Met with the patient and discussed Iness. Her images will be discussed in the tumor board. Port placement was ordered.   05/17/2024- Tumor board discussion was done  and chemotherapy was recommended  06/05/2024- Met with the patient and signed consent for Yondelis     Treatment History:   Systemic treatment:  1. Doxorubicin + Dacarbazine + Zinecard (Day 1-3) of 21 day of cycle.  C1- 05/10/2021 to 05/12/2021  C2- 06/01/2021 to 06/03/2021  C3- 06/22/2021 to 06/24/2021  C4- 07/13/2021 to 07/15/2021  C5- 08/03/2021 to 08/05/2021   C6- 08/24/2021 to 08/26/2021     2. Docetaxel (75mg/m2) + Gemcitabine (900mg/m2) D1, D8 of a 21 day cycle  C1- 07/17/2023   C2- 08/07/2023- Discontinued after progression noted.      3. Pazopanib   Start date 08/30/2023 at 400mg by mouth daily.   Increase to 09/07/2023 at 800mg by mouth daily.   Stopped on 11/15/23 due to progressive disease.     4. Yondelis 1.5 mg/m2  C1- 06/05/2024  C2- 06/27/2024  C3- 07/17/2024    Assessment and Plan     Assessment:  Angie Tobin is a 58 y.o. year old female patient admitted to the MICU on 07/27/24 for hypotension on pressors. Given persistent hypotension requiring 2 pressors + elevated lactate to 2.8, c/f septic shock 2/2 UTI (enteric bacilli seen on urine cultures).     Mechanical Ventilation: none  Sedation/Analgesia:  none  Restraints: no    Plan:  NEUROLOGY/PSYCH:  Dx: n/a     CARDIOVASCULAR:  Dx: shock   Etiology: likely septic from UTI   Urine cultures from [facility] growing enteric bacilli   Received 3L NS, 1L LR at OSH + 100mg solumedrol   Management:  On Norepi .35, vaso 0.03  Broad spectrum abx: vanc, zosyn   Stress dose steroids solumedrol 50 q6h ordered  1L LR ordered     PULMONARY:  Dx:  new O2 requirement   Pt on 4L NC   Management:  Will further evaluate via CXR   Procal, ABG ordered     RENAL/GENITOURINARY:  Dx: UTI (see ID section below)     Dx: Acute Renal Failure   Pt presented with cr 7.6 at [facility], BUN 90   Cr today 4.60, BUN 64    Pt was started on CVVH at [facility]   Baseline cr: 0.7  Management:  RFP ordered upon admission to [facility] MICU, will assess labs prior to consulting Nephrology at  this time (will need to be consulted in AM)  Daily RFP     GASTROENTEROLOGY:  Dx: n/a     ENDOCRINOLOGY:  Dx: DM   Also monitor for steroid induced hyperglycemia   Management:  Holding home metformin   SSI   Hypoglycemia protocol   Accuchearnestine [facility]     HEMATOLOGY/ONCOLOGY:  Dx: leiomyosarcoma, 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 (diagnosed 2019 and surgically removed in 2019) s/p hysterectomy and bilateral salpingo- oophorectomy (June 2019) with metastasis to the lungs and liver (detected via CT scan in 2021)   Management:  Continue to monitor     Dx: TLS   S/p 1L LR, 3L NS at OSH + 2x rasburicase   Management:  Consult Oncology in AM for rasburicase   TLS labs ordered - monitor     Dx: Monoclonal B-cell lymphocytosis   Management:  Continue to monitor     MUSCULOSKELETAL/ SKIN:  Dx: n/a     INFECTIOUS DISEASE:  Dx: UTI   Urine cultures from [facility] growing enteric bacilli   Management:  Broad spectrum abx: vanc, zosyn   Will need to follow up urine cultures for speciation and eventual abx de-escalation   Repeat blood cultures     ICU Check List     FEN  Fluids: prn   Electrolytes: prn   Nutrition: regular   Prophylaxis:  DVT ppx: SCDs (thrombocytopenia)   GI ppx: n/a  Bowel care: n/a  Hardware:  Catheter: external    Access: PIV, mediport    Drains: n/a   Lines: A line, triple lumen   Social:  Code: Full Code    NOK: Loulou [phone]   Disposition: ONELIA Selby MD   07/27/24 at 1:45 AM     Disclaimer: Documentation completed with the information available at the time of input. The times in the chart may not be reflective of actual patient care times, interventions, or procedures. Documentation occurs after the physical care of the patient.

## 2024-07-27 NOTE — SIGNIFICANT EVENT
"MICU ADMIT  Patient has vaso infusing at 0.03 and levo infusing at 0.3mcg/kg/min. patient on 6LNC.    07/26/24 2341   Vitals   Temp 35.8 °C (96.4 °F)   Temp Source Temporal   Heart Rate (!) 111   Resp 23   /60   MAP (mmHg) 76   Art Line (1)   Arterial Line Location 1 Left radial   Medical Gas Therapy   SpO2 98 %   Medical Gas Therapy Supplemental oxygen   O2 Delivery Method Nasal cannula   Humidification Yes   Pain Assessment   Pain Assessment 0-10   0-10 (Numeric) Pain Score 7   Pain Type Acute pain   Pain Location Generalized   Height and Weight   Height 1.778 m (5' 10\")   Height Method Stated   Weight 128 kg (282 lb 6.6 oz)   Weight Method Bed scale   BSA (Calculated - sq m) 2.52 sq meters   BMI (Calculated) 40.52   Weight in (lb) to have BMI = 25 173.9       "

## 2024-07-27 NOTE — CARE PLAN
Problem: Pain - Adult  Goal: Verbalizes/displays adequate comfort level or baseline comfort level  Outcome: Progressing     Problem: Safety - Adult  Goal: Free from fall injury  Outcome: Progressing     Problem: Skin  Goal: Participates in plan/prevention/treatment measures  Outcome: Progressing  Goal: Prevent/manage excess moisture  Outcome: Progressing  Goal: Prevent/minimize sheer/friction injuries  Outcome: Progressing  Goal: Promote/optimize nutrition  Outcome: Progressing     Problem: Diabetes  Goal: Achieve decreasing blood glucose levels by end of shift  Outcome: Progressing  Goal: Increase stability of blood glucose readings by end of shift  Outcome: Progressing  Goal: Maintain electrolyte levels within acceptable range throughout shift  Outcome: Progressing  Goal: Maintain glucose levels >70mg/dl to <250mg/dl throughout shift  Outcome: Progressing  Goal: No changes in neurological exam by end of shift  Outcome: Progressing     Problem: Pain  Goal: Turns in bed with improved pain control throughout the shift  Outcome: Progressing  Goal: Free from opioid side effects throughout the shift  Outcome: Progressing  Goal: Free from acute confusion related to pain meds throughout the shift  Outcome: Progressing

## 2024-07-27 NOTE — PROGRESS NOTES
Pharmacy Medication History Review    Angie Tobin is a 58 y.o. female admitted for Sepsis (Multi). Pharmacy reviewed the patient's [id number] medications for accuracy.    The list below reflects the updated PTA list. Comments regarding how patient may be taking medications differently can be found in the Admit Orders Activity  Prior to Admission Medications   Prescriptions Last Dose Informant   LORazepam (Ativan) 0.5 mg tablet  Self   Sig: Take 1 tablet (0.5 mg) by mouth every 8 hours if needed for anxiety.   albuterol 108 (90 Base) MCG/ACT inhaler  Self   Sig: Inhale 1-2 puffs. Every 4-6 hours as needed   fluticasone (Flonase) 50 mcg/actuation nasal spray  Self   Sig: Administer 1 spray into each nostril once daily. Shake gently. Before first use, prime pump. After use, clean tip and replace cap.   lidocaine-prilocaine (Emla) 2.5-2.5 % cream  Self   Sig: Apply topically if needed for mild pain (1 - 3). Please apply to [facility] prior to access for chemotherapy   lisinopril 20 mg tablet  Self   Sig: Take 1 tablet (20 mg) by mouth once daily.   loratadine (Claritin) 10 mg tablet  Self   Sig: Take 1 tablet (10 mg) by mouth once daily as needed for allergies.   meclizine (Antivert) 12.5 mg tablet  Self   Sig: Take 1-2 tablets (12.5-25 mg) by mouth 3 times a day as needed (vertigo).   metFORMIN (Glucophage) 500 mg tablet  Self   Sig: Take 1 tablet (500 mg) by mouth 2 times a day.   metoprolol succinate XL (Toprol-XL) 25 mg 24 hr tablet  Self   Sig: Take 1 tablet (25 mg) by mouth once daily.   ondansetron (Zofran) 8 mg tablet  Self   Sig: Take 1 tablet (8 mg) by mouth every 8 hours if needed for nausea or vomiting.   prochlorperazine (Compazine) 10 mg tablet  Self   Sig: Take 1 tablet (10 mg) by mouth every 6 hours if needed for nausea or vomiting.      Facility-Administered Medications: None        Patient declines M2B at discharge. Pharmacy has been updated to [facility] pharmacy.    Sources:    -patient  interview  -outpatient pharmacy dispense history  -Care Everywhere medication lists  -OARRS  -heme-onc clinic note 7/17/24    Below are additional concerns with the patient's PTA list:    -patient states she hasn't been taking any of her oral medications in last few days due to being ill  Yosef Rojas, PharmD  Transitions of Care Pharmacist  [facility]  Please reach out via Secure Chat for questions, or if no response call [facility] or vocera [facility]

## 2024-07-28 ENCOUNTER — APPOINTMENT (OUTPATIENT)
Dept: RADIOLOGY | Facility: HOSPITAL | Age: 59
DRG: 871 | End: 2024-07-28
Payer: COMMERCIAL

## 2024-07-28 LAB
ALBUMIN SERPL BCP-MCNC: 2.9 G/DL (ref 3.4–5)
ALBUMIN SERPL BCP-MCNC: 3 G/DL (ref 3.4–5)
ALP SERPL-CCNC: 376 U/L (ref 33–110)
ALT SERPL W P-5'-P-CCNC: 60 U/L (ref 7–45)
ANION GAP SERPL CALC-SCNC: 17 MMOL/L (ref 10–20)
ANION GAP SERPL CALC-SCNC: 18 MMOL/L (ref 10–20)
AST SERPL W P-5'-P-CCNC: 149 U/L (ref 9–39)
BACTERIA BLD CULT: NORMAL
BACTERIA UR CULT: NO GROWTH
BASOPHILS # BLD MANUAL: 0 X10*3/UL (ref 0–0.1)
BASOPHILS # BLD MANUAL: 0 X10*3/UL (ref 0–0.1)
BASOPHILS NFR BLD MANUAL: 0 %
BASOPHILS NFR BLD MANUAL: 0 %
BILIRUB SERPL-MCNC: 1.5 MG/DL (ref 0–1.2)
BUN SERPL-MCNC: 27 MG/DL (ref 6–23)
BUN SERPL-MCNC: 42 MG/DL (ref 6–23)
BURR CELLS BLD QL SMEAR: ABNORMAL
CALCIUM SERPL-MCNC: 8.4 MG/DL (ref 8.6–10.6)
CALCIUM SERPL-MCNC: 8.5 MG/DL (ref 8.6–10.6)
CHLORIDE SERPL-SCNC: 99 MMOL/L (ref 98–107)
CHLORIDE SERPL-SCNC: 99 MMOL/L (ref 98–107)
CO2 SERPL-SCNC: 18 MMOL/L (ref 21–32)
CO2 SERPL-SCNC: 23 MMOL/L (ref 21–32)
CREAT SERPL-MCNC: 2.03 MG/DL (ref 0.5–1.05)
CREAT SERPL-MCNC: 2.66 MG/DL (ref 0.5–1.05)
EGFRCR SERPLBLD CKD-EPI 2021: 20 ML/MIN/1.73M*2
EGFRCR SERPLBLD CKD-EPI 2021: 28 ML/MIN/1.73M*2
EOSINOPHIL # BLD MANUAL: 0 X10*3/UL (ref 0–0.7)
EOSINOPHIL # BLD MANUAL: 0 X10*3/UL (ref 0–0.7)
EOSINOPHIL NFR BLD MANUAL: 0 %
EOSINOPHIL NFR BLD MANUAL: 0 %
ERYTHROCYTE [DISTWIDTH] IN BLOOD BY AUTOMATED COUNT: 16 % (ref 11.5–14.5)
ERYTHROCYTE [DISTWIDTH] IN BLOOD BY AUTOMATED COUNT: 16.8 % (ref 11.5–14.5)
GLUCOSE BLD MANUAL STRIP-MCNC: 116 MG/DL (ref 74–99)
GLUCOSE BLD MANUAL STRIP-MCNC: 119 MG/DL (ref 74–99)
GLUCOSE BLD MANUAL STRIP-MCNC: 133 MG/DL (ref 74–99)
GLUCOSE BLD MANUAL STRIP-MCNC: 143 MG/DL (ref 74–99)
GLUCOSE BLD MANUAL STRIP-MCNC: 166 MG/DL (ref 74–99)
GLUCOSE BLD MANUAL STRIP-MCNC: 167 MG/DL (ref 74–99)
GLUCOSE BLD MANUAL STRIP-MCNC: 171 MG/DL (ref 74–99)
GLUCOSE BLD MANUAL STRIP-MCNC: 186 MG/DL (ref 74–99)
GLUCOSE BLD MANUAL STRIP-MCNC: 189 MG/DL (ref 74–99)
GLUCOSE BLD MANUAL STRIP-MCNC: 189 MG/DL (ref 74–99)
GLUCOSE BLD MANUAL STRIP-MCNC: 195 MG/DL (ref 74–99)
GLUCOSE BLD MANUAL STRIP-MCNC: 207 MG/DL (ref 74–99)
GLUCOSE BLD MANUAL STRIP-MCNC: 208 MG/DL (ref 74–99)
GLUCOSE BLD MANUAL STRIP-MCNC: 211 MG/DL (ref 74–99)
GLUCOSE BLD MANUAL STRIP-MCNC: 242 MG/DL (ref 74–99)
GLUCOSE BLD MANUAL STRIP-MCNC: 242 MG/DL (ref 74–99)
GLUCOSE BLD MANUAL STRIP-MCNC: 257 MG/DL (ref 74–99)
GLUCOSE BLD MANUAL STRIP-MCNC: 303 MG/DL (ref 74–99)
GLUCOSE BLD MANUAL STRIP-MCNC: 319 MG/DL (ref 74–99)
GLUCOSE BLD MANUAL STRIP-MCNC: 349 MG/DL (ref 74–99)
GLUCOSE BLD MANUAL STRIP-MCNC: 365 MG/DL (ref 74–99)
GLUCOSE BLD MANUAL STRIP-MCNC: 65 MG/DL (ref 74–99)
GLUCOSE BLD MANUAL STRIP-MCNC: 67 MG/DL (ref 74–99)
GLUCOSE SERPL-MCNC: 119 MG/DL (ref 74–99)
GLUCOSE SERPL-MCNC: 358 MG/DL (ref 74–99)
HCT VFR BLD AUTO: 23.1 % (ref 36–46)
HCT VFR BLD AUTO: 23.4 % (ref 36–46)
HGB BLD-MCNC: 7.9 G/DL (ref 12–16)
HGB BLD-MCNC: 8 G/DL (ref 12–16)
IMM GRANULOCYTES # BLD AUTO: 0.01 X10*3/UL (ref 0–0.7)
IMM GRANULOCYTES # BLD AUTO: 0.03 X10*3/UL (ref 0–0.7)
IMM GRANULOCYTES NFR BLD AUTO: 1.6 % (ref 0–0.9)
IMM GRANULOCYTES NFR BLD AUTO: 3.7 % (ref 0–0.9)
LYMPHOCYTES # BLD MANUAL: 0.17 X10*3/UL (ref 1.2–4.8)
LYMPHOCYTES # BLD MANUAL: 0.28 X10*3/UL (ref 1.2–4.8)
LYMPHOCYTES NFR BLD MANUAL: 29.1 %
LYMPHOCYTES NFR BLD MANUAL: 34.5 %
MAGNESIUM SERPL-MCNC: 2.02 MG/DL (ref 1.6–2.4)
MAGNESIUM SERPL-MCNC: 2.29 MG/DL (ref 1.6–2.4)
MCH RBC QN AUTO: 27.9 PG (ref 26–34)
MCH RBC QN AUTO: 28.2 PG (ref 26–34)
MCHC RBC AUTO-ENTMCNC: 33.8 G/DL (ref 32–36)
MCHC RBC AUTO-ENTMCNC: 34.6 G/DL (ref 32–36)
MCV RBC AUTO: 81 FL (ref 80–100)
MCV RBC AUTO: 83 FL (ref 80–100)
MONOCYTES # BLD MANUAL: 0.08 X10*3/UL (ref 0.1–1)
MONOCYTES # BLD MANUAL: 0.16 X10*3/UL (ref 0.1–1)
MONOCYTES NFR BLD MANUAL: 13.9 %
MONOCYTES NFR BLD MANUAL: 19.5 %
NEUTS SEG # BLD MANUAL: 0.33 X10*3/UL (ref 1.2–7)
NEUTS SEG # BLD MANUAL: 0.36 X10*3/UL (ref 1.2–7)
NEUTS SEG NFR BLD MANUAL: 44.8 %
NEUTS SEG NFR BLD MANUAL: 55.8 %
NRBC BLD-RTO: 0 /100 WBCS (ref 0–0)
NRBC BLD-RTO: 0 /100 WBCS (ref 0–0)
PHOSPHATE SERPL-MCNC: 2.9 MG/DL (ref 2.5–4.9)
PLATELET # BLD AUTO: 22 X10*3/UL (ref 150–450)
PLATELET # BLD AUTO: 27 X10*3/UL (ref 150–450)
PLATELET CLUMP BLD QL SMEAR: PRESENT
POTASSIUM SERPL-SCNC: 4.1 MMOL/L (ref 3.5–5.3)
POTASSIUM SERPL-SCNC: 4.1 MMOL/L (ref 3.5–5.3)
PROT SERPL-MCNC: 5.1 G/DL (ref 6.4–8.2)
RBC # BLD AUTO: 2.83 X10*6/UL (ref 4–5.2)
RBC # BLD AUTO: 2.84 X10*6/UL (ref 4–5.2)
RBC MORPH BLD: ABNORMAL
RBC MORPH BLD: ABNORMAL
SODIUM SERPL-SCNC: 131 MMOL/L (ref 136–145)
SODIUM SERPL-SCNC: 135 MMOL/L (ref 136–145)
TOTAL CELLS COUNTED BLD: 86
TOTAL CELLS COUNTED BLD: 87
VARIANT LYMPHS # BLD MANUAL: 0.01 X10*3/UL (ref 0–0.5)
VARIANT LYMPHS # BLD MANUAL: 0.01 X10*3/UL (ref 0–0.5)
VARIANT LYMPHS NFR BLD: 1.2 %
VARIANT LYMPHS NFR BLD: 1.2 %
WBC # BLD AUTO: 0.6 X10*3/UL (ref 4.4–11.3)
WBC # BLD AUTO: 0.8 X10*3/UL (ref 4.4–11.3)

## 2024-07-28 PROCEDURE — 83874 ASSAY OF MYOGLOBIN: CPT | Performed by: INTERNAL MEDICINE

## 2024-07-28 PROCEDURE — C9113 INJ PANTOPRAZOLE SODIUM, VIA: HCPCS

## 2024-07-28 PROCEDURE — 85027 COMPLETE CBC AUTOMATED: CPT

## 2024-07-28 PROCEDURE — 2500000005 HC RX 250 GENERAL PHARMACY W/O HCPCS

## 2024-07-28 PROCEDURE — 71045 X-RAY EXAM CHEST 1 VIEW: CPT | Performed by: RADIOLOGY

## 2024-07-28 PROCEDURE — 85007 BL SMEAR W/DIFF WBC COUNT: CPT

## 2024-07-28 PROCEDURE — 83010 ASSAY OF HAPTOGLOBIN QUANT: CPT

## 2024-07-28 PROCEDURE — 2500000004 HC RX 250 GENERAL PHARMACY W/ HCPCS (ALT 636 FOR OP/ED)

## 2024-07-28 PROCEDURE — 37799 UNLISTED PX VASCULAR SURGERY: CPT | Mod: WESLAB

## 2024-07-28 PROCEDURE — 83735 ASSAY OF MAGNESIUM: CPT

## 2024-07-28 PROCEDURE — 80069 RENAL FUNCTION PANEL: CPT

## 2024-07-28 PROCEDURE — 83051 HEMOGLOBIN PLASMA: CPT

## 2024-07-28 PROCEDURE — 90945 DIALYSIS ONE EVALUATION: CPT | Performed by: INTERNAL MEDICINE

## 2024-07-28 PROCEDURE — 80053 COMPREHEN METABOLIC PANEL: CPT

## 2024-07-28 PROCEDURE — 82947 ASSAY GLUCOSE BLOOD QUANT: CPT

## 2024-07-28 PROCEDURE — 2500000004 HC RX 250 GENERAL PHARMACY W/ HCPCS (ALT 636 FOR OP/ED): Mod: JZ

## 2024-07-28 PROCEDURE — 71045 X-RAY EXAM CHEST 1 VIEW: CPT

## 2024-07-28 PROCEDURE — 99291 CRITICAL CARE FIRST HOUR: CPT

## 2024-07-28 PROCEDURE — 2020000001 HC ICU ROOM DAILY

## 2024-07-28 RX ORDER — CYCLOBENZAPRINE HCL 5 MG
5 TABLET ORAL 3 TIMES DAILY PRN
Status: DISCONTINUED | OUTPATIENT
Start: 2024-07-28 | End: 2024-07-28

## 2024-07-28 RX ORDER — PANTOPRAZOLE SODIUM 40 MG/10ML
80 INJECTION, POWDER, LYOPHILIZED, FOR SOLUTION INTRAVENOUS ONCE
Status: COMPLETED | OUTPATIENT
Start: 2024-07-28 | End: 2024-07-28

## 2024-07-28 RX ORDER — NOREPINEPHRINE BITARTRATE 1 MG/ML
INJECTION, SOLUTION INTRAVENOUS
Status: COMPLETED
Start: 2024-07-28 | End: 2024-07-28

## 2024-07-28 RX ORDER — PANTOPRAZOLE SODIUM 40 MG/10ML
40 INJECTION, POWDER, LYOPHILIZED, FOR SOLUTION INTRAVENOUS 2 TIMES DAILY
Status: DISCONTINUED | OUTPATIENT
Start: 2024-07-28 | End: 2024-07-29

## 2024-07-28 RX ORDER — DICLOFENAC SODIUM 10 MG/G
4 GEL TOPICAL 4 TIMES DAILY PRN
Status: DISCONTINUED | OUTPATIENT
Start: 2024-07-28 | End: 2024-08-03 | Stop reason: HOSPADM

## 2024-07-28 RX ORDER — LIDOCAINE 560 MG/1
1 PATCH PERCUTANEOUS; TOPICAL; TRANSDERMAL EVERY 24 HOURS
Status: DISCONTINUED | OUTPATIENT
Start: 2024-07-28 | End: 2024-08-03 | Stop reason: HOSPADM

## 2024-07-28 RX ADMIN — HYDROCORTISONE SODIUM SUCCINATE 50 MG: 100 INJECTION, POWDER, FOR SOLUTION INTRAMUSCULAR; INTRAVENOUS at 17:46

## 2024-07-28 RX ADMIN — CALCIUM CHLORIDE, MAGNESIUM CHLORIDE, DEXTROSE MONOHYDRATE, LACTIC ACID, SODIUM CHLORIDE, SODIUM BICARBONATE AND POTASSIUM CHLORIDE 2700 ML/HR: 3.68; 3.05; 22; 5.4; 6.46; 3.09; .314 INJECTION INTRAVENOUS at 18:36

## 2024-07-28 RX ADMIN — MEROPENEM 1 G: 1 INJECTION, POWDER, FOR SOLUTION INTRAVENOUS at 12:25

## 2024-07-28 RX ADMIN — PANTOPRAZOLE SODIUM 40 MG: 40 INJECTION, POWDER, FOR SOLUTION INTRAVENOUS at 21:00

## 2024-07-28 RX ADMIN — INSULIN HUMAN 14.4 UNITS/HR: 1 INJECTION, SOLUTION INTRAVENOUS at 14:25

## 2024-07-28 RX ADMIN — NOREPINEPHRINE BITARTRATE: 1 SOLUTION INTRAVENOUS at 06:15

## 2024-07-28 RX ADMIN — HYDROCORTISONE SODIUM SUCCINATE 50 MG: 100 INJECTION, POWDER, FOR SOLUTION INTRAMUSCULAR; INTRAVENOUS at 12:27

## 2024-07-28 RX ADMIN — INSULIN HUMAN 3.5 UNITS/HR: 1 INJECTION, SOLUTION INTRAVENOUS at 23:25

## 2024-07-28 RX ADMIN — MEROPENEM 1 G: 1 INJECTION, POWDER, FOR SOLUTION INTRAVENOUS at 20:59

## 2024-07-28 RX ADMIN — HYDROCORTISONE SODIUM SUCCINATE 50 MG: 100 INJECTION, POWDER, FOR SOLUTION INTRAMUSCULAR; INTRAVENOUS at 06:00

## 2024-07-28 RX ADMIN — CALCIUM CHLORIDE, MAGNESIUM CHLORIDE, DEXTROSE MONOHYDRATE, LACTIC ACID, SODIUM CHLORIDE, SODIUM BICARBONATE AND POTASSIUM CHLORIDE 2700 ML/HR: 3.68; 3.05; 22; 5.4; 6.46; 3.09; .314 INJECTION INTRAVENOUS at 12:33

## 2024-07-28 RX ADMIN — VASOPRESSIN 0.03 UNITS/MIN: 0.2 INJECTION INTRAVENOUS at 08:59

## 2024-07-28 RX ADMIN — VANCOMYCIN HYDROCHLORIDE 1000 MG: 1 INJECTION, SOLUTION INTRAVENOUS at 07:18

## 2024-07-28 RX ADMIN — CALCIUM CHLORIDE, MAGNESIUM CHLORIDE, DEXTROSE MONOHYDRATE, LACTIC ACID, SODIUM CHLORIDE, SODIUM BICARBONATE AND POTASSIUM CHLORIDE 2700 ML/HR: 3.68; 3.05; 22; 5.4; 6.46; 3.09; .314 INJECTION INTRAVENOUS at 00:02

## 2024-07-28 RX ADMIN — VASOPRESSIN 0.03 UNITS/MIN: 0.2 INJECTION INTRAVENOUS at 19:48

## 2024-07-28 RX ADMIN — LIDOCAINE 1 PATCH: 4 PATCH TOPICAL at 05:02

## 2024-07-28 RX ADMIN — HYDROCORTISONE SODIUM SUCCINATE 50 MG: 100 INJECTION, POWDER, FOR SOLUTION INTRAMUSCULAR; INTRAVENOUS at 23:25

## 2024-07-28 RX ADMIN — POLYETHYLENE GLYCOL 3350 17 G: 17 POWDER, FOR SOLUTION ORAL at 08:44

## 2024-07-28 RX ADMIN — FILGRASTIM-SNDZ 480 MCG: 480 INJECTION, SOLUTION INTRAVENOUS; SUBCUTANEOUS at 17:46

## 2024-07-28 RX ADMIN — MEROPENEM 1 G: 1 INJECTION, POWDER, FOR SOLUTION INTRAVENOUS at 04:15

## 2024-07-28 RX ADMIN — CALCIUM CHLORIDE, MAGNESIUM CHLORIDE, DEXTROSE MONOHYDRATE, LACTIC ACID, SODIUM CHLORIDE, SODIUM BICARBONATE AND POTASSIUM CHLORIDE 2700 ML/HR: 3.68; 3.05; 22; 5.4; 6.46; 3.09; .314 INJECTION INTRAVENOUS at 06:15

## 2024-07-28 RX ADMIN — PANTOPRAZOLE SODIUM 80 MG: 40 INJECTION, POWDER, FOR SOLUTION INTRAVENOUS at 13:59

## 2024-07-28 RX ADMIN — VASOPRESSIN 0.03 UNITS/MIN: 0.2 INJECTION INTRAVENOUS at 19:44

## 2024-07-28 ASSESSMENT — PAIN DESCRIPTION - LOCATION: LOCATION: BACK

## 2024-07-28 ASSESSMENT — PAIN - FUNCTIONAL ASSESSMENT
PAIN_FUNCTIONAL_ASSESSMENT: 0-10

## 2024-07-28 ASSESSMENT — PAIN SCALES - GENERAL
PAINLEVEL_OUTOF10: 5 - MODERATE PAIN
PAINLEVEL_OUTOF10: 0 - NO PAIN
PAINLEVEL_OUTOF10: 6

## 2024-07-28 NOTE — CONSULTS
Name: Angie Tobin  MRN: [id number]  Encounter Date: 7/27/2024  PCP: Zainab Nobles, APRN-CNP  Heme-Onc: Dr. Walsh    Reason for consult: consideration of GCSF, c/f TLS  Attending Provider: Dr. Zacarias    Hematology/ Oncology Consult Note      History of Present Illness   Angie Tobin is a 58 y.o. 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 Oncology is consulted for consideration of G-CSF in setting of neutropenia and active infection and concern for TLS.     Patient reports poor PO intake, n/v after C3 trabectedin on 7/17. She had palliative radiation to thoracic spine 3/3 fractions (7/3-7/8) as well. She also endorsed diarrhea 3-4 episodes daily x 4 days. She presented to [facility] on 7/25 with severe fatigue, found to be hypotensive with renal failure. She was admitted to the ICU where she required pressors and CVVH up until 7/26. Her uric acid was 15.9 and so she was treated with rasburicase at OSH. She was transferred to [facility] for further care on 7/26. Source of infection thought to be klebsiella UTI.     Today, patient reports feeling better than on initial presentation. She denies fever, chills, or urinary symptoms.      Heme/Onc History    Treatment Synopsis:   Ms. Tobin is a pleasant woman who presented with RUQ pain in March 2019. Further investigations and imaging showed a large heterogeneous mass in the pancreatic  bed. Initially this was thought to be lymphoma, however biopsy showed soft tissue sarcoma. On April 22, 2019 this mass was removed by Dr. Gonzalez via surgery. 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 She was subsequently  seen by Gyn Onc and underwent hysterectomy and bilateral salpingo-oopherectomy on Danae 10, 2019. This  specimen did not show any evidence of tumor. We saw her first in August 2019. Scans did not detect any tumor at that point. Her case was discussed in  the tumor board for post op RT. Since there was no focus to be seen, it was decided that RT would be deferred for the future if a recurrence happens. CT scan in April 2021 detected new solitary nodules in lung and liver. MRI done on April 23, 2021 confirmed  recurrence in the liver. Her case was discussed in the tumor board and a decision was made to pursue systemic chemotherapy. We started her on doxorubicin and dacarbazine with zinecard protection. s/p 2 cycles, CT scans shows positive response in tumor  burden. Completed 6 cycles uneventfully on 08/26/2021.     Of note, the patient also has a history of Monoclonal B-cell lymphocytosis. She was worked up in 2016 for this reason (BM Biopsy report present in the physician portal)     03/31/2022- CT scan showed a lung nodule that is 0.6cm (was inconspicuous on the previous scan). Port removed on Feb 10, 2022.   07/05/2022- CT scan on 7/1/22 showed that the lung nodule has enlarged to 0.9cm. We petitioned for a biopsy of this lung nodule. Interestingly, the liver lesions have disappeared.   08/16/2022- Lung biopsy completed in early august and path reads leiomyosarcoma. Case presented in tumor board on 8/19/22 and decision made to refer her to CT surgery   10/14/2022- Surgery completed- 6 wedges removed. Multiple small nodules of leiomyosarcoma and microscopic foci reported.   12/06/2022- CT scan is LINH. Repeat staging in 3 months.  03/03/2023- CT scan showed the liver lesion to be stable.   06/02/2023- CT scan shows worsening liver lesion- MRI liver ordered on 06/16/2023 showing progressing liver mets. We petitioned for Docetaxel/Gemcitabine to start 07/17/2023.  08/21/2023- CT scan and MRI done after 2 cycles show progressive disease in the liver and in the lungs.   08/23/2023- Ordered Pazopanib.   08/30/2023- Started  pazopanib at 400mg by mouth daily  09/07/2023- Increased to 800mg by mouth daily  11/13/2023- Imaging shows progressive disease in the liver.   11/15/2023- Stopped pazopanib and referred for Y-90.  12/19/2023- Received Y-90 treatment.      2024 01/23/24- Phone visit. Ordered imaging. Recovered well from y-90.  01/31/2024- CT scan done. Very small lung nodules. Radiology reports mild increase in size. I feel this is measurement differences.  02/02/2024- Excellent response to Y-90. However, there is another mass in the dome of the liver that is increasing in size. I spoke to Dr. Gold and he will perform Y-90 to that mass. However, upon review of MRI- Dr. Gold felt to wait for some time and repeat the scan in 3 months.  05/12/2024- Imaging shows progressive disease in the lung and in the liver.   05/14/2024- Met with the patient and discussed Yondelis. Her images will be discussed in the tumor board. Port placement was ordered.   05/17/2024- Tumor board discussion was done and chemotherapy was recommended  06/05/2024- Met with the patient and signed consent for Yondelis     Treatment History:   Systemic treatment:  1. Doxorubicin + Dacarbazine + Zinecard (Day 1-3) of 21 day of cycle.  C1- 05/10/2021 to 05/12/2021  C2- 06/01/2021 to 06/03/2021  C3- 06/22/2021 to 06/24/2021  C4- 07/13/2021 to 07/15/2021  C5- 08/03/2021 to 08/05/2021   C6- 08/24/2021 to 08/26/2021     2. Docetaxel (75mg/m2) + Gemcitabine (900mg/m2) D1, D8 of a 21 day cycle  C1- 07/17/2023   C2- 08/07/2023- Discontinued after progression noted.      3. Pazopanib   Start date 08/30/2023 at 400mg by mouth daily.   Increase to 09/07/2023 at 800mg by mouth daily.   Stopped on 11/15/23 due to progressive disease.     4. Yondelis 1.5 mg/m2  C1- 06/05/2024  C2 - 6/27/24  C3 - 7/17/24    Past Medical history     Past Medical History:   Diagnosis Date    Acute sinusitis 09/27/2023    Benign essential HTN 04/19/2023    Body mass index (BMI) 40.0-44.9, adult  (Multi) 09/27/2023    Candidiasis, unspecified 01/20/2022    Antibiotic-induced yeast infection    Conjunctivitis 09/27/2023    Contact with and (suspected) exposure to covid-19 09/15/2022    Disorder of liver 07/11/2023    Disorder of thyroid 10/18/2023    Elevated blood-pressure reading, without diagnosis of hypertension 05/26/2017    Elevated BP without diagnosis of hypertension    Gastroesophageal reflux disease 09/15/2022    Herniated lumbar intervertebral disc 09/13/2022    Comment on above: OTHER INTERVERTEBRAL DISC DISPLACEMENT, LUMBAR REGION    Herpes simplex virus (HSV) infection 04/19/2023    Hypercholesterolemia 09/15/2022    Inappropriate sinus tachycardia, so stated (CMS-HCC) 04/19/2023    Leiomyoma 03/11/2024    Lymphoproliferative disorder (Multi) 09/06/2023    Malignant neoplasm metastatic to left lung (Multi) 04/19/2023    Malignant neoplasm of body of uterus (Multi) 09/27/2023    Mass of pancreas ([facility]) 09/27/2023    Neuropathy 03/11/2024    Never smoked any substance 10/25/2023    Other conditions influencing health status 09/27/2017    History of cough    Personal history of diseases of the blood and blood-forming organs and certain disorders involving the immune mechanism 06/06/2016    History of leukocytosis    Personal history of other benign neoplasm 05/08/2019    History of other benign neoplasm    Personal history of other diseases of the musculoskeletal system and connective tissue 04/18/2018    History of low back pain    Personal history of other diseases of the nervous system and sense organs 10/03/2016    History of neuropathy    Personal history of other diseases of the respiratory system 08/31/2017    History of acute bronchitis    Personal history of other diseases of the respiratory system 01/24/2022    History of acute sinusitis    Personal history of other specified conditions 09/16/2020    History of weight gain    Postoperative pain 03/11/2024    Right lower quadrant  abdominal tenderness 03/04/2019    RLQ abdominal tenderness    Right upper quadrant pain 03/13/2015    Abdominal pain, RUQ (right upper quadrant)    Secondary hypertension 10/18/2023    Toenail fungus 04/19/2023    Uterine leiomyoma 09/27/2023    Viral URI 09/27/2023    Vitamin D deficiency 04/19/2023    Weight gain 03/11/2024         Past Surgical History     Past Surgical History:   Procedure Laterality Date    BREAST SURGERY  05/30/2013    Breast Surgery Reduction Procedure    CHOLECYSTECTOMY  12/02/2014    Cholecystectomy Laparoscopic    CT GUIDED PERCUTANEOUS BIOPSY BONE  10/24/2016    CT GUIDED PERCUTANEOUS BIOPSY BONE 10/24/2016 [facility]    CT GUIDED PERCUTANEOUS BIOPSY LUNG  8/5/2022    CT GUIDED PERCUTANEOUS BIOPSY LUNG 8/5/2022 [facility]    ESOPHAGOGASTRODUODENOSCOPY  04/16/2013    Diagnostic Esophagogastroduodenoscopy    IR INTERVENTION ARTERIAL EMBOLIZATION  12/13/2023    IR INTERVENTION ARTERIAL EMBOLIZATION [place]13[place]20[place] [facility] [place]    IR INTERVENTION ARTERIAL EMBOLIZATION  12/19/2023    IR INTERVENTION ARTERIAL EMBOLIZATION 12[place]19/2023 [place] [facility] [place]    OTHER SURGICAL HISTORY  04/19/2013    Anal Fistulotomy (Subcutaneous)    OTHER SURGICAL HISTORY  10/25/2022    Lung wedge resection    OTHER SURGICAL HISTORY  10/25/2022    Lung lobectomy    OTHER SURGICAL HISTORY  05/08/2019    Resection    OTHER SURGICAL HISTORY  05/30/2013    Perineal Transplant Of Anovaginal Fistula    US GUIDED NEEDLE LIVER BIOPSY  7/11/2023    US GUIDED NEEDLE LIVER BIOPSY 7/11/2023 [facility]         Family History      Family History   Problem Relation Name Age of Onset    Other (atrial flutter) Mother      Diabetes Mother      Leukemia Father      Liver cancer Father      Ovarian cancer Sister      Hypothyroidism Brother      Breast cancer Paternal Grandmother      No Known Problems Sibling           Social History     Social History     Socioeconomic History    Marital status: Single    Tobacco Use    Smoking status: Never    Smokeless tobacco: Never   Substance and Sexual Activity    Drug use: Never     Social Determinants of Health     Financial Resource Strain: Patient Declined (7/26/2024)    Overall Financial Resource Strain (CARDIA)     Difficulty of Paying Living Expenses: Patient declined   Food Insecurity: No Food Insecurity (7/25/2024)    Hunger Vital Sign     Worried About Running Out of Food in the Last Year: Never true     Ran Out of Food in the Last Year: Never true   Transportation Needs: Patient Declined (7/26/2024)    PRAPARE - Transportation     Lack of Transportation (Medical): Patient declined     Lack of Transportation (Non-Medical): Patient declined   Physical Activity: Inactive (7/25/2024)    Exercise Vital Sign     Days of Exercise per Week: 0 days     Minutes of Exercise per Session: 0 min   Stress: No Stress Concern Present (7/25/2024)    [place] [employer] - Occupational Stress Questionnaire     Feeling of Stress : Not at all   Social Connections: Unknown (7/25/2024)    Social Connection and Isolation Panel [NHANES]     Frequency of Communication with Friends and Family: More than three times a week     Frequency of Social Gatherings with Friends and Family: More than three times a week     Attends [employer]ism Services: Patient declined     Active Member of Clubs or Organizations: Patient declined     Attends Club or Organization Meetings: Patient declined     Marital Status: Never    Recent Concern: Social Connections - Socially Isolated (7/25/2024)    Social Connection and Isolation Panel [NHANES]     Frequency of Communication with Friends and Family: More than three times a week     Frequency of Social Gatherings with Friends and Family: More than three times a week     Attends Adventism Services: Never     Active Member of Clubs or Organizations: No     Attends Club or Organization Meetings: Never     Marital Status: Never   "  Intimate Partner Violence: Not At Risk (2024)    Humiliation, Afraid, Rape, and Kick questionnaire     Fear of Current or Ex-Partner: No     Emotionally Abused: No     Physically Abused: No     Sexually Abused: No   Housing Stability: Patient Declined (2024)    Housing Stability Vital Sign     Unable to Pay for Housing in the Last Year: Patient declined     Number of Times Moved in the Last Year: 1     Homeless in the Last Year: Patient declined         Allergies     Allergies   Allergen Reactions    Hydromorphone Other    Codeine Other     Abdominal pain       Medications   filgrastim or biosimilar, 480 mcg, q24h  hydrocortisone sodium succinate, 50 mg, q6h  lidocaine, 1 patch, Daily  magnesium sulfate, 1 g, Once  meropenem, 1 g, q8h  polyethylene glycol, 17 g, BID  sennosides, 1 tablet, Nightly  vancomycin, 1,000 mg, q12h      insulin regular infusion, Last Rate: 6 Units/hr (24)  norepinephrine, Last Rate: 0.28 mcg/kg/min (24)  PrismaSol 4/2.5, Last Rate: 2,700 mL/hr (24)  vasopressin, Last Rate: 0.03 Units/min (24)      dextrose, 12.5 g, q15 min PRN  dextrose, 12.5 g, q15 min PRN  dextrose, 25 g, q15 min PRN  dextrose, 25 g, q15 min PRN  glucagon, 1 mg, q15 min PRN  glucagon, 1 mg, q15 min PRN  glucagon, 1 mg, q15 min PRN  glucagon, 1 mg, q15 min PRN  insulin regular, 2-6 Units, PRN  melatonin, 5 mg, Nightly PRN  vancomycin, , Daily PRN        Review of Systems   Review of Systems   10 pt ROS reviewed and negative aside from above    Physical Exam   Blood pressure 116/60, pulse 102, temperature 36.6 °C (97.9 °F), temperature source Temporal, resp. rate 19, height 1.778 m (5' 10\"), weight 134 kg (295 lb 13.7 oz), SpO2 99%.    ECO    Gen: obese, awake, alert, in no acute distress  HEENT: AT/NC, PEERL, EOMI, no LAD  CV: RRR, no m/r/g  Pulm: CTAB, without w/r/r. Wearing NC  Abd: soft, NT/ND, no organomegaly  Ext: no LE edema  Skin: warm and dry  Neuro: " A&Ox4, moves all 4 extremities spontaneously     Labs     Lab Results   Component Value Date    GLUCOSE 465 (HH) 07/27/2024    CALCIUM 8.2 (L) 07/27/2024     (L) 07/27/2024    K 4.7 07/27/2024    CO2 20 (L) 07/27/2024    CL 95 (L) 07/27/2024    BUN 51 (H) 07/27/2024    CREATININE 3.19 (H) 07/27/2024       Lab Results   Component Value Date    WBC 0.8 (LL) 07/27/2024    HGB 6.9 (L) 07/27/2024    HCT 20.6 (L) 07/27/2024    MCV 83 07/27/2024    PLT 37 (LL) 07/27/2024       Lab Results   Component Value Date    ALT 53 (H) 07/27/2024    AST 83 (H) 07/27/2024     (H) 07/16/2024    ALKPHOS 329 (H) 07/27/2024    BILITOT 1.3 (H) 07/27/2024       Imaging   CT abd/pelvis 7/25/24  IMPRESSION:  1. No evidence of acute abnormality in the abdomen or pelvis in this  limited exam.  2. Metastatic soft tissue lesion involving the posterior elements of  T11 has decreased in size with improvement of previously seen spinal  canal narrowing. Small lytic metastasis in the left T6 vertebral body  also noted.  3. Evaluation of the known liver metastases is limited given the lack  of IV contrast.  4. Large ventral abdominal wall hernias containing bowel superiorly  and fat inferiorly, as detailed above. No evidence of obstruction or  strangulation.    Assessment/Plan     Angie Tobin is a 58 y.o. 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 Oncology is consulted for consideration of G-CSF in setting of neutropenia and active infection and concern for TLS.     Patient with pancytopenia which may be related to trabectedin +/- acute illness/septic shock.  on 7/27.    Her acute kidney injury can be explained by hypovolemia and septic shock. It is rare to see TLS with sarcoma, she does not have significant bulky disease,  and we do not expect her cancer treatment to cause dramatic lysis of her tumor. Uric acid can be elevated in setting of renal failure. Regardless, patient was treated with rasburicase at OSH and uric acid is now < 1.5.     Recommendations:  -can administer pegfilgrastim 480mcg daily until ANC > 1500   -no further indication for rasburicase  -would recommend checking CK level and myoglobinuria. Was previously elevated CK > 1000 and trabectedin can cause rhabdomyolysis     Thank you for this consult, we will continue to follow. Patient seen and discussed with attending physician, Dr. Walsh, who agrees with the above.     Carmella Adamson MD  Hematology-Oncology Fellow, PGY4  Hematology Consult Pager: [phone]  Oncology Consult Pager: [phone]

## 2024-07-28 NOTE — CARE PLAN
Problem: Skin  Goal: Participates in plan/prevention/treatment measures  Outcome: Progressing  Flowsheets (Taken 7/28/2024 1653)  Participates in plan/prevention/treatment measures: Elevate heels  Goal: Prevent/manage excess moisture  Outcome: Progressing  Flowsheets (Taken 7/28/2024 1653)  Prevent/manage excess moisture: Cleanse incontinence/protect with barrier cream  Goal: Prevent/minimize sheer/friction injuries  Outcome: Progressing  Flowsheets (Taken 7/28/2024 1653)  Prevent/minimize sheer/friction injuries: Turn/reposition every 2 hours/use positioning/transfer devices  Goal: Promote/optimize nutrition  Outcome: Progressing  Flowsheets (Taken 7/28/2024 1653)  Promote/optimize nutrition: Monitor/record intake including meals  Goal: Promote skin healing  Outcome: Progressing  Flowsheets (Taken 7/28/2024 1653)  Promote skin healing:   Assess skin/pad under line(s)/device(s)   Protective dressings over bony prominences   Turn/reposition every 2 hours/use positioning/transfer devices     Problem: Diabetes  Goal: Achieve decreasing blood glucose levels by end of shift  Outcome: Progressing  Goal: Increase stability of blood glucose readings by end of shift  Outcome: Progressing  Goal: Maintain electrolyte levels within acceptable range throughout shift  Outcome: Progressing  Goal: Maintain glucose levels >70mg/dl to <250mg/dl throughout shift  Outcome: Progressing  Goal: No changes in neurological exam by end of shift  Outcome: Progressing     Problem: Pain  Goal: Turns in bed with improved pain control throughout the shift  Outcome: Progressing  Goal: Free from opioid side effects throughout the shift  Outcome: Progressing  Goal: Free from acute confusion related to pain meds throughout the shift  Outcome: Progressing

## 2024-07-28 NOTE — PROGRESS NOTES
"Medical Intensive Care - Daily Progress Note   Subjective    Angie Tobin is a 58 y.o. year old female patient admitted on 7/26/2024 with following ICU needs:  Septic shock requiring vasopressors     Interval History:  Patient overall feels better. No new complaints overnight. Last night she received 1 unit RBCs because of low Hb, she feels better after that. She is still on CVVH. She had BM last night.     Meds    Scheduled medications  filgrastim or biosimilar, 480 mcg, subcutaneous, q24h  hydrocortisone sodium succinate, 50 mg, intravenous, q6h  lidocaine, 1 patch, transdermal, Daily  lidocaine, 1 patch, transdermal, q24h  meropenem, 1 g, intravenous, q8h  pantoprazole, 40 mg, intravenous, BID  polyethylene glycol, 17 g, oral, BID  sennosides, 1 tablet, oral, Nightly      Continuous medications  insulin regular infusion, 0-20 Units/hr, Last Rate: 11.4 Units/hr (07/28/24 0900)  norepinephrine, 0-0.5 mcg/kg/min, Last Rate: 0.18 mcg/kg/min (07/28/24 1322)  PrismaSol 4/2.5, 2,700 mL/hr, Last Rate: 2,700 mL/hr (07/28/24 1233)  vasopressin, 0-0.03 Units/min, Last Rate: 0.03 Units/min (07/28/24 0859)      PRN medications  PRN medications: dextrose, dextrose, dextrose, dextrose, glucagon, glucagon, glucagon, glucagon, insulin regular, melatonin     Objective    Blood pressure 114/55, pulse 99, temperature 36 °C (96.8 °F), resp. rate 16, height 1.778 m (5' 10\"), weight 130 kg (286 lb 9.6 oz), SpO2 98%.     Physical Exam   Constitutional:       General: She is not in acute distress.     Appearance: She is obese. She is diaphoretic.   HENT:      Head: Normocephalic and atraumatic.      Nose: Nose normal.      Mouth/Throat:      Mouth: Mucous membranes are moist.   Eyes:      Extraocular Movements: Extraocular movements intact.      Pupils: Pupils are equal, round, and reactive to light.   Cardiovascular:      Rate and Rhythm: Tachycardia present.      Pulses: Normal pulses.      Heart sounds: No murmur heard.     No " friction rub. No gallop.   Pulmonary:      Effort: Pulmonary effort is normal. No respiratory distress.      Breath sounds: No wheezing, rhonchi or rales.   Abdominal:      General: Bowel sounds are normal. There is no distension.      Palpations: Abdomen is soft. There is no mass.      Tenderness: There is abdominal tenderness. There is no guarding.      Hernia: A hernia is present.   Musculoskeletal:      Cervical back: Normal range of motion.   Skin:     General: Skin is warm.      Capillary Refill: Capillary refill takes less than 2 seconds.      Findings: Bruising (abdomen) present.   Neurological:      General: No focal deficit present.      Mental Status: She is oriented to person, place, and time. She is lethargic.   Psychiatric:         Mood and Affect: Mood normal.     Intake/Output Summary (Last 24 hours) at 7/28/2024 1410  Last data filed at 7/28/2024 1000  Gross per 24 hour   Intake 2111.73 ml   Output 1467 ml   Net 644.73 ml     Labs:   Results from last 72 hours   Lab Units 07/28/24  0119 07/27/24  1740 07/27/24  0117   SODIUM mmol/L 131* 128* 132*   POTASSIUM mmol/L 4.1 4.7 4.9   CHLORIDE mmol/L 99 95* 97*   CO2 mmol/L 18* 20* 18*   BUN mg/dL 42* 51* 49*   CREATININE mg/dL 2.66* 3.19* 3.27*   GLUCOSE mg/dL 358* 465* 271*   CALCIUM mg/dL 8.4* 8.2* 8.5*   ANION GAP mmol/L 18 18 22*   EGFR mL/min/1.73m*2 20* 16* 16*   PHOSPHORUS mg/dL 2.9 3.4 3.3      Results from last 72 hours   Lab Units 07/28/24  0119 07/27/24  1740 07/27/24  0117 07/26/24  0417   WBC AUTO x10*3/uL 0.6* 0.8* 1.1* 2.6*   HEMOGLOBIN g/dL 8.0* 6.9* 7.5* 8.0*   HEMATOCRIT % 23.1* 20.6* 21.7* 25.2*   PLATELETS AUTO x10*3/uL 27* 37* 46* 85*   LYMPHO PCT MAN % 29.1  --  30.5 26.0   MONO PCT MAN % 13.9  --  5.9 2.0   EOSINO PCT MAN % 0.0  --  0.0 0.0        Micro/ID:     Lab Results   Component Value Date    URINECULTURE No growth 07/26/2024    BLOODCULT No growth at 1 day 07/27/2024    BLOODCULT No growth at 1 day 07/27/2024     MELD 3.0:  10 at 6/12/2024  8:00 AM  MELD-Na: 8 at 6/12/2024  8:00 AM  Calculated from:  Serum Creatinine: 0.80 mg/dL (Using min of 1 mg/dL) at 6/12/2024  8:00 AM  Serum Sodium: 136 mmol/L at 6/12/2024  8:00 AM  Total Bilirubin: 1.7 mg/dL at 6/12/2024  8:00 AM  Serum Albumin: 4.2 g/dL (Using max of 3.5 g/dL) at 6/12/2024  8:00 AM  INR(ratio): 1.0 at 6/12/2024  8:00 AM  Age at listing (hypothetical): 58 years  Sex: Female at 6/12/2024  8:00 AM     Summary of key imaging results from the last 24 hours  CXR 7/28/24:  1. Low lung volumes with bronchovascular crowding and suspect  superimposed mild pulmonary edema.  2. Interval improvement of a now trace left pleural effusion.  3. Right chest wall MediPort as above.    Assessment and Plan     Assessment: Angie Tobin is a 58 y.o. year old female patient who presented to OSH 7/25/2024 and [place] [facility] [place] for septic shock requiring pressors with urinary tract infection source, acute hypoxic respiratory failure, with possible TLS, treated with 2x rasburicase, oncology consulted yesterday, per their note TLS is unlikely based on her tumor type. Received one unit RBCs, Zosyn switched to meropenem on 7/27.     Mechanical Ventilation: none  Sedation/Analgesia:  none  Restraints: no    Summary for 07/28/24  :  Titrate Vasopressors to maintain MAP >65   FU with nephrology   Started on pegfilgrastim 480mcg daily until ANC > 1500 per oncology  Discontinued vancomycin on 7/28, started on meropenem on 7/27  C/w insulin drip  FU on urine myoglobulin and CK    Plan:  NEUROLOGY/PSYCH:  #Anxiety  -Lorazepam 0.5m *2 given at OSH on presentation for anxiety  -Patient denies anxiety at present  Plan:  -Will monitor     CARDIOVASCULAR:  #Shock, Likely Septic in Origin   -Etiology: Likely septic from UTI, cultures growing enteric bacilli. Given troponin trend (64 -> 56) and TTE (normal right ventricular function), unlikely cardiac in origin. Given hypotension continuing s/p  aggressive fluid resuscitation, unlikely hypovolemic in origin.   -7/26 OSH: Received 5L NS, 1L LR, 50g Albumin, 100mg solumedrol  -7/26 OSH: Norepinephrine and Vasopressin begun for persistent hypotension   -7/27 began Stress Dose Steroids Hydrocortisone 50mg Q6H   -7/27 1.5L LR   -Lactate downtrend: 7/26 2.8 -> 7/27 1.9  Plan:  -Continue Norepinephrine, titration per patients ability  -Continue Hydrocortisone  -Continue Vasopressin 0.03  -Will replete fluids prn, careful not to overload due to ALDEN  -ABXs per ID section     #Hypertension  -Home medication: Metoprolol Succinate XL 25mg, Lisinopril 20mg  Plan  -Holding metoprolol in setting of shock  -Holding Lisinopril in setting of ALDEN     PULMONARY:  #Acute Hypoxic Hypercapnic Respiratory Failure   -Etiology: Likely secondary to atelectasis vs pulmonary edema vs pneumonia given CXR findings  -2022 6 Wedge lung resection 2/2 metastasis  -New O2 requirement; no O2 requirement at baseline  -Pt on 3L NC at present  -7/27 Pro calcitonin: 3.07  7/28 ABG: Ph 7.42, o2 27, co2 103  Plan:  -Continue Oxygen supplementation  -Monitor for changes in symptoms     RENAL/GENITOURINARY:  #UTI   -See ID section     #ALDEN w/ Oliguria, required CVVH  -Baseline Cr: 0.7  -Presentation: BUN 90, Cr 7.6, now 2.66  -7/26 CVVH at OSH -> 310 mL urine  -King placed at OSH. Removed 7[place]27 [place] [facility] -7/27: BUN 42, Cr 2.66  Plan:  -Nephrology Consulted regarding CVVH   -BID RFP      #Hyperkalemia, likely in the setting of TLS  -Presentation: 5.5. Peaked 5.9. now 4.1  -OSH: Insulin drip, Lokelma 10g *1, Sodium Bicarbonate infusion, Calcium gluconate  -7/27: 4.9. Additional 2g Calcium Gluconate given  Plan:  -BID RFP, replete PRN     #Hypocalcemia, likely in the setting of TLS  -Presentation: 7.9.  -7/28: 8.4  Plan  -BID RFP, replete PRN     #Hypomagnesemia  -7/27 2.02  Plan  -BID RFP, replete PRN     GASTROENTEROLOGY:  #Constipation   -No BM since presentation at OSH  -Abdomen  soft  Plan  -Miralax, Senna  -Avoid narcotics, replete electrolytes PRN, treat sepsis, replete fluids prn     ENDOCRINOLOGY:  #DM   -7/26 Hemoglobin A1C: 9.2 (7.4 in 2022)  -Home meds: Metformin 500mg BID  -Given Insulin Drip on presentation at OSH  -Patient receiving high dose steroids since 7/27  Plan:  -Holding home metformin in inpatient setting  -Resumed Insulin Drip for persistent hyperglycemia  -Hypoglycemia protocol   -Brain [facility]      HEMATOLOGY/ONCOLOGY:  #Leiomyosarcoma, with metastasis to liver, lungs, and spine  -Originally found in the pancreatic bed 2019. Hysterectomy and bilateral salpingo- oophorectomy 2019. H/o chemotherapy and radiation. Multiple remissions. Began Yondelis 06/05/2024.   -Last chemotherapy: Yondelis C3 7/17/2024  Plan  -Oncology consulted, recs appreciated   -No chemotherapy pending at present      #Elevated liver enzymes  -, ALT 53, AST 83  -Likely secondary to metastasis to liver  Plan  -Will monitor     #Tumor Lysis Syndrome, improving (resolved, no concern for now)  -On presentation: Hyperuricemia 15.9, hypocalcemia, hyperkalemia, nausea, vomit, diarrhea  -7/26 OSH: 2x rasburicase, 5L NS, 1L LR, 50g Albumin, 100mg solumedrol  -G6PD negative  -7/27 Uric Acid: <1.5  Plan:  -per Oncology, there is no concern for TLS  -Trending labs      #Pancytopenia  #Neutropenia  -WBC 3.9 -> 1.1, RBC 3.06 -> 2.71, Hemoglobin 7.5, Platelets 46  -ANC 0.69  -Etiology: secondary to sepsis vs post chemotherapy  -Heparin given at OSH  Plan  -Holding heparin in setting of thrombocytopenia  -Treat Underlying Condition     #Monoclonal B-cell lymphocytosis   -Diagnosed 2016.  Plan:  -Monitor     MUSCULOSKELETAL/ SKIN:  FREDERICK     INFECTIOUS DISEASE:  # UTI   -7/25 UCX: >100,000 Enteric Bacilli  -7/25 BCX * 2: NG2D  -7/26 Repeat UCX: Pending  -7/27 Repeat BCX: Pending  -S/P Ceftriaxone 1g 7/25  -s/p vancomycin 7/27-7/28  -s/p Zosyn 7/26-7/27  -7/27 Pro calcitonin: 3.07  -MRSA neg  Plan  -Continue  meropenem  (7/27- present)  -discontinued Vancomycin (7/27-7/28)       ICU Check List   FEN  Fluids: S/P 1.5L fluid 7/27. PRN   Electrolytes: Will replete PRN   Nutrition: Regular Diet   Prophylaxis:  DVT ppx: SCDs (Holding medical anticoagulation due to thrombocytopenia)   GI ppx: PPI IV   Bowel care: Miralax 17g BID, Sennosides, 8.6mg nightly  Hardware:  Catheter: Hemodialysis triple lumen left femoral catheter- placed 7/26 at OSH  Access: PIV, Mediport    Drains: King removed from OSH. Current External Urinary catheter  Lines: A line left radial- placed 7/26 at OSH  Social:  Code: Full Code    NOK: Loulou Mcneilannalilia (cousin) [phone]; Tatyana Renata (niece) [phone]  Disposition: ONELIA Pierce MD   07/28/24 at 2:10 PM     Disclaimer: Documentation completed with the information available at the time of input. The times in the chart may not be reflective of actual patient care times, interventions, or procedures. Documentation occurs after the physical care of the patient.

## 2024-07-28 NOTE — PROGRESS NOTES
CRRT procedure note   Seen and assessed on CVVH. Labs and events reviewed   Remains hemodynamically unstable, on vasopressin   Vascular access: right IJ trialysis catheter  BFR :200 ml/min  Filter:M150  Anticoagulation:  Total Replacement Rate: 2700 ml/hour  Pre blood pump : 900 ml/hour    Replacement solution potassium:  4 mEq/L  Patient fluid removal: per ICU team ;   Please check Phosphorus, Calcium and Magnesium daily and replace peripherally as needed  Continue with current CVVH prescription.

## 2024-07-28 NOTE — PROGRESS NOTES
Vancomycin Dosing by Pharmacy- Cessation of Therapy    Consult to pharmacy for vancomycin dosing has been discontinued by the prescriber, pharmacy will sign off at this time.    Please call pharmacy if there are further questions or re-enter a consult if vancomycin is resumed.     Alyssa Clay, PharmD

## 2024-07-29 ENCOUNTER — APPOINTMENT (OUTPATIENT)
Dept: RADIOLOGY | Facility: HOSPITAL | Age: 59
DRG: 871 | End: 2024-07-29
Payer: COMMERCIAL

## 2024-07-29 VITALS
HEART RATE: 100 BPM | SYSTOLIC BLOOD PRESSURE: 114 MMHG | WEIGHT: 286.6 LBS | DIASTOLIC BLOOD PRESSURE: 55 MMHG | RESPIRATION RATE: 15 BRPM | BODY MASS INDEX: 41.03 KG/M2 | HEIGHT: 70 IN | OXYGEN SATURATION: 98 % | TEMPERATURE: 96.6 F

## 2024-07-29 LAB
ALBUMIN SERPL BCP-MCNC: 2.8 G/DL (ref 3.4–5)
ALBUMIN SERPL BCP-MCNC: 2.9 G/DL (ref 3.4–5)
ALP SERPL-CCNC: 430 U/L (ref 33–110)
ALT SERPL W P-5'-P-CCNC: 61 U/L (ref 7–45)
ANION GAP SERPL CALC-SCNC: 16 MMOL/L (ref 10–20)
ANION GAP SERPL CALC-SCNC: 16 MMOL/L (ref 10–20)
AST SERPL W P-5'-P-CCNC: 169 U/L (ref 9–39)
BACTERIA BLD CULT: NORMAL
BACTERIA BLD CULT: NORMAL
BASOPHILS # BLD MANUAL: 0 X10*3/UL (ref 0–0.1)
BASOPHILS NFR BLD MANUAL: 0 %
BILIRUB SERPL-MCNC: 1.5 MG/DL (ref 0–1.2)
BLOOD EXPIRATION DATE: NORMAL
BUN SERPL-MCNC: 19 MG/DL (ref 6–23)
BUN SERPL-MCNC: 24 MG/DL (ref 6–23)
CALCIUM SERPL-MCNC: 8.3 MG/DL (ref 8.6–10.6)
CALCIUM SERPL-MCNC: 8.3 MG/DL (ref 8.6–10.6)
CHLORIDE SERPL-SCNC: 98 MMOL/L (ref 98–107)
CHLORIDE SERPL-SCNC: 99 MMOL/L (ref 98–107)
CO2 SERPL-SCNC: 23 MMOL/L (ref 21–32)
CO2 SERPL-SCNC: 24 MMOL/L (ref 21–32)
CREAT SERPL-MCNC: 1.55 MG/DL (ref 0.5–1.05)
CREAT SERPL-MCNC: 1.79 MG/DL (ref 0.5–1.05)
DISPENSE STATUS: NORMAL
EGFRCR SERPLBLD CKD-EPI 2021: 33 ML/MIN/1.73M*2
EGFRCR SERPLBLD CKD-EPI 2021: 39 ML/MIN/1.73M*2
EOSINOPHIL # BLD MANUAL: 0 X10*3/UL (ref 0–0.7)
EOSINOPHIL NFR BLD MANUAL: 0 %
ERYTHROCYTE [DISTWIDTH] IN BLOOD BY AUTOMATED COUNT: 16.9 % (ref 11.5–14.5)
GLUCOSE BLD MANUAL STRIP-MCNC: 144 MG/DL (ref 74–99)
GLUCOSE BLD MANUAL STRIP-MCNC: 152 MG/DL (ref 74–99)
GLUCOSE BLD MANUAL STRIP-MCNC: 154 MG/DL (ref 74–99)
GLUCOSE BLD MANUAL STRIP-MCNC: 167 MG/DL (ref 74–99)
GLUCOSE BLD MANUAL STRIP-MCNC: 168 MG/DL (ref 74–99)
GLUCOSE BLD MANUAL STRIP-MCNC: 168 MG/DL (ref 74–99)
GLUCOSE BLD MANUAL STRIP-MCNC: 176 MG/DL (ref 74–99)
GLUCOSE BLD MANUAL STRIP-MCNC: 184 MG/DL (ref 74–99)
GLUCOSE BLD MANUAL STRIP-MCNC: 186 MG/DL (ref 74–99)
GLUCOSE BLD MANUAL STRIP-MCNC: 192 MG/DL (ref 74–99)
GLUCOSE BLD MANUAL STRIP-MCNC: 210 MG/DL (ref 74–99)
GLUCOSE BLD MANUAL STRIP-MCNC: 224 MG/DL (ref 74–99)
GLUCOSE BLD MANUAL STRIP-MCNC: 239 MG/DL (ref 74–99)
GLUCOSE BLD MANUAL STRIP-MCNC: 239 MG/DL (ref 74–99)
GLUCOSE BLD MANUAL STRIP-MCNC: 248 MG/DL (ref 74–99)
GLUCOSE BLD MANUAL STRIP-MCNC: 273 MG/DL (ref 74–99)
GLUCOSE SERPL-MCNC: 170 MG/DL (ref 74–99)
GLUCOSE SERPL-MCNC: 258 MG/DL (ref 74–99)
HAPTOGLOB SERPL-MCNC: 277 MG/DL (ref 37–246)
HCT VFR BLD AUTO: 22.6 % (ref 36–46)
HGB BLD-MCNC: 7.9 G/DL (ref 12–16)
IMM GRANULOCYTES # BLD AUTO: 0.11 X10*3/UL (ref 0–0.7)
IMM GRANULOCYTES NFR BLD AUTO: 7.5 % (ref 0–0.9)
LACTATE SERPL-SCNC: 2.5 MMOL/L (ref 0.4–2)
LACTATE SERPL-SCNC: 2.7 MMOL/L (ref 0.4–2)
LYMPHOCYTES # BLD MANUAL: 0.54 X10*3/UL (ref 1.2–4.8)
LYMPHOCYTES NFR BLD MANUAL: 36.2 %
MAGNESIUM SERPL-MCNC: 2.31 MG/DL (ref 1.6–2.4)
MCH RBC QN AUTO: 27.9 PG (ref 26–34)
MCHC RBC AUTO-ENTMCNC: 35 G/DL (ref 32–36)
MCV RBC AUTO: 80 FL (ref 80–100)
MONOCYTES # BLD MANUAL: 0.21 X10*3/UL (ref 0.1–1)
MONOCYTES NFR BLD MANUAL: 13.8 %
NEUTROPHILS # BLD MANUAL: 0.68 X10*3/UL (ref 1.2–7.7)
NEUTS BAND # BLD MANUAL: 0.19 X10*3/UL (ref 0–0.7)
NEUTS BAND NFR BLD MANUAL: 12.9 %
NEUTS SEG # BLD MANUAL: 0.49 X10*3/UL (ref 1.2–7)
NEUTS SEG NFR BLD MANUAL: 32.8 %
NRBC BLD-RTO: 0 /100 WBCS (ref 0–0)
PATH REVIEW-CBC DIFFERENTIAL: NORMAL
PHOSPHATE SERPL-MCNC: 2.6 MG/DL (ref 2.5–4.9)
PHOSPHATE SERPL-MCNC: 2.6 MG/DL (ref 2.5–4.9)
PLATELET # BLD AUTO: 27 X10*3/UL (ref 150–450)
POTASSIUM SERPL-SCNC: 4.3 MMOL/L (ref 3.5–5.3)
POTASSIUM SERPL-SCNC: 4.6 MMOL/L (ref 3.5–5.3)
PRODUCT BLOOD TYPE: 7300
PRODUCT CODE: NORMAL
PROT SERPL-MCNC: 4.9 G/DL (ref 6.4–8.2)
RBC # BLD AUTO: 2.83 X10*6/UL (ref 4–5.2)
RBC MORPH BLD: ABNORMAL
SODIUM SERPL-SCNC: 133 MMOL/L (ref 136–145)
SODIUM SERPL-SCNC: 134 MMOL/L (ref 136–145)
TOTAL CELLS COUNTED BLD: 116
UNIT ABO: NORMAL
UNIT NUMBER: NORMAL
UNIT RH: NORMAL
UNIT VOLUME: 350
VARIANT LYMPHS # BLD MANUAL: 0.06 X10*3/UL (ref 0–0.5)
VARIANT LYMPHS NFR BLD: 4.3 %
WBC # BLD AUTO: 1.5 X10*3/UL (ref 4.4–11.3)
XM INTEP: NORMAL

## 2024-07-29 PROCEDURE — 2500000005 HC RX 250 GENERAL PHARMACY W/O HCPCS

## 2024-07-29 PROCEDURE — 97530 THERAPEUTIC ACTIVITIES: CPT | Mod: GP

## 2024-07-29 PROCEDURE — 84100 ASSAY OF PHOSPHORUS: CPT

## 2024-07-29 PROCEDURE — C9113 INJ PANTOPRAZOLE SODIUM, VIA: HCPCS

## 2024-07-29 PROCEDURE — 90945 DIALYSIS ONE EVALUATION: CPT | Performed by: INTERNAL MEDICINE

## 2024-07-29 PROCEDURE — 2020000001 HC ICU ROOM DAILY

## 2024-07-29 PROCEDURE — 85027 COMPLETE CBC AUTOMATED: CPT

## 2024-07-29 PROCEDURE — 76705 ECHO EXAM OF ABDOMEN: CPT

## 2024-07-29 PROCEDURE — 37799 UNLISTED PX VASCULAR SURGERY: CPT

## 2024-07-29 PROCEDURE — 2500000004 HC RX 250 GENERAL PHARMACY W/ HCPCS (ALT 636 FOR OP/ED)

## 2024-07-29 PROCEDURE — 2500000001 HC RX 250 WO HCPCS SELF ADMINISTERED DRUGS (ALT 637 FOR MEDICARE OP)

## 2024-07-29 PROCEDURE — 2500000004 HC RX 250 GENERAL PHARMACY W/ HCPCS (ALT 636 FOR OP/ED): Mod: JZ

## 2024-07-29 PROCEDURE — 76705 ECHO EXAM OF ABDOMEN: CPT | Performed by: RADIOLOGY

## 2024-07-29 PROCEDURE — 84075 ASSAY ALKALINE PHOSPHATASE: CPT

## 2024-07-29 PROCEDURE — 80069 RENAL FUNCTION PANEL: CPT | Mod: CCI

## 2024-07-29 PROCEDURE — 90937 HEMODIALYSIS REPEATED EVAL: CPT

## 2024-07-29 PROCEDURE — 83735 ASSAY OF MAGNESIUM: CPT

## 2024-07-29 PROCEDURE — 99291 CRITICAL CARE FIRST HOUR: CPT | Performed by: STUDENT IN AN ORGANIZED HEALTH CARE EDUCATION/TRAINING PROGRAM

## 2024-07-29 PROCEDURE — 85007 BL SMEAR W/DIFF WBC COUNT: CPT

## 2024-07-29 PROCEDURE — 83605 ASSAY OF LACTIC ACID: CPT

## 2024-07-29 PROCEDURE — 97162 PT EVAL MOD COMPLEX 30 MIN: CPT | Mod: GP

## 2024-07-29 PROCEDURE — 82947 ASSAY GLUCOSE BLOOD QUANT: CPT

## 2024-07-29 RX ORDER — MEROPENEM 1 G/1
1 INJECTION, POWDER, FOR SOLUTION INTRAVENOUS EVERY 12 HOURS
Status: DISCONTINUED | OUTPATIENT
Start: 2024-07-29 | End: 2024-08-03 | Stop reason: HOSPADM

## 2024-07-29 RX ORDER — PANTOPRAZOLE SODIUM 40 MG/10ML
40 INJECTION, POWDER, LYOPHILIZED, FOR SOLUTION INTRAVENOUS DAILY
Status: DISCONTINUED | OUTPATIENT
Start: 2024-07-30 | End: 2024-08-03 | Stop reason: HOSPADM

## 2024-07-29 RX ORDER — MIDODRINE HYDROCHLORIDE 10 MG/1
10 TABLET ORAL EVERY 8 HOURS
Status: DISCONTINUED | OUTPATIENT
Start: 2024-07-29 | End: 2024-07-31

## 2024-07-29 RX ADMIN — LIDOCAINE 1 PATCH: 4 PATCH TOPICAL at 20:08

## 2024-07-29 RX ADMIN — LIDOCAINE 1 PATCH: 4 PATCH TOPICAL at 05:20

## 2024-07-29 RX ADMIN — HYDROCORTISONE SODIUM SUCCINATE 50 MG: 100 INJECTION, POWDER, FOR SOLUTION INTRAMUSCULAR; INTRAVENOUS at 18:21

## 2024-07-29 RX ADMIN — LIDOCAINE 1 PATCH: 4 PATCH TOPICAL at 20:07

## 2024-07-29 RX ADMIN — INSULIN HUMAN 3 UNITS/HR: 1 INJECTION, SOLUTION INTRAVENOUS at 19:51

## 2024-07-29 RX ADMIN — MEROPENEM 1 G: 1 INJECTION, POWDER, FOR SOLUTION INTRAVENOUS at 05:19

## 2024-07-29 RX ADMIN — MIDODRINE HYDROCHLORIDE 10 MG: 10 TABLET ORAL at 21:35

## 2024-07-29 RX ADMIN — HYDROCORTISONE SODIUM SUCCINATE 50 MG: 100 INJECTION, POWDER, FOR SOLUTION INTRAMUSCULAR; INTRAVENOUS at 05:20

## 2024-07-29 RX ADMIN — DICLOFENAC SODIUM 4 G: 10 GEL TOPICAL at 03:21

## 2024-07-29 RX ADMIN — HYDROCORTISONE SODIUM SUCCINATE 50 MG: 100 INJECTION, POWDER, FOR SOLUTION INTRAMUSCULAR; INTRAVENOUS at 23:02

## 2024-07-29 RX ADMIN — CALCIUM CHLORIDE, MAGNESIUM CHLORIDE, DEXTROSE MONOHYDRATE, LACTIC ACID, SODIUM CHLORIDE, SODIUM BICARBONATE AND POTASSIUM CHLORIDE 2700 ML/HR: 3.68; 3.05; 22; 5.4; 6.46; 3.09; .314 INJECTION INTRAVENOUS at 00:34

## 2024-07-29 RX ADMIN — VASOPRESSIN 0.03 UNITS/MIN: 0.2 INJECTION INTRAVENOUS at 19:51

## 2024-07-29 RX ADMIN — PANTOPRAZOLE SODIUM 40 MG: 40 INJECTION, POWDER, FOR SOLUTION INTRAVENOUS at 09:15

## 2024-07-29 RX ADMIN — SODIUM CHLORIDE, POTASSIUM CHLORIDE, SODIUM LACTATE AND CALCIUM CHLORIDE 500 ML: 600; 310; 30; 20 INJECTION, SOLUTION INTRAVENOUS at 14:37

## 2024-07-29 RX ADMIN — FILGRASTIM-SNDZ 480 MCG: 480 INJECTION, SOLUTION INTRAVENOUS; SUBCUTANEOUS at 16:41

## 2024-07-29 RX ADMIN — Medication 5 MG: at 19:51

## 2024-07-29 RX ADMIN — MIDODRINE HYDROCHLORIDE 10 MG: 10 TABLET ORAL at 14:37

## 2024-07-29 RX ADMIN — MEROPENEM 1 G: 1 INJECTION, POWDER, FOR SOLUTION INTRAVENOUS at 16:41

## 2024-07-29 RX ADMIN — CALCIUM CHLORIDE, MAGNESIUM CHLORIDE, DEXTROSE MONOHYDRATE, LACTIC ACID, SODIUM CHLORIDE, SODIUM BICARBONATE AND POTASSIUM CHLORIDE 2700 ML/HR: 3.68; 3.05; 22; 5.4; 6.46; 3.09; .314 INJECTION INTRAVENOUS at 06:51

## 2024-07-29 RX ADMIN — INSULIN HUMAN 4.5 UNITS/HR: 1 INJECTION, SOLUTION INTRAVENOUS at 00:11

## 2024-07-29 RX ADMIN — VASOPRESSIN 0.03 UNITS/MIN: 0.2 INJECTION INTRAVENOUS at 07:57

## 2024-07-29 RX ADMIN — HYDROCORTISONE SODIUM SUCCINATE 50 MG: 100 INJECTION, POWDER, FOR SOLUTION INTRAMUSCULAR; INTRAVENOUS at 12:21

## 2024-07-29 RX ADMIN — CALCIUM CHLORIDE, MAGNESIUM CHLORIDE, DEXTROSE MONOHYDRATE, LACTIC ACID, SODIUM CHLORIDE, SODIUM BICARBONATE AND POTASSIUM CHLORIDE 2700 ML/HR: 3.68; 3.05; 22; 5.4; 6.46; 3.09; .314 INJECTION INTRAVENOUS at 12:56

## 2024-07-29 RX ADMIN — NOREPINEPHRINE BITARTRATE 0.24 MCG/KG/MIN: 1 SOLUTION INTRAVENOUS at 16:31

## 2024-07-29 RX ADMIN — SODIUM CHLORIDE, POTASSIUM CHLORIDE, SODIUM LACTATE AND CALCIUM CHLORIDE 500 ML: 600; 310; 30; 20 INJECTION, SOLUTION INTRAVENOUS at 10:50

## 2024-07-29 SDOH — SOCIAL STABILITY: SOCIAL INSECURITY: WITHIN THE LAST YEAR, HAVE YOU BEEN AFRAID OF YOUR PARTNER OR EX-PARTNER?: NO

## 2024-07-29 SDOH — SOCIAL STABILITY: SOCIAL INSECURITY: WITHIN THE LAST YEAR, HAVE YOU BEEN HUMILIATED OR EMOTIONALLY ABUSED IN OTHER WAYS BY YOUR PARTNER OR EX-PARTNER?: NO

## 2024-07-29 SDOH — ECONOMIC STABILITY: HOUSING INSECURITY: IN THE PAST 12 MONTHS, HOW MANY TIMES HAVE YOU MOVED WHERE YOU WERE LIVING?: 1

## 2024-07-29 SDOH — ECONOMIC STABILITY: INCOME INSECURITY: IN THE LAST 12 MONTHS, WAS THERE A TIME WHEN YOU WERE NOT ABLE TO PAY THE MORTGAGE OR RENT ON TIME?: NO

## 2024-07-29 SDOH — ECONOMIC STABILITY: HOUSING INSECURITY: AT ANY TIME IN THE PAST 12 MONTHS, WERE YOU HOMELESS OR LIVING IN A SHELTER (INCLUDING NOW)?: NO

## 2024-07-29 SDOH — ECONOMIC STABILITY: INCOME INSECURITY: IN THE PAST 12 MONTHS, HAS THE ELECTRIC, GAS, OIL, OR WATER COMPANY THREATENED TO SHUT OFF SERVICE IN YOUR HOME?: YES

## 2024-07-29 SDOH — ECONOMIC STABILITY: FOOD INSECURITY: WITHIN THE PAST 12 MONTHS, THE FOOD YOU BOUGHT JUST DIDN'T LAST AND YOU DIDN'T HAVE MONEY TO GET MORE.: NEVER TRUE

## 2024-07-29 ASSESSMENT — COGNITIVE AND FUNCTIONAL STATUS - GENERAL
MOBILITY SCORE: 7
MOVING TO AND FROM BED TO CHAIR: TOTAL
WALKING IN HOSPITAL ROOM: TOTAL
CLIMB 3 TO 5 STEPS WITH RAILING: TOTAL
STANDING UP FROM CHAIR USING ARMS: TOTAL
MOVING FROM LYING ON BACK TO SITTING ON SIDE OF FLAT BED WITH BEDRAILS: A LOT
TURNING FROM BACK TO SIDE WHILE IN FLAT BAD: TOTAL

## 2024-07-29 ASSESSMENT — PAIN - FUNCTIONAL ASSESSMENT
PAIN_FUNCTIONAL_ASSESSMENT: 0-10

## 2024-07-29 ASSESSMENT — PAIN SCALES - GENERAL
PAINLEVEL_OUTOF10: 3
PAINLEVEL_OUTOF10: 0 - NO PAIN
PAINLEVEL_OUTOF10: 2
PAINLEVEL_OUTOF10: 0 - NO PAIN
PAINLEVEL_OUTOF10: 0 - NO PAIN

## 2024-07-29 ASSESSMENT — ACTIVITIES OF DAILY LIVING (ADL)
ADL_ASSISTANCE: INDEPENDENT
EFFECT OF PAIN ON DAILY ACTIVITIES: PAIN WITH MOVEMENT

## 2024-07-29 ASSESSMENT — PAIN DESCRIPTION - DESCRIPTORS
DESCRIPTORS: DISCOMFORT
DESCRIPTORS: DISCOMFORT

## 2024-07-29 ASSESSMENT — PAIN DESCRIPTION - ORIENTATION: ORIENTATION: LEFT;RIGHT

## 2024-07-29 NOTE — PROGRESS NOTES
Physical Therapy    Physical Therapy Evaluation & Treatment    Patient Name: Angie Tobin  MRN: [id number]  Today's Date: 7/29/2024   Time Calculation  Start Time: 0954  Stop Time: 1037  Time Calculation (min): 43 min    PT student under the direct supervision of a licensed physical therapist     Assessment/Plan   PT Assessment  PT Assessment Results: Decreased strength, Impaired balance, Decreased mobility, Decreased endurance  Rehab Prognosis: Good  End of Session Communication: Bedside nurse  Assessment Comment: pt is a 58 y.o F who presents to the MICU with septic shock. pt demos impaired strength, balance, endurance, and mobility. pt would benefit from continued PT services to address the above deficits.  End of Session Patient Position: Bed, 3 rail up, Alarm off, not on at start of session   IP OR SWING BED PT PLAN  Inpatient or Swing Bed: Inpatient  PT Plan  Treatment/Interventions: Bed mobility, Transfer training, Gait training, Balance training, Strengthening, Endurance training, Therapeutic exercise, Therapeutic activity, Positioning, Postural re-education  PT Plan: Ongoing PT  PT Frequency: 4 times per week  PT Discharge Recommendations: Moderate intensity level of continued care  PT Recommended Transfer Status:  (MaxAx2 bed mobility, CGA-MinAx1 EOB)  PT - OK to Discharge: Yes      Subjective     General Visit Information:  General  Reason for Referral: Presented to [facility] ED d/t weakness and dehydration with presentation of tachycardia and renal failure. pt + for UTI. pt became hypotensive and transferred to the MICU requiring pressors and CVVH. CT AP showed metastastic soft tissue lesion of T6 and T11 but no evidence of acute abnormality.  Past Medical History Relevant to Rehab: HTN, HLD, B-cell monoclonal lymphocytosis, DM, and leiomyosarcoma  Family/Caregiver Present: No  Prior to Session Communication: Bedside nurse  Patient Position Received: Bed, 3 rail up, Alarm off, not on at start of  session  General Comment: pt cooperative and agreeable to participate in PT services.  Home Living:  Home Living  Type of Home: House  Lives With: Alone  Home Adaptive Equipment: None  Home Layout: Two level  Home Access: Stairs to enter without rails  Entrance Stairs-Number of Steps: 2  Bathroom Shower/Tub: Tub/shower unit  Bathroom Equipment: None  Home Living Comments: bedroom and full bathroom on second floor. Basement on main level.  Prior Level of Function:  Prior Function Per Pt/Caregiver Report  Level of [place]: Independent with ADLs and functional transfers, Independent with homemaking with ambulation  Receives Help From:  (Sister and friends, if needed)  ADL Assistance: Independent  Homemaking Assistance: Independent  Ambulatory Assistance: Independent (Household and community)  Vocational:  (Employed with , works from home)  Hand Dominance: Right  Prior Function Comments: (+) drives, pt reported no falls within the past 6 months  Precautions:  Precautions  Hearing/Visual Limitations: Hearing WFL, pt wears glasess for far distances and when driving  Medical Precautions: Fall precautions, Oxygen therapy device and L/min  Precautions Comment: Protective precaution; MAP > 65  Vital Signs:  Vital Signs  Heart Rate:  (Pre: 105   Post: 107)  Resp:  (Pre: 20   Post: 18)  SpO2:  (Pre: 100%   Post: 97%)  BP:  (Pre: 104/55 (73)   Post: 102/51 (68) pt's MAP in high 60s in supine however remained in the 70s when sitting EOB.)  BP Method: Arterial line    Objective   Pain:  Pain Assessment  Pain Assessment: 0-10  0-10 (Numeric) Pain Score: 0 - No pain  Pain Location:  (pt reported abdominal pain sitting EOB)  Cognition:  Cognition  Overall Cognitive Status: Within Functional Limits  Orientation Level:  (AOx3)  Following Commands: Follows all commands and directions without difficulty    General Assessments:  General Observation  General Observation: vaso 0.03, levo 0.22, insulin 1.5, PIV, CVVH via femoral  trialysis, 1 L NC, A-line, mediport, external catheter    Activity Tolerance  Endurance: Decreased tolerance for upright activites  Early Mobility/Exercise Safety Screen: Proceed with mobilization - No exclusion criteria met    Sensation  Sensation Comment: pt reported occassional numbness and tingling in B feet and hands for 3 days    Strength  Strength Comments: No formal MMT assessed d/t low platelets. However, R shoulder flexion: >/= 3/5, L shoulder flexion 2/5. B elbow flexion/ext >/= 3/5, B  4/5. B DF/PF >/= 3/5, B knee ext >/= 3/5.  Strength  Strength Comments: No formal MMT assessed d/t low platelets. However, R shoulder flexion: >/= 3/5, L shoulder flexion 2/5. B elbow flexion/ext >/= 3/5, B  4/5. B DF/PF >/= 3/5, B knee ext >/= 3/5.     Postural Control  Postural Control: Impaired (Occassional retrolean when sitting EOB)  Head Control: Occassional downward head posture when sitting EOB    Static Sitting Balance  Static Sitting-Balance Support: Feet supported (1-2 UE support)  Static Sitting-Level of Assistance: Contact guard (x1)  Static Sitting-Comment/Number of Minutes: Verbal and tactile cueing to facilitate an anterior lean. pt able to briefly maintain midline and upright posture with mod verbal stim but complained of abdominal pressure.  Dynamic Sitting Balance  Dynamic Sitting-Balance Support: Feet supported (1-2 UE support)  Dynamic Sitting-Comments: CGA-MinAx1       Functional Assessments:  Bed Mobility  Bed Mobility: Yes  Bed Mobility 1  Bed Mobility 1: Supine to sitting, Sitting to supine  Level of Assistance 1: Maximum assistance, +2, Maximum tactile cues, Moderate verbal cues  Bed Mobility Comments 1: HOB elevated, draw sheet  Bed Mobility 2  Bed Mobility Comments 2: pt in a dependent chair position prior to further mobility d/t being on 2 pressors and to assess hemodynamic stability with positional changes. pt tolerated posture well with no significant change in BP.     Extremity/Trunk  Assessments:  RUE   RUE :  (AROM WFL)  LUE   LUE:  (shoulder flexion AROM < 45 deg, PROM ~120 deg. Elbow flexion/ext AROM WFL)  RLE   RLE :  (AROM WFL)  LLE   LLE :  (AROM WFL)  Treatments:     Therapeutic Activity  Therapeutic Activity Performed: Yes  Therapeutic Activity 1: pt in dependent chair position x15 mins prior to progressing mobility. pt sat EOB for 10 mins. Vitals re-assessed multiple times to ensure hemodynamic stability. pt had no significant change in vitals. At times, pt had difficulty sitting upright d/t experiencing abdominal pressure and discomfort.Therapist provided verbal and tactile cueing but with occassional retrolean. pt did not progress further d/t feeling fatigued and wanting to lay back down.  Outcome Measures:  [facility] Basic Mobility  Turning from your back to your side while in a flat bed without using bedrails: A lot  Moving from lying on your back to sitting on the side of a flat bed without using bedrails: Total  Moving to and from bed to chair (including a wheelchair): Total  Standing up from a chair using your arms (e.g. wheelchair or bedside chair): Total  To walk in hospital room: Total  Climbing 3-5 steps with railing: Total  Basic Mobility - Total Score: 7       FSS-ICU  Ambulation: Unable to attempt due to weakness  Rolling: Total assistance (performs 25% or requires another person)  Sitting: Minimal assistance (performs 75% or more of task)  Transfer Sit-to-Stand: Unable to perform  Transfer Supine-to-Sit: Total assistance (performs 25% or requires another person)  Total Score: 6      Early Mobility/Exercise Safety Screen: Proceed with mobilization - No exclusion criteria met  ICU Mobility Scale: Sitting over edge of bed [3]    Encounter Problems       Encounter Problems (Active)       Balance       Patient will complete static (supervision) and dynamic (stand by assist) sitting balance activities using UE support as needed in order to maintain midline without acute LOB.         Start:  07/29/24    Expected End:  08/19/24            Patient will complete static (CGAx1) and dynamic (MinAx1) standing balance activities using LRAD without acute LOB.        Start:  07/29/24    Expected End:  08/19/24               Mobility       Patient will complete bed mobility with MINx1 with HOB elevated and use of a bedrail        Start:  07/29/24    Expected End:  08/19/24            Patient will ambulate >/=10' with LRAD with MINx1 without acute LOB        Start:  07/29/24    Expected End:  08/19/24            Patient will participate in BLE there-ex program in order to assist in improving strength and to assist with the completion of functional mobility tasks.        Start:  07/29/24    Expected End:  08/19/24               PT Transfers       Patient will complete STS with MINx1 using LRAD without acute LOB         Start:  07/29/24    Expected End:  08/19/24            Patient will complete bed<->chair transfer with MinAx1 using LRAD as needed without acute LOB        Start:  07/29/24    Expected End:  08/19/24                   Education Documentation  Mobility Training, taught by YU RobertsonPT at 7/29/2024 12:10 PM.  Learner: Patient  Readiness: Acceptance  Method: Explanation  Response: Demonstrated Understanding  Comment: Mobility progression, postural education        Chelsea Melchor, SPT

## 2024-07-29 NOTE — PROGRESS NOTES
NEPHROLOGY FOLLOW UP NOTE    Angie Tobin   58 y.o.   133 kg (292 lbs 5.3 oz)  MRN/Room: [id number]/27/27-A    Subjective:     Angie is feeling well today. She does not know if she has been urinating or not. She denies any nausea, vomiting, headache, fever, chills, shortness of breath, chest pain.    Current medications:     filgrastim or biosimilar, 480 mcg, q24h  hydrocortisone sodium succinate, 50 mg, q6h  lidocaine, 1 patch, Daily  lidocaine, 1 patch, q24h  meropenem, 1 g, q12h  pantoprazole, 40 mg, BID  polyethylene glycol, 17 g, BID  sennosides, 1 tablet, Nightly      insulin regular infusion, Last Rate: 1 Units/hr (07/29/24 0405)  norepinephrine, Last Rate: 0.24 mcg/kg/min (07/29/24 1200)  PrismaSol 4/2.5, Last Rate: 2,700 mL/hr (07/29/24 1256)  vasopressin, Last Rate: 0.03 Units/min (07/29/24 1200)      benzocaine-menthol, 1 lozenge, q2h PRN  dextrose, 12.5 g, q15 min PRN  dextrose, 25 g, q15 min PRN  diclofenac sodium, 4 g, 4x daily PRN  glucagon, 1 mg, q15 min PRN  glucagon, 1 mg, q15 min PRN  glucagon, 1 mg, q15 min PRN  insulin regular, 2-6 Units, PRN  melatonin, 5 mg, Nightly PRN      Objective:    Vitals:    07/29/24 1300   Pulse: 107   Resp: 16   Temp:    SpO2:           Intake/Output Summary (Last 24 hours) at 7/29/2024 1322  Last data filed at 7/29/2024 1300  Gross per 24 hour   Intake 1957.02 ml   Output 1068 ml   Net 889.02 ml       General appearance: Awake and alert, oriented, . No distress  HEENT: NC/AT, dry oral mucosa  Skin: no apparent rash  Heart: heart sounds 1 & 2 present and normal, no murmurs heard or rub. Tachycardic.  Lungs: Adequate air entry, breath sounds clear bilaterally.  No wheezing/crackles  Abdomen: soft, tender in all four quadrants.  Extremities: No edema, no joint swelling  Neuro: No FND,  no asterixis   ACCESS: Hemodialysis triple lumen left femoral catheter. PIV. Mediport. Left radial A line.    Blood Labs:  Results for orders placed or performed during the hospital  encounter of 07/26/24 (from the past 24 hour(s))   CBC and Auto Differential   Result Value Ref Range    WBC 0.8 (LL) 4.4 - 11.3 x10*3/uL    nRBC 0.0 0.0 - 0.0 /100 WBCs    RBC 2.83 (L) 4.00 - 5.20 x10*6/uL    Hemoglobin 7.9 (L) 12.0 - 16.0 g/dL    Hematocrit 23.4 (L) 36.0 - 46.0 %    MCV 83 80 - 100 fL    MCH 27.9 26.0 - 34.0 pg    MCHC 33.8 32.0 - 36.0 g/dL    RDW 16.8 (H) 11.5 - 14.5 %    Platelets 22 (LL) 150 - 450 x10*3/uL    Immature Granulocytes %, Automated 3.7 (H) 0.0 - 0.9 %    Immature Granulocytes Absolute, Automated 0.03 0.00 - 0.70 x10*3/uL   POCT GLUCOSE   Result Value Ref Range    POCT Glucose 208 (H) 74 - 99 mg/dL   Manual Differential   Result Value Ref Range    Neutrophils %, Manual 44.8 40.0 - 80.0 %    Lymphocytes %, Manual 34.5 13.0 - 44.0 %    Monocytes %, Manual 19.5 2.0 - 10.0 %    Eosinophils %, Manual 0.0 0.0 - 6.0 %    Basophils %, Manual 0.0 0.0 - 2.0 %    Atypical Lymphocytes %, Manual 1.2 0.0 - 2.0 %    Seg Neutrophils Absolute, Manual 0.36 (L) 1.20 - 7.00 x10*3/uL    Lymphocytes Absolute, Manual 0.28 (L) 1.20 - 4.80 x10*3/uL    Monocytes Absolute, Manual 0.16 0.10 - 1.00 x10*3/uL    Eosinophils Absolute, Manual 0.00 0.00 - 0.70 x10*3/uL    Basophils Absolute, Manual 0.00 0.00 - 0.10 x10*3/uL    Atypical Lymphs Absolute, Manual 0.01 0.00 - 0.50 x10*3/uL    Total Cells Counted 87     RBC Morphology See Below     Elaine Cells Few     Clumped Platelets Present    POCT GLUCOSE   Result Value Ref Range    POCT Glucose 189 (H) 74 - 99 mg/dL   POCT GLUCOSE   Result Value Ref Range    POCT Glucose 171 (H) 74 - 99 mg/dL   POCT GLUCOSE   Result Value Ref Range    POCT Glucose 143 (H) 74 - 99 mg/dL   POCT GLUCOSE   Result Value Ref Range    POCT Glucose 65 (L) 74 - 99 mg/dL   POCT GLUCOSE   Result Value Ref Range    POCT Glucose 67 (L) 74 - 99 mg/dL   POCT GLUCOSE   Result Value Ref Range    POCT Glucose 119 (H) 74 - 99 mg/dL   Comprehensive Metabolic Panel   Result Value Ref Range    Glucose 119  (H) 74 - 99 mg/dL    Sodium 135 (L) 136 - 145 mmol/L    Potassium 4.1 3.5 - 5.3 mmol/L    Chloride 99 98 - 107 mmol/L    Bicarbonate 23 21 - 32 mmol/L    Anion Gap 17 10 - 20 mmol/L    Urea Nitrogen 27 (H) 6 - 23 mg/dL    Creatinine 2.03 (H) 0.50 - 1.05 mg/dL    eGFR 28 (L) >60 mL/min/1.73m*2    Calcium 8.5 (L) 8.6 - 10.6 mg/dL    Albumin 3.0 (L) 3.4 - 5.0 g/dL    Alkaline Phosphatase 376 (H) 33 - 110 U/L    Total Protein 5.1 (L) 6.4 - 8.2 g/dL     (H) 9 - 39 U/L    Bilirubin, Total 1.5 (H) 0.0 - 1.2 mg/dL    ALT 60 (H) 7 - 45 U/L   Magnesium   Result Value Ref Range    Magnesium 2.29 1.60 - 2.40 mg/dL   POCT GLUCOSE   Result Value Ref Range    POCT Glucose 133 (H) 74 - 99 mg/dL   POCT GLUCOSE   Result Value Ref Range    POCT Glucose 167 (H) 74 - 99 mg/dL   POCT GLUCOSE   Result Value Ref Range    POCT Glucose 186 (H) 74 - 99 mg/dL   POCT GLUCOSE   Result Value Ref Range    POCT Glucose 207 (H) 74 - 99 mg/dL   POCT GLUCOSE   Result Value Ref Range    POCT Glucose 210 (H) 74 - 99 mg/dL   POCT GLUCOSE   Result Value Ref Range    POCT Glucose 184 (H) 74 - 99 mg/dL   POCT GLUCOSE   Result Value Ref Range    POCT Glucose 176 (H) 74 - 99 mg/dL   POCT GLUCOSE   Result Value Ref Range    POCT Glucose 152 (H) 74 - 99 mg/dL   POCT GLUCOSE   Result Value Ref Range    POCT Glucose 144 (H) 74 - 99 mg/dL   POCT GLUCOSE   Result Value Ref Range    POCT Glucose 154 (H) 74 - 99 mg/dL   Comprehensive Metabolic Panel   Result Value Ref Range    Glucose 170 (H) 74 - 99 mg/dL    Sodium 134 (L) 136 - 145 mmol/L    Potassium 4.3 3.5 - 5.3 mmol/L    Chloride 99 98 - 107 mmol/L    Bicarbonate 23 21 - 32 mmol/L    Anion Gap 16 10 - 20 mmol/L    Urea Nitrogen 24 (H) 6 - 23 mg/dL    Creatinine 1.79 (H) 0.50 - 1.05 mg/dL    eGFR 33 (L) >60 mL/min/1.73m*2    Calcium 8.3 (L) 8.6 - 10.6 mg/dL    Albumin 2.9 (L) 3.4 - 5.0 g/dL    Alkaline Phosphatase 430 (H) 33 - 110 U/L    Total Protein 4.9 (L) 6.4 - 8.2 g/dL     (H) 9 - 39 U/L     Bilirubin, Total 1.5 (H) 0.0 - 1.2 mg/dL    ALT 61 (H) 7 - 45 U/L   Magnesium   Result Value Ref Range    Magnesium 2.31 1.60 - 2.40 mg/dL   Phosphorus   Result Value Ref Range    Phosphorus 2.6 2.5 - 4.9 mg/dL   CBC and Auto Differential   Result Value Ref Range    WBC 1.5 (L) 4.4 - 11.3 x10*3/uL    nRBC 0.0 0.0 - 0.0 /100 WBCs    RBC 2.83 (L) 4.00 - 5.20 x10*6/uL    Hemoglobin 7.9 (L) 12.0 - 16.0 g/dL    Hematocrit 22.6 (L) 36.0 - 46.0 %    MCV 80 80 - 100 fL    MCH 27.9 26.0 - 34.0 pg    MCHC 35.0 32.0 - 36.0 g/dL    RDW 16.9 (H) 11.5 - 14.5 %    Platelets 27 (LL) 150 - 450 x10*3/uL    Immature Granulocytes %, Automated 7.5 (H) 0.0 - 0.9 %    Immature Granulocytes Absolute, Automated 0.11 0.00 - 0.70 x10*3/uL   Manual Differential   Result Value Ref Range    Neutrophils %, Manual 32.8 40.0 - 80.0 %    Bands %, Manual 12.9 0.0 - 5.0 %    Lymphocytes %, Manual 36.2 13.0 - 44.0 %    Monocytes %, Manual 13.8 2.0 - 10.0 %    Eosinophils %, Manual 0.0 0.0 - 6.0 %    Basophils %, Manual 0.0 0.0 - 2.0 %    Atypical Lymphocytes %, Manual 4.3 0.0 - 2.0 %    Seg Neutrophils Absolute, Manual 0.49 (L) 1.20 - 7.00 x10*3/uL    Bands Absolute, Manual 0.19 0.00 - 0.70 x10*3/uL    Lymphocytes Absolute, Manual 0.54 (L) 1.20 - 4.80 x10*3/uL    Monocytes Absolute, Manual 0.21 0.10 - 1.00 x10*3/uL    Eosinophils Absolute, Manual 0.00 0.00 - 0.70 x10*3/uL    Basophils Absolute, Manual 0.00 0.00 - 0.10 x10*3/uL    Atypical Lymphs Absolute, Manual 0.06 0.00 - 0.50 x10*3/uL    Total Cells Counted 116     Neutrophils Absolute, Manual 0.68 (L) 1.20 - 7.70 x10*3/uL    RBC Morphology No significant RBC morphology present    POCT GLUCOSE   Result Value Ref Range    POCT Glucose 168 (H) 74 - 99 mg/dL   POCT GLUCOSE   Result Value Ref Range    POCT Glucose 168 (H) 74 - 99 mg/dL   POCT GLUCOSE   Result Value Ref Range    POCT Glucose 192 (H) 74 - 99 mg/dL   POCT GLUCOSE   Result Value Ref Range    POCT Glucose 167 (H) 74 - 99 mg/dL           ASSESSMENT:    Angie Tobin is a  58 y.o.  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 Nephrology is currently managing for ALDEN stage III (baseline Cr 0.7 jumped to 7.6 with BUN 90 on 07/25/2024).     #ALDEN, anuric - BL Cr 0.7  -Etiology: acute tubular injury from poor PO intake/septic shock (UTI) with c/f TLS at OSH  -CT abdomen was negative for hydronephrosis.   -UA- epithelial cells, +RBC/WBC, 1+ protein   -started on CVVH (started 07/25/2024) due to hyperkalemia, acidosis  - Patient remains on norepinephrine and vasopressin to stabilize blood pressure with home HTN medications held.    Recommendations:  - Continue with CVVH  - Monitor for increase in urine output  - Avoid nephrotoxins, contrast if possible  - Strict Is/Os  - Renal dosing for medications for latest eGFR, follow medication trough as appropriate  - Avoid hypotension/rapid fluctuations in BPs    Roberto Hart, MS4  24 hour Renal Pager - [phone]    Discussed with attending nephrologist, Dr. Sathish Isabel.

## 2024-07-29 NOTE — PROGRESS NOTES
SOCIAL WORK NOTE   SW met with patient at bedside for assessment. Patient normally lives at home alone and is independent at baseline, working as  employee. Patient confirmed emergency contact, but noted other family members. Copy of HCPOA given to patient at bedside to review. She was agreeable to completing it in the future, but wanted to speak with possible proxy before signing. Social work to follow.  VIJAY Llanos, KATYA-S ([id number])

## 2024-07-29 NOTE — PROGRESS NOTES
"Medical Intensive Care - Daily Progress Note   Subjective    Angie Tobin is a 58 y.o. year old female patient admitted on 7/26/2024 with following ICU needs:  Septic shock requiring vasopressors     Interval History:  Patient was awake and alert. She feels that her symptoms have improved today. No overnight complaints. She did not report fevers and chills or dizziness.       Meds    Scheduled medications  filgrastim or biosimilar, 480 mcg, subcutaneous, q24h  hydrocortisone sodium succinate, 50 mg, intravenous, q6h  lidocaine, 1 patch, transdermal, Daily  lidocaine, 1 patch, transdermal, q24h  meropenem, 1 g, intravenous, q12h  pantoprazole, 40 mg, intravenous, BID  polyethylene glycol, 17 g, oral, BID  sennosides, 1 tablet, oral, Nightly      Continuous medications  insulin regular infusion, 0-20 Units/hr, Last Rate: 1 Units/hr (07/29/24 0405)  norepinephrine, 0-0.5 mcg/kg/min, Last Rate: 0.24 mcg/kg/min (07/29/24 1200)  PrismaSol 4/2.5, 2,700 mL/hr, Last Rate: 2,700 mL/hr (07/29/24 1256)  vasopressin, 0-0.03 Units/min, Last Rate: 0.03 Units/min (07/29/24 1200)      PRN medications  PRN medications: benzocaine-menthol, dextrose, dextrose, diclofenac sodium, glucagon, glucagon, glucagon, insulin regular, melatonin     Objective    Blood pressure 114/55, pulse 107, temperature 36 °C (96.8 °F), temperature source Temporal, resp. rate 16, height 1.778 m (5' 10\"), weight 133 kg (292 lb 5.3 oz), SpO2 96%.     Physical Exam   Constitutional:       General: She is not in acute distress.     Appearance: She is obese. She is diaphoretic.   HENT:      Head: Normocephalic and atraumatic.      Nose: Nose normal.      Mouth/Throat:      Mouth: Mucous membranes are moist.   Eyes:      Extraocular Movements: Extraocular movements intact.      Pupils: Pupils are equal, round, and reactive to light.   Cardiovascular:      Rate and Rhythm: Tachycardia present.      Pulses: Normal pulses.      Heart sounds: No murmur heard.     " No friction rub. No gallop.   Pulmonary:      Effort: Pulmonary effort is normal. No respiratory distress.      Breath sounds: No wheezing, rhonchi or rales.   Abdominal:      General: Bowel sounds are normal. There is no distension.      Palpations: Abdomen is soft. There is no mass.      Tenderness: There is abdominal tenderness. There is no guarding.      Hernia: A hernia is present.   Musculoskeletal:      Cervical back: Normal range of motion.   Skin:     General: Skin is warm.      Capillary Refill: Capillary refill takes less than 2 seconds.      Findings: Bruising (abdomen) present.   Neurological:      General: No focal deficit present.      Mental Status: She is oriented to person, place, and time. She is lethargic.   Psychiatric:         Mood and Affect: Mood normal.     Intake/Output Summary (Last 24 hours) at 7/29/2024 1311  Last data filed at 7/29/2024 1300  Gross per 24 hour   Intake 1957.02 ml   Output 1068 ml   Net 889.02 ml     Labs:   Results from last 72 hours   Lab Units 07/29/24  0530 07/28/24  1937 07/28/24  0119 07/27/24  1740   SODIUM mmol/L 134* 135* 131* 128*   POTASSIUM mmol/L 4.3 4.1 4.1 4.7   CHLORIDE mmol/L 99 99 99 95*   CO2 mmol/L 23 23 18* 20*   BUN mg/dL 24* 27* 42* 51*   CREATININE mg/dL 1.79* 2.03* 2.66* 3.19*   GLUCOSE mg/dL 170* 119* 358* 465*   CALCIUM mg/dL 8.3* 8.5* 8.4* 8.2*   ANION GAP mmol/L 16 17 18 18   EGFR mL/min/1.73m*2 33* 28* 20* 16*   PHOSPHORUS mg/dL 2.6  --  2.9 3.4      Results from last 72 hours   Lab Units 07/29/24  0530 07/28/24  1408 07/28/24  0119   WBC AUTO x10*3/uL 1.5* 0.8* 0.6*   HEMOGLOBIN g/dL 7.9* 7.9* 8.0*   HEMATOCRIT % 22.6* 23.4* 23.1*   PLATELETS AUTO x10*3/uL 27* 22* 27*   LYMPHO PCT MAN % 36.2 34.5 29.1   MONO PCT MAN % 13.8 19.5 13.9   EOSINO PCT MAN % 0.0 0.0 0.0        Micro/ID:     Lab Results   Component Value Date    URINECULTURE No growth 07/26/2024    BLOODCULT No growth at 2 days 07/27/2024    BLOODCULT No growth at 2 days 07/27/2024      MELD 3.0: 10 at 6/12/2024  8:00 AM  MELD-Na: 8 at 6/12/2024  8:00 AM  Calculated from:  Serum Creatinine: 0.80 mg/dL (Using min of 1 mg/dL) at 6/12/2024  8:00 AM  Serum Sodium: 136 mmol/L at 6/12/2024  8:00 AM  Total Bilirubin: 1.7 mg/dL at 6/12/2024  8:00 AM  Serum Albumin: 4.2 g/dL (Using max of 3.5 g/dL) at 6/12/2024  8:00 AM  INR(ratio): 1.0 at 6/12/2024  8:00 AM  Age at listing (hypothetical): 58 years  Sex: Female at 6/12/2024  8:00 AM     Summary of key imaging results from the last 24 hours  CXR 7/28/24:  1. Low lung volumes with bronchovascular crowding and suspect  superimposed mild pulmonary edema.  2. Interval improvement of a now trace left pleural effusion.  3. Right chest wall MediPort as above.    Assessment and Plan     Assessment: Angie Tobin is a 58 y.o. year old female patient who presented to OSH 7/25/2024 and [place] [facility] [place] for septic shock requiring pressors with urinary tract infection source, acute hypoxic respiratory failure, with possible TLS, treated with 2x rasburicase, oncology consulted yesterday, per their note TLS is unlikely based on her tumor type. Received one unit RBCs, Zosyn switched to meropenem on 7/27.     Mechanical Ventilation: none  Sedation/Analgesia:  none  Restraints: no    Summary for 07/29/24  :  500cc LR bolus for blood pressure  C diff PCR  Titrate Vasopressors to maintain MAP >65   Continue pegfilgrastim 480mcg daily until ANC > 1500 per oncology  Continue meropenem  C/w insulin drip  FU on urine myoglobulin    Plan:  NEUROLOGY/PSYCH:  #Anxiety  ::Lorazepam 0.5m *2 given at OSH on presentation for anxiety  ::Patient denies anxiety at present  Plan:  -Will monitor     CARDIOVASCULAR:  #Shock, Likely Septic in Origin   ::Etiology: Likely septic from UTI, cultures growing enteric bacilli. Given troponin trend (64 -> 56) and TTE (normal right ventricular function), unlikely cardiac in origin. Given hypotension continuing s/p aggressive  fluid resuscitation, unlikely hypovolemic in origin.   ::7/26 OSH: Received 5L NS, 1L LR, 50g Albumin, 100mg solumedrol  ::7/26 OSH: Norepinephrine and Vasopressin begun for persistent hypotension   ::7/27 began Stress Dose Steroids Hydrocortisone 50mg Q6H   ::7/27 1.5L LR, 7/29 0.5L LR   ::Lactate downtrend: 7/26 2.8 -> 7/27 1.9  Plan:  -Continue Norepinephrine, titration per patients ability  -Continue Hydrocortisone 50 Q6h  -Continue Vasopressin 0.03  -Will replete fluids prn, careful not to overload due to ALDEN  -ABXs per ID section     #Hypertension  ::Home medication: Metoprolol Succinate XL 25mg, Lisinopril 20mg  Plan  -Holding metoprolol in setting of shock  -Holding Lisinopril in setting of ALDEN     PULMONARY:  #Acute Hypoxic Hypercapnic Respiratory Failure   ::Etiology: Likely secondary to atelectasis vs pulmonary edema vs pneumonia given CXR findings  ::2022 6 Wedge lung resection 2/2 metastasis  ::New O2 requirement; no O2 requirement at baseline  ::7/27 Pro calcitonin: 3.07  ::7/28 ABG: Ph 7.42, o2 27, co2 103  ::Pt on 2L NC at present  Plan:  -Continue Oxygen supplementation  -Monitor for changes in symptoms     RENAL/GENITOURINARY:  #UTI   -See ID section     #ALDEN w/ Oliguria, required CVVH  ::Baseline Cr: 0.7  ::Presentation: BUN 90, Cr 7.6, now 1.79  ::7/26 CVVH at OSH -> 310 mL urine  ::King placed at OSH. Removed 7/27 upon admission to [facility].   ::CK 1288   Plan:  -Nephrology consulted regarding CVVH   -On CVVH  -BID RFP      #Hyperkalemia, likely in the setting of TLS  ::Presentation: 5.5. Peaked 5.9. now 4.3  ::OSH: Insulin drip, Lokelma 10g *1, Sodium Bicarbonate infusion, Calcium gluconate  ::7/27: 4.9. Additional 2g Calcium Gluconate given  Plan:  -BID RFP, replete PRN     #Hypocalcemia, likely in the setting of TLS  ::Presentation: 7.9  ::7/29: 8.3  Plan:  -BID RFP, replete PRN     #Hypomagnesemia  ::7/27 2.02  Plan:  -BID RFP, replete PRN     GASTROENTEROLOGY:  #Diarrhea  ::Had multiple loose  bowel movements after admission  Plan:  -Stopped Miralax, Senna  -C diff PCR  -Avoid narcotics, replete electrolytes PRN, treat sepsis, replete fluids prn     ENDOCRINOLOGY:  #DM   ::7/26 Hemoglobin A1C: 9.2 (7.4 in 2022)  ::Home meds: Metformin 500mg BID  ::Given Insulin Drip on presentation at OSH  ::Patient receiving high dose steroids since 7/27  Plan:  -Holding home metformin in inpatient setting  -Resumed Insulin Drip for persistent hyperglycemia  -Hypoglycemia protocol   -[facility]      HEMATOLOGY/ONCOLOGY:  #Leiomyosarcoma, with metastasis to liver, lungs, and spine  ::Originally found in the pancreatic bed 2019. Hysterectomy and bilateral salpingo- oophorectomy 2019. H/o chemotherapy and radiation. Multiple remissions. Began Yondelis 06/05/2024.   ::Last chemotherapy: Yondelis C3 7/17/2024  Plan:  -Oncology consulted, recs appreciated   -No chemotherapy pending at present      #Elevated liver enzymes  ::, ALT 53, AST 83  ::Likely secondary to metastasis to liver  Plan:  -Will monitor     #Tumor Lysis Syndrome, improving (resolved, no concern for now)  ::On presentation: Hyperuricemia 15.9, hypocalcemia, hyperkalemia, nausea, vomit, diarrhea  ::7/26 OSH: 2x rasburicase, 5L NS, 1L LR, 50g Albumin, 100mg solumedrol  ::G6PD negative  ::7/27 Uric Acid: <1.5  Plan:  -per Oncology, there is no concern for TLS  -Trending labs      #Pancytopenia  #Neutropenia  ::WBC 3.9 > 1.1 > 1.5, RBC 3.06 > 2.71 > 2.83, Hemoglobin 7.9, Platelets 27  ::ANC 5.36  ::Etiology: secondary to sepsis vs post chemotherapy  ::Heparin given at OSH  Plan:  -Holding heparin in setting of thrombocytopenia  -Treat Underlying Condition  -Transfuse platelets before placing central line  -Continue filgastrim     #Monoclonal B-cell lymphocytosis   ::Diagnosed 2016  Plan:  -Monitor     MUSCULOSKELETAL/ SKIN:  FREDERICK     INFECTIOUS DISEASE:  # UTI   ::7/25 UCX: >100,000 Enteric Bacilli  ::7/25 BCX * 2: NG2D  ::7/26 Repeat UCX:  Pending  ::7/27 Repeat BCX: Pending  ::S/P Ceftriaxone 1g 7/25  ::s/p vancomycin 7/27-7/28  ::s/p Zosyn 7/26-7/27  ::7/27 Pro calcitonin: 3.07  ::MRSA neg  Plan:  ::Continue meropenem  (7/27- present)  ::Discontinued Vancomycin (7/27-7/28)       ICU Check List   FEN  Fluids: S/P 1.5L fluid 7/27. PRN   Electrolytes: Will replete PRN   Nutrition: Regular Diet   Prophylaxis:  DVT ppx: SCDs (Holding medical anticoagulation due to thrombocytopenia)   GI ppx: PPI IV   Bowel care: Miralax 17g BID, Sennosides 8.6mg nightly  Hardware:  Catheter: Hemodialysis triple lumen left femoral catheter- placed 7/26 at OSH  Access: PIV, Mediport    Drains: King removed from OSH. Current External Urinary catheter  Lines: A line left radial- placed 7/26 at OSH  Social:  Code: Full Code    NOK: Loulou Benavides (cousin) [phone]; Tatyana Yanez (niece) [phone]  Disposition: MICSAMMY Macias MD   07/29/24 at 1:11 PM     Disclaimer: Documentation completed with the information available at the time of input. The times in the chart may not be reflective of actual patient care times, interventions, or procedures. Documentation occurs after the physical care of the patient.

## 2024-07-29 NOTE — CARE PLAN
Problem: Pain - Adult  Goal: Verbalizes/displays adequate comfort level or baseline comfort level  Outcome: Progressing  Flowsheets (Taken 7/29/2024 1201)  Verbalizes/displays adequate comfort level or baseline comfort level: Administer analgesics based on type and severity of pain and evaluate response     Problem: Safety - Adult  Goal: Free from fall injury  Outcome: Progressing  Flowsheets (Taken 7/29/2024 1201)  Free from fall injury: Instruct family/caregiver on patient safety     Problem: Skin  Goal: Participates in plan/prevention/treatment measures  Outcome: Progressing  Flowsheets (Taken 7/29/2024 1201)  Participates in plan/prevention/treatment measures: Elevate heels  Goal: Prevent/manage excess moisture  Outcome: Progressing  Flowsheets (Taken 7/29/2024 1201)  Prevent/manage excess moisture:   Cleanse incontinence/protect with barrier cream   Monitor for/manage infection if present  Goal: Prevent/minimize sheer/friction injuries  Outcome: Progressing  Flowsheets (Taken 7/29/2024 1201)  Prevent/minimize sheer/friction injuries:   HOB 30 degrees or less   Turn/reposition every 2 hours/use positioning/transfer devices   Use pull sheet  Goal: Promote/optimize nutrition  Outcome: Progressing  Flowsheets (Taken 7/29/2024 1201)  Promote/optimize nutrition: Monitor/record intake including meals  Goal: Promote skin healing  Outcome: Progressing  Flowsheets (Taken 7/29/2024 1201)  Promote skin healing: Turn/reposition every 2 hours/use positioning/transfer devices

## 2024-07-30 LAB
ABO GROUP (TYPE) IN BLOOD: NORMAL
ALBUMIN SERPL BCP-MCNC: 2.7 G/DL (ref 3.4–5)
ALBUMIN SERPL BCP-MCNC: 3 G/DL (ref 3.4–5)
ALP SERPL-CCNC: 469 U/L (ref 33–110)
ALT SERPL W P-5'-P-CCNC: 75 U/L (ref 7–45)
ANION GAP SERPL CALC-SCNC: 15 MMOL/L (ref 10–20)
ANION GAP SERPL CALC-SCNC: 16 MMOL/L (ref 10–20)
ANTIBODY SCREEN: NORMAL
AST SERPL W P-5'-P-CCNC: 219 U/L (ref 9–39)
BASOPHILS # BLD MANUAL: 0 X10*3/UL (ref 0–0.1)
BASOPHILS NFR BLD MANUAL: 0 %
BILIRUB SERPL-MCNC: 1.5 MG/DL (ref 0–1.2)
BUN SERPL-MCNC: 16 MG/DL (ref 6–23)
BUN SERPL-MCNC: 18 MG/DL (ref 6–23)
C COLI+JEJ+UPSA DNA STL QL NAA+PROBE: NOT DETECTED
CA-I BLD-SCNC: 1.16 MMOL/L (ref 1.1–1.33)
CALCIUM SERPL-MCNC: 8.4 MG/DL (ref 8.6–10.6)
CALCIUM SERPL-MCNC: 8.5 MG/DL (ref 8.6–10.6)
CHLORIDE SERPL-SCNC: 98 MMOL/L (ref 98–107)
CHLORIDE SERPL-SCNC: 99 MMOL/L (ref 98–107)
CO2 SERPL-SCNC: 23 MMOL/L (ref 21–32)
CO2 SERPL-SCNC: 25 MMOL/L (ref 21–32)
CREAT SERPL-MCNC: 1.52 MG/DL (ref 0.5–1.05)
CREAT SERPL-MCNC: 1.57 MG/DL (ref 0.5–1.05)
EC STX1 GENE STL QL NAA+PROBE: NOT DETECTED
EC STX2 GENE STL QL NAA+PROBE: NOT DETECTED
EGFRCR SERPLBLD CKD-EPI 2021: 38 ML/MIN/1.73M*2
EGFRCR SERPLBLD CKD-EPI 2021: 40 ML/MIN/1.73M*2
EOSINOPHIL # BLD MANUAL: 0 X10*3/UL (ref 0–0.7)
EOSINOPHIL NFR BLD MANUAL: 0 %
ERYTHROCYTE [DISTWIDTH] IN BLOOD BY AUTOMATED COUNT: 17.2 % (ref 11.5–14.5)
ERYTHROCYTE [DISTWIDTH] IN BLOOD BY AUTOMATED COUNT: 17.2 % (ref 11.5–14.5)
GLUCOSE BLD MANUAL STRIP-MCNC: 124 MG/DL (ref 74–99)
GLUCOSE BLD MANUAL STRIP-MCNC: 136 MG/DL (ref 74–99)
GLUCOSE BLD MANUAL STRIP-MCNC: 141 MG/DL (ref 74–99)
GLUCOSE BLD MANUAL STRIP-MCNC: 153 MG/DL (ref 74–99)
GLUCOSE BLD MANUAL STRIP-MCNC: 157 MG/DL (ref 74–99)
GLUCOSE BLD MANUAL STRIP-MCNC: 165 MG/DL (ref 74–99)
GLUCOSE BLD MANUAL STRIP-MCNC: 167 MG/DL (ref 74–99)
GLUCOSE BLD MANUAL STRIP-MCNC: 171 MG/DL (ref 74–99)
GLUCOSE BLD MANUAL STRIP-MCNC: 171 MG/DL (ref 74–99)
GLUCOSE BLD MANUAL STRIP-MCNC: 181 MG/DL (ref 74–99)
GLUCOSE BLD MANUAL STRIP-MCNC: 182 MG/DL (ref 74–99)
GLUCOSE BLD MANUAL STRIP-MCNC: 194 MG/DL (ref 74–99)
GLUCOSE BLD MANUAL STRIP-MCNC: 201 MG/DL (ref 74–99)
GLUCOSE BLD MANUAL STRIP-MCNC: 202 MG/DL (ref 74–99)
GLUCOSE BLD MANUAL STRIP-MCNC: 204 MG/DL (ref 74–99)
GLUCOSE BLD MANUAL STRIP-MCNC: 205 MG/DL (ref 74–99)
GLUCOSE BLD MANUAL STRIP-MCNC: 206 MG/DL (ref 74–99)
GLUCOSE BLD MANUAL STRIP-MCNC: 209 MG/DL (ref 74–99)
GLUCOSE BLD MANUAL STRIP-MCNC: 222 MG/DL (ref 74–99)
GLUCOSE BLD MANUAL STRIP-MCNC: 228 MG/DL (ref 74–99)
GLUCOSE SERPL-MCNC: 175 MG/DL (ref 74–99)
GLUCOSE SERPL-MCNC: 226 MG/DL (ref 74–99)
HCT VFR BLD AUTO: 24.2 % (ref 36–46)
HCT VFR BLD AUTO: 24.2 % (ref 36–46)
HGB BLD-MCNC: 8.2 G/DL (ref 12–16)
HGB BLD-MCNC: 8.2 G/DL (ref 12–16)
IMM GRANULOCYTES # BLD AUTO: 0.02 X10*3/UL (ref 0–0.7)
IMM GRANULOCYTES NFR BLD AUTO: 0.7 % (ref 0–0.9)
LACTATE SERPL-SCNC: 2.9 MMOL/L (ref 0.4–2)
LYMPHOCYTES # BLD MANUAL: 0.43 X10*3/UL (ref 1.2–4.8)
LYMPHOCYTES NFR BLD MANUAL: 15.5 %
MAGNESIUM SERPL-MCNC: 2.36 MG/DL (ref 1.6–2.4)
MAGNESIUM SERPL-MCNC: 2.4 MG/DL (ref 1.6–2.4)
MCH RBC QN AUTO: 27.7 PG (ref 26–34)
MCH RBC QN AUTO: 27.7 PG (ref 26–34)
MCHC RBC AUTO-ENTMCNC: 33.9 G/DL (ref 32–36)
MCHC RBC AUTO-ENTMCNC: 33.9 G/DL (ref 32–36)
MCV RBC AUTO: 82 FL (ref 80–100)
MCV RBC AUTO: 82 FL (ref 80–100)
MONOCYTES # BLD MANUAL: 0.3 X10*3/UL (ref 0.1–1)
MONOCYTES NFR BLD MANUAL: 10.6 %
NEUTS SEG # BLD MANUAL: 2.07 X10*3/UL (ref 1.2–7)
NEUTS SEG NFR BLD MANUAL: 73.9 %
NOROVIRUS GI + GII RNA STL NAA+PROBE: NOT DETECTED
NRBC BLD-RTO: 1.8 /100 WBCS (ref 0–0)
NRBC BLD-RTO: 1.8 /100 WBCS (ref 0–0)
PHOSPHATE SERPL-MCNC: 2.5 MG/DL (ref 2.5–4.9)
PHOSPHATE SERPL-MCNC: 2.6 MG/DL (ref 2.5–4.9)
PLATELET # BLD AUTO: 23 X10*3/UL (ref 150–450)
PLATELET # BLD AUTO: 23 X10*3/UL (ref 150–450)
POTASSIUM SERPL-SCNC: 4.3 MMOL/L (ref 3.5–5.3)
POTASSIUM SERPL-SCNC: 4.3 MMOL/L (ref 3.5–5.3)
PROT SERPL-MCNC: 5 G/DL (ref 6.4–8.2)
RBC # BLD AUTO: 2.96 X10*6/UL (ref 4–5.2)
RBC # BLD AUTO: 2.96 X10*6/UL (ref 4–5.2)
RBC MORPH BLD: ABNORMAL
RH FACTOR (ANTIGEN D): NORMAL
RV RNA STL NAA+PROBE: NOT DETECTED
SALMONELLA DNA STL QL NAA+PROBE: NOT DETECTED
SHIGELLA DNA SPEC QL NAA+PROBE: NOT DETECTED
SODIUM SERPL-SCNC: 134 MMOL/L (ref 136–145)
SODIUM SERPL-SCNC: 134 MMOL/L (ref 136–145)
TOTAL CELLS COUNTED BLD: 142
V CHOLERAE DNA STL QL NAA+PROBE: NOT DETECTED
WBC # BLD AUTO: 2.8 X10*3/UL (ref 4.4–11.3)
WBC # BLD AUTO: 2.8 X10*3/UL (ref 4.4–11.3)
Y ENTEROCOL DNA STL QL NAA+PROBE: NOT DETECTED

## 2024-07-30 PROCEDURE — 2500000004 HC RX 250 GENERAL PHARMACY W/ HCPCS (ALT 636 FOR OP/ED)

## 2024-07-30 PROCEDURE — 37799 UNLISTED PX VASCULAR SURGERY: CPT

## 2024-07-30 PROCEDURE — 90937 HEMODIALYSIS REPEATED EVAL: CPT

## 2024-07-30 PROCEDURE — 82947 ASSAY GLUCOSE BLOOD QUANT: CPT

## 2024-07-30 PROCEDURE — 99291 CRITICAL CARE FIRST HOUR: CPT | Performed by: STUDENT IN AN ORGANIZED HEALTH CARE EDUCATION/TRAINING PROGRAM

## 2024-07-30 PROCEDURE — 84100 ASSAY OF PHOSPHORUS: CPT

## 2024-07-30 PROCEDURE — 80069 RENAL FUNCTION PANEL: CPT | Mod: CCI

## 2024-07-30 PROCEDURE — 97166 OT EVAL MOD COMPLEX 45 MIN: CPT | Mod: GO

## 2024-07-30 PROCEDURE — 2500000001 HC RX 250 WO HCPCS SELF ADMINISTERED DRUGS (ALT 637 FOR MEDICARE OP)

## 2024-07-30 PROCEDURE — 85007 BL SMEAR W/DIFF WBC COUNT: CPT

## 2024-07-30 PROCEDURE — 83735 ASSAY OF MAGNESIUM: CPT

## 2024-07-30 PROCEDURE — 2500000005 HC RX 250 GENERAL PHARMACY W/O HCPCS

## 2024-07-30 PROCEDURE — 85027 COMPLETE CBC AUTOMATED: CPT

## 2024-07-30 PROCEDURE — 82977 ASSAY OF GGT: CPT

## 2024-07-30 PROCEDURE — C9113 INJ PANTOPRAZOLE SODIUM, VIA: HCPCS

## 2024-07-30 PROCEDURE — 2020000001 HC ICU ROOM DAILY

## 2024-07-30 PROCEDURE — 37799 UNLISTED PX VASCULAR SURGERY: CPT | Performed by: INTERNAL MEDICINE

## 2024-07-30 PROCEDURE — 87493 C DIFF AMPLIFIED PROBE: CPT

## 2024-07-30 PROCEDURE — 97530 THERAPEUTIC ACTIVITIES: CPT | Mod: GO

## 2024-07-30 PROCEDURE — 85027 COMPLETE CBC AUTOMATED: CPT | Performed by: INTERNAL MEDICINE

## 2024-07-30 PROCEDURE — 80053 COMPREHEN METABOLIC PANEL: CPT

## 2024-07-30 PROCEDURE — 87506 IADNA-DNA/RNA PROBE TQ 6-11: CPT

## 2024-07-30 PROCEDURE — 83605 ASSAY OF LACTIC ACID: CPT

## 2024-07-30 PROCEDURE — 2500000002 HC RX 250 W HCPCS SELF ADMINISTERED DRUGS (ALT 637 FOR MEDICARE OP, ALT 636 FOR OP/ED)

## 2024-07-30 PROCEDURE — 86901 BLOOD TYPING SEROLOGIC RH(D): CPT

## 2024-07-30 PROCEDURE — 82330 ASSAY OF CALCIUM: CPT

## 2024-07-30 RX ORDER — INSULIN LISPRO 100 [IU]/ML
0-10 INJECTION, SOLUTION INTRAVENOUS; SUBCUTANEOUS
Status: DISCONTINUED | OUTPATIENT
Start: 2024-07-30 | End: 2024-08-01

## 2024-07-30 RX ORDER — DEXTROSE 50 % IN WATER (D50W) INTRAVENOUS SYRINGE
25
Status: DISCONTINUED | OUTPATIENT
Start: 2024-07-30 | End: 2024-08-03 | Stop reason: HOSPADM

## 2024-07-30 RX ORDER — INSULIN GLARGINE 100 [IU]/ML
15 INJECTION, SOLUTION SUBCUTANEOUS NIGHTLY
Status: DISCONTINUED | OUTPATIENT
Start: 2024-07-30 | End: 2024-08-03 | Stop reason: HOSPADM

## 2024-07-30 RX ORDER — DEXTROSE 50 % IN WATER (D50W) INTRAVENOUS SYRINGE
12.5
Status: DISCONTINUED | OUTPATIENT
Start: 2024-07-30 | End: 2024-08-03 | Stop reason: HOSPADM

## 2024-07-30 RX ORDER — NOREPINEPHRINE BITARTRATE/D5W 8 MG/250ML
PLASTIC BAG, INJECTION (ML) INTRAVENOUS
Status: DISPENSED
Start: 2024-07-30 | End: 2024-07-30

## 2024-07-30 RX ADMIN — CALCIUM CHLORIDE, MAGNESIUM CHLORIDE, DEXTROSE MONOHYDRATE, LACTIC ACID, SODIUM CHLORIDE, SODIUM BICARBONATE AND POTASSIUM CHLORIDE 2700 ML/HR: 3.68; 3.05; 22; 5.4; 6.46; 3.09; .314 INJECTION INTRAVENOUS at 12:00

## 2024-07-30 RX ADMIN — MEROPENEM 1 G: 1 INJECTION, POWDER, FOR SOLUTION INTRAVENOUS at 04:21

## 2024-07-30 RX ADMIN — INSULIN GLARGINE 15 UNITS: 100 INJECTION, SOLUTION SUBCUTANEOUS at 20:38

## 2024-07-30 RX ADMIN — CALCIUM CHLORIDE, MAGNESIUM CHLORIDE, DEXTROSE MONOHYDRATE, LACTIC ACID, SODIUM CHLORIDE, SODIUM BICARBONATE AND POTASSIUM CHLORIDE 2700 ML/HR: 3.68; 3.05; 22; 5.4; 6.46; 3.09; .314 INJECTION INTRAVENOUS at 00:40

## 2024-07-30 RX ADMIN — MEROPENEM 1 G: 1 INJECTION, POWDER, FOR SOLUTION INTRAVENOUS at 17:25

## 2024-07-30 RX ADMIN — DICLOFENAC SODIUM 4 G: 10 GEL TOPICAL at 05:05

## 2024-07-30 RX ADMIN — PANTOPRAZOLE SODIUM 40 MG: 40 INJECTION, POWDER, FOR SOLUTION INTRAVENOUS at 08:32

## 2024-07-30 RX ADMIN — NOREPINEPHRINE BITARTRATE 0.14 MCG/KG/MIN: 1 SOLUTION INTRAVENOUS at 04:21

## 2024-07-30 RX ADMIN — VASOPRESSIN 0.03 UNITS/MIN: 0.2 INJECTION INTRAVENOUS at 07:50

## 2024-07-30 RX ADMIN — MIDODRINE HYDROCHLORIDE 10 MG: 10 TABLET ORAL at 05:21

## 2024-07-30 RX ADMIN — HYDROCORTISONE SODIUM SUCCINATE 50 MG: 100 INJECTION, POWDER, FOR SOLUTION INTRAMUSCULAR; INTRAVENOUS at 05:05

## 2024-07-30 RX ADMIN — LIDOCAINE 1 PATCH: 4 PATCH TOPICAL at 20:40

## 2024-07-30 RX ADMIN — HYDROCORTISONE SODIUM SUCCINATE 50 MG: 100 INJECTION, POWDER, FOR SOLUTION INTRAMUSCULAR; INTRAVENOUS at 11:31

## 2024-07-30 RX ADMIN — LIDOCAINE 1 PATCH: 4 PATCH TOPICAL at 20:41

## 2024-07-30 RX ADMIN — Medication 5 MG: at 23:58

## 2024-07-30 RX ADMIN — CALCIUM CHLORIDE, MAGNESIUM CHLORIDE, DEXTROSE MONOHYDRATE, LACTIC ACID, SODIUM CHLORIDE, SODIUM BICARBONATE AND POTASSIUM CHLORIDE 2700 ML/HR: 3.68; 3.05; 22; 5.4; 6.46; 3.09; .314 INJECTION INTRAVENOUS at 06:15

## 2024-07-30 RX ADMIN — MIDODRINE HYDROCHLORIDE 10 MG: 10 TABLET ORAL at 22:02

## 2024-07-30 RX ADMIN — MIDODRINE HYDROCHLORIDE 10 MG: 10 TABLET ORAL at 14:19

## 2024-07-30 RX ADMIN — HYDROCORTISONE SODIUM SUCCINATE 50 MG: 100 INJECTION, POWDER, FOR SOLUTION INTRAMUSCULAR; INTRAVENOUS at 23:46

## 2024-07-30 RX ADMIN — HYDROCORTISONE SODIUM SUCCINATE 50 MG: 100 INJECTION, POWDER, FOR SOLUTION INTRAMUSCULAR; INTRAVENOUS at 17:25

## 2024-07-30 ASSESSMENT — PAIN - FUNCTIONAL ASSESSMENT
PAIN_FUNCTIONAL_ASSESSMENT: 0-10

## 2024-07-30 ASSESSMENT — PAIN SCALES - GENERAL
PAINLEVEL_OUTOF10: 0 - NO PAIN
PAINLEVEL_OUTOF10: 5 - MODERATE PAIN
PAINLEVEL_OUTOF10: 0 - NO PAIN
PAINLEVEL_OUTOF10: 3

## 2024-07-30 ASSESSMENT — ACTIVITIES OF DAILY LIVING (ADL)
BATHING_ASSISTANCE: MODERATE
ADL_ASSISTANCE: INDEPENDENT

## 2024-07-30 ASSESSMENT — COGNITIVE AND FUNCTIONAL STATUS - GENERAL
EATING MEALS: A LITTLE
HELP NEEDED FOR BATHING: A LOT
DAILY ACTIVITIY SCORE: 12
PERSONAL GROOMING: A LITTLE
DRESSING REGULAR UPPER BODY CLOTHING: A LOT
TOILETING: TOTAL
DRESSING REGULAR LOWER BODY CLOTHING: TOTAL

## 2024-07-30 ASSESSMENT — PAIN DESCRIPTION - LOCATION: LOCATION: BACK

## 2024-07-30 NOTE — PROGRESS NOTES
NEPHROLOGY FOLLOW UP NOTE    Angie Tobin   58 y.o.   133 kg (292 lbs 5.3 oz)  MRN/Room: [id number]/27/27-A    Subjective:     Angie is feeling well today. She denies any nausea, vomiting, headache, fever, chills, shortness of breath, or chest pain.    Current medications:     hydrocortisone sodium succinate, 50 mg, q6h  lidocaine, 1 patch, Daily  lidocaine, 1 patch, q24h  meropenem, 1 g, q12h  midodrine, 10 mg, q8h  norepinephrine in dextrose 5 %, ,   pantoprazole, 40 mg, Daily      insulin regular infusion, Last Rate: 3 Units/hr (07/30/24 1007)  norepinephrine, Last Rate: 0.08 mcg/kg/min (07/30/24 1139)  PrismaSol 4/2.5, Last Rate: 2,700 mL/hr (07/30/24 0615)  vasopressin, Last Rate: Stopped (07/30/24 1013)      benzocaine-menthol, 1 lozenge, q2h PRN  dextrose, 12.5 g, q15 min PRN  dextrose, 25 g, q15 min PRN  diclofenac sodium, 4 g, 4x daily PRN  glucagon, 1 mg, q15 min PRN  glucagon, 1 mg, q15 min PRN  glucagon, 1 mg, q15 min PRN  insulin regular, 2-6 Units, PRN  melatonin, 5 mg, Nightly PRN  norepinephrine in dextrose 5 %, ,       Objective:    Vitals:    07/30/24 1200   BP:    Pulse:    Resp:    Temp: 35.8 °C (96.4 °F)   SpO2:           Intake/Output Summary (Last 24 hours) at 7/30/2024 1240  Last data filed at 7/30/2024 1100  Gross per 24 hour   Intake 961.73 ml   Output 1256 ml   Net -294.27 ml       General appearance: Awake and alert, oriented, . No distress  HEENT: NC/AT, mucous membranes moist  Skin: no apparent rash  Heart: heart sounds 1 & 2 present and normal, no murmurs heard or rub. Tachycardic.  Lungs: Adequate air entry, breath sounds clear bilaterally.  No wheezing/crackles  Abdomen: soft, tender in all four quadrants.  Extremities: No edema, no joint swelling  Neuro: No FND,  no asterixis   ACCESS: Hemodialysis triple lumen left femoral catheter. PIV. Mediport. Left radial A line.    Blood Labs:  Results for orders placed or performed during the hospital encounter of 07/26/24 (from the past  24 hour(s))   POCT GLUCOSE   Result Value Ref Range    POCT Glucose 186 (H) 74 - 99 mg/dL   POCT GLUCOSE   Result Value Ref Range    POCT Glucose 224 (H) 74 - 99 mg/dL   Renal function panel   Result Value Ref Range    Glucose 258 (H) 74 - 99 mg/dL    Sodium 133 (L) 136 - 145 mmol/L    Potassium 4.6 3.5 - 5.3 mmol/L    Chloride 98 98 - 107 mmol/L    Bicarbonate 24 21 - 32 mmol/L    Anion Gap 16 10 - 20 mmol/L    Urea Nitrogen 19 6 - 23 mg/dL    Creatinine 1.55 (H) 0.50 - 1.05 mg/dL    eGFR 39 (L) >60 mL/min/1.73m*2    Calcium 8.3 (L) 8.6 - 10.6 mg/dL    Phosphorus 2.6 2.5 - 4.9 mg/dL    Albumin 2.8 (L) 3.4 - 5.0 g/dL   Lactate   Result Value Ref Range    Lactate 2.7 (H) 0.4 - 2.0 mmol/L   POCT GLUCOSE   Result Value Ref Range    POCT Glucose 239 (H) 74 - 99 mg/dL   Lactate   Result Value Ref Range    Lactate 2.5 (H) 0.4 - 2.0 mmol/L   POCT GLUCOSE   Result Value Ref Range    POCT Glucose 273 (H) 74 - 99 mg/dL   POCT GLUCOSE   Result Value Ref Range    POCT Glucose 239 (H) 74 - 99 mg/dL   POCT GLUCOSE   Result Value Ref Range    POCT Glucose 248 (H) 74 - 99 mg/dL   POCT GLUCOSE   Result Value Ref Range    POCT Glucose 222 (H) 74 - 99 mg/dL   POCT GLUCOSE   Result Value Ref Range    POCT Glucose 201 (H) 74 - 99 mg/dL   POCT GLUCOSE   Result Value Ref Range    POCT Glucose 202 (H) 74 - 99 mg/dL   Comprehensive Metabolic Panel   Result Value Ref Range    Glucose 226 (H) 74 - 99 mg/dL    Sodium 134 (L) 136 - 145 mmol/L    Potassium 4.3 3.5 - 5.3 mmol/L    Chloride 99 98 - 107 mmol/L    Bicarbonate 23 21 - 32 mmol/L    Anion Gap 16 10 - 20 mmol/L    Urea Nitrogen 18 6 - 23 mg/dL    Creatinine 1.57 (H) 0.50 - 1.05 mg/dL    eGFR 38 (L) >60 mL/min/1.73m*2    Calcium 8.4 (L) 8.6 - 10.6 mg/dL    Albumin 3.0 (L) 3.4 - 5.0 g/dL    Alkaline Phosphatase 469 (H) 33 - 110 U/L    Total Protein 5.0 (L) 6.4 - 8.2 g/dL     (H) 9 - 39 U/L    Bilirubin, Total 1.5 (H) 0.0 - 1.2 mg/dL    ALT 75 (H) 7 - 45 U/L   Magnesium   Result  Value Ref Range    Magnesium 2.36 1.60 - 2.40 mg/dL   Phosphorus   Result Value Ref Range    Phosphorus 2.5 2.5 - 4.9 mg/dL   Type and screen   Result Value Ref Range    ABO TYPE B     Rh TYPE POS     ANTIBODY SCREEN NEG    CBC   Result Value Ref Range    WBC 2.8 (L) 4.4 - 11.3 x10*3/uL    nRBC 1.8 (H) 0.0 - 0.0 /100 WBCs    RBC 2.96 (L) 4.00 - 5.20 x10*6/uL    Hemoglobin 8.2 (L) 12.0 - 16.0 g/dL    Hematocrit 24.2 (L) 36.0 - 46.0 %    MCV 82 80 - 100 fL    MCH 27.7 26.0 - 34.0 pg    MCHC 33.9 32.0 - 36.0 g/dL    RDW 17.2 (H) 11.5 - 14.5 %    Platelets 23 (LL) 150 - 450 x10*3/uL   CBC and Auto Differential   Result Value Ref Range    WBC 2.8 (L) 4.4 - 11.3 x10*3/uL    nRBC 1.8 (H) 0.0 - 0.0 /100 WBCs    RBC 2.96 (L) 4.00 - 5.20 x10*6/uL    Hemoglobin 8.2 (L) 12.0 - 16.0 g/dL    Hematocrit 24.2 (L) 36.0 - 46.0 %    MCV 82 80 - 100 fL    MCH 27.7 26.0 - 34.0 pg    MCHC 33.9 32.0 - 36.0 g/dL    RDW 17.2 (H) 11.5 - 14.5 %    Platelets 23 (LL) 150 - 450 x10*3/uL    Immature Granulocytes %, Automated 0.7 0.0 - 0.9 %    Immature Granulocytes Absolute, Automated 0.02 0.00 - 0.70 x10*3/uL   Manual Differential   Result Value Ref Range    Neutrophils %, Manual 73.9 40.0 - 80.0 %    Lymphocytes %, Manual 15.5 13.0 - 44.0 %    Monocytes %, Manual 10.6 2.0 - 10.0 %    Eosinophils %, Manual 0.0 0.0 - 6.0 %    Basophils %, Manual 0.0 0.0 - 2.0 %    Seg Neutrophils Absolute, Manual 2.07 1.20 - 7.00 x10*3/uL    Lymphocytes Absolute, Manual 0.43 (L) 1.20 - 4.80 x10*3/uL    Monocytes Absolute, Manual 0.30 0.10 - 1.00 x10*3/uL    Eosinophils Absolute, Manual 0.00 0.00 - 0.70 x10*3/uL    Basophils Absolute, Manual 0.00 0.00 - 0.10 x10*3/uL    Total Cells Counted 142     RBC Morphology No significant RBC morphology present    POCT GLUCOSE   Result Value Ref Range    POCT Glucose 205 (H) 74 - 99 mg/dL   POCT GLUCOSE   Result Value Ref Range    POCT Glucose 181 (H) 74 - 99 mg/dL   POCT GLUCOSE   Result Value Ref Range    POCT Glucose 182  (H) 74 - 99 mg/dL   POCT GLUCOSE   Result Value Ref Range    POCT Glucose 165 (H) 74 - 99 mg/dL   POCT GLUCOSE   Result Value Ref Range    POCT Glucose 157 (H) 74 - 99 mg/dL   POCT GLUCOSE   Result Value Ref Range    POCT Glucose 153 (H) 74 - 99 mg/dL   POCT GLUCOSE   Result Value Ref Range    POCT Glucose 136 (H) 74 - 99 mg/dL   POCT GLUCOSE   Result Value Ref Range    POCT Glucose 141 (H) 74 - 99 mg/dL   POCT GLUCOSE   Result Value Ref Range    POCT Glucose 124 (H) 74 - 99 mg/dL      XR chest 1 view 07/28/2024    IMPRESSION:  1. Low lung volumes with bronchovascular crowding and suspect  superimposed mild pulmonary edema.  2. Interval improvement of a now trace left pleural effusion.  3. Right chest wall MediPort as above.    ASSESSMENT:    Angie Tobin is a  58 y.o.  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 Nephrology is currently managing for ALDEN stage III (baseline Cr 0.7 jumped to 7.6 with BUN 90 on 07/25/2024; current Cr 1.57 on CVVH).     #ALDEN, anuric - BL Cr 0.7  - Etiology: Acute tubular injury from poor PO intake/septic shock (UTI) with c/f TLS at OSH  - CT abdomen was negative for hydronephrosis  - UA- epithelial cells, +RBC/WBC, 1+ protein  - Started on CVVH (started 07/25/2024) due to hyperkalemia, acidosis  - Patient remains anuric (0 urine output last 24 hours)  - Patient remains on norepinephrine and vasopressin to stabilize blood pressure with home HTN medications held    Recommendations:  - Continue with CVVH  - Monitor for increase in urine output  - Avoid nephrotoxins, contrast if possible  - Strict Is/Os  - Renal dosing for medications for latest eGFR, follow medication trough as appropriate  - Avoid hypotension/rapid fluctuations in BPs    Roberto Hart, MS4  24 hour Renal Pager -  [phone]    Discussed with attending nephrologist, Dr. Sathish Isabel.

## 2024-07-30 NOTE — CARE PLAN
Problem: Pain - Adult  Goal: Verbalizes/displays adequate comfort level or baseline comfort level  Outcome: Progressing     Problem: Safety - Adult  Goal: Free from fall injury  Outcome: Progressing     Problem: Skin  Goal: Participates in plan/prevention/treatment measures  Outcome: Progressing  Flowsheets (Taken 7/30/2024 0545)  Participates in plan/prevention/treatment measures:   Elevate heels   Increase activity/out of bed for meals  Goal: Prevent/manage excess moisture  Outcome: Progressing  Flowsheets (Taken 7/30/2024 0545)  Prevent/manage excess moisture:   Cleanse incontinence/protect with barrier cream   Moisturize dry skin   Use wicking fabric (obtain order)  Goal: Promote/optimize nutrition  Outcome: Progressing  Flowsheets (Taken 7/30/2024 0545)  Promote/optimize nutrition:   Offer water/supplements/favorite foods   Consume > 50% meals/supplements   Monitor/record intake including meals  Goal: Promote skin healing  Outcome: Progressing  Flowsheets (Taken 7/30/2024 0545)  Promote skin healing:   Protective dressings over bony prominences   Turn/reposition every 2 hours/use positioning/transfer devices     Problem: Pain  Goal: Turns in bed with improved pain control throughout the shift  Outcome: Progressing  Goal: Free from opioid side effects throughout the shift  Outcome: Progressing  Goal: Free from acute confusion related to pain meds throughout the shift  Outcome: Progressing

## 2024-07-30 NOTE — PROGRESS NOTES
Occupational Therapy    Evaluation and Treatment    Patient Name: Angie Tobin  MRN: [id number]  Today's Date: 7/30/2024  Room: 27/27  Time Calculation  Start Time: 1157  Stop Time: 1230  Time Calculation (min): 33 min    Assessment  IP OT Assessment  OT Assessment: Angie is a 59 yo female presenting with deficits in ADLs, IADLs, bed mobility, transfers, UE strength, and functional activity tolerance. pt would benefit from skilled OT intervention to return to OF safely  Prognosis: Good  Barriers to Discharge: None  Evaluation/Treatment Tolerance: Patient tolerated treatment well  Medical Staff Made Aware: Yes  End of Session Communication: Bedside nurse  End of Session Patient Position: Bed, 3 rail up, Alarm off, not on at start of session  Plan:  Inpatient Plan  Treatment Interventions: ADL retraining, Functional transfer training, UE strengthening/ROM, Endurance training, Patient/family training, Compensatory technique education, Equipment evaluation/education  OT Frequency: 3 times per week  OT Discharge Recommendations: Moderate intensity level of continued care  Equipment Recommended upon Discharge:  (TBD)  OT Recommended Transfer Status: Assist of 2  OT - OK to Discharge: Yes  OT Assessment  OT Assessment Results: Decreased ADL status, Decreased upper extremity strength, Decreased endurance, Decreased safe judgment during ADL, Decreased functional mobility, Decreased gross motor control, Decreased IADLs  Prognosis: Good  Barriers to Discharge: None  Evaluation/Treatment Tolerance: Patient tolerated treatment well  Medical Staff Made Aware: Yes  Strengths: Ability to acquire knowledge, Capable of completing ADLs semi/independent, Housing layout, Insight into problems, Living arrangement secure, Premorbid level of function, Rehab experience, Support of Caregivers  Barriers to Participation: Attitude of self    Subjective   Current Problem:  1. Sepsis (Multi)  Myoglobin, urine; [facility]; [id number] -  "Miscellaneous Test    Myoglobin, urine; [facility]; [id number] - Miscellaneous Test    Myoglobin, urine; [facility]; [id number] - Miscellaneous Test      2. Shock (Multi)  Transthoracic Echo (TTE) Complete    Transthoracic Echo (TTE) Complete        General:  Reason for Referral: OSH 7/25/2024 and was transferred to [facility] MICU on 07/27/24 for septic shock requiring pressors, acute hypercapnic respiratory failure,  and TLS 2/2 recent chemotherapy.  Past Medical History Relevant to Rehab: HTN, HLD, B-cell monoclonal lymphocytosis, DM, and leiomyosarcoma  Prior to Session Communication: Bedside nurse  Patient Position Received: Bed, 3 rail up, Alarm off, not on at start of session  Family/Caregiver Present: Yes  Caregiver Feedback: niece present in room  General Comment: Pt willing to work with OT. Pt with increased anxiety EOB, able to be calmed with education and relfective listening. Per RN pt recently came off of vaso. On .08 of levo. Vitals stable hermelindo, Pt on CVVH, [place]   Precautions:  Medical Precautions: Fall precautions, Oxygen therapy device and L/min  Precautions Comment: Protective precaution; MAP > 65  Vital Signs:  Heart Rate: 103 (115 post)  Resp: 15 (16 post)  SpO2: 100 %  BP: 117/54 (122/65, VSS throughout)  MAP (mmHg): 75 (88 post)  Pain:  Pain Assessment  Pain Assessment: 0-10  0-10 (Numeric) Pain Score: 0 - No pain (reports some \"hot spots\" on L shoulder that hurt with touch)  Lines/Tubes/Drains:  Arterial Line 07/26/24 Left Radial (Active)   Number of days: 3       Implantable Port 07/25/24 Right Chest Single lumen port (Active)   Number of days: 5       External Urinary Catheter Female (Active)   Number of days: 3       Hemodialysis Cath 07/26/24 Triple lumen Left Non-tunneled catheter Femoral (Active)   Number of days: 4         Objective   Cognition:  Overall Cognitive Status: Within Functional Limits  Orientation Level: Oriented X4  Following Commands: Follows all commands and directions without " difficulty  Cognition Comments: increased anxiousness EOB  Attention: Within Functional Limits  Insight: Mild  Impulsive: Mildly  Processing Speed: Within funtional limits           Home Living:  Type of Home: House  Lives With: Alone  Home Adaptive Equipment: None  Home Layout: Two level, Bed/bath upstairs, 1/2 bath on main level, Stairs to alternate level with rails  Alternate Level Stairs-Number of Steps: 10+10  Home Access: Stairs to enter without rails  Entrance Stairs-Number of Steps: 2  Bathroom Shower/Tub: Tub/shower unit  Bathroom Toilet: Standard  Bathroom Equipment: None   Prior Function:  Level of [place]: Independent with ADLs and functional transfers, Independent with homemaking with ambulation  Receives Help From: Family, Friends (as needed)  ADL Assistance: Independent  Homemaking Assistance: Independent  Ambulatory Assistance: Independent  Vocational:  (Employed with [employer], works from home)  Leisure: gambling  Hand Dominance: Right  Prior Function Comments: (+) drives, pt reported no falls within the past 6 months  IADL History:     ADL:  Eating Assistance:  (set up)  Grooming Assistance: Minimal (anticipated)  Bathing Assistance: Moderate (anticipated)  UE Dressing Assistance: Minimal (anticipated)  LE Dressing Assistance: Maximal (anticipated)  Toileting Assistance with Device: Maximal (anticipated)  Activity Tolerance:  Endurance: Tolerates 10 - 20 min exercise with multiple rests  Early Mobility/Exercise Safety Screen: Proceed with mobilization - No exclusion criteria met  Balance:  Static Sitting Balance  Static Sitting-Balance Support: Bilateral upper extremity supported  Static Sitting-Level of Assistance: Minimum assistance  Bed Mobility/Transfers: Bed Mobility/Transfers: Bed Mobility  Bed Mobility: Yes  Bed Mobility 1  Bed Mobility 1: Supine to sitting, Sitting to supine  Level of Assistance 1: Maximum assistance (x1)  Bed Mobility Comments 1: HOB elevated supine to sit, use of draw  sheet  Bed Mobility 2  Bed Mobility Comments 2: Pt dependently placed in chair position.   and    IADL's:      Vision: Vision - Basic Assessment  Current Vision: Wears glasses for distance only (and driving)   and    Sensation:  Light Touch: No apparent deficits  Strength:  Strength Comments: BUE globally weak 3-/5  Perception:     Coordination:  Movements are Fluid and Coordinated:  (grossly bradkinetic)   Hand Function:  Hand Function  Gross Grasp: Functional  Extremities:   RUE   RUE : Within Functional Limits (AAROM WFL), LUE   LUE: Within Functional Limits (AAROM WFL), RLE   RLE : Within Functional Limits (AAROM WFL), and LLE   LLE : Within Functional Limits (AAROM WFL)    Treatment Completed on Evaluation  Activities of Daily Living: Feeding  Feeding Level of Assistance: Setup  Feeding Where Assessed: Bed level  Feeding Comments: arms positioned, set up for eating          Therapy/Activity: Therapeutic Exercise  Therapeutic Exercise Performed: Yes  Therapeutic Exercise Activity 1: AAROM of WILLIAMS shoulder flexion  x10, AAROM of WILLIAMS elbow flexion x10, scapular retractions WILLIAMS x10  Therapeutic Exercise Activity 2: BLE knee extension x10 for preparatory activity   Therapeutic Activity  Therapeutic Activity Performed: Yes  Therapeutic Activity 1: Progressive upright mobility in bed in chair position. Pt tolerates x10 minutes in upright chair position. VSS, progressed to EOB. Pt sitting EOB with proper positioning and BLE blocked for reinforcement 2.2 anxiety. Pt placing BUE onto bed. Pt reports mild dizziness despite VSS following EOB x12 minutes with CGA/MIn A. Returned to supine.    Outcome Measures: [facility] Daily Activity  Putting on and taking off regular lower body clothing: Total  Bathing (including washing, rinsing, drying): A lot  Putting on and taking off regular upper body clothing: A lot  Toileting, which includes using toilet, bedpan or urinal: Total  Taking care of personal grooming such as brushing teeth:  A little  Eating Meals: A little  Daily Activity - Total Score: 12    Confusion Assessment Method-ICU (CAM-ICU)  Feature 1: Acute Onset or Fluctuating Course: Negative  Feature 2: Inattention: Negative  Feature 4: Disorganized Thinking: Negative  Overall CAM-ICU: Negative   ICU Mobility Screen  Early Mobility/Exercise Safety Screen: Proceed with mobilization - No exclusion criteria met  ICU Mobility Scale: Sitting over edge of bed,          Education Documentation  Body Mechanics, taught by Desiree Turcios OT at 7/30/2024  1:53 PM.  Learner: Patient  Readiness: Acceptance  Method: Explanation  Response: Needs Reinforcement    Precautions, taught by Desiree Turcios OT at 7/30/2024  1:53 PM.  Learner: Patient  Readiness: Acceptance  Method: Explanation  Response: Needs Reinforcement    ADL Training, taught by Desiree Turcios OT at 7/30/2024  1:53 PM.  Learner: Patient  Readiness: Acceptance  Method: Explanation  Response: Needs Reinforcement    Education Comments  No comments found.        Goals:   Encounter Problems       Encounter Problems (Active)       ADLs       Patient with complete lower body dressing with moderate assist level of assistance donning and doffing all LE clothes  with PRN adaptive equipment while edge of bed  (Progressing)       Start:  07/30/24    Expected End:  08/20/24            Patient will complete daily grooming tasks with independent level of assistance and PRN adaptive equipment while EOB. (Progressing)       Start:  07/30/24    Expected End:  08/20/24            Patient will complete toileting including hygiene clothing management/hygiene with moderate assist level of assistance and bedside commode. (Progressing)       Start:  07/30/24    Expected End:  08/20/24               EXERCISE/STRENGTHENING       Patient with increase BUE to >/= 3/5 strength. (Progressing)       Start:  07/30/24    Expected End:  08/20/24               TRANSFERS       Patient will perform bed mobility minimal  assist  level of assistance and bed rails in order to improve safety and independence with mobility (Progressing)       Start:  07/30/24    Expected End:  08/20/24            Patient will complete functional transfer to BSC/chair with least restrictive device with moderate assist level of assistance. (Progressing)       Start:  07/30/24    Expected End:  08/20/24 07/30/24 at 1:54 PM   Desiree Turcios OT   Rehab Office: [phone]

## 2024-07-30 NOTE — CARE PLAN
Problem: Pain - Adult  Goal: Verbalizes/displays adequate comfort level or baseline comfort level  Outcome: Progressing  Flowsheets (Taken 7/30/2024 1449)  Verbalizes/displays adequate comfort level or baseline comfort level:   Encourage patient to monitor pain and request assistance   Assess pain using appropriate pain scale     Problem: Safety - Adult  Goal: Free from fall injury  Outcome: Progressing  Flowsheets (Taken 7/30/2024 1449)  Free from fall injury: Instruct family/caregiver on patient safety     Problem: Skin  Goal: Participates in plan/prevention/treatment measures  Outcome: Progressing  Flowsheets (Taken 7/30/2024 1449)  Participates in plan/prevention/treatment measures: Elevate heels  Goal: Prevent/manage excess moisture  Outcome: Progressing  Flowsheets (Taken 7/30/2024 1449)  Prevent/manage excess moisture:   Cleanse incontinence/protect with barrier cream   Monitor for/manage infection if present  Goal: Prevent/minimize sheer/friction injuries  Outcome: Progressing  Flowsheets (Taken 7/30/2024 1449)  Prevent/minimize sheer/friction injuries:   Turn/reposition every 2 hours/use positioning/transfer devices   Use pull sheet  Goal: Promote/optimize nutrition  Outcome: Progressing  Flowsheets (Taken 7/30/2024 1449)  Promote/optimize nutrition: Monitor/record intake including meals  Goal: Promote skin healing  Outcome: Progressing  Flowsheets (Taken 7/30/2024 1449)  Promote skin healing:   Protective dressings over bony prominences   Turn/reposition every 2 hours/use positioning/transfer devices

## 2024-07-30 NOTE — PROGRESS NOTES
"Medical Intensive Care - Daily Progress Note   Subjective    Angie Tobin is a 58 y.o. year old female patient admitted on 7/26/2024 with following ICU needs: Septic shock requiring vasopressors.     Interval History:  Patient was awake and alert. She feels about the same today. No overnight complaints. She did not report fevers and chills or dizziness.       Meds    Scheduled medications  hydrocortisone sodium succinate, 50 mg, intravenous, q6h  lidocaine, 1 patch, transdermal, Daily  lidocaine, 1 patch, transdermal, q24h  meropenem, 1 g, intravenous, q12h  midodrine, 10 mg, oral, q8h  norepinephrine in dextrose 5 %, , ,   pantoprazole, 40 mg, intravenous, Daily      Continuous medications  insulin regular infusion, 0-20 Units/hr, Last Rate: 3 Units/hr (07/30/24 1007)  norepinephrine, 0-0.5 mcg/kg/min, Last Rate: 0.08 mcg/kg/min (07/30/24 1139)  PrismaSol 4/2.5, 2,700 mL/hr, Last Rate: 2,700 mL/hr (07/30/24 0615)  vasopressin, 0-0.03 Units/min, Last Rate: Stopped (07/30/24 1013)      PRN medications  PRN medications: benzocaine-menthol, dextrose, dextrose, diclofenac sodium, glucagon, glucagon, glucagon, insulin regular, melatonin, norepinephrine in dextrose 5 %     Objective    Blood pressure 114/55, pulse 97, temperature 35.7 °C (96.3 °F), temperature source Temporal, resp. rate (!) 30, height 1.778 m (5' 10\"), weight 130 kg (285 lb 15 oz), SpO2 100%.     Physical Exam   Constitutional:       General: She is not in acute distress.     Appearance: She is obese. She is diaphoretic.   HENT:      Head: Normocephalic and atraumatic.      Nose: Nose normal.      Mouth/Throat:      Mouth: Mucous membranes are moist.   Eyes:      Extraocular Movements: Extraocular movements intact.      Pupils: Pupils are equal, round, and reactive to light.   Cardiovascular:      Rate and Rhythm: Tachycardia present.      Pulses: Normal pulses.      Heart sounds: No murmur heard.     No friction rub. No gallop.   Pulmonary:      " Effort: Pulmonary effort is normal. No respiratory distress.      Breath sounds: No wheezing, rhonchi or rales.   Abdominal:      General: Bowel sounds are normal. There is no distension.      Palpations: Abdomen is soft. There is no mass.      Tenderness: There is abdominal tenderness. There is no guarding.      Hernia: A hernia is present.   Musculoskeletal:      Cervical back: Normal range of motion.   Skin:     General: Skin is warm.      Capillary Refill: Capillary refill takes less than 2 seconds.      Findings: Bruising (abdomen) present.   Neurological:      General: No focal deficit present.      Mental Status: She is oriented to person, place, and time.   Psychiatric:         Mood and Affect: Mood normal.     Intake/Output Summary (Last 24 hours) at 7/30/2024 1204  Last data filed at 7/30/2024 1100  Gross per 24 hour   Intake 961.73 ml   Output 1256 ml   Net -294.27 ml     Labs:   Results from last 72 hours   Lab Units 07/30/24  0427 07/29/24  1652 07/29/24  0530   SODIUM mmol/L 134* 133* 134*   POTASSIUM mmol/L 4.3 4.6 4.3   CHLORIDE mmol/L 99 98 99   CO2 mmol/L 23 24 23   BUN mg/dL 18 19 24*   CREATININE mg/dL 1.57* 1.55* 1.79*   GLUCOSE mg/dL 226* 258* 170*   CALCIUM mg/dL 8.4* 8.3* 8.3*   ANION GAP mmol/L 16 16 16   EGFR mL/min/1.73m*2 38* 39* 33*   PHOSPHORUS mg/dL 2.5 2.6 2.6      Results from last 72 hours   Lab Units 07/30/24  0427 07/29/24  0530 07/28/24  1408   WBC AUTO x10*3/uL 2.8*  2.8* 1.5* 0.8*   HEMOGLOBIN g/dL 8.2*  8.2* 7.9* 7.9*   HEMATOCRIT % 24.2*  24.2* 22.6* 23.4*   PLATELETS AUTO x10*3/uL 23*  23* 27* 22*   LYMPHO PCT MAN % 15.5 36.2 34.5   MONO PCT MAN % 10.6 13.8 19.5   EOSINO PCT MAN % 0.0 0.0 0.0        Micro/ID:     Lab Results   Component Value Date    URINECULTURE No growth 07/26/2024    BLOODCULT No growth at 3 days 07/27/2024    BLOODCULT No growth at 3 days 07/27/2024     MELD 3.0: 10 at 6/12/2024  8:00 AM  MELD-Na: 8 at 6/12/2024  8:00 AM  Calculated from:  Serum  Creatinine: 0.80 mg/dL (Using min of 1 mg/dL) at 6/12/2024  8:00 AM  Serum Sodium: 136 mmol/L at 6/12/2024  8:00 AM  Total Bilirubin: 1.7 mg/dL at 6/12/2024  8:00 AM  Serum Albumin: 4.2 g/dL (Using max of 3.5 g/dL) at 6/12/2024  8:00 AM  INR(ratio): 1.0 at 6/12/2024  8:00 AM  Age at listing (hypothetical): 58 years  Sex: Female at 6/12/2024  8:00 AM     Summary of key imaging results from the last 24 hours  CXR 7/29/24:  1. Hepatic steatosis with likely simple hepatic cyst within left lobe of the liver    Assessment and Plan     Assessment: Angie Tobin is a 58 y.o. year old female patient who presented to OSH 7/25/2024 and was transferred to [facility] MICU on 07/27/24 for septic shock requiring pressors with urinary tract infection source, acute hypoxic respiratory failure, with possible TLS, treated with 2x rasburicase, oncology consulted yesterday, per their note TLS is unlikely based on her tumor type. Received one unit RBCs, Zosyn switched to meropenem on 7/27.     Mechanical Ventilation: none  Sedation/Analgesia:  none  Restraints: no    Summary for 07/30/24  :  C diff PCR pending  Stopped vasopressin  Stopped pegfilgrastim   Continue meropenem  C/w insulin drip    Plan:  NEUROLOGY/PSYCH:  #Anxiety  ::Lorazepam 0.5m *2 given at OSH on presentation for anxiety  ::Patient denies anxiety at present  Plan:  -Will monitor     CARDIOVASCULAR:  #Shock, Likely Septic in Origin   ::Etiology: Likely septic from UTI, cultures growing enteric bacilli. Given troponin trend (64 -> 56) and TTE (normal right ventricular function), unlikely cardiac in origin. Given hypotension continuing s/p aggressive fluid resuscitation, unlikely hypovolemic in origin.   ::7/26 OSH: Received 5L NS, 1L LR, 50g Albumin, 100mg solumedrol  ::7/26 OSH: Norepinephrine and Vasopressin begun for persistent hypotension   ::7/27 began Stress Dose Steroids Hydrocortisone 50mg Q6H   ::7/27 1.5L LR, 7/29 0.5L LR   ::Lactate downtrend: 7/26 2.8 > 7/27 1.9 >  7/29 2.5  Plan:  -Continue Norepinephrine, titration per patients ability  -Continue Hydrocortisone 50 Q6h  -Stopped Vasopressin 0.03  -Will replete fluids prn, careful not to overload due to ALDEN  -ABXs per ID section (meropenem)  -Ordered repeat lactate     #Hypertension  ::Home medication: Metoprolol Succinate XL 25mg, Lisinopril 20mg  Plan  -Holding metoprolol in setting of shock  -Holding Lisinopril in setting of ALDEN     PULMONARY:  #Acute Hypoxic Hypercapnic Respiratory Failure   ::Etiology: Likely secondary to atelectasis vs pulmonary edema vs pneumonia given CXR findings  ::2022 6 Wedge lung resection 2/2 metastasis  ::New O2 requirement; no O2 requirement at baseline  ::7/27 Pro calcitonin: 3.07  ::7/28 ABG: Ph 7.42, o2 27, co2 103  ::Pt on 2L NC at present  Plan:  -Continue Oxygen supplementation  -Monitor for changes in symptoms     RENAL/GENITOURINARY:  #UTI   -See ID section     #ALDEN w/ Oliguria, required CVVH  ::Baseline Cr: 0.7  ::Presentation: BUN 90, Cr 7.6, now 1.79  ::7/26 CVVH at OSH -> 310 mL urine  ::King placed at OSH. Removed 7/27 upon admission to [facility].   ::CK 1288   Plan:  -Nephrology consulted regarding CVVH   -On CVVH  -BID RFP      #Hyperkalemia, likely in the setting of TLS  ::Presentation: 5.5. Peaked 5.9. now 4.3  ::OSH: Insulin drip, Lokelma 10g *1, Sodium Bicarbonate infusion, Calcium gluconate  ::7/27: 4.9. Additional 2g Calcium Gluconate given  Plan:  -BID RFP, replete PRN     #Hypocalcemia, likely in the setting of TLS  ::Presentation: 7.9  ::7/29: 8.3  Plan:  -BID RFP, replete PRN     #Hypomagnesemia  ::7/27 2.02  Plan:  -BID RFP, replete PRN     GASTROENTEROLOGY:  #Diarrhea  ::Had multiple loose bowel movements after admission  Plan:  -Stopped Miralax, Senna  -C diff PCR  -Avoid narcotics, replete electrolytes PRN, treat sepsis, replete fluids prn     ENDOCRINOLOGY:  #DM   ::7/26 Hemoglobin A1C: 9.2 (7.4 in 2022)  ::Home meds: Metformin 500mg BID  ::Given Insulin Drip on  presentation at OSH  ::Patient receiving high dose steroids since 7/27  Plan:  -Holding home metformin in inpatient setting  -Resumed Insulin Drip for persistent hyperglycemia  -Hypoglycemia protocol   -Brain MALLOY      HEMATOLOGY/ONCOLOGY:  #Leiomyosarcoma, with metastasis to liver, lungs, and spine  ::Originally found in the pancreatic bed 2019. Hysterectomy and bilateral salpingo- oophorectomy 2019. H/o chemotherapy and radiation. Multiple remissions. Began Yondelis 06/05/2024.   ::Last chemotherapy: Yondelis C3 7/17/2024  Plan:  -Oncology consulted, recs appreciated   -No chemotherapy pending at present      #Elevated liver enzymes  ::, ALT 53, AST 83  ::Likely secondary to metastasis to liver  Plan:  -Will monitor     #Tumor Lysis Syndrome, improving (resolved, no concern for now)  ::On presentation: hyperuricemia 15.9, hypocalcemia, hyperkalemia, nausea, vomit, diarrhea  ::7/26 OSH: 2x rasburicase, 5L NS, 1L LR, 50g Albumin, 100mg solumedrol  ::G6PD negative  ::7/27 Uric Acid: <1.5  Plan:  -per Oncology, there is no concern for TLS  -Trending labs      #Pancytopenia  #Neutropenia  ::WBC 3.9 > 1.1 > 1.5, RBC 3.06 > 2.71 > 2.83, Hemoglobin 7.9, Platelets 27  ::ANC 5.36  ::Etiology: secondary to sepsis vs post chemotherapy  ::Heparin given at OSH  Plan:  -Holding heparin in setting of thrombocytopenia  -Treat Underlying Condition  -Transfuse platelets before placing central line  -Continue filgastrim     #Monoclonal B-cell lymphocytosis   ::Diagnosed 2016  Plan:  -Monitor     MUSCULOSKELETAL/ SKIN:  FREDERICK     INFECTIOUS DISEASE:  # UTI   ::7/25 UCX: >100,000 Enteric Bacilli  ::7/25 BCX * 2: NG2D  ::7/26 Repeat UCX: Pending  ::7/27 Repeat BCX: Pending  ::S/P Ceftriaxone 1g 7/25  ::s/p vancomycin 7/27-7/28  ::s/p Zosyn 7/26-7/27  ::7/27 Pro calcitonin: 3.07  ::MRSA neg  Plan:  ::Continue meropenem  (7/27- present)  ::Discontinued Vancomycin (7/27-7/28)       ICU Check List   FEN  Fluids: S/P 1.5L fluid  7/27. PRN   Electrolytes: Will replete PRN   Nutrition: Regular Diet   Prophylaxis:  DVT ppx: SCDs (Holding medical anticoagulation due to thrombocytopenia)   GI ppx: PPI IV   Bowel care: Holding Miralax 17g BID, Sennosides 8.6mg nightly  Hardware:  Catheter: Hemodialysis triple lumen left femoral catheter- placed 7/26 at OSH  Access: PIV, Mediport    Drains: King removed from OSH. Current External Urinary catheter  Lines: A line left radial- placed 7/26 at OSH  Social:  Code: Full Code    NOK: Loulou Benavides (cousin) [phone]; Tatyana Yanez (niece) [phone]  Disposition: MICU        Queen PAMLEA Macias MD   07/30/24 at 12:04 PM     Disclaimer: Documentation completed with the information available at the time of input. The times in the chart may not be reflective of actual patient care times, interventions, or procedures. Documentation occurs after the physical care of the patient.

## 2024-07-30 NOTE — PROGRESS NOTES
SOCIAL WORK NOTE   SW met with patient to follow up on HCPOA. Patient states she prefers to speak with her cousin before completing it. SW advised that patient reach out to SW when ready to complete. Social work to follow.  VIJAY Llanos, NELSONW-S ([id number])

## 2024-07-31 ENCOUNTER — APPOINTMENT (OUTPATIENT)
Dept: RADIOLOGY | Facility: HOSPITAL | Age: 59
DRG: 871 | End: 2024-07-31
Payer: COMMERCIAL

## 2024-07-31 ENCOUNTER — APPOINTMENT (OUTPATIENT)
Dept: CARDIOLOGY | Facility: HOSPITAL | Age: 59
DRG: 871 | End: 2024-07-31
Payer: COMMERCIAL

## 2024-07-31 LAB
ALBUMIN SERPL BCP-MCNC: 2.7 G/DL (ref 3.4–5)
ALBUMIN SERPL BCP-MCNC: 2.8 G/DL (ref 3.4–5)
ALP SERPL-CCNC: 535 U/L (ref 33–110)
ALT SERPL W P-5'-P-CCNC: 97 U/L (ref 7–45)
ANION GAP BLDA CALCULATED.4IONS-SCNC: 10 MMO/L (ref 10–25)
ANION GAP SERPL CALC-SCNC: 18 MMOL/L (ref 10–20)
ANION GAP SERPL CALC-SCNC: 19 MMOL/L (ref 10–20)
AST SERPL W P-5'-P-CCNC: 320 U/L (ref 9–39)
BACTERIA BLD CULT: NORMAL
BACTERIA BLD CULT: NORMAL
BASE EXCESS BLDA CALC-SCNC: -2.8 MMOL/L (ref -2–3)
BILIRUB SERPL-MCNC: 1.8 MG/DL (ref 0–1.2)
BODY TEMPERATURE: 37 DEGREES CELSIUS
BUN SERPL-MCNC: 17 MG/DL (ref 6–23)
BUN SERPL-MCNC: 18 MG/DL (ref 6–23)
C DIF TOX TCDA+TCDB STL QL NAA+PROBE: ABNORMAL
CA-I BLD-SCNC: 1.12 MMOL/L (ref 1.1–1.33)
CA-I BLD-SCNC: 1.16 MMOL/L (ref 1.1–1.33)
CA-I BLDA-SCNC: 1.09 MMOL/L (ref 1.1–1.33)
CALCIUM SERPL-MCNC: 8.2 MG/DL (ref 8.6–10.6)
CALCIUM SERPL-MCNC: 8.3 MG/DL (ref 8.6–10.6)
CARDIAC TROPONIN I PNL SERPL HS: 203 NG/L (ref 0–34)
CHLORIDE BLDA-SCNC: 103 MMOL/L (ref 98–107)
CHLORIDE SERPL-SCNC: 97 MMOL/L (ref 98–107)
CHLORIDE SERPL-SCNC: 98 MMOL/L (ref 98–107)
CO2 SERPL-SCNC: 21 MMOL/L (ref 21–32)
CO2 SERPL-SCNC: 21 MMOL/L (ref 21–32)
CREAT SERPL-MCNC: 1.5 MG/DL (ref 0.5–1.05)
CREAT SERPL-MCNC: 1.53 MG/DL (ref 0.5–1.05)
EGFRCR SERPLBLD CKD-EPI 2021: 40 ML/MIN/1.73M*2
EGFRCR SERPLBLD CKD-EPI 2021: 40 ML/MIN/1.73M*2
ERYTHROCYTE [DISTWIDTH] IN BLOOD BY AUTOMATED COUNT: 17.2 % (ref 11.5–14.5)
GLUCOSE BLD MANUAL STRIP-MCNC: 207 MG/DL (ref 74–99)
GLUCOSE BLD MANUAL STRIP-MCNC: 229 MG/DL (ref 74–99)
GLUCOSE BLD MANUAL STRIP-MCNC: 253 MG/DL (ref 74–99)
GLUCOSE BLDA-MCNC: 266 MG/DL (ref 74–99)
GLUCOSE SERPL-MCNC: 228 MG/DL (ref 74–99)
GLUCOSE SERPL-MCNC: 246 MG/DL (ref 74–99)
HCO3 BLDA-SCNC: 20.1 MMOL/L (ref 22–26)
HCT VFR BLD AUTO: 24.4 % (ref 36–46)
HCT VFR BLD EST: 24 % (ref 36–46)
HGB BLD-MCNC: 8.2 G/DL (ref 12–16)
HGB BLDA-MCNC: 8.1 G/DL (ref 12–16)
HGB FREE PLAS-MCNC: 2.2 MG/DL (ref 0–9.7)
INHALED O2 CONCENTRATION: 21 %
LACTATE BLDA-SCNC: 3.4 MMOL/L (ref 0.4–2)
LACTATE SERPL-SCNC: 2.6 MMOL/L (ref 0.4–2)
LACTATE SERPL-SCNC: 3.4 MMOL/L (ref 0.4–2)
MAGNESIUM SERPL-MCNC: 2.35 MG/DL (ref 1.6–2.4)
MAGNESIUM SERPL-MCNC: 2.41 MG/DL (ref 1.6–2.4)
MCH RBC QN AUTO: 27.8 PG (ref 26–34)
MCHC RBC AUTO-ENTMCNC: 33.6 G/DL (ref 32–36)
MCV RBC AUTO: 83 FL (ref 80–100)
NRBC BLD-RTO: 3.1 /100 WBCS (ref 0–0)
OXYHGB MFR BLDA: 91.8 % (ref 94–98)
PCO2 BLDA: 27 MM HG (ref 38–42)
PH BLDA: 7.48 PH (ref 7.38–7.42)
PHOSPHATE SERPL-MCNC: 2.7 MG/DL (ref 2.5–4.9)
PHOSPHATE SERPL-MCNC: 3.4 MG/DL (ref 2.5–4.9)
PLATELET # BLD AUTO: 31 X10*3/UL (ref 150–450)
PO2 BLDA: 67 MM HG (ref 85–95)
POTASSIUM BLDA-SCNC: 4.5 MMOL/L (ref 3.5–5.3)
POTASSIUM SERPL-SCNC: 4.5 MMOL/L (ref 3.5–5.3)
POTASSIUM SERPL-SCNC: 4.6 MMOL/L (ref 3.5–5.3)
PROT SERPL-MCNC: 4.7 G/DL (ref 6.4–8.2)
RBC # BLD AUTO: 2.95 X10*6/UL (ref 4–5.2)
SAO2 % BLDA: 94 % (ref 94–100)
SCAN RESULT: NORMAL
SODIUM BLDA-SCNC: 129 MMOL/L (ref 136–145)
SODIUM SERPL-SCNC: 131 MMOL/L (ref 136–145)
SODIUM SERPL-SCNC: 133 MMOL/L (ref 136–145)
WBC # BLD AUTO: 8.3 X10*3/UL (ref 4.4–11.3)

## 2024-07-31 PROCEDURE — 87305 ASPERGILLUS AG IA: CPT

## 2024-07-31 PROCEDURE — 2500000002 HC RX 250 W HCPCS SELF ADMINISTERED DRUGS (ALT 637 FOR MEDICARE OP, ALT 636 FOR OP/ED)

## 2024-07-31 PROCEDURE — 87449 NOS EACH ORGANISM AG IA: CPT

## 2024-07-31 PROCEDURE — 74176 CT ABD & PELVIS W/O CONTRAST: CPT | Performed by: RADIOLOGY

## 2024-07-31 PROCEDURE — 83605 ASSAY OF LACTIC ACID: CPT

## 2024-07-31 PROCEDURE — 84100 ASSAY OF PHOSPHORUS: CPT

## 2024-07-31 PROCEDURE — 84100 ASSAY OF PHOSPHORUS: CPT | Performed by: STUDENT IN AN ORGANIZED HEALTH CARE EDUCATION/TRAINING PROGRAM

## 2024-07-31 PROCEDURE — C9113 INJ PANTOPRAZOLE SODIUM, VIA: HCPCS

## 2024-07-31 PROCEDURE — 84132 ASSAY OF SERUM POTASSIUM: CPT

## 2024-07-31 PROCEDURE — 82330 ASSAY OF CALCIUM: CPT | Performed by: INTERNAL MEDICINE

## 2024-07-31 PROCEDURE — 97530 THERAPEUTIC ACTIVITIES: CPT | Mod: GP

## 2024-07-31 PROCEDURE — 82947 ASSAY GLUCOSE BLOOD QUANT: CPT

## 2024-07-31 PROCEDURE — 2500000004 HC RX 250 GENERAL PHARMACY W/ HCPCS (ALT 636 FOR OP/ED)

## 2024-07-31 PROCEDURE — 83735 ASSAY OF MAGNESIUM: CPT | Performed by: STUDENT IN AN ORGANIZED HEALTH CARE EDUCATION/TRAINING PROGRAM

## 2024-07-31 PROCEDURE — 83735 ASSAY OF MAGNESIUM: CPT

## 2024-07-31 PROCEDURE — 90945 DIALYSIS ONE EVALUATION: CPT | Performed by: INTERNAL MEDICINE

## 2024-07-31 PROCEDURE — 80053 COMPREHEN METABOLIC PANEL: CPT

## 2024-07-31 PROCEDURE — 84484 ASSAY OF TROPONIN QUANT: CPT

## 2024-07-31 PROCEDURE — 37799 UNLISTED PX VASCULAR SURGERY: CPT | Performed by: INTERNAL MEDICINE

## 2024-07-31 PROCEDURE — 90937 HEMODIALYSIS REPEATED EVAL: CPT

## 2024-07-31 PROCEDURE — 2020000001 HC ICU ROOM DAILY

## 2024-07-31 PROCEDURE — 80069 RENAL FUNCTION PANEL: CPT | Mod: CCI

## 2024-07-31 PROCEDURE — 93010 ELECTROCARDIOGRAM REPORT: CPT | Performed by: INTERNAL MEDICINE

## 2024-07-31 PROCEDURE — 82330 ASSAY OF CALCIUM: CPT | Performed by: STUDENT IN AN ORGANIZED HEALTH CARE EDUCATION/TRAINING PROGRAM

## 2024-07-31 PROCEDURE — 2500000001 HC RX 250 WO HCPCS SELF ADMINISTERED DRUGS (ALT 637 FOR MEDICARE OP)

## 2024-07-31 PROCEDURE — 74176 CT ABD & PELVIS W/O CONTRAST: CPT

## 2024-07-31 PROCEDURE — 97112 NEUROMUSCULAR REEDUCATION: CPT | Mod: GP

## 2024-07-31 PROCEDURE — 93005 ELECTROCARDIOGRAM TRACING: CPT

## 2024-07-31 PROCEDURE — 85027 COMPLETE CBC AUTOMATED: CPT | Performed by: INTERNAL MEDICINE

## 2024-07-31 PROCEDURE — 71250 CT THORAX DX C-: CPT | Performed by: RADIOLOGY

## 2024-07-31 PROCEDURE — 99291 CRITICAL CARE FIRST HOUR: CPT | Performed by: STUDENT IN AN ORGANIZED HEALTH CARE EDUCATION/TRAINING PROGRAM

## 2024-07-31 PROCEDURE — 37799 UNLISTED PX VASCULAR SURGERY: CPT

## 2024-07-31 RX ORDER — PHENYLEPHRINE HYDROCHLORIDE 10 MG/ML
INJECTION INTRAVENOUS
Status: DISPENSED
Start: 2024-07-31 | End: 2024-08-01

## 2024-07-31 RX ORDER — EPINEPHRINE 1 MG/ML
INJECTION INTRAMUSCULAR; INTRAVENOUS; SUBCUTANEOUS
Status: DISPENSED
Start: 2024-07-31 | End: 2024-08-01

## 2024-07-31 RX ORDER — PHENYLEPHRINE HCL IN 0.9% NACL 0.4MG/10ML
SYRINGE (ML) INTRAVENOUS
Status: DISPENSED
Start: 2024-07-31 | End: 2024-08-01

## 2024-07-31 RX ADMIN — CALCIUM CHLORIDE, MAGNESIUM CHLORIDE, DEXTROSE MONOHYDRATE, LACTIC ACID, SODIUM CHLORIDE, SODIUM BICARBONATE AND POTASSIUM CHLORIDE 2700 ML/HR: 3.68; 3.05; 22; 5.4; 6.46; 3.09; .314 INJECTION INTRAVENOUS at 16:59

## 2024-07-31 RX ADMIN — INSULIN LISPRO 4 UNITS: 100 INJECTION, SOLUTION INTRAVENOUS; SUBCUTANEOUS at 12:03

## 2024-07-31 RX ADMIN — INSULIN GLARGINE 15 UNITS: 100 INJECTION, SOLUTION SUBCUTANEOUS at 20:58

## 2024-07-31 RX ADMIN — HYDROCORTISONE SODIUM SUCCINATE 50 MG: 100 INJECTION, POWDER, FOR SOLUTION INTRAMUSCULAR; INTRAVENOUS at 11:59

## 2024-07-31 RX ADMIN — PANTOPRAZOLE SODIUM 40 MG: 40 INJECTION, POWDER, FOR SOLUTION INTRAVENOUS at 09:01

## 2024-07-31 RX ADMIN — HYDROCORTISONE SODIUM SUCCINATE 50 MG: 100 INJECTION, POWDER, FOR SOLUTION INTRAMUSCULAR; INTRAVENOUS at 23:56

## 2024-07-31 RX ADMIN — MEROPENEM 1 G: 1 INJECTION, POWDER, FOR SOLUTION INTRAVENOUS at 16:47

## 2024-07-31 RX ADMIN — INSULIN LISPRO 4 UNITS: 100 INJECTION, SOLUTION INTRAVENOUS; SUBCUTANEOUS at 09:41

## 2024-07-31 RX ADMIN — MIDODRINE HYDROCHLORIDE 15 MG: 10 TABLET ORAL at 22:15

## 2024-07-31 RX ADMIN — MEROPENEM 1 G: 1 INJECTION, POWDER, FOR SOLUTION INTRAVENOUS at 05:40

## 2024-07-31 RX ADMIN — MIDODRINE HYDROCHLORIDE 10 MG: 10 TABLET ORAL at 05:40

## 2024-07-31 RX ADMIN — NOREPINEPHRINE BITARTRATE 0.1 MCG/KG/MIN: 1 SOLUTION INTRAVENOUS at 08:20

## 2024-07-31 RX ADMIN — HYDROCORTISONE SODIUM SUCCINATE 50 MG: 100 INJECTION, POWDER, FOR SOLUTION INTRAMUSCULAR; INTRAVENOUS at 17:45

## 2024-07-31 RX ADMIN — INSULIN LISPRO 6 UNITS: 100 INJECTION, SOLUTION INTRAVENOUS; SUBCUTANEOUS at 16:47

## 2024-07-31 RX ADMIN — MIDODRINE HYDROCHLORIDE 15 MG: 10 TABLET ORAL at 13:27

## 2024-07-31 RX ADMIN — CALCIUM CHLORIDE, MAGNESIUM CHLORIDE, DEXTROSE MONOHYDRATE, LACTIC ACID, SODIUM CHLORIDE, SODIUM BICARBONATE AND POTASSIUM CHLORIDE 2700 ML/HR: 3.68; 3.05; 22; 5.4; 6.46; 3.09; .314 INJECTION INTRAVENOUS at 10:13

## 2024-07-31 RX ADMIN — MICAFUNGIN SODIUM 150 MG: 50 INJECTION, POWDER, LYOPHILIZED, FOR SOLUTION INTRAVENOUS at 18:37

## 2024-07-31 RX ADMIN — VASOPRESSIN 0.03 UNITS/MIN: 0.2 INJECTION INTRAVENOUS at 06:20

## 2024-07-31 RX ADMIN — HYDROCORTISONE SODIUM SUCCINATE 50 MG: 100 INJECTION, POWDER, FOR SOLUTION INTRAMUSCULAR; INTRAVENOUS at 05:40

## 2024-07-31 ASSESSMENT — PAIN - FUNCTIONAL ASSESSMENT
PAIN_FUNCTIONAL_ASSESSMENT: 0-10

## 2024-07-31 ASSESSMENT — COGNITIVE AND FUNCTIONAL STATUS - GENERAL
STANDING UP FROM CHAIR USING ARMS: TOTAL
TURNING FROM BACK TO SIDE WHILE IN FLAT BAD: TOTAL
WALKING IN HOSPITAL ROOM: TOTAL
MOVING TO AND FROM BED TO CHAIR: TOTAL
MOBILITY SCORE: 7
CLIMB 3 TO 5 STEPS WITH RAILING: TOTAL
MOVING FROM LYING ON BACK TO SITTING ON SIDE OF FLAT BED WITH BEDRAILS: A LOT

## 2024-07-31 ASSESSMENT — PAIN SCALES - GENERAL
PAINLEVEL_OUTOF10: 0 - NO PAIN

## 2024-07-31 NOTE — DOCUMENTATION CLARIFICATION NOTE
"    PATIENT:               MAGAN SCHULTZ  ACCT #:                  [id number]  MRN:                       [id number]  :                       1965  ADMIT DATE:       2024 8:50 AM  DISCH DATE:        2024 10:58 PM  RESPONDING PROVIDER #:        [id number]          PROVIDER RESPONSE TEXT:    Sepsis with septic shock    CDI QUERY TEXT:    Clarification        Instruction:    Based on your assessment of the patient and the clinical information, please provide the requested documentation by clicking on the appropriate radio button and enter any additional information if prompted.    Question: Based on your medical judgment, can you please clarify which of these conditions is the most clinically supported    When answering this query, please exercise your independent professional judgment. The fact that a question is being asked, does not imply that any particular answer is desired or expected.    The patient's clinical indicators include:  Clinical Information: There is conflicting documentation in the medical record which requires clarification.    -The diagnosis of Septic Shock was documented on 2024 by Kyle Schafer PA-C    -The diagnosis of Shock Most likely distributive  was documented on 2024 Discharge summary by PAMELA Yancey PA-C      Clinical Indicators:    : ED documentation: \"Patient found have a UTI, at 1217 I am concerned for sepsis secondary to UTI. Rocephin ordered. She already received a full 30/kg bolus of fluids. Reperfusion exam shows improvement of her blood pressure, patient otherwise remained stable\"  \"Critical care was necessary to treat or prevent imminent or life-threatening deterioration of the following conditions: Sepsis and respiratory failure\"  \"Critical care time secondary to sepsis bundle to the hypotension tachycardia presence of UTI requiring IV antibiotics IV fluids. Also acute renal failure\"    : JED Schafer: \"Urinary tract infection- Rocephin broadened " "to Zosyn in the setting of worsening septic shock\"  \"Septic shock  - Holding lopressor in setting of hypotension  - Levophed gtt, and vasopressin added  - Maintain MAP > 65  - Continuous cardiac monitoring per ICU protocol\"    Lab results: WBC: 2.6, 1.1, 0.8, 0.6, 0.8, 1.5, 2.8  12.9 percent bands 07/29, Lactate 07/29 2.7, 2.5    Treatment: IF fluid resuscitation: ns 4 liters iv bolus,  ml bolus, IV bicarb gtt, vasopressor gtts: Levophed, Vasopressin, albumin 25 grams iv x 2, Ceftriaxone 1 gram IV x 1, Zosyn 2.25 grams iv x 2, ICU admit    Risk Factors: 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, B-cell monoclonal lymphocytosis< chemotherapy, Acute uti/ cystitis  Options provided:  -- Sepsis with septic shock  -- Cardiogenic shock  -- Hypovolemic shock  -- Other - I will add my own diagnosis  -- Refer to Clinical Documentation Reviewer    Query created by: Alber Olivia on 7/30/2024 12:34 PM      Electronically signed by:  FLO WOLFE PA-C 7/31/2024 12:53 PM          "

## 2024-07-31 NOTE — PROGRESS NOTES
NEPHROLOGY FOLLOW UP NOTE    Angie Tobin   58 y.o.   136 kg (299 lbs 6.2 oz)  MRN/Room: [id number]/27/27-A    Subjective:     Angie is feeling well today. She denies any nausea, vomiting, headache, fever, chills, shortness of breath, or chest pain.    Current medications:     hydrocortisone sodium succinate, 50 mg, q6h  insulin glargine, 15 Units, Nightly  insulin lispro, 0-10 Units, TID  lidocaine, 1 patch, Daily  lidocaine, 1 patch, q24h  meropenem, 1 g, q12h  midodrine, 15 mg, q8h  pantoprazole, 40 mg, Daily      insulin regular infusion, Last Rate: Stopped (07/30/24 2200)  norepinephrine, Last Rate: 0.1 mcg/kg/min (07/31/24 1038)  PrismaSol 4/2.5, Last Rate: 2,700 mL/hr (07/31/24 1013)  vasopressin, Last Rate: Stopped (07/31/24 1000)      benzocaine-menthol, 1 lozenge, q2h PRN  dextrose, 12.5 g, q15 min PRN  dextrose, 25 g, q15 min PRN  diclofenac sodium, 4 g, 4x daily PRN  glucagon, 1 mg, q15 min PRN  glucagon, 1 mg, q15 min PRN  glucagon, 1 mg, q15 min PRN  melatonin, 5 mg, Nightly PRN      Objective:    Vitals:    07/31/24 1200   BP:    Pulse:    Resp:    Temp: 35.6 °C (96.1 °F)   SpO2:           Intake/Output Summary (Last 24 hours) at 7/31/2024 1315  Last data filed at 7/31/2024 1300  Gross per 24 hour   Intake 464.58 ml   Output 601 ml   Net -136.42 ml       General appearance: Awake and alert, oriented, . No distress  HEENT: NC/AT, mucous membranes moist  Skin: no apparent rash  Heart: heart sounds 1 & 2 present and normal, no murmurs heard or rub. Tachycardic.  Lungs: Adequate air entry, breath sounds clear bilaterally.  No wheezing/crackles  Abdomen: soft, tender in all four quadrants.  Extremities: No edema, no joint swelling  Neuro: No FND,  no asterixis   ACCESS: Hemodialysis triple lumen left femoral catheter. PIV. Mediport. Left radial A line.    Blood Labs:  Results for orders placed or performed during the hospital encounter of 07/26/24 (from the past 24 hour(s))   POCT GLUCOSE   Result Value  Ref Range    POCT Glucose 204 (H) 74 - 99 mg/dL   POCT GLUCOSE   Result Value Ref Range    POCT Glucose 209 (H) 74 - 99 mg/dL   POCT GLUCOSE   Result Value Ref Range    POCT Glucose 194 (H) 74 - 99 mg/dL   Lactate   Result Value Ref Range    Lactate 2.9 (H) 0.4 - 2.0 mmol/L   Renal Function Panel   Result Value Ref Range    Glucose 175 (H) 74 - 99 mg/dL    Sodium 134 (L) 136 - 145 mmol/L    Potassium 4.3 3.5 - 5.3 mmol/L    Chloride 98 98 - 107 mmol/L    Bicarbonate 25 21 - 32 mmol/L    Anion Gap 15 10 - 20 mmol/L    Urea Nitrogen 16 6 - 23 mg/dL    Creatinine 1.52 (H) 0.50 - 1.05 mg/dL    eGFR 40 (L) >60 mL/min/1.73m*2    Calcium 8.5 (L) 8.6 - 10.6 mg/dL    Phosphorus 2.6 2.5 - 4.9 mg/dL    Albumin 2.7 (L) 3.4 - 5.0 g/dL   Magnesium   Result Value Ref Range    Magnesium 2.40 1.60 - 2.40 mg/dL   Calcium, ionized   Result Value Ref Range    POCT Calcium, Ionized 1.16 1.1 - 1.33 mmol/L   POCT GLUCOSE   Result Value Ref Range    POCT Glucose 206 (H) 74 - 99 mg/dL   POCT GLUCOSE   Result Value Ref Range    POCT Glucose 171 (H) 74 - 99 mg/dL   POCT GLUCOSE   Result Value Ref Range    POCT Glucose 171 (H) 74 - 99 mg/dL   POCT GLUCOSE   Result Value Ref Range    POCT Glucose 228 (H) 74 - 99 mg/dL   Comprehensive Metabolic Panel   Result Value Ref Range    Glucose 228 (H) 74 - 99 mg/dL    Sodium 131 (L) 136 - 145 mmol/L    Potassium 4.5 3.5 - 5.3 mmol/L    Chloride 97 (L) 98 - 107 mmol/L    Bicarbonate 21 21 - 32 mmol/L    Anion Gap 18 10 - 20 mmol/L    Urea Nitrogen 17 6 - 23 mg/dL    Creatinine 1.53 (H) 0.50 - 1.05 mg/dL    eGFR 40 (L) >60 mL/min/1.73m*2    Calcium 8.3 (L) 8.6 - 10.6 mg/dL    Albumin 2.8 (L) 3.4 - 5.0 g/dL    Alkaline Phosphatase 535 (H) 33 - 110 U/L    Total Protein 4.7 (L) 6.4 - 8.2 g/dL     (H) 9 - 39 U/L    Bilirubin, Total 1.8 (H) 0.0 - 1.2 mg/dL    ALT 97 (H) 7 - 45 U/L   Magnesium   Result Value Ref Range    Magnesium 2.35 1.60 - 2.40 mg/dL   Phosphorus   Result Value Ref Range    Phosphorus  2.7 2.5 - 4.9 mg/dL   CBC   Result Value Ref Range    WBC 8.3 4.4 - 11.3 x10*3/uL    nRBC 3.1 (H) 0.0 - 0.0 /100 WBCs    RBC 2.95 (L) 4.00 - 5.20 x10*6/uL    Hemoglobin 8.2 (L) 12.0 - 16.0 g/dL    Hematocrit 24.4 (L) 36.0 - 46.0 %    MCV 83 80 - 100 fL    MCH 27.8 26.0 - 34.0 pg    MCHC 33.6 32.0 - 36.0 g/dL    RDW 17.2 (H) 11.5 - 14.5 %    Platelets 31 (LL) 150 - 450 x10*3/uL   CALCIUM, IONIZED   Result Value Ref Range    POCT Calcium, Ionized 1.12 1.1 - 1.33 mmol/L   POCT GLUCOSE   Result Value Ref Range    POCT Glucose 207 (H) 74 - 99 mg/dL   Lactate   Result Value Ref Range    Lactate 2.6 (H) 0.4 - 2.0 mmol/L   POCT GLUCOSE   Result Value Ref Range    POCT Glucose 229 (H) 74 - 99 mg/dL      XR chest 1 view 07/28/2024    IMPRESSION:  1. Low lung volumes with bronchovascular crowding and suspect  superimposed mild pulmonary edema.  2. Interval improvement of a now trace left pleural effusion.  3. Right chest wall MediPort as above.    ASSESSMENT:    Angie Tobin is a  58 y.o.  year old female, with PMHx of HTN, HLD, DM, and leiomyosarcoma (Dx 2019, metastatic to lungs and liver) who presented as a transfer from [facility] with one week of nausea and vomiting and septic shock likely secondary to UTI requiring vasopressors. Nephrology is currently managing for ALDEN stage III (baseline Cr 0.7 jumped to 7.6 with BUN 90 on 07/25/2024; current Cr 1.57 on CVVH).     #ALDEN, anuric - BL Cr 0.7  - Etiology: Acute tubular injury from poor PO intake/septic shock (UTI) with c/f TLS at OSH  - CT abdomen was negative for hydronephrosis  - UA- epithelial cells, +RBC/WBC, 1+ protein  - Started on CVVH (started 07/25/2024) due to hyperkalemia, acidosis  - Patient remains anuric (0 urine output last 24 hours)  - Patient remains on norepinephrine and vasopressin to stabilize blood pressure with home HTN medications held  - Tolerating CVVH well with K, Bicarb in normal range    Recommendations:  - Continue with CVVH  - Monitor for  increase in urine output  - Avoid nephrotoxins, contrast if possible  - Strict Is/Os  - Renal dosing for medications for latest eGFR, follow medication trough as appropriate  - Avoid hypotension/rapid fluctuations in BPs    Roberto Hart, MS4  24 hour Renal Pager - [phone]    Discussed with attending nephrologist, Dr. Sathish Isabel.

## 2024-07-31 NOTE — PROGRESS NOTES
Physical Therapy    Physical Therapy Treatment    Patient Name: Angie Tobin  MRN: [id number]  Today's Date: 7/31/2024  Time Calculation  Start Time: 1113  Stop Time: 1157  Time Calculation (min): 44 min    PT student under the direct supervision of a licensed physical therapist     Assessment/Plan   PT Assessment  End of Session Communication: Bedside nurse  Assessment Comment: pt completed 15 min EOB with CGAx1 for static sitting and MinAx1 for dynamic sitting with frequent verbal and tactile cueing to maintain balance and upright posture. pt would benefit from continued skilled PT services to work on balance, endurance, and mobility.  End of Session Patient Position: Bed, 3 rail up, Alarm off, not on at start of session     PT Plan  Treatment/Interventions: Bed mobility, Transfer training, Gait training, Balance training, Strengthening, Endurance training, Therapeutic exercise, Therapeutic activity, Positioning, Postural re-education  PT Plan: Ongoing PT  PT Frequency: 4 times per week  PT Discharge Recommendations: Moderate intensity level of continued care  PT Recommended Transfer Status:  (MaxAx2 bed mobility, CGA-MinAx1 EOB)  PT - OK to Discharge: Yes      General Visit Information:   PT  Visit  PT Received On: 07/31/24  General  Family/Caregiver Present: No  Prior to Session Communication: Bedside nurse  Patient Position Received: Bed, 3 rail up, Alarm off, not on at start of session  General Comment: pt agreeable and cooperative to participate in PT services. pt with flat affect. pt reported that she feels groggy as if she is being drugged, RN aware.In beginning of session, pt stated that she has abdominal discomfort and reportting LE's feeling heavy. Therapist informed RN and medical team. (Therapist spoke to attending in the room about order supporting therapy (music, art, pet therapy))    Subjective   Precautions:  Precautions  Medical Precautions: Fall precautions  Precautions Comment: Protective  precaution; MAP > 65  Vital Signs:  Vital Signs  Heart Rate:  (Pre: 108   Post: 109)  Resp:  (Pre: 19   Post: 24)  SpO2:  (Pre: 98%  Post: 100%)  BP:  (Pre: 91/50 (66)   Post: 125/64 (85))  BP Method: Arterial line    Objective   Pain:  Pain Assessment  Pain Assessment: 0-10  0-10 (Numeric) Pain Score: 0 - No pain (Initially, no pain. However pt later complained of abdominal discomfort with mobility)  Cognition:  Cognition  Overall Cognitive Status: Impaired (CAM- however flat affect and with cognitive delay during therapy)  Orientation Level:  (AOx3)  Following Commands: Follows one step commands with increased time (and repetition, %)     Postural Control:  Postural Control  Postural Control: Impaired (Retrolean and R lean when sitting EOB)  Head Control: Occassional downward head posture when sitting EOB  Trunk Control: Impaired  Static Sitting Balance  Static Sitting-Balance Support: Feet supported, Bilateral upper extremity supported  Static Sitting-Level of Assistance:  (Min-CGAx1)  Static Sitting-Comment/Number of Minutes: 15 mins EOB total. pt required constant verbal and tactile cueing to facilitate and maintain anterior lean to stimulate upright posture. Therapist assisted pt's R hand onto L thigh to decrease R lean. pt required multiple verbal and tactile cues to keep R hand on her thigh d/t pt attempting to move hand on the bed which further increases her R lean. pt required verbal cueing to facilitate upright posture. pt able to maintain posture with verbal stim.  Dynamic Sitting Balance  Dynamic Sitting-Balance Support: Feet supported, Bilateral upper extremity supported  Dynamic Sitting-Comments: MinAx1 (Verbal and tactile cueing provided to facilitate upright sitting posture.)    Activity Tolerance:  Activity Tolerance  Endurance: Decreased tolerance for upright activites  Early Mobility/Exercise Safety Screen: Proceed with mobilization - No exclusion criteria met  Treatments:      Bed  Mobility  Bed Mobility: Yes  Bed Mobility 1  Bed Mobility 1: Supine to sitting, Sitting to supine  Bed Mobility Comments 1: DepAx2 for supine to sit; DepAx3 for sit to supine, RN assisted to maintain pt and staff safety d/t pt's positioning EOB and body habitus. (HOB elevated, draw sheet)  Bed Mobility 2  Bed Mobility  2: Rolling right, Rolling left  Level of Assistance 2: Maximum assistance, +2, Moderate verbal cues, Moderate tactile cues  Bed Mobility Comments 2: Therapist provided verbal cues for task sequencing and hand placement    Outcome Measures:  [facility] Basic Mobility  Turning from your back to your side while in a flat bed without using bedrails: A lot  Moving from lying on your back to sitting on the side of a flat bed without using bedrails: Total  Moving to and from bed to chair (including a wheelchair): Total  Standing up from a chair using your arms (e.g. wheelchair or bedside chair): Total  To walk in hospital room: Total  Climbing 3-5 steps with railing: Total  Basic Mobility - Total Score: 7       FSS-ICU  Ambulation: Unable to attempt due to weakness  Rolling: Total assistance (performs 25% or requires another person)  Sitting: Minimal assistance (performs 75% or more of task)  Transfer Sit-to-Stand: Unable to perform  Transfer Supine-to-Sit: Total assistance (performs 25% or requires another person)  Total Score: 6      Early Mobility/Exercise Safety Screen: Proceed with mobilization - No exclusion criteria met  ICU Mobility Scale: Sitting over edge of bed [3]    Education Documentation  Mobility Training, taught by LORAINE Robertson at 7/31/2024  2:52 PM.  Learner: Patient  Readiness: Acceptance  Method: Explanation  Response: Needs Reinforcement  Comment: Mobility progression, postural education      Encounter Problems       Encounter Problems (Active)       Balance       Patient will complete static (supervision) and dynamic (stand by assist) sitting balance activities using UE support as  needed in order to maintain midline without acute LOB.  (Progressing)       Start:  07/29/24    Expected End:  08/19/24            Patient will complete static (CGAx1) and dynamic (MinAx1) standing balance activities using LRAD without acute LOB.  (Not Progressing)       Start:  07/29/24    Expected End:  08/19/24               Mobility       Patient will complete bed mobility with MINx1 with HOB elevated and use of a bedrail  (Not Progressing)       Start:  07/29/24    Expected End:  08/19/24            Patient will ambulate >/=10' with LRAD with MINx1 without acute LOB  (Not Progressing)       Start:  07/29/24    Expected End:  08/19/24            Patient will participate in BLE there-ex program in order to assist in improving strength and to assist with the completion of functional mobility tasks.  (Not Progressing)       Start:  07/29/24    Expected End:  08/19/24               PT Transfers       Patient will complete STS with MINx1 using LRAD without acute LOB   (Not Progressing)       Start:  07/29/24    Expected End:  08/19/24            Patient will complete bed<->chair transfer with MinAx1 using LRAD as needed without acute LOB  (Not Progressing)       Start:  07/29/24    Expected End:  08/19/24                     Chelsea Melchor, SPT

## 2024-07-31 NOTE — CARE PLAN
Problem: Pain - Adult  Goal: Verbalizes/displays adequate comfort level or baseline comfort level  Outcome: Progressing     Problem: Safety - Adult  Goal: Free from fall injury  Outcome: Progressing     Problem: Skin  Goal: Participates in plan/prevention/treatment measures  Outcome: Progressing  Flowsheets (Taken 7/31/2024 1934)  Participates in plan/prevention/treatment measures: Elevate heels  Goal: Prevent/manage excess moisture  Outcome: Progressing  Flowsheets (Taken 7/31/2024 1934)  Prevent/manage excess moisture: Monitor for/manage infection if present  Goal: Prevent/minimize sheer/friction injuries  Outcome: Progressing  Flowsheets (Taken 7/31/2024 1934)  Prevent/minimize sheer/friction injuries:   Turn/reposition every 2 hours/use positioning/transfer devices   Use pull sheet  Goal: Promote/optimize nutrition  Outcome: Progressing  Flowsheets (Taken 7/31/2024 1934)  Promote/optimize nutrition: Monitor/record intake including meals  Goal: Promote skin healing  Outcome: Progressing  Flowsheets (Taken 7/31/2024 1934)  Promote skin healing: Turn/reposition every 2 hours/use positioning/transfer devices

## 2024-07-31 NOTE — CARE PLAN
The patient's goals for the shift include  turning every 2 hours    The clinical goals for the shift include wean levophed as yoanna to maintain MAP>65    Over the shift, the patient did not make progress toward the following goals. Barriers to progression include . Recommendations to address these barriers include .

## 2024-07-31 NOTE — PROGRESS NOTES
"Medical Intensive Care - Daily Progress Note   Subjective    Angie Tobin is a 58 y.o. year old female patient admitted on 7/26/2024 with following ICU needs: Septic shock requiring vasopressors.     Interval History:  Patient was awake and alert this morning. Overnight, she required increased vasopressors due to hypotension. She did not report fevers and chills or dizziness. She is currently experiencing some abdominal pain.       Meds    Scheduled medications  hydrocortisone sodium succinate, 50 mg, intravenous, q6h  insulin glargine, 15 Units, subcutaneous, Nightly  insulin lispro, 0-10 Units, subcutaneous, TID  lidocaine, 1 patch, transdermal, Daily  lidocaine, 1 patch, transdermal, q24h  meropenem, 1 g, intravenous, q12h  midodrine, 15 mg, oral, q8h  pantoprazole, 40 mg, intravenous, Daily      Continuous medications  insulin regular infusion, 0-20 Units/hr, Last Rate: Stopped (07/30/24 2200)  norepinephrine, 0-0.5 mcg/kg/min, Last Rate: 0.1 mcg/kg/min (07/31/24 1038)  PrismaSol 4/2.5, 2,700 mL/hr, Last Rate: 2,700 mL/hr (07/31/24 1013)  vasopressin, 0-0.03 Units/min, Last Rate: Stopped (07/31/24 1000)      PRN medications  PRN medications: benzocaine-menthol, dextrose, dextrose, diclofenac sodium, glucagon, glucagon, glucagon, melatonin     Objective    Blood pressure 117/54, pulse 107, temperature 35.6 °C (96.1 °F), temperature source Temporal, resp. rate 14, height 1.778 m (5' 10\"), weight 136 kg (299 lb 6.2 oz), SpO2 99%.     Physical Exam   Constitutional:       General: She is not in acute distress.     Appearance: She is obese.  HENT:      Head: Normocephalic and atraumatic.      Nose: Nose normal.      Mouth/Throat:      Mouth: Mucous membranes are moist.   Eyes:      Extraocular Movements: Extraocular movements intact.      Pupils: Pupils are equal, round, and reactive to light.   Cardiovascular:      Rate and Rhythm: Tachycardia present.      Pulses: Normal pulses.      Heart sounds: No murmur " heard.     No friction rub. No gallop.   Pulmonary:      Effort: Pulmonary effort is normal. No respiratory distress.      Breath sounds: No wheezing, rhonchi or rales.   Abdominal:      General: Bowel sounds are normal. There is no distension.      Palpations: Abdomen is soft. There is no mass.      Tenderness: There is abdominal tenderness. There is no guarding.      Hernia: A hernia is present.   Musculoskeletal:      Cervical back: Normal range of motion.   Skin:     General: Skin is warm.      Capillary Refill: Capillary refill takes less than 2 seconds.   Neurological:      General: No focal deficit present.      Mental Status: She is oriented to person, place, and time.   Psychiatric:         Mood and Affect: Mood normal.     Intake/Output Summary (Last 24 hours) at 7/31/2024 1445  Last data filed at 7/31/2024 1300  Gross per 24 hour   Intake 447.74 ml   Output 552 ml   Net -104.26 ml     Labs:   Results from last 72 hours   Lab Units 07/31/24  0300 07/30/24  1611 07/30/24  0427   SODIUM mmol/L 131* 134* 134*   POTASSIUM mmol/L 4.5 4.3 4.3   CHLORIDE mmol/L 97* 98 99   CO2 mmol/L 21 25 23   BUN mg/dL 17 16 18   CREATININE mg/dL 1.53* 1.52* 1.57*   GLUCOSE mg/dL 228* 175* 226*   CALCIUM mg/dL 8.3* 8.5* 8.4*   ANION GAP mmol/L 18 15 16   EGFR mL/min/1.73m*2 40* 40* 38*   PHOSPHORUS mg/dL 2.7 2.6 2.5      Results from last 72 hours   Lab Units 07/31/24  0300 07/30/24  0427 07/29/24  0530   WBC AUTO x10*3/uL 8.3 2.8*  2.8* 1.5*   HEMOGLOBIN g/dL 8.2* 8.2*  8.2* 7.9*   HEMATOCRIT % 24.4* 24.2*  24.2* 22.6*   PLATELETS AUTO x10*3/uL 31* 23*  23* 27*   LYMPHO PCT MAN %  --  15.5 36.2   MONO PCT MAN %  --  10.6 13.8   EOSINO PCT MAN %  --  0.0 0.0        Micro/ID:     Lab Results   Component Value Date    URINECULTURE No growth 07/26/2024    BLOODCULT No growth at 4 days -  FINAL REPORT 07/27/2024    BLOODCULT No growth at 4 days -  FINAL REPORT 07/27/2024     MELD 3.0: 10 at 6/12/2024  8:00 AM  MELD-Na: 8 at  6/12/2024  8:00 AM  Calculated from:  Serum Creatinine: 0.80 mg/dL (Using min of 1 mg/dL) at 6/12/2024  8:00 AM  Serum Sodium: 136 mmol/L at 6/12/2024  8:00 AM  Total Bilirubin: 1.7 mg/dL at 6/12/2024  8:00 AM  Serum Albumin: 4.2 g/dL (Using max of 3.5 g/dL) at 6/12/2024  8:00 AM  INR(ratio): 1.0 at 6/12/2024  8:00 AM  Age at listing (hypothetical): 58 years  Sex: Female at 6/12/2024  8:00 AM     Summary of key imaging results from the last 24 hours  CT chest/abdomen (7/31/24): Multiple metastatic lung nodules. Increased abdominal wall edema and mesenteric stranding. Multiple lytic osseous metastases    Assessment and Plan     Assessment: Angie Tobin is a 58 y.o. year old female patient who presented to OSH 7/25/2024 and was transferred to [facility] MICU on 07/27/24 for septic shock requiring pressors with urinary tract infection source, acute hypoxic respiratory failure, with possible TLS, treated with 2x rasburicase, oncology consulted yesterday, per their note TLS is unlikely based on her tumor type. Received one unit RBCs, Zosyn switched to meropenem on 7/27.     Mechanical Ventilation: none  Sedation/Analgesia:  none  Restraints: no    Summary for 07/31/24  :  Patient back on vasopressin  Continue meropenem  BLE duplex ordered  CT chest and abdomen ordered  Midodrine increased to 15 mg    Plan:  NEUROLOGY/PSYCH:  #Anxiety  ::Lorazepam 0.5m *2 given at OSH on presentation for anxiety  ::Patient denies anxiety at present  Plan:  -Will monitor  -Ordered pet therapy  -Ordered music therapy     CARDIOVASCULAR:  #Shock, Likely Septic in Origin   ::Etiology: Likely septic from UTI, cultures growing enteric bacilli. Given troponin trend (64 -> 56) and TTE (normal right ventricular function), unlikely cardiac in origin. Given hypotension continuing s/p aggressive fluid resuscitation, unlikely hypovolemic in origin.   ::7/26 OSH: Received 5L NS, 1L LR, 50g Albumin, 100mg solumedrol  ::7/26 OSH: Norepinephrine and  Vasopressin begun for persistent hypotension   ::7/27 began Stress Dose Steroids Hydrocortisone 50mg Q6H   ::7/27 1.5L LR, 7/29 0.5L LR   ::Lactate downtrend: 7/26 2.8 > 7/27 1.9 > 7/29 2.5 > 7/30 2.9 > 7/31 2.6  ::Patient requires increased pressors overnight  Plan:  -Continue Norepinephrine and vasopressin, titration per patient's ability  -Continue Hydrocortisone 50 Q6h  -Will replete fluids prn, careful not to overload due to ALDEN  -ABXs per ID section (meropenem)  -Increased midodrine to 15 mg  -Bilateral venous duplex for evaluation of leg swelling     #Hypertension  ::Home medication: Metoprolol Succinate XL 25mg, Lisinopril 20mg  Plan:  -Holding metoprolol in setting of shock  -Holding Lisinopril in setting of ALDEN     PULMONARY:  #Acute Hypoxic Respiratory Failure   ::Etiology: Likely secondary to atelectasis vs pulmonary edema vs pneumonia given CXR findings  ::2022 6 Wedge lung resection 2/2 metastasis  ::New O2 requirement during hospitalization; no O2 requirement at baseline  ::7/27 Pro calcitonin: 3.07  ::7/28 ABG: Ph 7.42, O2 27, CO2 103  ::Pt satting well on room air at present  Plan:  -Monitor for changes in symptoms     RENAL/GENITOURINARY:  #UTI   -See ID section     #ALDEN w/ Oliguria, required CVVH  ::Baseline Cr: 0.7  ::Presentation: BUN 90, Cr 7.6, now 1.79  ::7/26 CVVH at OSH -> 310 mL urine  ::King placed at OSH. Removed 7/27 upon admission to [facility] [place] [facility].   ::CK 1288   Plan:  -Nephrology consulted regarding CVVH   -On CVVH  -BID RFP      #Hyperkalemia, likely in the setting of TLS  ::Presentation: 5.5. Peaked 5.9. now 4.3  ::OSH: Insulin drip, Lokelma 10g *1, Sodium Bicarbonate infusion, Calcium gluconate  ::7/27: 4.9. Additional 2g Calcium Gluconate given  Plan:  -BID RFP, replete PRN     #Hypocalcemia, likely in the setting of TLS  ::Presentation: 7.9  ::7/29: 8.3  Plan:  -BID RFP, replete PRN     #Hypomagnesemia  ::7/27 2.02  Plan:  -BID RFP, replete PRN     GASTROENTEROLOGY:  #Diarrhea  (resolved)  ::Had multiple loose bowel movements after admission  ::C diff PCR indeterminate  ::Stool pathogen PCR negative  ::Diarrhea currently resolved  Plan:  -Stopped Miralax, Senna  -Avoid narcotics, replete electrolytes PRN, treat sepsis, replete fluids prn     ENDOCRINOLOGY:  #DM   ::7/26 Hemoglobin A1C: 9.2 (7.4 in 2022)  ::Home meds: Metformin 500mg BID  ::Given Insulin Drip on presentation at OSH  ::Patient receiving high dose steroids since 7/27  Plan:  -Holding home metformin in inpatient setting  -Hypoglycemia protocol   -Brain [facility]   -Transitioned from insulin drip to subcut Lantus and Humalog     HEMATOLOGY/ONCOLOGY:  #Leiomyosarcoma, with metastasis to liver, lungs, and spine  ::Originally found in the pancreatic bed 2019. Hysterectomy and bilateral salpingo- oophorectomy 2019. H/o chemotherapy and radiation. Multiple remissions. Began Yondelis 06/05/2024.   ::Last chemotherapy: Yondelis C3 7/17/2024  Plan:  -Oncology consulted, recs appreciated   -No chemotherapy pending at present      #Elevated liver enzymes  ::, ALT 53, AST 83  ::Likely secondary to metastasis to liver  ::CT chest/abdomen (7/31/24): Multiple metastatic lung nodules. Increased abdominal wall edema and mesenteric stranding. Multiple lytic osseous metastases  Plan:  -Will monitor     #Tumor Lysis Syndrome, improving (resolved, no concern for now)  ::On presentation: hyperuricemia 15.9, hypocalcemia, hyperkalemia, nausea, vomit, diarrhea  ::7/26 OSH: 2x rasburicase, 5L NS, 1L LR, 50g Albumin, 100mg solumedrol  ::G6PD negative  ::7/27 Uric Acid: <1.5  Plan:  -per Oncology, there is no concern for TLS  -Trending labs      #Pancytopenia  #Neutropenia  ::WBC 3.9 > 1.1 > 1.5, RBC 3.06 > 2.71 > 2.83, Hemoglobin 7.9, Platelets 27  ::Latest ANC 5.36  ::Etiology: secondary to sepsis vs post chemotherapy  ::Heparin given at OSH  Plan:  -Holding heparin in setting of thrombocytopenia  -Transfuse platelets before placing central  line  -Stopped filgrastim, patient now has adequate ANC     #Monoclonal B-cell lymphocytosis   ::Diagnosed 2016  Plan:  -Monitor     MUSCULOSKELETAL/ SKIN:  FREDERICK     INFECTIOUS DISEASE:  # UTI   ::7/25 UCX: >100,000 Enteric Bacilli  ::7/25 BCX * 2: NG2D  ::7/26 Repeat UCX: negative  ::7/27 Repeat BCX: negative  ::S/P Ceftriaxone 1g 7/25  ::s/p vancomycin 7/27-7/28  ::s/p Zosyn 7/26-7/27  ::7/27 Pro calcitonin: 3.07  ::MRSA neg  Plan:  -Continue meropenem  (7/27- present)  -Discontinued Vancomycin (7/27-7/28)       ICU Check List   FEN  Fluids: S/P 1.5L fluid 7/27. PRN   Electrolytes: Will replete PRN   Nutrition: Regular Diet with Ensure supplementation  Prophylaxis:  DVT ppx: SCDs (Holding medical anticoagulation due to thrombocytopenia)   GI ppx: PPI IV   Bowel care: Holding Miralax 17g BID, Sennosides 8.6mg nightly  Hardware:  Catheter: Hemodialysis triple lumen left femoral catheter- placed 7/26 at OSH  Access: PIV, Mediport    Drains: King removed from OSH. Current External Urinary catheter  Lines: A line left radial- placed 7/26 at OSH  Social:  Code: Full Code    NOK: Loulou Benavides (cousin) [phone]; Tatyana Yanez (niece) [phone]  Disposition: MICU        Queen PAMELA Macias MD   07/31/24 at 2:45 PM     Disclaimer: Documentation completed with the information available at the time of input. The times in the chart may not be reflective of actual patient care times, interventions, or procedures. Documentation occurs after the physical care of the patient.

## 2024-07-31 NOTE — CONSULTS
"Nutrition Initial Assessment:   Nutrition Assessment    Reason for Assessment: Provider consult order for assessment and recommendations as well as enteral assessment.  RN with patient was unaware of ay plan to place feeding tube on patient at this time but notes her appetite and intake are poor.      Patient is a 58 y.o. female presenting with septic shock and need for pressors.  She is on CVVH at visit.  Likely septic shock r/t UTI.  Also with hypoxic respiratory failure and possible TLS-- unlikely.  She is on levo for pressure support.    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  She was somewhat confused at visit.  Reports multiple times that she does not like the food here at [facility].  Thinks her appetite was good/normal PTA.  Initially denies doing anything like Boost Ensure but then later on in the conversation that the Boost burns her mouth.  When asked further about issues with mouth sores or dysgeusia, she says that the food is \"hard\".  Denies GI issues at this time.  She is open to having supplements sent on her meal trays.  RN who has had patient in previous days (not today) reports that he feels that she may be getting delirious or depressed as she is acting differently when previous days.  Has so far today had no breakfast and only 100% of her sherbet and half of a chicken breast at lunch today.       Anthropometrics:  Height: 177.8 cm (5' 10\")   Weight: 136 kg (299 lb 6.2 oz)   BMI (Calculated): 42.96  IBW/kg (Dietitian Calculated): 68.2 kg  Percent of IBW: 199 %     Weight History:     7/31/24: 136kg  7/26/24: 128kg (admit)  6/28/24: 124kg  5/28/24: 125kg  2/13/24: 121kg  11/15/23: 122kg  6/27/23: 119kg    Pt reports " weighing 118kg as a usual body weight.  Weight is up now likely from fluid retention.     Weight Change %:       Nutrition Focused Physical Exam Findings:    Subcutaneous Fat Loss:   Orbital Fat Pads: Mild-Moderate (slight dark circles and slight hollowing)  Buccal Fat Pads: Well nourished (full, rounded cheeks)  Triceps: Well nourished (ample fat tissue)  Muscle Wasting:  Temporalis: Well nourished (well-defined muscle)  Pectoralis (Clavicular Region): Well nourished (clavicle not visible)  Deltoid/Trapezius: Well nourished (rounded appearance at arm, shoulder, neck)  Quadriceps: Well nourished (well developed, well rounded)  Gastrocnemius: Well nourished (well developed bulbous muscle)  Edema:  Edema Location: generalized, non-pitting  Physical Findings:  Skin: Negative    Nutrition Significant Labs:  BMP Trend:   Results from last 7 days   Lab Units 07/31/24  0300 07/30/24  1611 07/30/24 0427 07/29/24  1652   GLUCOSE mg/dL 228* 175* 226* 258*   CALCIUM mg/dL 8.3* 8.5* 8.4* 8.3*   SODIUM mmol/L 131* 134* 134* 133*   POTASSIUM mmol/L 4.5 4.3 4.3 4.6   CO2 mmol/L 21 25 23 24   CHLORIDE mmol/L 97* 98 99 98   BUN mg/dL 17 16 18 19   CREATININE mg/dL 1.53* 1.52* 1.57* 1.55*    , A1C:  Lab Results   Component Value Date    HGBA1C 9.2 (H) 07/26/2024   , BG POCT trend:   Results from last 7 days   Lab Units 07/31/24  1201 07/31/24  0930 07/30/24  2353 07/30/24  1946 07/30/24  1820   POCT GLUCOSE mg/dL 229* 207* 228* 171* 171*    , Renal Lab Trend:   Results from last 7 days   Lab Units 07/31/24  0300 07/30/24  1611 07/30/24  0427 07/29/24  1652   POTASSIUM mmol/L 4.5 4.3 4.3 4.6   PHOSPHORUS mg/dL 2.7 2.6 2.5 2.6   SODIUM mmol/L 131* 134* 134* 133*   MAGNESIUM mg/dL 2.35 2.40 2.36  --    EGFR mL/min/1.73m*2 40* 40* 38* 39*   BUN mg/dL 17 16 18 19   CREATININE mg/dL 1.53* 1.52* 1.57* 1.55*    , Vit D:   Lab Results   Component Value Date    VITD25 14 (L) 10/25/2023        Nutrition Specific Medications:  Scheduled  medications  hydrocortisone sodium succinate, 50 mg, intravenous, q6h  insulin glargine, 15 Units, subcutaneous, Nightly  insulin lispro, 0-10 Units, subcutaneous, TID  lidocaine, 1 patch, transdermal, Daily  lidocaine, 1 patch, transdermal, q24h  meropenem, 1 g, intravenous, q12h  midodrine, 15 mg, oral, q8h  pantoprazole, 40 mg, intravenous, Daily      Continuous medications  insulin regular infusion, 0-20 Units/hr, Last Rate: Stopped (07/30/24 2200)  norepinephrine, 0-0.5 mcg/kg/min, Last Rate: 0.1 mcg/kg/min (07/31/24 1200)  PrismaSol 4/2.5, 2,700 mL/hr, Last Rate: 2,700 mL/hr (07/31/24 1013)  vasopressin, 0-0.03 Units/min, Last Rate: Stopped (07/31/24 1000)      PRN medications  PRN medications: benzocaine-menthol, dextrose, dextrose, diclofenac sodium, glucagon, glucagon, glucagon, melatonin     I/O:   Last BM Date: 07/30/24; Stool Appearance: Loose (07/30/24 0800)        Dietary Orders (From admission, onward)       Start     Ordered    07/27/24 0040  Adult diet Renal; Potassium Restricted 2 gm (50mEq); 2 - 3 grams Sodium  Diet effective now        Question Answer Comment   Diet type Renal    Potassium restriction: Potassium Restricted 2 gm (50mEq)    Sodium restriction: 2 - 3 grams Sodium        07/27/24 0041                     Estimated Needs:   Total Energy Estimated Needs (kCal):  (6743-9949)  Method for Estimating Needs: MSJ= 1949 using 128kg  Total Protein Estimated Needs (g): 100 g  Method for Estimating Needs: 1.5 x 68.2kg            Nutrition Diagnosis      Nutrition Diagnosis  Patient has Nutrition Diagnosis: Yes  Diagnosis Status (1): New  Nutrition Diagnosis 1: Inadequate oral intake  Related to (1): ?delirium in setting of sepsis  As Evidenced by (1): decrease PO intake as reported by team     Difficult to assess pt's overall nutrition at this time as extra fluid is skewing physical exam and pt may not be the best historian.     Nutrition Interventions/Recommendations         Nutrition  Prescription:   RDN will liberalize diet to Regular.  If plan is for feeding tube for enteral nutrition, can do the following:  Two Herson HN at start rate of 15mls/hr.  Increase by 10mls q6hrs until goal rate of 45mls/hr reached.  Water flushes per team's discretion.  Goal rate provides 755mls free water daily.  This will meet 100% of her estimated needs.   Recheck Vitamin D level.        Nutrition Interventions:    RDN will order Ensure High Protein BID (160kcals, 16grams protein each) and Ensure Clear BID (240kcals and 8grams protein each)     TF at goal as needed= 2160kcals, 90grams protein    Nutrition Education:   Not appropriate       Nutrition Monitoring and Evaluation   Food/Nutrient Related History Monitoring  Monitoring and Evaluation Plan: Energy intake  Criteria: PO diet (meals, supplements and snacks) to meet >50% estimated needs          Time Spent/Follow-up Reminder:   Time Spent (min): 60 minutes  Last Date of Nutrition Visit: 07/31/24  Nutrition Follow-Up Needed?: Dietitian to reassess per policy  Follow up Comment: poor PO, supps; ?if plan is for feeding tube or not

## 2024-08-01 ENCOUNTER — APPOINTMENT (OUTPATIENT)
Dept: RADIOLOGY | Facility: HOSPITAL | Age: 59
DRG: 871 | End: 2024-08-01
Payer: COMMERCIAL

## 2024-08-01 ENCOUNTER — APPOINTMENT (OUTPATIENT)
Dept: VASCULAR MEDICINE | Facility: HOSPITAL | Age: 59
End: 2024-08-01
Payer: COMMERCIAL

## 2024-08-01 LAB
ALBUMIN SERPL BCP-MCNC: 2.5 G/DL (ref 3.4–5)
ALBUMIN SERPL BCP-MCNC: 2.7 G/DL (ref 3.4–5)
ALP SERPL-CCNC: 643 U/L (ref 33–110)
ALT SERPL W P-5'-P-CCNC: 118 U/L (ref 7–45)
ANION GAP SERPL CALC-SCNC: 17 MMOL/L (ref 10–20)
ANION GAP SERPL CALC-SCNC: 23 MMOL/L (ref 10–20)
APTT PPP: 23 SECONDS (ref 27–38)
AST SERPL W P-5'-P-CCNC: 407 U/L (ref 9–39)
BASOPHILS # BLD MANUAL: 0 X10*3/UL (ref 0–0.1)
BASOPHILS NFR BLD MANUAL: 0 %
BILIRUB SERPL-MCNC: 2.3 MG/DL (ref 0–1.2)
BUN SERPL-MCNC: 17 MG/DL (ref 6–23)
BUN SERPL-MCNC: 21 MG/DL (ref 6–23)
CA-I BLD-SCNC: 1.08 MMOL/L (ref 1.1–1.33)
CA-I BLD-SCNC: 1.08 MMOL/L (ref 1.1–1.33)
CALCIUM SERPL-MCNC: 8.2 MG/DL (ref 8.6–10.6)
CALCIUM SERPL-MCNC: 8.3 MG/DL (ref 8.6–10.6)
CARDIAC TROPONIN I PNL SERPL HS: 244 NG/L (ref 0–34)
CARDIAC TROPONIN I PNL SERPL HS: 260 NG/L (ref 0–34)
CARDIAC TROPONIN I PNL SERPL HS: 272 NG/L (ref 0–34)
CHLORIDE SERPL-SCNC: 96 MMOL/L (ref 98–107)
CHLORIDE SERPL-SCNC: 96 MMOL/L (ref 98–107)
CO2 SERPL-SCNC: 17 MMOL/L (ref 21–32)
CO2 SERPL-SCNC: 21 MMOL/L (ref 21–32)
CREAT SERPL-MCNC: 1.41 MG/DL (ref 0.5–1.05)
CREAT SERPL-MCNC: 1.43 MG/DL (ref 0.5–1.05)
EGFRCR SERPLBLD CKD-EPI 2021: 43 ML/MIN/1.73M*2
EGFRCR SERPLBLD CKD-EPI 2021: 44 ML/MIN/1.73M*2
EOSINOPHIL # BLD MANUAL: 0 X10*3/UL (ref 0–0.7)
EOSINOPHIL NFR BLD MANUAL: 0 %
ERYTHROCYTE [DISTWIDTH] IN BLOOD BY AUTOMATED COUNT: 17.2 % (ref 11.5–14.5)
ERYTHROCYTE [DISTWIDTH] IN BLOOD BY AUTOMATED COUNT: 17.2 % (ref 11.5–14.5)
FIBRINOGEN PPP-MCNC: 241 MG/DL (ref 200–400)
GLUCOSE BLD MANUAL STRIP-MCNC: 189 MG/DL (ref 74–99)
GLUCOSE BLD MANUAL STRIP-MCNC: 216 MG/DL (ref 74–99)
GLUCOSE BLD MANUAL STRIP-MCNC: 220 MG/DL (ref 74–99)
GLUCOSE BLD MANUAL STRIP-MCNC: 226 MG/DL (ref 74–99)
GLUCOSE SERPL-MCNC: 185 MG/DL (ref 74–99)
GLUCOSE SERPL-MCNC: 244 MG/DL (ref 74–99)
HCT VFR BLD AUTO: 22.9 % (ref 36–46)
HCT VFR BLD AUTO: 24.7 % (ref 36–46)
HGB BLD-MCNC: 7.7 G/DL (ref 12–16)
HGB BLD-MCNC: 8.2 G/DL (ref 12–16)
IMM GRANULOCYTES # BLD AUTO: 1.65 X10*3/UL (ref 0–0.7)
IMM GRANULOCYTES NFR BLD AUTO: 8.1 % (ref 0–0.9)
INR PPP: 1.1 (ref 0.9–1.1)
LACTATE SERPL-SCNC: 2.8 MMOL/L (ref 0.4–2)
LYMPHOCYTES # BLD MANUAL: 1.16 X10*3/UL (ref 1.2–4.8)
LYMPHOCYTES NFR BLD MANUAL: 5.7 %
MAGNESIUM SERPL-MCNC: 2.5 MG/DL (ref 1.6–2.4)
MAGNESIUM SERPL-MCNC: 2.52 MG/DL (ref 1.6–2.4)
MCH RBC QN AUTO: 28.1 PG (ref 26–34)
MCH RBC QN AUTO: 28.3 PG (ref 26–34)
MCHC RBC AUTO-ENTMCNC: 33.2 G/DL (ref 32–36)
MCHC RBC AUTO-ENTMCNC: 33.6 G/DL (ref 32–36)
MCV RBC AUTO: 84 FL (ref 80–100)
MCV RBC AUTO: 85 FL (ref 80–100)
MONOCYTES # BLD MANUAL: 1 X10*3/UL (ref 0.1–1)
MONOCYTES NFR BLD MANUAL: 4.9 %
MYELOCYTES # BLD MANUAL: 0.33 X10*3/UL
MYELOCYTES NFR BLD MANUAL: 1.6 %
NEUTS SEG # BLD MANUAL: 17.42 X10*3/UL (ref 1.2–7)
NEUTS SEG NFR BLD MANUAL: 85.4 %
NRBC BLD-RTO: 4 /100 WBCS (ref 0–0)
NRBC BLD-RTO: 4.7 /100 WBCS (ref 0–0)
PHOSPHATE SERPL-MCNC: 3.3 MG/DL (ref 2.5–4.9)
PHOSPHATE SERPL-MCNC: 4.1 MG/DL (ref 2.5–4.9)
PLATELET # BLD AUTO: 32 X10*3/UL (ref 150–450)
PLATELET # BLD AUTO: 39 X10*3/UL (ref 150–450)
POTASSIUM SERPL-SCNC: 4.8 MMOL/L (ref 3.5–5.3)
POTASSIUM SERPL-SCNC: 4.9 MMOL/L (ref 3.5–5.3)
PROMYELOCYTES # BLD MANUAL: 0.49 X10*3/UL
PROMYELOCYTES NFR BLD MANUAL: 2.4 %
PROT SERPL-MCNC: 4.6 G/DL (ref 6.4–8.2)
PROTHROMBIN TIME: 12 SECONDS (ref 9.8–12.8)
RBC # BLD AUTO: 2.72 X10*6/UL (ref 4–5.2)
RBC # BLD AUTO: 2.92 X10*6/UL (ref 4–5.2)
RBC MORPH BLD: ABNORMAL
SODIUM SERPL-SCNC: 129 MMOL/L (ref 136–145)
SODIUM SERPL-SCNC: 131 MMOL/L (ref 136–145)
TOTAL CELLS COUNTED BLD: 123
WBC # BLD AUTO: 17.3 X10*3/UL (ref 4.4–11.3)
WBC # BLD AUTO: 20.4 X10*3/UL (ref 4.4–11.3)

## 2024-08-01 PROCEDURE — 83735 ASSAY OF MAGNESIUM: CPT

## 2024-08-01 PROCEDURE — 85384 FIBRINOGEN ACTIVITY: CPT

## 2024-08-01 PROCEDURE — 85027 COMPLETE CBC AUTOMATED: CPT

## 2024-08-01 PROCEDURE — 93975 VASCULAR STUDY: CPT

## 2024-08-01 PROCEDURE — 83605 ASSAY OF LACTIC ACID: CPT

## 2024-08-01 PROCEDURE — 85610 PROTHROMBIN TIME: CPT

## 2024-08-01 PROCEDURE — 82784 ASSAY IGA/IGD/IGG/IGM EACH: CPT

## 2024-08-01 PROCEDURE — 2500000005 HC RX 250 GENERAL PHARMACY W/O HCPCS

## 2024-08-01 PROCEDURE — 82947 ASSAY GLUCOSE BLOOD QUANT: CPT

## 2024-08-01 PROCEDURE — 2500000001 HC RX 250 WO HCPCS SELF ADMINISTERED DRUGS (ALT 637 FOR MEDICARE OP)

## 2024-08-01 PROCEDURE — 85027 COMPLETE CBC AUTOMATED: CPT | Performed by: INTERNAL MEDICINE

## 2024-08-01 PROCEDURE — 84484 ASSAY OF TROPONIN QUANT: CPT

## 2024-08-01 PROCEDURE — 2500000004 HC RX 250 GENERAL PHARMACY W/ HCPCS (ALT 636 FOR OP/ED)

## 2024-08-01 PROCEDURE — 84100 ASSAY OF PHOSPHORUS: CPT

## 2024-08-01 PROCEDURE — 71045 X-RAY EXAM CHEST 1 VIEW: CPT | Performed by: RADIOLOGY

## 2024-08-01 PROCEDURE — 85007 BL SMEAR W/DIFF WBC COUNT: CPT

## 2024-08-01 PROCEDURE — 90945 DIALYSIS ONE EVALUATION: CPT | Performed by: INTERNAL MEDICINE

## 2024-08-01 PROCEDURE — C9113 INJ PANTOPRAZOLE SODIUM, VIA: HCPCS

## 2024-08-01 PROCEDURE — 2500000002 HC RX 250 W HCPCS SELF ADMINISTERED DRUGS (ALT 637 FOR MEDICARE OP, ALT 636 FOR OP/ED)

## 2024-08-01 PROCEDURE — 71045 X-RAY EXAM CHEST 1 VIEW: CPT

## 2024-08-01 PROCEDURE — 80053 COMPREHEN METABOLIC PANEL: CPT

## 2024-08-01 PROCEDURE — 82330 ASSAY OF CALCIUM: CPT | Performed by: INTERNAL MEDICINE

## 2024-08-01 PROCEDURE — 37799 UNLISTED PX VASCULAR SURGERY: CPT | Performed by: INTERNAL MEDICINE

## 2024-08-01 PROCEDURE — 99291 CRITICAL CARE FIRST HOUR: CPT | Performed by: STUDENT IN AN ORGANIZED HEALTH CARE EDUCATION/TRAINING PROGRAM

## 2024-08-01 PROCEDURE — 02HV33Z INSERTION OF INFUSION DEVICE INTO SUPERIOR VENA CAVA, PERCUTANEOUS APPROACH: ICD-10-PCS | Performed by: INTERNAL MEDICINE

## 2024-08-01 PROCEDURE — 37799 UNLISTED PX VASCULAR SURGERY: CPT

## 2024-08-01 PROCEDURE — 36556 INSERT NON-TUNNEL CV CATH: CPT | Performed by: INTERNAL MEDICINE

## 2024-08-01 PROCEDURE — 93970 EXTREMITY STUDY: CPT | Performed by: INTERNAL MEDICINE

## 2024-08-01 PROCEDURE — 82728 ASSAY OF FERRITIN: CPT

## 2024-08-01 PROCEDURE — 82330 ASSAY OF CALCIUM: CPT

## 2024-08-01 PROCEDURE — 93970 EXTREMITY STUDY: CPT

## 2024-08-01 PROCEDURE — 80069 RENAL FUNCTION PANEL: CPT | Mod: CCI

## 2024-08-01 PROCEDURE — 2020000001 HC ICU ROOM DAILY

## 2024-08-01 RX ORDER — LORAZEPAM 2 MG/ML
0.5 INJECTION INTRAMUSCULAR ONCE
Status: COMPLETED | OUTPATIENT
Start: 2024-08-01 | End: 2024-08-01

## 2024-08-01 RX ORDER — INSULIN LISPRO 100 [IU]/ML
0-15 INJECTION, SOLUTION INTRAVENOUS; SUBCUTANEOUS
Status: DISCONTINUED | OUTPATIENT
Start: 2024-08-01 | End: 2024-08-01

## 2024-08-01 RX ORDER — INSULIN LISPRO 100 [IU]/ML
0-15 INJECTION, SOLUTION INTRAVENOUS; SUBCUTANEOUS
Status: DISCONTINUED | OUTPATIENT
Start: 2024-08-01 | End: 2024-08-02

## 2024-08-01 RX ADMIN — NOREPINEPHRINE BITARTRATE 0.13 MCG/KG/MIN: 1 SOLUTION INTRAVENOUS at 02:58

## 2024-08-01 RX ADMIN — HYDROCORTISONE SODIUM SUCCINATE 50 MG: 100 INJECTION, POWDER, FOR SOLUTION INTRAMUSCULAR; INTRAVENOUS at 05:44

## 2024-08-01 RX ADMIN — LORAZEPAM 0.5 MG: 2 INJECTION INTRAMUSCULAR; INTRAVENOUS at 22:52

## 2024-08-01 RX ADMIN — MIDODRINE HYDROCHLORIDE 15 MG: 10 TABLET ORAL at 13:41

## 2024-08-01 RX ADMIN — LIDOCAINE 1 PATCH: 4 PATCH TOPICAL at 05:46

## 2024-08-01 RX ADMIN — MICAFUNGIN SODIUM 100 MG: 100 INJECTION, POWDER, LYOPHILIZED, FOR SOLUTION INTRAVENOUS at 17:30

## 2024-08-01 RX ADMIN — INSULIN LISPRO 3 UNITS: 100 INJECTION, SOLUTION INTRAVENOUS; SUBCUTANEOUS at 12:07

## 2024-08-01 RX ADMIN — INSULIN GLARGINE 15 UNITS: 100 INJECTION, SOLUTION SUBCUTANEOUS at 20:39

## 2024-08-01 RX ADMIN — INSULIN LISPRO 4 UNITS: 100 INJECTION, SOLUTION INTRAVENOUS; SUBCUTANEOUS at 09:03

## 2024-08-01 RX ADMIN — HYDROCORTISONE SODIUM SUCCINATE 50 MG: 100 INJECTION, POWDER, FOR SOLUTION INTRAMUSCULAR; INTRAVENOUS at 18:17

## 2024-08-01 RX ADMIN — MIDODRINE HYDROCHLORIDE 15 MG: 10 TABLET ORAL at 22:15

## 2024-08-01 RX ADMIN — MEROPENEM 1 G: 1 INJECTION, POWDER, FOR SOLUTION INTRAVENOUS at 16:50

## 2024-08-01 RX ADMIN — INSULIN LISPRO 6 UNITS: 100 INJECTION, SOLUTION INTRAVENOUS; SUBCUTANEOUS at 20:39

## 2024-08-01 RX ADMIN — PANTOPRAZOLE SODIUM 40 MG: 40 INJECTION, POWDER, FOR SOLUTION INTRAVENOUS at 09:01

## 2024-08-01 RX ADMIN — MIDODRINE HYDROCHLORIDE 15 MG: 10 TABLET ORAL at 05:43

## 2024-08-01 RX ADMIN — MEROPENEM 1 G: 1 INJECTION, POWDER, FOR SOLUTION INTRAVENOUS at 05:15

## 2024-08-01 RX ADMIN — Medication 5 MG: at 20:40

## 2024-08-01 RX ADMIN — HYDROCORTISONE SODIUM SUCCINATE 50 MG: 100 INJECTION, POWDER, FOR SOLUTION INTRAMUSCULAR; INTRAVENOUS at 12:07

## 2024-08-01 RX ADMIN — LIDOCAINE 1 PATCH: 4 PATCH TOPICAL at 09:01

## 2024-08-01 ASSESSMENT — PAIN - FUNCTIONAL ASSESSMENT
PAIN_FUNCTIONAL_ASSESSMENT: 0-10

## 2024-08-01 ASSESSMENT — PAIN SCALES - GENERAL
PAINLEVEL_OUTOF10: 0 - NO PAIN

## 2024-08-01 NOTE — PROGRESS NOTES
"Medical Intensive Care - Daily Progress Note   Subjective    Angie Tobin is a 58 y.o. year old female patient admitted on 7/26/2024 with following ICU needs: Septic shock requiring vasopressors.     Interval History:  Overnight, the patient had a brief episode of hypotension to 50s/30s with horizontal nystagmus at 8 PM. Episode resolved with increasing pressors to levo 0.3 and vaso 0.03. Pressors were deescalated to levo 0.15 and vaso 0 at 10 PM. EKG was only notable for sinus tachycardia.    Patient was awake and alert this morning. She reported no dizziness, chest pain, difficulty breathing, or worsening abdominal pain. She ate a little bit for dinner last night.      Meds    Scheduled medications  hydrocortisone sodium succinate, 50 mg, intravenous, q6h  insulin glargine, 15 Units, subcutaneous, Nightly  insulin lispro, 0-15 Units, subcutaneous, TID  lidocaine, 1 patch, transdermal, Daily  lidocaine, 1 patch, transdermal, q24h  meropenem, 1 g, intravenous, q12h  micafungin, 100 mg, intravenous, q24h  midodrine, 15 mg, oral, q8h  pantoprazole, 40 mg, intravenous, Daily      Continuous medications  norepinephrine, 0-0.5 mcg/kg/min, Last Rate: 0.07 mcg/kg/min (08/01/24 1025)  PrismaSol 4/2.5, 2,700 mL/hr, Last Rate: 2,700 mL/hr (07/31/24 1659)  vasopressin, 0-0.03 Units/min, Last Rate: Stopped (07/31/24 2000)      PRN medications  PRN medications: benzocaine-menthol, dextrose, dextrose, diclofenac sodium, glucagon, glucagon, glucagon, melatonin     Objective    Blood pressure 117/52, pulse 98, temperature 35.6 °C (96.1 °F), temperature source Temporal, resp. rate 14, height 1.778 m (5' 10\"), weight 136 kg (300 lb 11.3 oz), SpO2 98%.     Physical Exam   Constitutional:       General: She is not in acute distress.     Appearance: She is obese.  HENT:      Head: Normocephalic and atraumatic.      Nose: Nose normal.      Mouth/Throat:      Mouth: Mucous membranes are moist.   Eyes:      Extraocular Movements: " Extraocular movements intact.      Pupils: Pupils are equal, round, and reactive to light.   Cardiovascular:      Rate and Rhythm: Tachycardia present.      Pulses: Normal pulses.      Heart sounds: No murmur heard.     No friction rub. No gallop.   Pulmonary:      Effort: Pulmonary effort is normal. No respiratory distress.      Breath sounds: No wheezing, rhonchi or rales.   Abdominal:      General: Bowel sounds are normal. There is no distension.      Palpations: Abdomen is soft. There is no mass.      Tenderness: There is abdominal tenderness. There is no guarding.      Hernia: A hernia is present.   Musculoskeletal:      Cervical back: Normal range of motion.   Skin:     General: Skin is warm.      Capillary Refill: Capillary refill takes less than 2 seconds.   Neurological:      General: No focal deficit present.      Mental Status: She is oriented to person, place, and time.   Psychiatric:         Mood and Affect: Mood normal.     Intake/Output Summary (Last 24 hours) at 8/1/2024 1439  Last data filed at 8/1/2024 1300  Gross per 24 hour   Intake 1346.1 ml   Output 325 ml   Net 1021.1 ml     Labs:   Results from last 72 hours   Lab Units 08/01/24  0411 07/31/24  1646 07/31/24  0300   SODIUM mmol/L 129* 133* 131*   POTASSIUM mmol/L 4.9 4.6 4.5   CHLORIDE mmol/L 96* 98 97*   CO2 mmol/L 21 21 21   BUN mg/dL 17 18 17   CREATININE mg/dL 1.41* 1.50* 1.53*   GLUCOSE mg/dL 244* 246* 228*   CALCIUM mg/dL 8.3* 8.2* 8.3*   ANION GAP mmol/L 17 19 18   EGFR mL/min/1.73m*2 44* 40* 40*   PHOSPHORUS mg/dL 3.3 3.4 2.7      Results from last 72 hours   Lab Units 08/01/24  0411 07/31/24  0300 07/30/24  0427   WBC AUTO x10*3/uL 17.3* 8.3 2.8*  2.8*   HEMOGLOBIN g/dL 8.2* 8.2* 8.2*  8.2*   HEMATOCRIT % 24.7* 24.4* 24.2*  24.2*   PLATELETS AUTO x10*3/uL 39* 31* 23*  23*   LYMPHO PCT MAN %  --   --  15.5   MONO PCT MAN %  --   --  10.6   EOSINO PCT MAN %  --   --  0.0        Micro/ID:     Lab Results   Component Value Date     URINECULTURE No growth 07/26/2024    BLOODCULT No growth at 4 days -  FINAL REPORT 07/27/2024    BLOODCULT No growth at 4 days -  FINAL REPORT 07/27/2024     MELD 3.0: 22 at 8/1/2024  9:18 AM  MELD-Na: 21 at 8/1/2024  9:18 AM  Calculated from:  Serum Creatinine: 1.41 mg/dL at 8/1/2024  4:11 AM  Serum Sodium: 129 mmol/L at 8/1/2024  4:11 AM  Total Bilirubin: 2.3 mg/dL at 8/1/2024  4:11 AM  Serum Albumin: 2.7 g/dL at 8/1/2024  4:11 AM  INR(ratio): 1.1 at 8/1/2024  9:18 AM  Age at listing (hypothetical): 58 years  Sex: Female at 8/1/2024  9:18 AM     Summary of key imaging results from the last 24 hours  US Bilateral Lower Extremity Venous Duplex (8/1/24): No evidence of DVT in right lower extremity. Unable to rule out thrombus in non-compressible common femoral vein due to dialysis line. Rest of left leg negative for DVT.    Assessment and Plan     Assessment: Angie Tobin is a 58 y.o. year old female patient who presented to OSH 7/25/2024 and was transferred to [facility] [place] MICU on 07/27/24 for septic shock requiring pressors with urinary tract infection source, acute hypoxic respiratory failure, with possible TLS, treated with 2x rasburicase, oncology consulted yesterday, per their note TLS is unlikely based on her tumor type. Received one unit RBCs, Zosyn switched to meropenem on 7/27.     Mechanical Ventilation: none  Sedation/Analgesia:  none  Restraints: no    Summary for 08/01/24  :  Continue meropenem with micafungin  BLE duplex: negative for DVT  Liver doppler ordered    Plan:  NEUROLOGY/PSYCH:  #Anxiety  ::Lorazepam 0.5m *2 given at OSH on presentation for anxiety  ::Patient denies anxiety at present  Plan:  -Will monitor  -Ordered pet therapy  -Ordered music therapy     CARDIOVASCULAR:  #Shock, Likely Septic in Origin   ::Etiology: Likely septic from UTI, cultures growing enteric bacilli. Given troponin trend (64 -> 56) and TTE (normal right ventricular function), unlikely cardiac in origin. Given hypotension  continuing s/p aggressive fluid resuscitation, unlikely hypovolemic in origin.   ::7/26 OSH: Received 5L NS, 1L LR, 50g Albumin, 100mg solumedrol  ::7/26 OSH: Norepinephrine and Vasopressin begun for persistent hypotension   ::7/27 began Stress Dose Steroids Hydrocortisone 50mg Q6H   ::7/27 1.5L LR, 7/29 0.5L LR   ::Lactate downtrend: 7/26 2.8 > 7/27 1.9 > 7/29 2.5 > 7/30 2.9 > 7/31 2.6  ::Patient requires increased pressors overnight  ::Bilateral venous duplex (8/1/24): No evidence of DVT in right lower extremity. Unable to rule out thrombus in non-compressible common femoral vein due to dialysis line. Rest of left leg negative for DVT.  Plan:  -Continue Norepinephrine and vasopressin, titration per patient's ability  -Continue Hydrocortisone 50 Q6h  -Continue midodrine 15 mg  -Continue meropenem, added micafungin  -Will replete fluids prn, careful not to overload due to ALDEN    #Hypertension  ::Home medication: Metoprolol Succinate XL 25mg, Lisinopril 20mg  Plan:  -Holding metoprolol in setting of shock  -Holding Lisinopril in setting of ALDEN     PULMONARY:  #Acute Hypoxic Respiratory Failure   ::Etiology: Likely secondary to atelectasis vs pulmonary edema vs pneumonia given CXR findings  ::2022 6 Wedge lung resection 2/2 metastasis  ::New O2 requirement during hospitalization; no O2 requirement at baseline  ::7/27 Pro calcitonin: 3.07  ::7/28 ABG: Ph 7.42, O2 27, CO2 103  ::Pt satting well on room air at present  Plan:  -Monitor for changes in symptoms     RENAL/GENITOURINARY:  #UTI   -See ID section     #ALDEN w/ Oliguria, required CVVH  ::Baseline Cr: 0.7  ::Presentation: BUN 90, Cr 7.6, now 1.79  ::7/26 CVVH at OSH -> 310 mL urine  ::King placed at OSH. Removed 7/27 upon admission to [facility] [place].   ::CK 1288   Plan:  -Nephrology consulted regarding CVVH   -On CVVH  -BID RFP      #Hyperkalemia, likely in the setting of TLS  ::Presentation: 5.5. Peaked 5.9. now 4.3  ::OSH: Insulin drip, Lokelma 10g *1, Sodium Bicarbonate  infusion, Calcium gluconate  ::7/27: 4.9. Additional 2g Calcium Gluconate given  Plan:  -BID RFP, replete PRN     #Hypocalcemia, likely in the setting of TLS  ::Presentation: 7.9  ::7/29: 8.3  Plan:  -BID RFP, replete PRN     #Hypomagnesemia  ::7/27 2.02  Plan:  -BID RFP, replete PRN     GASTROENTEROLOGY:  #Diarrhea (resolved)  ::Had multiple loose bowel movements after admission  ::C diff PCR indeterminate  ::Stool pathogen PCR negative  ::Diarrhea currently resolved  Plan:  -Stopped Miralax, Senna  -Avoid narcotics, replete electrolytes PRN, treat sepsis, replete fluids prn     ENDOCRINOLOGY:  #DM   ::7/26 Hemoglobin A1C: 9.2 (7.4 in 2022)  ::Home meds: Metformin 500mg BID  ::Given Insulin Drip on presentation at OSH  ::Patient receiving high dose steroids since 7/27  Plan:  -Holding home metformin in inpatient setting  -Hypoglycemia protocol   -Accezequiel ACHS   -insulin glargine 15 with lispro sliding scale     HEMATOLOGY/ONCOLOGY:  #Leiomyosarcoma, with metastasis to liver, lungs, and spine  ::Originally found in the pancreatic bed 2019. Hysterectomy and bilateral salpingo- oophorectomy 2019. H/o chemotherapy and radiation. Multiple remissions. Began Yondelis 06/05/2024.   ::Last chemotherapy: Yondelis C3 7/17/2024  Plan:  -Oncology consulted, recs appreciated   -No chemotherapy pending at present      #Elevated liver enzymes  ::, ALT 53, AST 83  ::Likely secondary to metastasis to liver  ::CT chest/abdomen (7/31/24): Multiple metastatic lung nodules. Increased abdominal wall edema and mesenteric stranding. Multiple lytic osseous metastases  ::Increased LFTs (7/27 -> 8/1/24): ALT 53->118, AST 83->407  ::CT AP (7/31/24): hepatic steatosis and unchanged atrophy of segment VI and VII s/p Y90 treatment  Plan:  -Us liver with doppler     #Tumor Lysis Syndrome, improving (resolved, no concern for now)  ::On presentation: hyperuricemia 15.9, hypocalcemia, hyperkalemia, nausea, vomit, diarrhea  ::7/26 OSH: 2x  rasburicase, 5L NS, 1L LR, 50g Albumin, 100mg solumedrol  ::G6PD negative  ::7/27 Uric Acid: <1.5  Plan:  -per Oncology, there is no concern for TLS  -Trending labs      #Pancytopenia  #Neutropenia  ::WBC 3.9 > 1.1 > 1.5, RBC 3.06 > 2.71 > 2.83, Hemoglobin 7.9, Platelets 27  ::Latest ANC 5.36  ::Etiology: secondary to sepsis vs post chemotherapy  ::Heparin given at OSH  ::WBC 8.3 -> 17.3 (8/1/24)  Plan:  -Holding heparin in setting of thrombocytopenia  -Transfuse platelets before placing central line  -Stopped filgrastim, patient now has adequate ANC  -Acute increase in WBC may be related to filgrastim and steroid therapy; will consider drawing new cultures if WBCs continue to increase     #Monoclonal B-cell lymphocytosis   ::Diagnosed 2016  Plan:  -Monitor     MUSCULOSKELETAL/ SKIN:  FREDERICK     INFECTIOUS DISEASE:  # UTI   ::7/25 UCX: >100,000 Enteric Bacilli  ::7/25 BCX * 2: NG2D  ::7/26 Repeat UCX: negative  ::7/27 Repeat BCX: negative  ::S/P Ceftriaxone 1g 7/25  ::s/p vancomycin 7/27-7/28  ::s/p Zosyn 7/26-7/27  ::7/27 Pro calcitonin: 3.07  ::MRSA neg  Plan:  -Continue meropenem  (7/27- present)  -Discontinued Vancomycin (7/27-7/28)  -Added micafungin (8/1/24 -)       ICU Check List   FEN  Fluids: S/P 1.5L fluid 7/27. PRN   Electrolytes: Will replete PRN   Nutrition: Regular Diet with Ensure supplementation  Prophylaxis:  DVT ppx: SCDs (Holding medical anticoagulation due to thrombocytopenia)   GI ppx: PPI IV   Bowel care: Holding Miralax 17g BID, Sennosides 8.6mg nightly  Hardware:  Catheter: Hemodialysis triple lumen left femoral catheter- placed 7/26 at OSH  Access: PIV, Mediport    Drains: King removed from OSH. Current External Urinary catheter  Lines: A line left radial- placed 7/26 at OSH  Social:  Code: Full Code    NOK: Loulou Benavides (cousin) [phone]; Tatyana Yanez (niece) [phone]  Disposition: ONELIA Macias MD   08/01/24 at 2:39 PM     Disclaimer: Documentation completed with the  information available at the time of input. The times in the chart may not be reflective of actual patient care times, interventions, or procedures. Documentation occurs after the physical care of the patient.

## 2024-08-01 NOTE — PROGRESS NOTES
SOCIAL WORK NOTE   Patient's cousin contacted bedside nurse about POA. SW spoke with Cousin, Loulou. She states patient had expressed interest in completing document. SW completed forms with patient. HCPOA 1 Cousin Loulou. Copy placed in chart and original and copy left at bedside for patient. Social work to follow.   VIJAY Llanos, LISW-S ([id number])

## 2024-08-01 NOTE — CARE PLAN
The patient's goals for the shift include  sleep    The clinical goals for the shift include wean pressors to maintain MAP>65    Over the shift, the patient did not make progress toward the following goals. Barriers to progression include . Recommendations to address these barriers include .

## 2024-08-01 NOTE — PROGRESS NOTES
NEPHROLOGY FOLLOW UP NOTE    Angie Tobin   58 y.o.   136 kg (299 lbs 6.2 oz)  MRN/Room: [id number]/27/27-A    Subjective:     Angie is feeling well today. She feels she has a bit more energy than yesterday. She denies any nausea, vomiting, headache, fever, chills, shortness of breath, or chest pain.    Current medications:     hydrocortisone sodium succinate, 50 mg, q6h  insulin glargine, 15 Units, Nightly  insulin lispro, 0-15 Units, TID  lidocaine, 1 patch, Daily  lidocaine, 1 patch, q24h  meropenem, 1 g, q12h  micafungin, 150 mg, q24h  midodrine, 15 mg, q8h  pantoprazole, 40 mg, Daily      norepinephrine, Last Rate: 0.07 mcg/kg/min (08/01/24 1025)  PrismaSol 4/2.5, Last Rate: 2,700 mL/hr (07/31/24 1659)  vasopressin, Last Rate: Stopped (07/31/24 2000)      benzocaine-menthol, 1 lozenge, q2h PRN  dextrose, 12.5 g, q15 min PRN  dextrose, 25 g, q15 min PRN  diclofenac sodium, 4 g, 4x daily PRN  glucagon, 1 mg, q15 min PRN  glucagon, 1 mg, q15 min PRN  glucagon, 1 mg, q15 min PRN  melatonin, 5 mg, Nightly PRN      Objective:    Vitals:    08/01/24 1200   BP: 150/61   Pulse: 102   Resp: 23   Temp: 35.6 °C (96.1 °F)   SpO2:           Intake/Output Summary (Last 24 hours) at 8/1/2024 1304  Last data filed at 8/1/2024 1200  Gross per 24 hour   Intake 1349.7 ml   Output 322 ml   Net 1027.7 ml       General appearance: Awake and alert, oriented, . No distress  HEENT: NC/AT, mucous membranes moist  Skin: no apparent rash  Heart: heart sounds 1 & 2 present and normal, no murmurs heard or rub. Tachycardic.  Lungs: Adequate air entry, breath sounds clear bilaterally.  No wheezing/crackles  Abdomen: soft, tender in all four quadrants.  Extremities: No edema, no joint swelling  Neuro: No FND,  no asterixis   ACCESS: Hemodialysis triple lumen left femoral catheter. PIV. Mediport. Left radial A line.    Blood Labs:  Results for orders placed or performed during the hospital encounter of 07/26/24 (from the past 24 hour(s))    POCT GLUCOSE   Result Value Ref Range    POCT Glucose 253 (H) 74 - 99 mg/dL   Renal Function Panel   Result Value Ref Range    Glucose 246 (H) 74 - 99 mg/dL    Sodium 133 (L) 136 - 145 mmol/L    Potassium 4.6 3.5 - 5.3 mmol/L    Chloride 98 98 - 107 mmol/L    Bicarbonate 21 21 - 32 mmol/L    Anion Gap 19 10 - 20 mmol/L    Urea Nitrogen 18 6 - 23 mg/dL    Creatinine 1.50 (H) 0.50 - 1.05 mg/dL    eGFR 40 (L) >60 mL/min/1.73m*2    Calcium 8.2 (L) 8.6 - 10.6 mg/dL    Phosphorus 3.4 2.5 - 4.9 mg/dL    Albumin 2.7 (L) 3.4 - 5.0 g/dL   Magnesium   Result Value Ref Range    Magnesium 2.41 (H) 1.60 - 2.40 mg/dL   CALCIUM, IONIZED   Result Value Ref Range    POCT Calcium, Ionized 1.16 1.1 - 1.33 mmol/L   Troponin I, High Sensitivity   Result Value Ref Range    Troponin I, High Sensitivity (CMC) 203 (HH) 0 - 34 ng/L   Lactate   Result Value Ref Range    Lactate 3.4 (H) 0.4 - 2.0 mmol/L   Blood Gas Arterial Full Panel   Result Value Ref Range    POCT pH, Arterial 7.48 (H) 7.38 - 7.42 pH    POCT pCO2, Arterial 27 (L) 38 - 42 mm Hg    POCT pO2, Arterial 67 (L) 85 - 95 mm Hg    POCT SO2, Arterial 94 94 - 100 %    POCT Oxy Hemoglobin, Arterial 91.8 (L) 94.0 - 98.0 %    POCT Hematocrit Calculated, Arterial 24.0 (L) 36.0 - 46.0 %    POCT Sodium, Arterial 129 (L) 136 - 145 mmol/L    POCT Potassium, Arterial 4.5 3.5 - 5.3 mmol/L    POCT Chloride, Arterial 103 98 - 107 mmol/L    POCT Ionized Calcium, Arterial 1.09 (L) 1.10 - 1.33 mmol/L    POCT Glucose, Arterial 266 (H) 74 - 99 mg/dL    POCT Lactate, Arterial 3.4 (H) 0.4 - 2.0 mmol/L    POCT Base Excess, Arterial -2.8 (L) -2.0 - 3.0 mmol/L    POCT HCO3 Calculated, Arterial 20.1 (L) 22.0 - 26.0 mmol/L    POCT Hemoglobin, Arterial 8.1 (L) 12.0 - 16.0 g/dL    POCT Anion Gap, Arterial 10 10 - 25 mmo/L    Patient Temperature 37.0 degrees Celsius    FiO2 21 %   Troponin I, High Sensitivity   Result Value Ref Range    Troponin I, High Sensitivity (CMC) 260 (HH) 0 - 34 ng/L   Comprehensive  Metabolic Panel   Result Value Ref Range    Glucose 244 (H) 74 - 99 mg/dL    Sodium 129 (L) 136 - 145 mmol/L    Potassium 4.9 3.5 - 5.3 mmol/L    Chloride 96 (L) 98 - 107 mmol/L    Bicarbonate 21 21 - 32 mmol/L    Anion Gap 17 10 - 20 mmol/L    Urea Nitrogen 17 6 - 23 mg/dL    Creatinine 1.41 (H) 0.50 - 1.05 mg/dL    eGFR 44 (L) >60 mL/min/1.73m*2    Calcium 8.3 (L) 8.6 - 10.6 mg/dL    Albumin 2.7 (L) 3.4 - 5.0 g/dL    Alkaline Phosphatase 643 (H) 33 - 110 U/L    Total Protein 4.6 (L) 6.4 - 8.2 g/dL     (H) 9 - 39 U/L    Bilirubin, Total 2.3 (H) 0.0 - 1.2 mg/dL     (H) 7 - 45 U/L   Magnesium   Result Value Ref Range    Magnesium 2.52 (H) 1.60 - 2.40 mg/dL   Phosphorus   Result Value Ref Range    Phosphorus 3.3 2.5 - 4.9 mg/dL   CBC   Result Value Ref Range    WBC 17.3 (H) 4.4 - 11.3 x10*3/uL    nRBC 4.0 (H) 0.0 - 0.0 /100 WBCs    RBC 2.92 (L) 4.00 - 5.20 x10*6/uL    Hemoglobin 8.2 (L) 12.0 - 16.0 g/dL    Hematocrit 24.7 (L) 36.0 - 46.0 %    MCV 85 80 - 100 fL    MCH 28.1 26.0 - 34.0 pg    MCHC 33.2 32.0 - 36.0 g/dL    RDW 17.2 (H) 11.5 - 14.5 %    Platelets 39 (LL) 150 - 450 x10*3/uL   Lactate   Result Value Ref Range    Lactate 2.8 (H) 0.4 - 2.0 mmol/L   Troponin I, High Sensitivity   Result Value Ref Range    Troponin I, High Sensitivity (CMC) 272 (HH) 0 - 34 ng/L   CALCIUM, IONIZED   Result Value Ref Range    POCT Calcium, Ionized 1.08 (L) 1.1 - 1.33 mmol/L   POCT GLUCOSE   Result Value Ref Range    POCT Glucose 220 (H) 74 - 99 mg/dL   Coagulation Screen   Result Value Ref Range    Protime 12.0 9.8 - 12.8 seconds    INR 1.1 0.9 - 1.1    aPTT 23 (L) 27 - 38 seconds   Fibrinogen   Result Value Ref Range    Fibrinogen 241 200 - 400 mg/dL   Troponin I, High Sensitivity   Result Value Ref Range    Troponin I, High Sensitivity (CMC) 244 (HH) 0 - 34 ng/L   POCT GLUCOSE   Result Value Ref Range    POCT Glucose 189 (H) 74 - 99 mg/dL      XR chest 1 view 07/28/2024    IMPRESSION:  1. Low lung volumes with  bronchovascular crowding and suspect  superimposed mild pulmonary edema.  2. Interval improvement of a now trace left pleural effusion.  3. Right chest wall MediPort as above.    ASSESSMENT:    Angie Tobin is a  58 y.o.  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 Nephrology is currently managing for ALDEN stage III (baseline Cr 0.7 jumped to 7.6 with BUN 90 on 07/25/2024; current Cr 1.57 on CVVH).    #ALDEN, anuric - BL Cr 0.7  - Etiology: Acute tubular injury from poor PO intake/septic shock (UTI) with c/f TLS at OSH  - CT abdomen was negative for hydronephrosis  - UA- epithelial cells, +RBC/WBC, 1+ protein  - Started on CVVH (started 07/25/2024) due to hyperkalemia, acidosis  - Patient remains anuric (0 urine output last 24 hours)  - Patient now off vasopressin but remains on norepinephrine to stabilize blood pressure with home HTN medications held  - Tolerating CVVH well with K, Bicarb in normal range  - Fluid status: euvolemic    Recommendations:  - Will likely continue with CVVH through the weekend. Can reassess HD stability next week to assess for transition to SLED or HD.  - Monitor for increase in urine output  - Avoid nephrotoxins, contrast if possible  - Strict Is/Os  - Renal dosing for medications for latest eGFR, follow medication trough as appropriate  - Avoid hypotension/rapid fluctuations in BPs    Roberto Hart, MS4  24 hour Renal Pager - [phone]    Discussed with attending nephrologist, Dr. Sathish Isabel.

## 2024-08-01 NOTE — CARE PLAN
Problem: Skin  Goal: Participates in plan/prevention/treatment measures  Outcome: Progressing  Flowsheets (Taken 8/1/2024 1111)  Participates in plan/prevention/treatment measures:   Discuss with provider PT/OT consult   Elevate heels  Goal: Prevent/manage excess moisture  Outcome: Progressing  Flowsheets (Taken 8/1/2024 1111)  Prevent/manage excess moisture: Monitor for/manage infection if present  Goal: Prevent/minimize sheer/friction injuries  Outcome: Progressing  Flowsheets (Taken 8/1/2024 1111)  Prevent/minimize sheer/friction injuries:   Use pull sheet   HOB 30 degrees or less   Turn/reposition every 2 hours/use positioning/transfer devices  Goal: Promote/optimize nutrition  Outcome: Progressing  Flowsheets (Taken 8/1/2024 1111)  Promote/optimize nutrition:   Monitor/record intake including meals   Assist with feeding  Goal: Promote skin healing  Outcome: Progressing  Flowsheets (Taken 8/1/2024 1111)  Promote skin healing:   Turn/reposition every 2 hours/use positioning/transfer devices   Rotate device position/do not position patient on device

## 2024-08-02 ENCOUNTER — APPOINTMENT (OUTPATIENT)
Dept: RADIOLOGY | Facility: HOSPITAL | Age: 59
DRG: 871 | End: 2024-08-02
Payer: COMMERCIAL

## 2024-08-02 LAB
ABO GROUP (TYPE) IN BLOOD: NORMAL
ALBUMIN SERPL BCP-MCNC: 2.6 G/DL (ref 3.4–5)
ALBUMIN SERPL BCP-MCNC: 2.6 G/DL (ref 3.4–5)
ALBUMIN SERPL BCP-MCNC: 2.8 G/DL (ref 3.4–5)
ALP SERPL-CCNC: 655 U/L (ref 33–110)
ALP SERPL-CCNC: 655 U/L (ref 33–110)
ALT SERPL W P-5'-P-CCNC: 148 U/L (ref 7–45)
ALT SERPL W P-5'-P-CCNC: 148 U/L (ref 7–45)
AMMONIA PLAS-SCNC: 76 UMOL/L (ref 16–53)
ANION GAP BLDA CALCULATED.4IONS-SCNC: 23 MMO/L (ref 10–25)
ANION GAP BLDV CALCULATED.4IONS-SCNC: 24 MMOL/L (ref 10–25)
ANION GAP BLDV CALCULATED.4IONS-SCNC: 27 MMOL/L (ref 10–25)
ANION GAP SERPL CALC-SCNC: 24 MMOL/L (ref 10–20)
ANION GAP SERPL CALC-SCNC: 31 MMOL/L (ref 10–20)
ANTIBODY SCREEN: NORMAL
ASPERGILLUS GALACTOMANNAN EIA,SERUM: 0.05
AST SERPL W P-5'-P-CCNC: 534 U/L (ref 9–39)
AST SERPL W P-5'-P-CCNC: 534 U/L (ref 9–39)
B-OH-BUTYR SERPL-SCNC: 0.63 MMOL/L (ref 0.02–0.27)
BASE EXCESS BLDA CALC-SCNC: -11.5 MMOL/L (ref -2–3)
BASE EXCESS BLDV CALC-SCNC: -14.4 MMOL/L (ref -2–3)
BASE EXCESS BLDV CALC-SCNC: -19.9 MMOL/L (ref -2–3)
BASOPHILS # BLD MANUAL: 0 X10*3/UL (ref 0–0.1)
BASOPHILS NFR BLD MANUAL: 0 %
BILIRUB DIRECT SERPL-MCNC: 1.4 MG/DL (ref 0–0.3)
BILIRUB SERPL-MCNC: 3 MG/DL (ref 0–1.2)
BILIRUB SERPL-MCNC: 3 MG/DL (ref 0–1.2)
BLOOD EXPIRATION DATE: NORMAL
BODY TEMPERATURE: 37 DEGREES CELSIUS
BUN SERPL-MCNC: 21 MG/DL (ref 6–23)
BUN SERPL-MCNC: 22 MG/DL (ref 6–23)
BURR CELLS BLD QL SMEAR: ABNORMAL
CA-I BLD-SCNC: 1.03 MMOL/L (ref 1.1–1.33)
CA-I BLD-SCNC: 1.07 MMOL/L (ref 1.1–1.33)
CA-I BLDA-SCNC: 1.11 MMOL/L (ref 1.1–1.33)
CA-I BLDV-SCNC: 1.14 MMOL/L (ref 1.1–1.33)
CA-I BLDV-SCNC: 1.21 MMOL/L (ref 1.1–1.33)
CALCIUM SERPL-MCNC: 8 MG/DL (ref 8.6–10.6)
CALCIUM SERPL-MCNC: 8.7 MG/DL (ref 8.6–10.6)
CHLORIDE BLDA-SCNC: 100 MMOL/L (ref 98–107)
CHLORIDE BLDV-SCNC: 97 MMOL/L (ref 98–107)
CHLORIDE BLDV-SCNC: 98 MMOL/L (ref 98–107)
CHLORIDE SERPL-SCNC: 97 MMOL/L (ref 98–107)
CHLORIDE SERPL-SCNC: 98 MMOL/L (ref 98–107)
CK SERPL-CCNC: 8374 U/L (ref 0–215)
CO2 SERPL-SCNC: 12 MMOL/L (ref 21–32)
CO2 SERPL-SCNC: 14 MMOL/L (ref 21–32)
CREAT SERPL-MCNC: 1.28 MG/DL (ref 0.5–1.05)
CREAT SERPL-MCNC: 1.35 MG/DL (ref 0.5–1.05)
D DIMER PPP FEU-MCNC: 5360 NG/ML FEU
DISPENSE STATUS: NORMAL
EGFRCR SERPLBLD CKD-EPI 2021: 46 ML/MIN/1.73M*2
EGFRCR SERPLBLD CKD-EPI 2021: 49 ML/MIN/1.73M*2
EOSINOPHIL # BLD MANUAL: 0.23 X10*3/UL (ref 0–0.7)
EOSINOPHIL NFR BLD MANUAL: 0.7 %
ERYTHROCYTE [DISTWIDTH] IN BLOOD BY AUTOMATED COUNT: 17.4 % (ref 11.5–14.5)
ERYTHROCYTE [DISTWIDTH] IN BLOOD BY AUTOMATED COUNT: 18.4 % (ref 11.5–14.5)
FERRITIN SERPL-MCNC: 3266 NG/ML (ref 8–150)
FIBRINOGEN PPP-MCNC: 185 MG/DL (ref 200–400)
FUNGITELL BETA-D GLUCAN,SERUM: 31 PG/ML
GGT SERPL-CCNC: 846 U/L (ref 5–55)
GLUCOSE BLD MANUAL STRIP-MCNC: 116 MG/DL (ref 74–99)
GLUCOSE BLD MANUAL STRIP-MCNC: 142 MG/DL (ref 74–99)
GLUCOSE BLD MANUAL STRIP-MCNC: 145 MG/DL (ref 74–99)
GLUCOSE BLD MANUAL STRIP-MCNC: 95 MG/DL (ref 74–99)
GLUCOSE BLDA-MCNC: 146 MG/DL (ref 74–99)
GLUCOSE BLDV-MCNC: 72 MG/DL (ref 74–99)
GLUCOSE BLDV-MCNC: 95 MG/DL (ref 74–99)
GLUCOSE SERPL-MCNC: 178 MG/DL (ref 74–99)
GLUCOSE SERPL-MCNC: 83 MG/DL (ref 74–99)
HCO3 BLDA-SCNC: 12.1 MMOL/L (ref 22–26)
HCO3 BLDV-SCNC: 10.4 MMOL/L (ref 22–26)
HCO3 BLDV-SCNC: 11.4 MMOL/L (ref 22–26)
HCT VFR BLD AUTO: 23.3 % (ref 36–46)
HCT VFR BLD AUTO: 24.3 % (ref 36–46)
HCT VFR BLD EST: 23 % (ref 36–46)
HCT VFR BLD EST: 26 % (ref 36–46)
HCT VFR BLD EST: 29 % (ref 36–46)
HGB BLD-MCNC: 7.8 G/DL (ref 12–16)
HGB BLD-MCNC: 7.8 G/DL (ref 12–16)
HGB BLDA-MCNC: 7.6 G/DL (ref 12–16)
HGB BLDV-MCNC: 8.6 G/DL (ref 12–16)
HGB BLDV-MCNC: 9.7 G/DL (ref 12–16)
HSV1 DNA BLD QL NAA+PROBE: NOT DETECTED
HSV2 DNA BLD QL NAA+PROBE: NOT DETECTED
IGG SERPL-MCNC: 363 MG/DL (ref 700–1600)
IMM GRANULOCYTES # BLD AUTO: 3.05 X10*3/UL (ref 0–0.7)
IMM GRANULOCYTES NFR BLD AUTO: 9.2 % (ref 0–0.9)
INHALED O2 CONCENTRATION: 21 %
INHALED O2 CONCENTRATION: 3 %
INHALED O2 CONCENTRATION: 36 %
LACTATE BLDA-SCNC: 12.4 MMOL/L (ref 0.4–2)
LACTATE BLDV-SCNC: 15 MMOL/L (ref 0.4–2)
LACTATE BLDV-SCNC: 16.9 MMOL/L (ref 0.4–2)
LACTATE SERPL-SCNC: 13.4 MMOL/L (ref 0.4–2)
LACTATE SERPL-SCNC: 14.3 MMOL/L (ref 0.4–2)
LDH SERPL L TO P-CCNC: 2860 U/L (ref 84–246)
LYMPHOCYTES # BLD MANUAL: 2.16 X10*3/UL (ref 1.2–4.8)
LYMPHOCYTES NFR BLD MANUAL: 6.5 %
MAGNESIUM SERPL-MCNC: 2.51 MG/DL (ref 1.6–2.4)
MAGNESIUM SERPL-MCNC: 2.65 MG/DL (ref 1.6–2.4)
MCH RBC QN AUTO: 28.5 PG (ref 26–34)
MCH RBC QN AUTO: 28.6 PG (ref 26–34)
MCHC RBC AUTO-ENTMCNC: 32.1 G/DL (ref 32–36)
MCHC RBC AUTO-ENTMCNC: 33.5 G/DL (ref 32–36)
MCV RBC AUTO: 85 FL (ref 80–100)
MCV RBC AUTO: 89 FL (ref 80–100)
METAMYELOCYTES # BLD MANUAL: 0.23 X10*3/UL
METAMYELOCYTES NFR BLD MANUAL: 0.7 %
MONOCYTES # BLD MANUAL: 2.4 X10*3/UL (ref 0.1–1)
MONOCYTES NFR BLD MANUAL: 7.2 %
MYELOCYTES # BLD MANUAL: 0.73 X10*3/UL
MYELOCYTES NFR BLD MANUAL: 2.2 %
NEUTS SEG # BLD MANUAL: 27.04 X10*3/UL (ref 1.2–7)
NEUTS SEG NFR BLD MANUAL: 81.2 %
NRBC BLD-RTO: 4.7 /100 WBCS (ref 0–0)
NRBC BLD-RTO: 6 /100 WBCS (ref 0–0)
OXYHGB MFR BLDA: 96 % (ref 94–98)
OXYHGB MFR BLDV: 71.2 % (ref 45–75)
OXYHGB MFR BLDV: 71.5 % (ref 45–75)
PCO2 BLDA: 20 MM HG (ref 38–42)
PCO2 BLDV: 26 MM HG (ref 41–51)
PCO2 BLDV: 42 MM HG (ref 41–51)
PH BLDA: 7.39 PH (ref 7.38–7.42)
PH BLDV: 7 PH (ref 7.33–7.43)
PH BLDV: 7.25 PH (ref 7.33–7.43)
PHOSPHATE SERPL-MCNC: 4 MG/DL (ref 2.5–4.9)
PHOSPHATE SERPL-MCNC: 4.9 MG/DL (ref 2.5–4.9)
PLATELET # BLD AUTO: 103 X10*3/UL (ref 150–450)
PLATELET # BLD AUTO: 24 X10*3/UL (ref 150–450)
PO2 BLDA: 102 MM HG (ref 85–95)
PO2 BLDV: 52 MM HG (ref 35–45)
PO2 BLDV: 57 MM HG (ref 35–45)
POTASSIUM BLDA-SCNC: 5.5 MMOL/L (ref 3.5–5.3)
POTASSIUM BLDV-SCNC: 5.3 MMOL/L (ref 3.5–5.3)
POTASSIUM BLDV-SCNC: 5.7 MMOL/L (ref 3.5–5.3)
POTASSIUM SERPL-SCNC: 4.9 MMOL/L (ref 3.5–5.3)
POTASSIUM SERPL-SCNC: 5 MMOL/L (ref 3.5–5.3)
PRODUCT BLOOD TYPE: 6200
PRODUCT CODE: NORMAL
PROMYELOCYTES # BLD MANUAL: 0.5 X10*3/UL
PROMYELOCYTES NFR BLD MANUAL: 1.5 %
PROT SERPL-MCNC: 4.4 G/DL (ref 6.4–8.2)
PROT SERPL-MCNC: 4.4 G/DL (ref 6.4–8.2)
RBC # BLD AUTO: 2.73 X10*6/UL (ref 4–5.2)
RBC # BLD AUTO: 2.74 X10*6/UL (ref 4–5.2)
RBC MORPH BLD: ABNORMAL
RH FACTOR (ANTIGEN D): NORMAL
SAO2 % BLDA: 96 % (ref 94–100)
SAO2 % BLDV: 73 % (ref 45–75)
SAO2 % BLDV: 73 % (ref 45–75)
SODIUM BLDA-SCNC: 130 MMOL/L (ref 136–145)
SODIUM BLDV-SCNC: 128 MMOL/L (ref 136–145)
SODIUM BLDV-SCNC: 129 MMOL/L (ref 136–145)
SODIUM SERPL-SCNC: 131 MMOL/L (ref 136–145)
SODIUM SERPL-SCNC: 135 MMOL/L (ref 136–145)
TOTAL CELLS COUNTED BLD: 138
TRIGL SERPL-MCNC: 355 MG/DL (ref 0–149)
UNIT ABO: NORMAL
UNIT NUMBER: NORMAL
UNIT RH: NORMAL
UNIT VOLUME: 274
WBC # BLD AUTO: 24.9 X10*3/UL (ref 4.4–11.3)
WBC # BLD AUTO: 33.3 X10*3/UL (ref 4.4–11.3)

## 2024-08-02 PROCEDURE — 83735 ASSAY OF MAGNESIUM: CPT

## 2024-08-02 PROCEDURE — 82550 ASSAY OF CK (CPK): CPT

## 2024-08-02 PROCEDURE — 80076 HEPATIC FUNCTION PANEL: CPT | Mod: CCI

## 2024-08-02 PROCEDURE — 2500000004 HC RX 250 GENERAL PHARMACY W/ HCPCS (ALT 636 FOR OP/ED)

## 2024-08-02 PROCEDURE — 2500000001 HC RX 250 WO HCPCS SELF ADMINISTERED DRUGS (ALT 637 FOR MEDICARE OP)

## 2024-08-02 PROCEDURE — 83520 IMMUNOASSAY QUANT NOS NONAB: CPT

## 2024-08-02 PROCEDURE — 36415 COLL VENOUS BLD VENIPUNCTURE: CPT

## 2024-08-02 PROCEDURE — 83605 ASSAY OF LACTIC ACID: CPT

## 2024-08-02 PROCEDURE — 85027 COMPLETE CBC AUTOMATED: CPT | Performed by: INTERNAL MEDICINE

## 2024-08-02 PROCEDURE — 85007 BL SMEAR W/DIFF WBC COUNT: CPT

## 2024-08-02 PROCEDURE — 36430 TRANSFUSION BLD/BLD COMPNT: CPT

## 2024-08-02 PROCEDURE — 86352 CELL FUNCTION ASSAY W/STIM: CPT

## 2024-08-02 PROCEDURE — 85379 FIBRIN DEGRADATION QUANT: CPT

## 2024-08-02 PROCEDURE — 82140 ASSAY OF AMMONIA: CPT

## 2024-08-02 PROCEDURE — 85384 FIBRINOGEN ACTIVITY: CPT

## 2024-08-02 PROCEDURE — 84132 ASSAY OF SERUM POTASSIUM: CPT

## 2024-08-02 PROCEDURE — 84075 ASSAY ALKALINE PHOSPHATASE: CPT

## 2024-08-02 PROCEDURE — 74177 CT ABD & PELVIS W/CONTRAST: CPT

## 2024-08-02 PROCEDURE — 84100 ASSAY OF PHOSPHORUS: CPT

## 2024-08-02 PROCEDURE — 84425 ASSAY OF VITAMIN B-1: CPT | Performed by: STUDENT IN AN ORGANIZED HEALTH CARE EDUCATION/TRAINING PROGRAM

## 2024-08-02 PROCEDURE — 90937 HEMODIALYSIS REPEATED EVAL: CPT

## 2024-08-02 PROCEDURE — 37799 UNLISTED PX VASCULAR SURGERY: CPT

## 2024-08-02 PROCEDURE — 2550000001 HC RX 255 CONTRASTS: Performed by: INTERNAL MEDICINE

## 2024-08-02 PROCEDURE — 37799 UNLISTED PX VASCULAR SURGERY: CPT | Performed by: INTERNAL MEDICINE

## 2024-08-02 PROCEDURE — 99291 CRITICAL CARE FIRST HOUR: CPT | Performed by: STUDENT IN AN ORGANIZED HEALTH CARE EDUCATION/TRAINING PROGRAM

## 2024-08-02 PROCEDURE — 2500000002 HC RX 250 W HCPCS SELF ADMINISTERED DRUGS (ALT 637 FOR MEDICARE OP, ALT 636 FOR OP/ED)

## 2024-08-02 PROCEDURE — 70450 CT HEAD/BRAIN W/O DYE: CPT | Performed by: RADIOLOGY

## 2024-08-02 PROCEDURE — 86901 BLOOD TYPING SEROLOGIC RH(D): CPT

## 2024-08-02 PROCEDURE — 90945 DIALYSIS ONE EVALUATION: CPT | Performed by: INTERNAL MEDICINE

## 2024-08-02 PROCEDURE — 87040 BLOOD CULTURE FOR BACTERIA: CPT

## 2024-08-02 PROCEDURE — 83529 ASAY OF INTERLEUKIN-6 (IL-6): CPT

## 2024-08-02 PROCEDURE — 85027 COMPLETE CBC AUTOMATED: CPT

## 2024-08-02 PROCEDURE — 82947 ASSAY GLUCOSE BLOOD QUANT: CPT

## 2024-08-02 PROCEDURE — 99255 IP/OBS CONSLTJ NEW/EST HI 80: CPT | Performed by: INTERNAL MEDICINE

## 2024-08-02 PROCEDURE — 74018 RADEX ABDOMEN 1 VIEW: CPT | Performed by: STUDENT IN AN ORGANIZED HEALTH CARE EDUCATION/TRAINING PROGRAM

## 2024-08-02 PROCEDURE — 84132 ASSAY OF SERUM POTASSIUM: CPT | Performed by: STUDENT IN AN ORGANIZED HEALTH CARE EDUCATION/TRAINING PROGRAM

## 2024-08-02 PROCEDURE — 82330 ASSAY OF CALCIUM: CPT

## 2024-08-02 PROCEDURE — 74177 CT ABD & PELVIS W/CONTRAST: CPT | Performed by: RADIOLOGY

## 2024-08-02 PROCEDURE — 70450 CT HEAD/BRAIN W/O DYE: CPT

## 2024-08-02 PROCEDURE — 84132 ASSAY OF SERUM POTASSIUM: CPT | Performed by: INTERNAL MEDICINE

## 2024-08-02 PROCEDURE — 87799 DETECT AGENT NOS DNA QUANT: CPT

## 2024-08-02 PROCEDURE — 2020000001 HC ICU ROOM DAILY

## 2024-08-02 PROCEDURE — 2500000005 HC RX 250 GENERAL PHARMACY W/O HCPCS

## 2024-08-02 PROCEDURE — 82010 KETONE BODYS QUAN: CPT

## 2024-08-02 PROCEDURE — 82330 ASSAY OF CALCIUM: CPT | Performed by: INTERNAL MEDICINE

## 2024-08-02 PROCEDURE — P9073 PLATELETS PHERESIS PATH REDU: HCPCS

## 2024-08-02 PROCEDURE — 87529 HSV DNA AMP PROBE: CPT

## 2024-08-02 PROCEDURE — C9113 INJ PANTOPRAZOLE SODIUM, VIA: HCPCS

## 2024-08-02 PROCEDURE — 74018 RADEX ABDOMEN 1 VIEW: CPT

## 2024-08-02 PROCEDURE — 83615 LACTATE (LD) (LDH) ENZYME: CPT

## 2024-08-02 PROCEDURE — 84478 ASSAY OF TRIGLYCERIDES: CPT

## 2024-08-02 PROCEDURE — 99254 IP/OBS CNSLTJ NEW/EST MOD 60: CPT | Performed by: SURGERY

## 2024-08-02 PROCEDURE — 71260 CT THORAX DX C+: CPT | Performed by: RADIOLOGY

## 2024-08-02 PROCEDURE — 99223 1ST HOSP IP/OBS HIGH 75: CPT | Performed by: NURSE PRACTITIONER

## 2024-08-02 RX ORDER — VANCOMYCIN HYDROCHLORIDE 1 G/200ML
1000 INJECTION, SOLUTION INTRAVENOUS EVERY 12 HOURS
Status: DISCONTINUED | OUTPATIENT
Start: 2024-08-02 | End: 2024-08-03 | Stop reason: HOSPADM

## 2024-08-02 RX ORDER — PROCHLORPERAZINE EDISYLATE 5 MG/ML
10 INJECTION INTRAMUSCULAR; INTRAVENOUS EVERY 6 HOURS PRN
Status: DISCONTINUED | OUTPATIENT
Start: 2024-08-02 | End: 2024-08-02

## 2024-08-02 RX ORDER — PROCHLORPERAZINE 25 MG/1
25 SUPPOSITORY RECTAL EVERY 12 HOURS PRN
Status: DISCONTINUED | OUTPATIENT
Start: 2024-08-02 | End: 2024-08-02

## 2024-08-02 RX ORDER — ACETAMINOPHEN 10 MG/ML
1000 INJECTION, SOLUTION INTRAVENOUS 2 TIMES DAILY
Status: DISCONTINUED | OUTPATIENT
Start: 2024-08-02 | End: 2024-08-03 | Stop reason: HOSPADM

## 2024-08-02 RX ORDER — EPINEPHRINE 1 MG/ML
INJECTION INTRAMUSCULAR; INTRAVENOUS; SUBCUTANEOUS
Status: DISCONTINUED
Start: 2024-08-02 | End: 2024-08-03 | Stop reason: HOSPADM

## 2024-08-02 RX ORDER — INSULIN LISPRO 100 [IU]/ML
0-15 INJECTION, SOLUTION INTRAVENOUS; SUBCUTANEOUS EVERY 4 HOURS
Status: DISCONTINUED | OUTPATIENT
Start: 2024-08-02 | End: 2024-08-03 | Stop reason: HOSPADM

## 2024-08-02 RX ORDER — PROCHLORPERAZINE MALEATE 10 MG
10 TABLET ORAL EVERY 6 HOURS PRN
Status: DISCONTINUED | OUTPATIENT
Start: 2024-08-02 | End: 2024-08-03 | Stop reason: HOSPADM

## 2024-08-02 RX ORDER — ONDANSETRON HYDROCHLORIDE 2 MG/ML
INJECTION, SOLUTION INTRAVENOUS
Status: COMPLETED
Start: 2024-08-02 | End: 2024-08-02

## 2024-08-02 RX ORDER — PROCHLORPERAZINE 25 MG/1
25 SUPPOSITORY RECTAL EVERY 12 HOURS PRN
Status: DISCONTINUED | OUTPATIENT
Start: 2024-08-02 | End: 2024-08-03 | Stop reason: HOSPADM

## 2024-08-02 RX ORDER — PROCHLORPERAZINE EDISYLATE 5 MG/ML
10 INJECTION INTRAMUSCULAR; INTRAVENOUS EVERY 6 HOURS PRN
Status: DISCONTINUED | OUTPATIENT
Start: 2024-08-02 | End: 2024-08-03 | Stop reason: HOSPADM

## 2024-08-02 RX ORDER — INDOMETHACIN 25 MG/1
CAPSULE ORAL
Status: COMPLETED
Start: 2024-08-02 | End: 2024-08-03

## 2024-08-02 RX ORDER — HYDROMORPHONE HYDROCHLORIDE 1 MG/ML
0.2 INJECTION, SOLUTION INTRAMUSCULAR; INTRAVENOUS; SUBCUTANEOUS EVERY 4 HOURS PRN
Status: DISCONTINUED | OUTPATIENT
Start: 2024-08-02 | End: 2024-08-03 | Stop reason: HOSPADM

## 2024-08-02 RX ORDER — VANCOMYCIN HYDROCHLORIDE 1 G/20ML
INJECTION, POWDER, LYOPHILIZED, FOR SOLUTION INTRAVENOUS DAILY PRN
Status: DISCONTINUED | OUTPATIENT
Start: 2024-08-02 | End: 2024-08-03 | Stop reason: HOSPADM

## 2024-08-02 RX ORDER — LACTULOSE 10 G/15ML
20 SOLUTION ORAL 3 TIMES DAILY
Status: DISCONTINUED | OUTPATIENT
Start: 2024-08-02 | End: 2024-08-03 | Stop reason: HOSPADM

## 2024-08-02 RX ORDER — INDOMETHACIN 25 MG/1
50 CAPSULE ORAL ONCE
Status: COMPLETED | OUTPATIENT
Start: 2024-08-03 | End: 2024-08-03

## 2024-08-02 RX ORDER — EPINEPHRINE HCL IN DEXTROSE 5% 4MG/250ML
0-1 PLASTIC BAG, INJECTION (ML) INTRAVENOUS CONTINUOUS
Status: DISCONTINUED | OUTPATIENT
Start: 2024-08-02 | End: 2024-08-02

## 2024-08-02 RX ORDER — PROCHLORPERAZINE MALEATE 10 MG
10 TABLET ORAL EVERY 6 HOURS PRN
Status: DISCONTINUED | OUTPATIENT
Start: 2024-08-02 | End: 2024-08-02

## 2024-08-02 RX ORDER — ONDANSETRON HYDROCHLORIDE 2 MG/ML
4 INJECTION, SOLUTION INTRAVENOUS ONCE
Status: COMPLETED | OUTPATIENT
Start: 2024-08-02 | End: 2024-08-02

## 2024-08-02 RX ORDER — ONDANSETRON HYDROCHLORIDE 2 MG/ML
4 INJECTION, SOLUTION INTRAVENOUS EVERY 4 HOURS PRN
Status: DISCONTINUED | OUTPATIENT
Start: 2024-08-02 | End: 2024-08-02

## 2024-08-02 RX ADMIN — ACETAMINOPHEN 1000 MG: 1000 INJECTION INTRAVENOUS at 14:56

## 2024-08-02 RX ADMIN — CALCIUM CHLORIDE, MAGNESIUM CHLORIDE, DEXTROSE MONOHYDRATE, LACTIC ACID, SODIUM CHLORIDE, SODIUM BICARBONATE AND POTASSIUM CHLORIDE 2700 ML/HR: 3.68; 3.05; 22; 5.4; 6.46; 3.09; .314 INJECTION INTRAVENOUS at 11:01

## 2024-08-02 RX ADMIN — CALCIUM CHLORIDE, MAGNESIUM CHLORIDE, DEXTROSE MONOHYDRATE, LACTIC ACID, SODIUM CHLORIDE, SODIUM BICARBONATE AND POTASSIUM CHLORIDE 2700 ML/HR: 3.68; 3.05; 22; 5.4; 6.46; 3.09; .314 INJECTION INTRAVENOUS at 16:54

## 2024-08-02 RX ADMIN — INSULIN GLARGINE 15 UNITS: 100 INJECTION, SOLUTION SUBCUTANEOUS at 21:22

## 2024-08-02 RX ADMIN — MEROPENEM 1 G: 1 INJECTION, POWDER, FOR SOLUTION INTRAVENOUS at 17:40

## 2024-08-02 RX ADMIN — CALCIUM CHLORIDE, MAGNESIUM CHLORIDE, DEXTROSE MONOHYDRATE, LACTIC ACID, SODIUM CHLORIDE, SODIUM BICARBONATE AND POTASSIUM CHLORIDE 2700 ML/HR: 3.68; 3.05; 22; 5.4; 6.46; 3.09; .314 INJECTION INTRAVENOUS at 21:57

## 2024-08-02 RX ADMIN — LIDOCAINE 1 PATCH: 4 PATCH TOPICAL at 08:59

## 2024-08-02 RX ADMIN — HYDROCORTISONE SODIUM SUCCINATE 50 MG: 100 INJECTION, POWDER, FOR SOLUTION INTRAMUSCULAR; INTRAVENOUS at 12:26

## 2024-08-02 RX ADMIN — CALCIUM CHLORIDE, MAGNESIUM CHLORIDE, DEXTROSE MONOHYDRATE, LACTIC ACID, SODIUM CHLORIDE, SODIUM BICARBONATE AND POTASSIUM CHLORIDE 2700 ML/HR: 3.68; 3.05; 22; 5.4; 6.46; 3.09; .314 INJECTION INTRAVENOUS at 16:56

## 2024-08-02 RX ADMIN — NOREPINEPHRINE BITARTRATE 0.09 MCG/KG/MIN: 1 SOLUTION INTRAVENOUS at 06:50

## 2024-08-02 RX ADMIN — ONDANSETRON HYDROCHLORIDE 4 MG: 2 INJECTION, SOLUTION INTRAVENOUS at 14:18

## 2024-08-02 RX ADMIN — CALCIUM CHLORIDE, MAGNESIUM CHLORIDE, DEXTROSE MONOHYDRATE, LACTIC ACID, SODIUM CHLORIDE, SODIUM BICARBONATE AND POTASSIUM CHLORIDE 2700 ML/HR: 3.68; 3.05; 22; 5.4; 6.46; 3.09; .314 INJECTION INTRAVENOUS at 22:00

## 2024-08-02 RX ADMIN — HYDROCORTISONE SODIUM SUCCINATE 50 MG: 100 INJECTION, POWDER, FOR SOLUTION INTRAMUSCULAR; INTRAVENOUS at 00:02

## 2024-08-02 RX ADMIN — PANTOPRAZOLE SODIUM 40 MG: 40 INJECTION, POWDER, FOR SOLUTION INTRAVENOUS at 08:59

## 2024-08-02 RX ADMIN — RIFAXIMIN 550 MG: 550 TABLET ORAL at 21:08

## 2024-08-02 RX ADMIN — MEROPENEM 1 G: 1 INJECTION, POWDER, FOR SOLUTION INTRAVENOUS at 04:37

## 2024-08-02 RX ADMIN — CALCIUM CHLORIDE 0.5 G: 100 INJECTION INTRAVENOUS; INTRAVENTRICULAR at 06:51

## 2024-08-02 RX ADMIN — MICAFUNGIN SODIUM 100 MG: 100 INJECTION, POWDER, LYOPHILIZED, FOR SOLUTION INTRAVENOUS at 17:33

## 2024-08-02 RX ADMIN — HYDROCORTISONE SODIUM SUCCINATE 50 MG: 100 INJECTION, POWDER, FOR SOLUTION INTRAMUSCULAR; INTRAVENOUS at 17:33

## 2024-08-02 RX ADMIN — LACTULOSE 20 G: 20 SOLUTION ORAL at 22:10

## 2024-08-02 RX ADMIN — HYDROCORTISONE SODIUM SUCCINATE 50 MG: 100 INJECTION, POWDER, FOR SOLUTION INTRAMUSCULAR; INTRAVENOUS at 23:59

## 2024-08-02 RX ADMIN — VANCOMYCIN HYDROCHLORIDE 1000 MG: 1 INJECTION, SOLUTION INTRAVENOUS at 09:57

## 2024-08-02 RX ADMIN — HYDROCORTISONE SODIUM SUCCINATE 50 MG: 100 INJECTION, POWDER, FOR SOLUTION INTRAMUSCULAR; INTRAVENOUS at 06:05

## 2024-08-02 RX ADMIN — CALCIUM CHLORIDE 0.5 G: 100 INJECTION INTRAVENOUS; INTRAVENTRICULAR at 23:15

## 2024-08-02 RX ADMIN — CALCIUM CHLORIDE, MAGNESIUM CHLORIDE, DEXTROSE MONOHYDRATE, LACTIC ACID, SODIUM CHLORIDE, SODIUM BICARBONATE AND POTASSIUM CHLORIDE 2700 ML/HR: 3.68; 3.05; 22; 5.4; 6.46; 3.09; .314 INJECTION INTRAVENOUS at 21:59

## 2024-08-02 RX ADMIN — ONDANSETRON 4 MG: 2 INJECTION INTRAMUSCULAR; INTRAVENOUS at 14:18

## 2024-08-02 RX ADMIN — CALCIUM CHLORIDE, MAGNESIUM CHLORIDE, DEXTROSE MONOHYDRATE, LACTIC ACID, SODIUM CHLORIDE, SODIUM BICARBONATE AND POTASSIUM CHLORIDE 2700 ML/HR: 3.68; 3.05; 22; 5.4; 6.46; 3.09; .314 INJECTION INTRAVENOUS at 15:06

## 2024-08-02 RX ADMIN — CALCIUM CHLORIDE, MAGNESIUM CHLORIDE, DEXTROSE MONOHYDRATE, LACTIC ACID, SODIUM CHLORIDE, SODIUM BICARBONATE AND POTASSIUM CHLORIDE 2700 ML/HR: 3.68; 3.05; 22; 5.4; 6.46; 3.09; .314 INJECTION INTRAVENOUS at 16:55

## 2024-08-02 RX ADMIN — CALCIUM CHLORIDE, MAGNESIUM CHLORIDE, DEXTROSE MONOHYDRATE, LACTIC ACID, SODIUM CHLORIDE, SODIUM BICARBONATE AND POTASSIUM CHLORIDE 2700 ML/HR: 3.68; 3.05; 22; 5.4; 6.46; 3.09; .314 INJECTION INTRAVENOUS at 10:59

## 2024-08-02 RX ADMIN — PROCHLORPERAZINE EDISYLATE 10 MG: 5 INJECTION INTRAMUSCULAR; INTRAVENOUS at 15:54

## 2024-08-02 RX ADMIN — MIDODRINE HYDROCHLORIDE 15 MG: 10 TABLET ORAL at 22:10

## 2024-08-02 RX ADMIN — LIDOCAINE 1 PATCH: 4 PATCH TOPICAL at 04:37

## 2024-08-02 RX ADMIN — VASOPRESSIN 0.03 UNITS/MIN: 0.2 INJECTION INTRAVENOUS at 14:56

## 2024-08-02 RX ADMIN — DEXTROSE MONOHYDRATE 0.1 MCG/KG/MIN: 50 INJECTION, SOLUTION INTRAVENOUS at 23:45

## 2024-08-02 RX ADMIN — MIDODRINE HYDROCHLORIDE 15 MG: 10 TABLET ORAL at 06:05

## 2024-08-02 RX ADMIN — CALCIUM CHLORIDE, MAGNESIUM CHLORIDE, DEXTROSE MONOHYDRATE, LACTIC ACID, SODIUM CHLORIDE, SODIUM BICARBONATE AND POTASSIUM CHLORIDE 2700 ML/HR: 3.68; 3.05; 22; 5.4; 6.46; 3.09; .314 INJECTION INTRAVENOUS at 11:00

## 2024-08-02 RX ADMIN — IOHEXOL 75 ML: 350 INJECTION, SOLUTION INTRAVENOUS at 10:40

## 2024-08-02 RX ADMIN — VANCOMYCIN HYDROCHLORIDE 1000 MG: 1 INJECTION, SOLUTION INTRAVENOUS at 21:08

## 2024-08-02 ASSESSMENT — PAIN SCALES - GENERAL
PAINLEVEL_OUTOF10: 0 - NO PAIN

## 2024-08-02 ASSESSMENT — COGNITIVE AND FUNCTIONAL STATUS - GENERAL
TURNING FROM BACK TO SIDE WHILE IN FLAT BAD: TOTAL
CLIMB 3 TO 5 STEPS WITH RAILING: TOTAL
STANDING UP FROM CHAIR USING ARMS: TOTAL
MOBILITY SCORE: 7
DAILY ACTIVITIY SCORE: 12
PERSONAL GROOMING: A LITTLE
EATING MEALS: A LITTLE
DRESSING REGULAR LOWER BODY CLOTHING: TOTAL
HELP NEEDED FOR BATHING: A LOT
DRESSING REGULAR UPPER BODY CLOTHING: A LOT
WALKING IN HOSPITAL ROOM: TOTAL
MOVING TO AND FROM BED TO CHAIR: TOTAL
TOILETING: TOTAL
MOVING FROM LYING ON BACK TO SITTING ON SIDE OF FLAT BED WITH BEDRAILS: A LOT

## 2024-08-02 ASSESSMENT — PAIN - FUNCTIONAL ASSESSMENT
PAIN_FUNCTIONAL_ASSESSMENT: CPOT (CRITICAL CARE PAIN OBSERVATION TOOL)

## 2024-08-02 NOTE — CONSULTS
Vancomycin Dosing by Pharmacy- INITIAL    Angie Tobin is a 58 y.o. year old female who Pharmacy has been consulted for vancomycin dosing for other undifferentiated septic shock . Based on the patient's indication and renal status this patient will be dosed based on a goal trough/random level of 15-20.     Renal function is currently CVVH dependent.    Visit Vitals  /53   Pulse 92   Temp 35.6 °C (96.1 °F)   Resp (!) 28        Lab Results   Component Value Date    CREATININE 1.35 (H) 2024    CREATININE 1.43 (H) 2024    CREATININE 1.41 (H) 2024    CREATININE 1.50 (H) 2024        Patient weight is as follows:   Vitals:    24 0000   Weight: 138 kg (305 lb 5.4 oz)       Cultures:  No results found for the encounter in last 14 days.        I/O last 3 completed shifts:  In: 3269.1 (23.6 mL/kg) [P.O.:2340; I.V.:416.6 (3 mL/kg); IV Piggyback:512.5]  Out: 407 (2.9 mL/kg) [Other:407]  Weight: 138.5 kg   I/O during current shift:  I/O this shift:  In: 50 [IV Piggyback:50]  Out: -     Temp (24hrs), Av.5 °C (95.9 °F), Min:35.1 °C (95.2 °F), Max:35.7 °C (96.3 °F)         Assessment/Plan     Patient will not be given a loading dose.  Will initiate vancomycin maintenance,  1000 mg every 12 hours.  Follow-up level will be ordered on 8/3/24 at 0900 unless clinically indicated sooner.  Will continue to monitor renal function daily while on vancomycin and order serum creatinine at least every 48 hours if not already ordered.  Follow for continued vancomycin needs, clinical response, and signs/symptoms of toxicity.       Cris Celeste, PharmD

## 2024-08-02 NOTE — PROGRESS NOTES
"Medical Intensive Care - Daily Progress Note   Subjective    Angie Tobin is a 58 y.o. year old female patient admitted on 7/26/2024 with following ICU needs: Septic shock requiring vasopressors.     Interval History:  Overnight, the patient had episode of anxiety. Ativan 0.5 mg was given.    This morning, patient was only oriented to self (not place or time). She stated that she felt very tired. Later in the day, she stated that she had some nausea and abdominal pain.       Meds    Scheduled medications  acetaminophen, 1,000 mg, intravenous, BID  hydrocortisone sodium succinate, 50 mg, intravenous, q6h  insulin glargine, 15 Units, subcutaneous, Nightly  insulin lispro, 0-15 Units, subcutaneous, q4h  lidocaine, 1 patch, transdermal, Daily  lidocaine, 1 patch, transdermal, q24h  meropenem, 1 g, intravenous, q12h  micafungin, 100 mg, intravenous, q24h  midodrine, 15 mg, oral, q8h  pantoprazole, 40 mg, intravenous, Daily  vancomycin, 1,000 mg, intravenous, q12h      Continuous medications  norepinephrine, 0-0.5 mcg/kg/min, Last Rate: 0.1 mcg/kg/min (08/02/24 1219)  PrismaSol 4/2.5, 2,700 mL/hr  vasopressin, 0-0.03 Units/min, Last Rate: 0.03 Units/min (08/02/24 1456)      PRN medications  PRN medications: benzocaine-menthol, dextrose, dextrose, diclofenac sodium, glucagon, glucagon, glucagon, melatonin, prochlorperazine **OR** prochlorperazine **OR** prochlorperazine, trimethobenzamide, vancomycin     Objective    Blood pressure 108/54, pulse 87, temperature 35.2 °C (95.4 °F), resp. rate 11, height 1.778 m (5' 10\"), weight 138 kg (305 lb 5.4 oz), SpO2 100%.     Physical Exam  Constitutional:       Appearance: She is obese. She is ill-appearing.   HENT:      Head: Normocephalic and atraumatic.      Mouth/Throat:      Mouth: Mucous membranes are moist.      Pharynx: Oropharynx is clear.   Eyes:      General: Scleral icterus present.      Extraocular Movements: Extraocular movements intact.      Pupils: Pupils are " equal, round, and reactive to light.   Neck:      Comments: Central line in place  Cardiovascular:      Rate and Rhythm: Normal rate and regular rhythm.      Pulses: Normal pulses.   Pulmonary:      Effort: Pulmonary effort is normal.      Breath sounds: Normal breath sounds.      Comments: On 4L O2  Abdominal:      General: Bowel sounds are normal.      Palpations: Abdomen is soft.      Tenderness: There is abdominal tenderness.      Comments: Mild tenderness around umbilicus   Skin:     General: Skin is warm and dry.   Neurological:      Mental Status: She is disoriented.      Comments: Oriented to self only      Constitutional:       General: She is not in acute distress.     Appearance: She is obese.  HENT:      Head: Normocephalic and atraumatic.      Nose: Nose normal.      Mouth/Throat:      Mouth: Mucous membranes are moist.   Eyes:      Extraocular Movements: Extraocular movements intact.      Pupils: Pupils are equal, round, and reactive to light.   Cardiovascular:      Rate and Rhythm: Tachycardia present.      Pulses: Normal pulses.      Heart sounds: No murmur heard.     No friction rub. No gallop.   Pulmonary:      Effort: Pulmonary effort is normal. No respiratory distress.      Breath sounds: No wheezing, rhonchi or rales.   Abdominal:      General: Bowel sounds are normal. There is no distension.      Palpations: Abdomen is soft. There is no mass.      Tenderness: There is abdominal tenderness. There is no guarding.      Hernia: A hernia is present.   Musculoskeletal:      Cervical back: Normal range of motion.   Skin:     General: Skin is warm.      Capillary Refill: Capillary refill takes less than 2 seconds.   Neurological:      General: No focal deficit present.      Mental Status: She is oriented to person, place, and time.   Psychiatric:         Mood and Affect: Mood normal.     Intake/Output Summary (Last 24 hours) at 8/2/2024 1502  Last data filed at 8/2/2024 1200  Gross per 24 hour   Intake  1821.41 ml   Output 180 ml   Net 1641.41 ml     Labs:   Results from last 72 hours   Lab Units 08/02/24 0305 08/01/24 1859 08/01/24  0411   SODIUM mmol/L 131* 131* 129*   POTASSIUM mmol/L 4.9 4.8 4.9   CHLORIDE mmol/L 98 96* 96*   CO2 mmol/L 14* 17* 21   BUN mg/dL 21 21 17   CREATININE mg/dL 1.35* 1.43* 1.41*   GLUCOSE mg/dL 178* 185* 244*   CALCIUM mg/dL 8.0* 8.2* 8.3*   ANION GAP mmol/L 24* 23* 17   EGFR mL/min/1.73m*2 46* 43* 44*   PHOSPHORUS mg/dL 4.0 4.1 3.3      Results from last 72 hours   Lab Units 08/02/24 0305 08/01/24 1859 08/01/24 0411   WBC AUTO x10*3/uL 24.9* 20.4* 17.3*   HEMOGLOBIN g/dL 7.8* 7.7* 8.2*   HEMATOCRIT % 23.3* 22.9* 24.7*   PLATELETS AUTO x10*3/uL 24* 32* 39*   LYMPHO PCT MAN %  --  5.7  --    MONO PCT MAN %  --  4.9  --    EOSINO PCT MAN %  --  0.0  --         Micro/ID:     Lab Results   Component Value Date    URINECULTURE No growth 07/26/2024    BLOODCULT Loaded on Instrument - Culture in progress 08/02/2024    BLOODCULT Loaded on Instrument - Culture in progress 08/02/2024     MELD 3.0: 21 at 8/2/2024  3:05 AM  MELD-Na: 20 at 8/2/2024  3:05 AM  Calculated from:  Serum Creatinine: 1.35 mg/dL at 8/2/2024  3:05 AM  Serum Sodium: 131 mmol/L at 8/2/2024  3:05 AM  Total Bilirubin: 3.0 mg/dL at 8/2/2024  3:05 AM  Serum Albumin: 2.6 g/dL at 8/2/2024  3:05 AM  INR(ratio): 1.1 at 8/1/2024  9:18 AM  Age at listing (hypothetical): 58 years  Sex: Female at 8/2/2024  3:05 AM     Summary of key imaging results from the last 24 hours  US Bilateral Lower Extremity Venous Duplex (8/1/24): No evidence of DVT in right lower extremity. Unable to rule out thrombus in non-compressible common femoral vein due to dialysis line. Rest of left leg negative for DVT.    Assessment and Plan     Assessment: Angie Tobin is a 58 y.o. year old female patient who presented to OSH 7/25/2024 and [place] [facility] [place] for septic shock requiring pressors with urinary tract infection source, acute  hypoxic respiratory failure, with possible TLS, treated with 2x rasburicase, oncology consulted yesterday, per their note TLS is unlikely based on her tumor type. Received one unit RBCs, Zosyn switched to meropenem on 7/27.     Mechanical Ventilation: none  Sedation/Analgesia:  none  Restraints: no    Summary for 08/02/24  :  Added vancomycin  Consults: supportive oncology, hepatology  NPO with Q4h insulin  Feeding tube insertion    Plan:  NEUROLOGY/PSYCH:  #Anxiety  ::Lorazepam 0.5m *2 given at OSH on presentation for anxiety  ::Patient denies anxiety at present  Plan:  -Will monitor  -Ordered pet therapy  -Ordered music therapy     CARDIOVASCULAR:  #Shock, Likely Septic in Origin   ::Etiology: Likely septic from UTI, cultures growing enteric bacilli. Given troponin trend (64 -> 56) and TTE (normal right ventricular function), unlikely cardiac in origin. Given hypotension continuing s/p aggressive fluid resuscitation, unlikely hypovolemic in origin.   ::7/26 OSH: Received 5L NS, 1L LR, 50g Albumin, 100mg solumedrol  ::7/26 OSH: Norepinephrine and Vasopressin begun for persistent hypotension   ::7/27 began Stress Dose Steroids Hydrocortisone 50mg Q6H   ::7/27 1.5L LR, 7/29 0.5L LR   ::Lactate: 7/26 2.8 > 7/27 1.9 > 7/29 2.5 > 7/30 2.9 > 7/31 2.6, 8/2 13.4 > 15.0  ::Patient requires increased pressors overnight  ::Bilateral venous duplex (8/1/24): No evidence of DVT in right lower extremity. Unable to rule out thrombus in non-compressible common femoral vein due to dialysis line. Rest of left leg negative for DVT.  ::VBG (8/2/24): pH 7.25, pCO2 26, pO2 52  Plan:  -Continue Norepinephrine and vasopressin, titration per patient's ability  -Continue Hydrocortisone 50 Q6h  -Continue midodrine 15 mg  -Continue meropenem, added micafungin  -Will replete fluids prn, careful not to overload due to ALDEN    #Hypertension  ::Home medication: Metoprolol Succinate XL 25mg, Lisinopril 20mg  Plan:  -Holding metoprolol in setting of  shock  -Holding Lisinopril in setting of ALDEN     PULMONARY:  #Acute Hypoxic Respiratory Failure   ::Etiology: Likely secondary to atelectasis vs pulmonary edema vs pneumonia given CXR findings  ::2022 6 Wedge lung resection 2/2 metastasis  ::New O2 requirement during hospitalization; no O2 requirement at baseline  ::7/27 Pro calcitonin: 3.07  ::7/28 ABG: Ph 7.42, O2 27, CO2 103  ::Pt satting well on room air at present  Plan:  -Monitor for changes in symptoms     RENAL/GENITOURINARY:  #UTI   -See ID section     #ALDEN w/ Oliguria, required CVVH  ::Baseline Cr: 0.7  ::Presentation: BUN 90, Cr 7.6, now 1.79  ::7/26 CVVH at OSH -> 310 mL urine  ::King placed at OSH. Removed 7/27 upon admission to [facility].   ::CK 1288   Plan:  -Nephrology consulted regarding CVVH   -On CVVH  -BID RFP      #Hyperkalemia, likely in the setting of TLS  ::Presentation: 5.5. Peaked 5.9. now 4.3  ::OSH: Insulin drip, Lokelma 10g *1, Sodium Bicarbonate infusion, Calcium gluconate  ::7/27: 4.9. Additional 2g Calcium Gluconate given  Plan:  -BID RFP, replete PRN     #Hypocalcemia, likely in the setting of TLS  ::Presentation: 7.9  ::7/29: 8.3  Plan:  -BID RFP, replete PRN     #Hypomagnesemia  ::7/27 2.02  Plan:  -BID RFP, replete PRN     GASTROENTEROLOGY:  #Diarrhea (resolved)  ::Had multiple loose bowel movements after admission  ::C diff PCR indeterminate  ::Stool pathogen PCR negative  ::Diarrhea currently resolved  Plan:  -Stopped Miralax, Senna  -Avoid narcotics, replete electrolytes PRN, treat sepsis, replete fluids prn    #Nausea  -prochlorperazine PRN  -trimethobenzamide 200 mg PRN    #Nutrition  ::Nutrition previously recommended regular diet with Ensure shake supplementation  ::Patient has had difficulty with PO nutrition intake lately  Plan:  - Consider NG tube placement if patient is amenable     ENDOCRINOLOGY:  #DM   ::7/26 Hemoglobin A1C: 9.2 (7.4 in 2022)  ::Home meds: Metformin 500mg BID  ::Given Insulin Drip on presentation at  OSH  ::Patient receiving high dose steroids since 7/27  Plan:  -Holding home metformin in inpatient setting  -Hypoglycemia protocol   -Accuchecks ACHS   -insulin glargine 15 with lispro sliding scale     HEMATOLOGY/ONCOLOGY:  #Leiomyosarcoma, with metastasis to liver, lungs, and spine  ::Originally found in the pancreatic bed 2019. Hysterectomy and bilateral salpingo- oophorectomy 2019. H/o chemotherapy and radiation. Multiple remissions. Began Yondelis 06/05/2024.   ::Last chemotherapy: Yondelis C3 7/17/2024  Plan:  -Oncology consulted, recs appreciated   -No chemotherapy pending at present      #Elevated liver enzymes  ::, ALT 53, AST 83  ::Likely secondary to metastasis to liver  ::CT chest/abdomen (7/31/24): Multiple metastatic lung nodules. Increased abdominal wall edema and mesenteric stranding. Multiple lytic osseous metastases  ::Increased LFTs (7/27 -> 8/1/24): ALT 53->118, AST 83->407  ::CT AP (7/31/24): hepatic steatosis and unchanged atrophy of segment VI and VII s/p Y90 treatment  ::liver doppler (8/1/24): non-diagnostic due to bowel gas  ::Ammonia 76 (8/2/24)  Plan:  -Consulted hepatology, appreciate recs     #Tumor Lysis Syndrome, improving (resolved, no concern for now)  ::On presentation: hyperuricemia 15.9, hypocalcemia, hyperkalemia, nausea, vomit, diarrhea  ::7/26 OSH: 2x rasburicase, 5L NS, 1L LR, 50g Albumin, 100mg solumedrol  ::G6PD negative  ::7/27 Uric Acid: <1.5  Plan:  -per Oncology, there is no concern for TLS  -Trending labs      #Pancytopenia  #Neutropenia (resolved)  ::WBC 3.9 > 1.1 > 1.5, RBC 3.06 > 2.71 > 2.83, Hemoglobin 7.9, Platelets 27  ::Latest ANC 5.36  ::Etiology: secondary to sepsis vs post chemotherapy  ::Heparin given at OSH  ::WBC 8.3 -> 17.3 (8/1/24)  Plan:  -Holding heparin in setting of thrombocytopenia  -Transfuse platelets before placing central line  -Stopped filgrastim, patient now has adequate ANC  -Acute increase in WBC may be related to filgrastim and  steroid therapy; will consider drawing new cultures if WBCs continue to increase     #Monoclonal B-cell lymphocytosis   ::Diagnosed 2016  Plan:  -Monitor     MUSCULOSKELETAL/ SKIN:  FREDERICK     INFECTIOUS DISEASE:  # UTI   ::7/25 UCX: >100,000 Enteric Bacilli  ::7/25 BCX * 2: NG2D  ::7/26 Repeat UCX: negative  ::7/27 Repeat BCX: negative  ::S/P Ceftriaxone 1g 7/25  ::s/p vancomycin 7/27-7/28  ::s/p Zosyn 7/26-7/27  ::7/27 Pro calcitonin: 3.07  ::MRSA neg  Plan:  -Continue meropenem  (7/27- present)  -Continue vancomycin (8/1/24 - )  -Added micafungin (8/1/24 -)       ICU Check List   FEN  Fluids: S/P 1.5L fluid 7/27. PRN   Electrolytes: Will replete PRN   Nutrition: NPO  Prophylaxis:  DVT ppx: SCDs (Holding medical anticoagulation due to thrombocytopenia)   GI ppx: PPI IV   Bowel care: Holding Miralax 17g BID, Sennosides 8.6mg nightly  Hardware:  Access: PIV, Mediport    Drains: King removed from OSH. Current External Urinary catheter  Lines: Central line - placed 8/1/24  Social:  Code: Full Code    NOK: Loulou Benavides (cousin) [phone]; Tatyana Yanez (niece) [phone]  Disposition: MICU        Queen PAMELA Mcaias MD   08/02/24 at 3:02 PM     Disclaimer: Documentation completed with the information available at the time of input. The times in the chart may not be reflective of actual patient care times, interventions, or procedures. Documentation occurs after the physical care of the patient.

## 2024-08-02 NOTE — CONSULTS
[facility]   [facility]  INITIAL CONSULT NOTE     Consult requested by: Service: MICU    Reason for Consult: Elevated liver enzymes     Admission Chief Complaint: Shock       SUBJECTIVE     HPI: Angie Tobin is a 58 y.o. 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 She was initially admitted to OSH for nausea, diarrhea, decreased PO intake and was found to be in septic shock, ARF (requiring CVVH) and acute hypoxic respiratory failure. She was transferred to  MICU for further management. Hepatology service is consulted for elevated liver enzymes.     Patient is lying in bed and complains of fatigue. She otherwise has no fever,chills, nausea, vomiting, abdominal pain, melena, hematochezia or confusion.     Today, she is afebrile and HDS on levo 0.12. Pertinent labs include Na 131, Cr 1.34 (On CVVH), Alb 2.6, Alk phos 655, , , T bili 3.0, Ck 8K, lactate 2.9, INR 1.1, WBC 24K (on filgastrim), Hgb 7.8, plt 24K, UA (7/26) with Blood 2+ and RBC 6-10.     ROS: Complete review of systems obtained, negative unless otherwise indicated above.     Allergies   Allergen Reactions    Hydromorphone Other    Codeine Other     Abdominal pain     Past Medical History:   Diagnosis Date    Acute sinusitis 09/27/2023    Benign essential HTN 04/19/2023    Body mass index (BMI) 40.0-44.9, adult (Multi) 09/27/2023    Candidiasis, unspecified 01/20/2022    Antibiotic-induced yeast infection    Conjunctivitis 09/27/2023    Contact with and (suspected) exposure to covid-19 09/15/2022    Disorder of liver 07/11/2023    Disorder of thyroid 10/18/2023    Elevated blood-pressure reading, without diagnosis of hypertension 05/26/2017    Elevated BP without diagnosis of hypertension    Gastroesophageal reflux disease 09/15/2022     Herniated lumbar intervertebral disc 09/13/2022    Comment on above: OTHER INTERVERTEBRAL DISC DISPLACEMENT, LUMBAR REGION    Herpes simplex virus (HSV) infection 04/19/2023    Hypercholesterolemia 09/15/2022    Inappropriate sinus tachycardia, so stated (CMS-HCC) 04/19/2023    Leiomyoma 03/11/2024    Lymphoproliferative disorder (Multi) 09/06/2023    Malignant neoplasm metastatic to left lung (Multi) 04/19/2023    Malignant neoplasm of body of uterus (Multi) 09/27/2023    Mass of pancreas ([facility]) 09/27/2023    Neuropathy 03/11/2024    Never smoked any substance 10/25/2023    Other conditions influencing health status 09/27/2017    History of cough    Personal history of diseases of the blood and blood-forming organs and certain disorders involving the immune mechanism 06/06/2016    History of leukocytosis    Personal history of other benign neoplasm 05/08/2019    History of other benign neoplasm    Personal history of other diseases of the musculoskeletal system and connective tissue 04/18/2018    History of low back pain    Personal history of other diseases of the nervous system and sense organs 10/03/2016    History of neuropathy    Personal history of other diseases of the respiratory system 08/31/2017    History of acute bronchitis    Personal history of other diseases of the respiratory system 01/24/2022    History of acute sinusitis    Personal history of other specified conditions 09/16/2020    History of weight gain    Postoperative pain 03/11/2024    Right lower quadrant abdominal tenderness 03/04/2019    RLQ abdominal tenderness    Right upper quadrant pain 03/13/2015    Abdominal pain, RUQ (right upper quadrant)    Secondary hypertension 10/18/2023    Toenail fungus 04/19/2023    Uterine leiomyoma 09/27/2023    Viral URI 09/27/2023    Vitamin D deficiency 04/19/2023    Weight gain 03/11/2024     Past Surgical History:   Procedure Laterality Date    BREAST SURGERY  05/30/2013    Breast Surgery  Reduction Procedure    CHOLECYSTECTOMY  12/02/2014    Cholecystectomy Laparoscopic    CT GUIDED PERCUTANEOUS BIOPSY BONE  10/24/2016    CT GUIDED PERCUTANEOUS BIOPSY BONE 10/24/2016 [facility]    CT GUIDED PERCUTANEOUS BIOPSY LUNG  8/5/2022    CT GUIDED PERCUTANEOUS BIOPSY LUNG 8/5/2022 [facility]    ESOPHAGOGASTRODUODENOSCOPY  04/16/2013    Diagnostic Esophagogastroduodenoscopy    IR INTERVENTION ARTERIAL EMBOLIZATION  12/13/2023    IR INTERVENTION ARTERIAL EMBOLIZATION 12/13/2023 Oj Gold MD [facility]GIO    IR INTERVENTION ARTERIAL EMBOLIZATION  12/19/2023    IR INTERVENTION ARTERIAL EMBOLIZATION 12/19/2023 Oj Gold MD [facility] ANGIO    OTHER SURGICAL HISTORY  04/19/2013    Anal Fistulotomy (Subcutaneous)    OTHER SURGICAL HISTORY  10/25/2022    Lung wedge resection    OTHER SURGICAL HISTORY  10/25/2022    Lung lobectomy    OTHER SURGICAL HISTORY  05/08/2019    Resection    OTHER SURGICAL HISTORY  05/30/2013    Perineal Transplant Of Anovaginal Fistula    US GUIDED NEEDLE LIVER BIOPSY  7/11/2023    US GUIDED NEEDLE LIVER BIOPSY 7/11/2023 [facility]     Family History   Problem Relation Name Age of Onset    Other (atrial flutter) Mother      Diabetes Mother      Leukemia Father      Liver cancer Father      Ovarian cancer Sister      Hypothyroidism Brother      Breast cancer Paternal Grandmother      No Known Problems Sibling       Social History     Social History Narrative    Not on file     [unfilled]    Medications:  Scheduled medications  hydrocortisone sodium succinate, 50 mg, intravenous, q6h  insulin glargine, 15 Units, subcutaneous, Nightly  insulin lispro, 0-15 Units, subcutaneous, Before meals & nightly  lidocaine, 1 patch, transdermal, Daily  lidocaine, 1 patch, transdermal, q24h  meropenem, 1 g, intravenous, q12h  micafungin, 100 mg, intravenous, q24h  midodrine, 15 mg, oral, q8h  pantoprazole, 40 mg, intravenous, Daily  vancomycin, 1,000 mg, intravenous, q12h      Continuous  medications  norepinephrine, 0-0.5 mcg/kg/min, Last Rate: 0.12 mcg/kg/min (08/02/24 0920)  PrismaSol 4/2.5, 2,700 mL/hr, Last Rate: 2,700 mL/hr (07/31/24 1659)  vasopressin, 0-0.03 Units/min, Last Rate: Stopped (07/31/24 2000)      PRN medications  PRN medications: benzocaine-menthol, dextrose, dextrose, diclofenac sodium, glucagon, glucagon, glucagon, melatonin, vancomycin       EXAM     Vital signs:  [unfilled]    Physical Exam  Constitutional:       Appearance: She is ill-appearing.   Eyes:      General: Scleral icterus present.      Pupils: Pupils are equal, round, and reactive to light.   Cardiovascular:      Rate and Rhythm: Normal rate and regular rhythm.   Pulmonary:      Effort: Pulmonary effort is normal.   Abdominal:      General: Abdomen is flat. There is no distension.      Palpations: Abdomen is soft.      Tenderness: There is no abdominal tenderness.   Skin:     Coloration: Skin is jaundiced.   Neurological:      General: No focal deficit present.      Mental Status: She is alert and oriented to person, place, and time.   Psychiatric:         Mood and Affect: Mood normal.         Thought Content: Thought content normal.         Judgment: Judgment normal.             DATA                                                                            Labs     Lab Results   Component Value Date    WBC 24.9 (H) 08/02/2024    WBC 20.4 (H) 08/01/2024    WBC 17.3 (H) 08/01/2024    HGB 7.8 (L) 08/02/2024    HGB 7.7 (L) 08/01/2024    HGB 8.2 (L) 08/01/2024    MCV 85 08/02/2024    MCV 84 08/01/2024    MCV 85 08/01/2024    PLT 24 (LL) 08/02/2024    PLT 32 (LL) 08/01/2024    PLT 39 (LL) 08/01/2024       Lab Results   Component Value Date    GLUCOSE 178 (H) 08/02/2024    CALCIUM 8.0 (L) 08/02/2024     (L) 08/02/2024    K 4.9 08/02/2024    CO2 14 (L) 08/02/2024    CL 98 08/02/2024    BUN 21 08/02/2024    CREATININE 1.35 (H) 08/02/2024       Lab Results   Component Value Date     (H) 08/02/2024      (H) 08/01/2024    ALT 97 (H) 07/31/2024     (H) 08/02/2024     (H) 08/01/2024     (H) 07/31/2024     (H) 07/16/2024     (H) 06/25/2024     (H) 06/04/2024    ALKPHOS 655 (H) 08/02/2024    ALKPHOS 643 (H) 08/01/2024    ALKPHOS 535 (H) 07/31/2024    BILITOT 3.0 (H) 08/02/2024    BILITOT 2.3 (H) 08/01/2024    BILITOT 1.8 (H) 07/31/2024                                                                                  Imaging           === 07/26/24 ===    US LIVER WITH DOPPLER    - Impression -  Nondiagnostic examination due to the above limitations.    I personally reviewed the images/study and I agree with the findings  as stated above by resident physician, Dr. Conner Cordoba.    MACRO:  None    Signed by: Oj Gold 8/2/2024 5:55 AM  Dictation workstation:   [id number]  === 07/26/24 ===    CT CHEST ABDOMEN PELVIS WO CONTRAST    - Impression -  Limited noncontrast evaluation of the chest/abdomen/pelvis.    Chest    Limited evaluation secondary to motion artifact.  1. No acute intrathoracic abnormality.  2. Limited visualization of multiple lung nodules favoring metastatic  disease.  3. Similar appearance of multiple lytic osseous metastases.    Abdomen-Pelvis  1. No acute abdominopelvic abnormality.  2. Increasing abdominal wall edema and mesenteric stranding likely  secondary to volume overload. Correlate with volume status.  3. Similar appearance of multiple osseous metastases.  4. Additional findings as above.    I personally reviewed the images/study and I agree with the resident  findings as stated by Jacquelyn Reyes MD. This study was interpreted at  [facility], [place], [place].    MACRO:  None    Signed by: Salas Mckeon 7/31/2024 11:00 PM  Dictation workstation:   [id number]                                                                           GI Procedures       ASSESSMENT / PLAN                  Assessment and  Recommendations:   Angie Tobin is a 58 y.o. 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 She was initially admitted to OSH for nausea, diarrhea, decreased PO intake and was found to be in septic shock, ARF (requiring CVVH) and acute hypoxic respiratory failure. She was transferred to  MICU for further management. Hepatology service is consulted for elevated liver enzymes.     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 Her alk phos is ~600, ALT ~140, AST ~534 and T bili ~3.0. Her liver enzymes have been worsening over the past week. Review of prior labs show that  her Alk phos has been progressively getting worse over the past few months (1/2024) and her AST/ALT fluctuates between normal and mildly elevated for the past year. T bili was previously normal until this admission. Of note, patient had Y90 therapy for liver mets in December 2023. Additional work up this admission show trending CK to 8K, UA with blood 2+ and RBC 6-10 and ARF, suggesting rhabdo. Trabectedin can cause rhabdo. Rhabdo causes elevated Alk phos, AST and ALT from muscle breakdown, however T bili elevation points toward liver injury. Other causes of liver injury in this case includes shock and worsening metastatic disease. CT a/p (08/02) show liver with fatty infiltration, post treatment changes from prior Y90 treatment and no focal enhancing lesions (although imaging limited by streaking artifacts. Acute viral hepatitis panel was negative (7/26).     Recommendation:   - Daily liver enzymes and INR   - Please check GGT and direct bilirubin   - Broad spectrum Abx per primary team    ROHIT per primary team.    ------------------------------------------------------------------------  Isaiah Kelley MD   Gastroenterology Fellow    After 5PM and on Weekends, please page on-call fellow.    To be discussed with service attending Dr. Avendano  Final recommendations pending attending attestation.

## 2024-08-02 NOTE — CONSULTS
"SUPPORTIVE AND PALLIATIVE ONCOLOGY CONSULT    Inpatient consult to [facility] Adult Supportive Oncology  Consult performed by: Yudith Smiley, APRN-CNP, DNP  Consult ordered by: Wayne Zacarias MD        SERVICE DATE: 8/2/2024      PALLIATIVE MEDICINE OUTPATIENT PROVIDER:  None  CURRENT ATTENDING PROVIDER: Wayne Zacarias MD     Medical Oncologist: Nico Walsh MD   Radiation Oncologist: No care team member to display  Primary Physician: Zainab Nobles  [phone]    REASON FOR CONSULT/CHIEF CONSULT COMPLAINT: symptom control and goals of care discussion    Subjective   HISTORY OF PRESENT ILLNESS: Angie Tobin is a 58 y.o. 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 PMH significant for HTN, HLD, DM, B-cell monoclonal lymphocytosis. Admitted to [facility] 7/25 with fatigue, hypotension, found to have ALDEN with hyperkalemia, transferred to [facility] MICU 7/26/2024 for further evaluation and management of septic shock from klebsiella UTI in the setting of neutropenia. Course complicated by MODS with acute worsening of hepatic function. Nephrology, Oncology, and Hepatology have been consulted. Supportive and Palliative Oncology is consulted for symptom control and goals of care discussion.     Received C3 7/17 and reported nausea and vomiting and diarrhea after.     Pt acutely worsened in past 24h with worsening LFTs, increasing lactate 14.3, AMS.    Pt with AMS and able to report having nausea and abdominal pain. Unable to obtain details of symptoms. Andreia at bedside reports patient is sensitive to opioids with most causing nausea/vomiting, feeling \"loopy\" and prefers not to take opioids if possible.     Met with patient's cousin/JET Jamil and andreia Joe with Dr." Xavi for updates.     Pain Assessment:  Unable to obtain complete pain assessment due to AMS  Onset:  Days  Location:  abdomen   Intolerances:Angie Tobin is allergic to hydromorphone and codeine. Reports nausea, no allergic reaction   Barriers to Pain Management: Side effects and critical illness    Opioid Use  Past 24 h opioid use:   0  Total 24h OME use:  0    Note: OME calculations based on equianalgesic table below. Please note this table is based on best available evidence but conversions are still approximate. These are NOT opioid DOSES for individual patient use; this is equivalency information.  Drug Parenteral Enteral   Morphine 10 25   Oxycodone N/A 20   Hydromorphone 2 5   Fentanyl 0.15 N/A   Tramadol N/A 120   Citation: Yoshi GREEN. Demystifying opioid conversion calculations: A guide for effective dosing, Second edition. MD Luis: American Society of Health-System Pharmacists, 2018.    OARRS/PDMP reviewed 8/2/24 no aberrant behavior noted.    Symptom Assessment:  Unable to obtain complete assessment secondary to Encephalopathy  Pain:somewhat  Lack of energy: very much  Anxiety: somewhat  Nausea: very much  Vomiting: none        Information obtained from: chart review, interview of patient, interview of family, discussion with RN, and discussion with primary team  ______________________________________________________________________     Oncology History   Leiomyosarcoma (Multi)   4/19/2023 Initial Diagnosis    Leiomyosarcoma (Multi)     6/5/2024 -  Chemotherapy    Trabectedin, 21 Day Cycles         Past Medical History:   Diagnosis Date    Acute sinusitis 09/27/2023    Benign essential HTN 04/19/2023    Body mass index (BMI) 40.0-44.9, adult (Multi) 09/27/2023    Candidiasis, unspecified 01/20/2022    Antibiotic-induced yeast infection    Conjunctivitis 09/27/2023    Contact with and (suspected) exposure to covid-19 09/15/2022    Disorder of liver 07/11/2023    Disorder of thyroid 10/18/2023     Elevated blood-pressure reading, without diagnosis of hypertension 05/26/2017    Elevated BP without diagnosis of hypertension    Gastroesophageal reflux disease 09/15/2022    Herniated lumbar intervertebral disc 09/13/2022    Comment on above: OTHER INTERVERTEBRAL DISC DISPLACEMENT, LUMBAR REGION    Herpes simplex virus (HSV) infection 04/19/2023    Hypercholesterolemia 09/15/2022    Inappropriate sinus tachycardia, so stated (CMS-HCC) 04/19/2023    Leiomyoma 03/11/2024    Lymphoproliferative disorder (Multi) 09/06/2023    Malignant neoplasm metastatic to left lung (Multi) 04/19/2023    Malignant neoplasm of body of uterus (Multi) 09/27/2023    Mass of pancreas ([facility]) 09/27/2023    Neuropathy 03/11/2024    Never smoked any substance 10/25/2023    Other conditions influencing health status 09/27/2017    History of cough    Personal history of diseases of the blood and blood-forming organs and certain disorders involving the immune mechanism 06/06/2016    History of leukocytosis    Personal history of other benign neoplasm 05/08/2019    History of other benign neoplasm    Personal history of other diseases of the musculoskeletal system and connective tissue 04/18/2018    History of low back pain    Personal history of other diseases of the nervous system and sense organs 10/03/2016    History of neuropathy    Personal history of other diseases of the respiratory system 08/31/2017    History of acute bronchitis    Personal history of other diseases of the respiratory system 01/24/2022    History of acute sinusitis    Personal history of other specified conditions 09/16/2020    History of weight gain    Postoperative pain 03/11/2024    Right lower quadrant abdominal tenderness 03/04/2019    RLQ abdominal tenderness    Right upper quadrant pain 03/13/2015    Abdominal pain, RUQ (right upper quadrant)    Secondary hypertension 10/18/2023    Toenail fungus 04/19/2023    Uterine leiomyoma 09/27/2023    Viral URI  09/27/2023    Vitamin D deficiency 04/19/2023    Weight gain 03/11/2024     Past Surgical History:   Procedure Laterality Date    BREAST SURGERY  05/30/2013    Breast Surgery Reduction Procedure    CHOLECYSTECTOMY  12/02/2014    Cholecystectomy Laparoscopic    CT GUIDED PERCUTANEOUS BIOPSY BONE  10/24/2016    CT GUIDED PERCUTANEOUS BIOPSY BONE 10/24/2016 [facility]    CT GUIDED PERCUTANEOUS BIOPSY LUNG  8/5/2022    CT GUIDED PERCUTANEOUS BIOPSY LUNG 8/5/2022 [facility]    ESOPHAGOGASTRODUODENOSCOPY  04/16/2013    Diagnostic Esophagogastroduodenoscopy    IR INTERVENTION ARTERIAL EMBOLIZATION  12/13/2023    IR INTERVENTION ARTERIAL EMBOLIZATION 12/13/2023 Oj Gold MD [facility]    IR INTERVENTION ARTERIAL EMBOLIZATION  12/19/2023    IR INTERVENTION ARTERIAL EMBOLIZATION 12/19/2023 Oj Gold MD [facility]GIO    OTHER SURGICAL HISTORY  04/19/2013    Anal Fistulotomy (Subcutaneous)    OTHER SURGICAL HISTORY  10/25/2022    Lung wedge resection    OTHER SURGICAL HISTORY  10/25/2022    Lung lobectomy    OTHER SURGICAL HISTORY  05/08/2019    Resection    OTHER SURGICAL HISTORY  05/30/2013    Perineal Transplant Of Anovaginal Fistula    US GUIDED NEEDLE LIVER BIOPSY  7/11/2023    US GUIDED NEEDLE LIVER BIOPSY 7/11/2023 [facility]     Family History   Problem Relation Name Age of Onset    Other (atrial flutter) Mother      Diabetes Mother      Leukemia Father      Liver cancer Father      Ovarian cancer Sister      Hypothyroidism Brother      Breast cancer Paternal Grandmother      No Known Problems Sibling          SOCIAL HISTORY:  Marital Status single, lives alone and Employment works for , 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 She has never used smokeless tobacco. She reports that she does not use drugs.      REVIEW OF SYSTEMS:  Review of systems negative unless noted in HPI.       Objective       Lab Results   Component Value Date    WBC 24.9 (H)  08/02/2024    HGB 7.8 (L) 08/02/2024    HCT 23.3 (L) 08/02/2024    MCV 85 08/02/2024    PLT 24 (LL) 08/02/2024      Lab Results   Component Value Date    GLUCOSE 178 (H) 08/02/2024    CALCIUM 8.0 (L) 08/02/2024     (L) 08/02/2024    K 4.9 08/02/2024    CO2 14 (L) 08/02/2024    CL 98 08/02/2024    BUN 21 08/02/2024    CREATININE 1.35 (H) 08/02/2024     Lab Results   Component Value Date     (H) 08/02/2024     (H) 08/02/2024     (H) 07/16/2024    ALKPHOS 655 (H) 08/02/2024    BILITOT 3.0 (H) 08/02/2024     Estimated Creatinine Clearance: 70.2 mL/min (A) (by C-G formula based on SCr of 1.35 mg/dL (H)).     CT Head 8/2/2024  IMPRESSION:  No evidence of acute infarct, mass effect, midline shift, or acute  hemorrhage. MRI would be more sensitive and specific if recent  ischemia were clinically suspected.      Inflammatory changes left maxillary sinuses above-described. Please  correlate clinically for possible left maxillary sinusitis.        CT CAP 8/2/2024  IMPRESSION:  Limited evaluation due to extensive streak artifact.      CHEST:  1. No acute intrathoracic abnormality.  2. Increased right lower lobe atelectasis as compared to recent  prior. Superimposed infectious/inflammatory process is less likely  but not excluded.  3. Limited evaluation of multiple lung nodules favoring metastatic  disease.  4. Additional findings as above.      ABDOMEN-PELVIS:  1. No acute abdominopelvic abnormality.  2. Similar abdominal wall edema and mesenteric stranding likely  secondary to volume overload.  3. Osseous metastatic disease similar to previous.  4. Additional findings as above.        Encounter Date: 07/25/24   ECG 12 lead   Result Value    Ventricular Rate 106    Atrial Rate 106    WV Interval 146    QRS Duration 88    QT Interval 376    QTC Calculation(Bazett) 499    P Axis 34    R Axis -4    T Axis 8    QRS Count 17    Q Onset 211    P Onset 138    P Offset 184    T Offset 399    QTC Fredericia  454    Narrative    Sinus tachycardia  Low voltage QRS  Poor R-wave progression  Abnormal ECG  When compared with ECG of 25-OCT-2023 10:33,  No significant change was found  Confirmed by Theo Jones ([id number]) on 7/25/2024 12:19:52 PM     Wt Readings from Last 5 Encounters:   08/02/24 138 kg (305 lb 5.4 oz)   07/26/24 127 kg (279 lb 8.7 oz)   07/17/24 121 kg (265 lb 10.5 oz)   07/08/24 122 kg (268 lb 6.4 oz)   06/28/24 124 kg (273 lb 5.9 oz)       Current Outpatient Medications   Medication Instructions    albuterol 108 (90 Base) MCG/ACT inhaler 1-2 puffs, inhalation, Every 4-6 hours as needed    fluticasone (Flonase) 50 mcg/actuation nasal spray 1 spray, Each Nostril, Daily, Shake gently. Before first use, prime pump. After use, clean tip and replace cap.    lidocaine-prilocaine (Emla) 2.5-2.5 % cream Topical, As needed, Please apply to Medi\[facility] [place] [facility]\"" prior to access for chemotherapy    lisinopril 20 mg, oral, Daily    loratadine (CLARITIN) 10 mg, oral, Daily PRN    LORazepam (ATIVAN) 0.5 mg, oral, Every 8 hours PRN    meclizine (ANTIVERT) 12.5-25 mg, oral, 3 times daily PRN    metFORMIN (GLUCOPHAGE) 500 mg, oral, 2 times daily    metoprolol succinate XL (TOPROL-XL) 25 mg, oral, Daily    ondansetron (ZOFRAN) 8 mg, oral, Every 8 hours PRN    prochlorperazine (COMPAZINE) 10 mg, oral, Every 6 hours PRN     Scheduled medications   hydrocortisone sodium succinate, 50 mg, intravenous, q6h  insulin glargine, 15 Units, subcutaneous, Nightly  insulin lispro, 0-15 Units, subcutaneous, q4h  lidocaine, 1 patch, transdermal, Daily  lidocaine, 1 patch, transdermal, q24h  meropenem, 1 g, intravenous, q12h  micafungin, 100 mg, intravenous, q24h  midodrine, 15 mg, oral, q8h  pantoprazole, 40 mg, intravenous, Daily  vancomycin, 1,000 mg, intravenous, q12h      Continuous medications  norepinephrine, 0-0.5 mcg/kg/min, Last Rate: 0.1 mcg/kg/min (08/02/24 1219)  PrismaSol 4/2.5, 2,700 mL/hr, Last Rate: 2,700 mL/hr (08/02/24  1101)  vasopressin, 0-0.03 Units/min, Last Rate: Stopped (07/31/24 2000)      PRN medications  benzocaine-menthol, 1 lozenge, q2h PRN  dextrose, 12.5 g, q15 min PRN  dextrose, 25 g, q15 min PRN  diclofenac sodium, 4 g, 4x daily PRN  glucagon, 1 mg, q15 min PRN  glucagon, 1 mg, q15 min PRN  glucagon, 1 mg, q15 min PRN  melatonin, 5 mg, Nightly PRN  vancomycin, , Daily PRN         Allergies:   Allergies   Allergen Reactions    Hydromorphone Other    Codeine Other     Abdominal pain                PHYSICAL EXAMINATION:  Vital Signs:   Vital signs reviewed  Vitals:    08/02/24 1200   BP: 118/55   Pulse: 88   Resp: 20   Temp:    SpO2: 100%     Pain Score: 0 - No pain     Physical Exam  Vitals reviewed.   Constitutional:       General: She is not in acute distress.     Appearance: She is ill-appearing.   HENT:      Head: Normocephalic and atraumatic.      Mouth/Throat:      Mouth: Mucous membranes are dry.   Eyes:      Conjunctiva/sclera: Conjunctivae normal.   Cardiovascular:      Rate and Rhythm: Tachycardia present.   Pulmonary:      Effort: Pulmonary effort is normal. No respiratory distress.   Abdominal:      General: There is no distension.      Palpations: Abdomen is soft.      Tenderness: There is no abdominal tenderness.   Musculoskeletal:      Right lower leg: Edema present.      Left lower leg: Edema present.   Skin:     General: Skin is warm and dry.   Neurological:      Mental Status: She is lethargic and disoriented.   Psychiatric:         Attention and Perception: She is inattentive.         Speech: She is noncommunicative.         Behavior: Behavior is slowed.         Cognition and Memory: Cognition is impaired.         ASSESSMENT/PLAN:  Angie Tobin is a 58 y.o. 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 PMH significant for HTN, HLD, DM, B-cell monoclonal lymphocytosis. [facility] with fatigue, hypotension, found to have ALDEN with hyperkalemia, transferred to [facility] MICU 7/26/2024 for further evaluation and management of septic shock from klebsiella UTI in the setting of neutropenia. Course complicated by MODS with acute worsening of hepatic function. Nephrology, Oncology, and Hepatology have been consulted. Supportive and Palliative Oncology is consulted for symptom control and goals of care discussion.     Pain:  abdominal pain related to metastatic cancer with liver mets  Pain is: cancer related pain  Type: visceral and somatic  Pain control: sub-optimally controlled  Home regimen:   none  Intolerances/previously tried: In general sensitive to opioids, Hydromorphone is not an allergy, rather an intolerance  Personalized pain goal:  SISSY  Total OME usage for the past 24 hours:  0  Renal and liver dysfunction  Intolerance to most opioids due to nausea, intolerances not allergies  Recommend either Hydromorphone 0.2 mg IV q3h prn or Fentanyl 25 mcg IV q2h prn severe pain, consider premedicating with compazine  Continue to monitor pain scores and administer PRN medications as appropriate  Continue/initiate nonpharmacologic pain management strategies including ice/heat therapy, distraction techniques, deep breathing/relaxation techniques, calming music, and repositioning  Continue to monitor for signs of opioid efficacy (pain scores, improved functionality) and toxicity (pinpoint pupils, excess sedation/drowsiness/confusion, respiratory depression, etc.)    Nausea:  At risk for nausea with vomiting related to chemotherapy and UTI    Home regimen:  Ondansetron  and Prochlorperazine   Suboptimally controlled  Intermittent dosing Ondansetron 4 mg IV q6h prn nausea first line, monitor QTC, QTC 7/25/24 499  Start Compazine 10 mg IV q6h prn nausea  Non QT  prolonging options include lorazepam, scopolamine, Tigan (though IM injection in setting of low platelets)    Bowel management  Recent diarrhea likely related to chemotherapy, infection  At risk for constipation related to opioids, currently not constipated, recently with diarrhea  Usual bowel pattern: every day  Home regimen: none  LBM 7/29  Monitor BM frequency, adjust regimen as needed  Goal to have BM without straining q48-72h  Currently unable to take oral meds, once has enteral access consider adding miralax or senna liquid prn constipation, loperamide 2 mg PO QID prn diarrhea    Altered Mood:  Chronic anxiety   controlled with home regimen   Home regimen: lorazepam 0.5 mg BID  Currently with AMS     Sleeping Difficulty:  Impaired sleep related to hospital environment  Home regimen:  lorazepam  Continue melatonin 5 mg PO at bedtime     Medical Decision Making/Goals of Care/Advance Care Planning:  Patient's current clinical condition, including diagnosis, prognosis, and management plan, and goals of care were discussed.   Life limiting disease: metastatic malignancy and critical illness  Family: Supportive cousin, niece  Performance status: Major  limitations due to disease process, prior to admission minimal limitations  Understanding of health: HCPOA demonstrates good prognostic understanding of disease process, understands patient has incurable metastatic cancer, understands severity of patient's illness  Information:Wants full disclosure  Medical update:Dr. Hurley provided updates about current condition, evaluation and treatment plan  Prognosis:guarded, critically ill with worsening clinical status  Goals: symptom control and cancer directed therapy Loulou deluca patient was doing well up until this illness. She understands she has incurable cancer and has cancer treatment options. She would like to give her every chance to survive this acute illness to continue on to get additional cancer treatment. She  would like to have a discussion with primary Oncologist Dr. Walsh today.   Worries and fears now and future: inability to receive cancer treatment and death   Code status discussion:  Full code, considering DNR however would like primary Oncologist Dr. Walsh's input    Advance Directives  Existence of Advance Directives:Yes, documentation or copy in medical record, completed 8/1, verified copy in patient hard copy chart  Decision maker: JET is riazsin Loulou Benavides [phone]  Code Status: Full code    Introduction to Supportive and Palliative Oncology:  Spoke with patient and family at bedside  Introduced the role and philosophy of Supportive and Palliative oncology in the evaluation and management of symptoms during cancer treatment    Disposition:  Please  start the process of having prior authorization with meds to beds deliver medications to patient prior to discharge via [facility] pharmacy. Prescriptions will need to be sent 48-72 hours prior to discharge so that a prior authorization can be completed.     Discharge date: unknown pending acute issues  Will assess if patient needs an appointment with Outpatient Supportive Oncology as appropriate    Signature and billing:  Thank you for allowing us to participate in the care of this patient. Recommendations will be communicated back to the consulting service by way of shared electronic medical record or face-to-face.    Medical complexity was high level due to due to complexity of problems, extensive data review, and high risk of management/treatment.      DATA   Diagnostic tests and information reviewed for today's visit:  Conversation with primary team, Conversation with Dr. Walsh, RN, Most recent labs and imaging results, Most recent EKG, Medications       Plan of Care discussed with: Provider, RN, Patient and Family/Significant Other: cousin/POA    Thank you for asking Supportive and Palliative Oncology to assist with care of this  patient.  Recommendations will be communicated back to the consulting service by way of shared electronic medical record/secure chat/email or face-to-face.   We will continue to follow.  Please contact us for additional questions or concerns.      SIGNATURE: LUDA Harding-CNP, JABARI  PAGER/CONTACT:  Contact information:  Supportive and Palliative Oncology  Monday-Friday 8 AM-5 PM  Epic Secure chat or pager [phone].  After hours and weekends:  pager [phone]

## 2024-08-02 NOTE — PROGRESS NOTES
NEPHROLOGY FOLLOW UP NOTE    Angie Tobin   58 y.o.   136 kg (305 lbs 5.4 oz)  MRN/Room: [id number]/27/27-A    Subjective:     The primary team appears to be broadening their differential at this time and is searching for other potential sources of infection considering patient's persistent need for pressors despite meropenem. Patient returned a lactate of 14.3 this morning and creatinine kinase of 8300.    Angie was feeling more somnolent today, but was otherwise feeling about the same. Denied shortness of breath, chest pain, abdominal pain, nausea, vomiting, diarrhea.    Current medications:     hydrocortisone sodium succinate, 50 mg, q6h  insulin glargine, 15 Units, Nightly  insulin lispro, 0-15 Units, q4h  lidocaine, 1 patch, Daily  lidocaine, 1 patch, q24h  meropenem, 1 g, q12h  micafungin, 100 mg, q24h  midodrine, 15 mg, q8h  pantoprazole, 40 mg, Daily  vancomycin, 1,000 mg, q12h      norepinephrine, Last Rate: 0.1 mcg/kg/min (08/02/24 1219)  PrismaSol 4/2.5  vasopressin, Last Rate: Stopped (07/31/24 2000)      benzocaine-menthol, 1 lozenge, q2h PRN  dextrose, 12.5 g, q15 min PRN  dextrose, 25 g, q15 min PRN  diclofenac sodium, 4 g, 4x daily PRN  glucagon, 1 mg, q15 min PRN  glucagon, 1 mg, q15 min PRN  glucagon, 1 mg, q15 min PRN  melatonin, 5 mg, Nightly PRN  vancomycin, , Daily PRN      Objective:    Vitals:    08/02/24 1300   BP: 108/54   Pulse: 87   Resp: 11   Temp:    SpO2: 100%          Intake/Output Summary (Last 24 hours) at 8/2/2024 1345  Last data filed at 8/2/2024 1200  Gross per 24 hour   Intake 2638.21 ml   Output 203 ml   Net 2435.21 ml       General appearance: In no acute distress. Lethargic.  HEENT: NC/AT, mucous membranes moist. White plaques on tongue. Scleral icterus.  Skin: no apparent rash  Heart: heart sounds 1 & 2 present and normal, no murmurs heard or rub. Tachycardic.  Lungs: Adequate air entry, breath sounds clear bilaterally.  No wheezing/crackles  Abdomen: soft, non-tender.  "Abdominal hernia.  Extremities: No edema, no joint swelling  Neuro: No FND,  no asterixis. Aox2. Thought day was \"Tuesday.\" Lethargic, decreased arousal compared to previous days.  ACCESS: Hemodialysis triple lumen left femoral catheter. PIV. Mediport. Left radial A line.    Blood Labs:  Results for orders placed or performed during the hospital encounter of 07/26/24 (from the past 24 hour(s))   POCT GLUCOSE   Result Value Ref Range    POCT Glucose 226 (H) 74 - 99 mg/dL   CBC and Auto Differential   Result Value Ref Range    WBC 20.4 (H) 4.4 - 11.3 x10*3/uL    nRBC 4.7 (H) 0.0 - 0.0 /100 WBCs    RBC 2.72 (L) 4.00 - 5.20 x10*6/uL    Hemoglobin 7.7 (L) 12.0 - 16.0 g/dL    Hematocrit 22.9 (L) 36.0 - 46.0 %    MCV 84 80 - 100 fL    MCH 28.3 26.0 - 34.0 pg    MCHC 33.6 32.0 - 36.0 g/dL    RDW 17.2 (H) 11.5 - 14.5 %    Platelets 32 (LL) 150 - 450 x10*3/uL    Immature Granulocytes %, Automated 8.1 (H) 0.0 - 0.9 %    Immature Granulocytes Absolute, Automated 1.65 (H) 0.00 - 0.70 x10*3/uL   Renal function panel   Result Value Ref Range    Glucose 185 (H) 74 - 99 mg/dL    Sodium 131 (L) 136 - 145 mmol/L    Potassium 4.8 3.5 - 5.3 mmol/L    Chloride 96 (L) 98 - 107 mmol/L    Bicarbonate 17 (L) 21 - 32 mmol/L    Anion Gap 23 (H) 10 - 20 mmol/L    Urea Nitrogen 21 6 - 23 mg/dL    Creatinine 1.43 (H) 0.50 - 1.05 mg/dL    eGFR 43 (L) >60 mL/min/1.73m*2    Calcium 8.2 (L) 8.6 - 10.6 mg/dL    Phosphorus 4.1 2.5 - 4.9 mg/dL    Albumin 2.5 (L) 3.4 - 5.0 g/dL   Magnesium   Result Value Ref Range    Magnesium 2.50 (H) 1.60 - 2.40 mg/dL   Calcium, ionized   Result Value Ref Range    POCT Calcium, Ionized 1.08 (L) 1.1 - 1.33 mmol/L   Manual Differential   Result Value Ref Range    Neutrophils %, Manual 85.4 40.0 - 80.0 %    Lymphocytes %, Manual 5.7 13.0 - 44.0 %    Monocytes %, Manual 4.9 2.0 - 10.0 %    Eosinophils %, Manual 0.0 0.0 - 6.0 %    Basophils %, Manual 0.0 0.0 - 2.0 %    Myelocytes %, Manual 1.6 0.0 - 0.0 %    Promyelocytes " %, Manual 2.4 0.0 - 0.0 %    Seg Neutrophils Absolute, Manual 17.42 (H) 1.20 - 7.00 x10*3/uL    Lymphocytes Absolute, Manual 1.16 (L) 1.20 - 4.80 x10*3/uL    Monocytes Absolute, Manual 1.00 0.10 - 1.00 x10*3/uL    Eosinophils Absolute, Manual 0.00 0.00 - 0.70 x10*3/uL    Basophils Absolute, Manual 0.00 0.00 - 0.10 x10*3/uL    Myelocytes Absolute, Manual 0.33 0.00 - 0.00 x10*3/uL    Promyelocytes Absolute, Manual 0.49 0.00 - 0.00 x10*3/uL    Total Cells Counted 123     RBC Morphology No significant RBC morphology present    POCT GLUCOSE   Result Value Ref Range    POCT Glucose 216 (H) 74 - 99 mg/dL   Comprehensive Metabolic Panel   Result Value Ref Range    Glucose 178 (H) 74 - 99 mg/dL    Sodium 131 (L) 136 - 145 mmol/L    Potassium 4.9 3.5 - 5.3 mmol/L    Chloride 98 98 - 107 mmol/L    Bicarbonate 14 (L) 21 - 32 mmol/L    Anion Gap 24 (H) 10 - 20 mmol/L    Urea Nitrogen 21 6 - 23 mg/dL    Creatinine 1.35 (H) 0.50 - 1.05 mg/dL    eGFR 46 (L) >60 mL/min/1.73m*2    Calcium 8.0 (L) 8.6 - 10.6 mg/dL    Albumin 2.6 (L) 3.4 - 5.0 g/dL    Alkaline Phosphatase 655 (H) 33 - 110 U/L    Total Protein 4.4 (L) 6.4 - 8.2 g/dL     (H) 9 - 39 U/L    Bilirubin, Total 3.0 (H) 0.0 - 1.2 mg/dL     (H) 7 - 45 U/L   Magnesium   Result Value Ref Range    Magnesium 2.51 (H) 1.60 - 2.40 mg/dL   Phosphorus   Result Value Ref Range    Phosphorus 4.0 2.5 - 4.9 mg/dL   CBC   Result Value Ref Range    WBC 24.9 (H) 4.4 - 11.3 x10*3/uL    nRBC 4.7 (H) 0.0 - 0.0 /100 WBCs    RBC 2.74 (L) 4.00 - 5.20 x10*6/uL    Hemoglobin 7.8 (L) 12.0 - 16.0 g/dL    Hematocrit 23.3 (L) 36.0 - 46.0 %    MCV 85 80 - 100 fL    MCH 28.5 26.0 - 34.0 pg    MCHC 33.5 32.0 - 36.0 g/dL    RDW 17.4 (H) 11.5 - 14.5 %    Platelets 24 (LL) 150 - 450 x10*3/uL   Type And Screen   Result Value Ref Range    ABO TYPE B     Rh TYPE POS     ANTIBODY SCREEN NEG    Creatine Kinase   Result Value Ref Range    Creatine Kinase 8,374 (H) 0 - 215 U/L   Beta Hydroxybutyrate    Result Value Ref Range    Beta-Hydroxybutyrate 0.63 (H) 0.02 - 0.27 mmol/L   CALCIUM, IONIZED   Result Value Ref Range    POCT Calcium, Ionized 1.03 (L) 1.1 - 1.33 mmol/L   Blood Culture    Specimen: Peripheral Venipuncture; Blood culture   Result Value Ref Range    Blood Culture Loaded on Instrument - Culture in progress    Blood Culture    Specimen: Peripheral Venipuncture; Blood culture   Result Value Ref Range    Blood Culture Loaded on Instrument - Culture in progress    Blood Gas Arterial Full Panel   Result Value Ref Range    POCT pH, Arterial 7.39 7.38 - 7.42 pH    POCT pCO2, Arterial 20 (L) 38 - 42 mm Hg    POCT pO2, Arterial 102 (H) 85 - 95 mm Hg    POCT SO2, Arterial 96 94 - 100 %    POCT Oxy Hemoglobin, Arterial 96.0 94.0 - 98.0 %    POCT Hematocrit Calculated, Arterial 23.0 (L) 36.0 - 46.0 %    POCT Sodium, Arterial 130 (L) 136 - 145 mmol/L    POCT Potassium, Arterial 5.5 (H) 3.5 - 5.3 mmol/L    POCT Chloride, Arterial 100 98 - 107 mmol/L    POCT Ionized Calcium, Arterial 1.11 1.10 - 1.33 mmol/L    POCT Glucose, Arterial 146 (H) 74 - 99 mg/dL    POCT Lactate, Arterial 12.4 (HH) 0.4 - 2.0 mmol/L    POCT Base Excess, Arterial -11.5 (L) -2.0 - 3.0 mmol/L    POCT HCO3 Calculated, Arterial 12.1 (L) 22.0 - 26.0 mmol/L    POCT Hemoglobin, Arterial 7.6 (L) 12.0 - 16.0 g/dL    POCT Anion Gap, Arterial 23 10 - 25 mmo/L    Patient Temperature 37.0 degrees Celsius    FiO2 21 %   POCT GLUCOSE   Result Value Ref Range    POCT Glucose 142 (H) 74 - 99 mg/dL   Lactate   Result Value Ref Range    Lactate 14.3 (HH) 0.4 - 2.0 mmol/L   POCT GLUCOSE   Result Value Ref Range    POCT Glucose 145 (H) 74 - 99 mg/dL      XR chest 1 view 07/28/2024    IMPRESSION:  1. Low lung volumes with bronchovascular crowding and suspect  superimposed mild pulmonary edema.  2. Interval improvement of a now trace left pleural effusion.  3. Right chest wall MediPort as above.    ASSESSMENT:    Angie SANTIAGO Maranibalsantos is a  58 y.o.  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 Patient has remained dependent on pressors and had a new lactate on 08/02/2024 of 14.3; primary team is now broadening differential. Nephrology is currently managing for ALDEN stage III (baseline Cr 0.7 jumped to 7.6 with BUN 90 on 07/25/2024; Cr has remained around 1.5 on CVVH).    #ALDEN, anuric - BL Cr 0.7  - Etiology: Acute tubular injury from poor PO intake/septic shock (UTI) with c/f TLS at OSH  - CT abdomen was negative for hydronephrosis  - UA- epithelial cells, +RBC/WBC, 1+ protein  - Started on CVVH 07/25/2024 due to hyperkalemia, acidosis  - Patient remains anuric (0 urine output last 24 hours)  - Patient now off vasopressin but remains on norepinephrine to stabilize blood pressure with home HTN medications held  - Tolerating CVVH well with K, Bicarb in normal range and BUN/Cr low  - Fluid status: euvolemic    Recommendations:  - Considering ongoing anuria and worsening clinical picture with need for pressors, will continue with CVVH through the weekend. Can reassess HD stability next week to assess for transition to SLED or HD.  - Monitor for increase in urine output  - Avoid nephrotoxins, contrast if possible  - Strict Is/Os  - Renal dosing for medications for latest eGFR, follow medication trough as appropriate  - Avoid hypotension/rapid fluctuations in BPs    Roberto Hart, MS4  24 hour Renal Pager - [phone]    Discussed with attending nephrologist, Dr. Sathish Isabel.

## 2024-08-02 NOTE — PROGRESS NOTES
Social Work Note   Referral was submitted to Hospice in error. SW called and spoke with the Hospice intake to let them know that the referral is in error. I closed the referral. SW updated the primary SW.   COTY Campbell

## 2024-08-02 NOTE — PROCEDURES
Central Line Placement    A time out was performed. The patient was placed in correct position.  The RIGHT CHEST region was prepped and draped in sterile fashion using chlorhexidine scrub. Anesthesia was achieved with 1% lidocaine. Ultrasound guidance was used throughout the procedure to identify the correct vein. The introducer needle was inserted with ultrasound guidance into the RIGHT INTERNAL JUGULAR vein. Venous blood was withdrawn. Syringe was removed and a guidewire was advanced into the introducer needle. Guidewire position was confirmed in the vein with ultrasound guidance. A small incision was made at the skin surface with a scalpel and the introducer needle was exchanged for a dilator over the guidewire. After appropriate dilation was obtained, the dilator was exchanged over the wire for a triple lumen, central venous catheter. The wire was removed and the catheter was sutured in place.        -----    I was present during all critical and key portions of the procedure(s) and immediately available to furnish services the entire duration.  See resident note for details.     Tea Preciado MD PhD

## 2024-08-03 VITALS
OXYGEN SATURATION: 95 % | TEMPERATURE: 95.4 F | DIASTOLIC BLOOD PRESSURE: 46 MMHG | RESPIRATION RATE: 21 BRPM | HEIGHT: 70 IN | HEART RATE: 80 BPM | BODY MASS INDEX: 41.95 KG/M2 | WEIGHT: 293 LBS | SYSTOLIC BLOOD PRESSURE: 98 MMHG

## 2024-08-03 PROCEDURE — 2500000004 HC RX 250 GENERAL PHARMACY W/ HCPCS (ALT 636 FOR OP/ED)

## 2024-08-03 RX ORDER — MORPHINE SULFATE 4 MG/ML
2 INJECTION INTRAVENOUS
Status: DISCONTINUED | OUTPATIENT
Start: 2024-08-03 | End: 2024-08-03 | Stop reason: HOSPADM

## 2024-08-03 RX ORDER — MIDAZOLAM HYDROCHLORIDE 1 MG/ML
2 INJECTION INTRAMUSCULAR; INTRAVENOUS ONCE
Status: COMPLETED | OUTPATIENT
Start: 2024-08-03 | End: 2024-08-03

## 2024-08-03 RX ORDER — MORPHINE SULFATE 4 MG/ML
4 INJECTION INTRAVENOUS
Status: DISCONTINUED | OUTPATIENT
Start: 2024-08-03 | End: 2024-08-03 | Stop reason: HOSPADM

## 2024-08-03 RX ADMIN — MIDAZOLAM HYDROCHLORIDE 2 MG: 1 INJECTION, SOLUTION INTRAMUSCULAR; INTRAVENOUS at 01:27

## 2024-08-03 RX ADMIN — MORPHINE SULFATE 4 MG: 4 INJECTION, SOLUTION INTRAMUSCULAR; INTRAVENOUS at 01:27

## 2024-08-03 RX ADMIN — INDOMETHACIN 50 MEQ: 25 CAPSULE ORAL at 00:00

## 2024-08-03 NOTE — ACP (ADVANCE CARE PLANNING)
Advance Care Planning Note     Discussion Date: 08/02/24   Discussion Participants: patient's HCPOA cousin Loulou, Dr. Craft, Dr. Dunn    The patient wishes to discuss Advance Care Planning today and the following is a brief summary of our discussion.     Patient has capacity to make their own medical decisions: No  Health Care Agent/Surrogate Decision Maker documented in chart: Yes    Documents on file and valid:  Advance Directive/Living Will: No  Health Care Power of : Yes    Communication of Medical Status/Prognosis:   We discussed with patient's healthcare power of  that she has multiorgan system failure including renal, hepatic, and impending hypercapnic respiratory failure.  She has been on a decline over the past several days with suspected septic shock.  She is currently on 3 vasopressors, including max dose norepinephrine, vasopressin, and uptitrating epinephrine.  Despite this, she remains hypotensive with maps in the low 50s.  Patient's healthcare power of  is her cousin Loulou.  Loulou is a paramedic, and feels that further interventions including CPR and intubation would likely be futile.  Loulou fears that further intervention would cause undue suffering, and despite the patient being previously described as a fighter in regards to her cancer care for her stage 4 leiomyosarcoma, Loulou believes Angie would accept choosing to focus on comfort given the circumstances of her rapidly worsening clinical condition.  We discussed that until Loulou is able to come see the patient in person, we will continue to uptitrate pressors as needed.  If CVVH filter clots, we will not re-place her on CVVH.  If she appears to be in any discomfort, we will administer comfort medications, with Loulou knowing that such medications may have an adverse effect on her blood pressure or respiratory function.    Treatment Decisions  DNR/DNI with escalation limitations as above    Team Members  Dr. Luana Dr.  Shaun    Time Statement: Total face to face time spent on advance care planning was 25 minutes with 25 minutes spent in counseling, including the explanation.    Kimberly Dunn MD  8/2/2024 11:57 PM

## 2024-08-03 NOTE — ACP (ADVANCE CARE PLANNING)
Advance Care Planning Note     Discussion Date: 08/03/24   Discussion Participants: patient's HCPMATT Jamil and fmadairhuma     The patient wishes to discuss Advance Care Planning today and the following is a brief summary of our discussion.      Patient has capacity to make their own medical decisions: No  Health Care Agent/Surrogate Decision Maker documented in chart: Yes     Documents on file and valid:  Advance Directive/Living Will: No  Health Care Power of : Yes     Communication of Medical Status/Prognosis:   After patient's POA Loulou arrived, Loulou expressed desire to transition to comfort care. Per family wishes,  was called an [facility] visited the patient and family. Ordered comfort medications (Versed, morphine) to be given prior to discontinuing pressors.     Treatment Decisions  DNR/comfort care     Time Statement: Total face to face time spent on advance care planning was 25 minutes with 25 minutes spent in counseling, including the explanation.    Kimberly Dunn MD  8/3/2024 1:54 AM

## 2024-08-03 NOTE — CONSULTS
[facility]  ACUTE CARE SURGERY - CONSULT    Patient Name: Angie Tobin  MRN: [id number]  Admit Date: 726  : 1965  AGE: 58 y.o.   GENDER: female  ==============================================================================  TODAY'S ASSESSMENT AND PLAN OF CARE:  Angie Tobin is a 58 y.o. year old female patient who presented to OSH 2024 and was transferred to [facility] MICU on 24 for septic shock requiring pressors with urinary tract infection source, acute hypoxic respiratory failure. ACS consulted for ventral hernia. Hernia is stable and has been present for years, recent CT shows no signs of incarceration or strangulation, the hernia is soft without skin changes on exam. Very unlikely hernia is the cause of patient's ongoing sepsis given exam and imaging findings.     No surgical intervention indicated  Hernia is soft, without skin changes, and imaging is not concerning for strangulation or incarceration. Hernia is not likely to be the source of the patient's current presentation.     Patient discussed with attending Dr. 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 Patient is currently on 0.03 Vaso and 0.10 Levo. Patient is currently on CRRT for ALDEN, hyperkalemia. History gathered from family at bedside who says hernia has been present for years but she has not been able to undergo repair due to ongoing cancer treatment. No history of strangulation or incarceration, no skin changes, hernia is hard at times but mostly soft.      PAST MEDICAL HISTORY:   PMH:   Past Medical History:   Diagnosis Date    Acute sinusitis 09/27/2023    Benign essential HTN 04/19/2023    Body mass index (BMI) 40.0-44.9, adult (Multi) 09/27/2023    Candidiasis, unspecified 01/20/2022    Antibiotic-induced yeast infection    Conjunctivitis 09/27/2023    Contact with and (suspected) exposure to covid-19 09/15/2022    Disorder of liver 07/11/2023    Disorder of thyroid 10/18/2023    Elevated blood-pressure reading, without diagnosis of hypertension 05/26/2017    Elevated BP without diagnosis of hypertension    Gastroesophageal reflux disease 09/15/2022    Herniated lumbar intervertebral disc 09/13/2022    Comment on above: OTHER INTERVERTEBRAL DISC DISPLACEMENT, LUMBAR REGION    Herpes simplex virus (HSV) infection 04/19/2023    Hypercholesterolemia 09/15/2022    Inappropriate sinus tachycardia, so stated (CMS-HCC) 04/19/2023    Leiomyoma 03/11/2024    Lymphoproliferative disorder (Multi) 09/06/2023    Malignant neoplasm metastatic to left lung (Multi) 04/19/2023    Malignant neoplasm of body of uterus (Multi) 09/27/2023    Mass of pancreas ([facility]) 09/27/2023    Neuropathy 03/11/2024    Never smoked any substance 10/25/2023    Other conditions influencing health status 09/27/2017    History of cough    Personal history of diseases of the blood and blood-forming organs and certain disorders involving the immune mechanism 06/06/2016    History of leukocytosis    Personal history of other benign neoplasm 05/08/2019    History of other benign neoplasm    Personal history of other diseases of the musculoskeletal system and connective tissue 04/18/2018    History of low back pain    Personal history of other diseases of the nervous system and sense organs 10/03/2016    History of neuropathy    Personal history of other diseases of the respiratory system 08/31/2017    History of acute bronchitis    Personal history of other diseases of the respiratory system 01/24/2022     History of acute sinusitis    Personal history of other specified conditions 09/16/2020    History of weight gain    Postoperative pain 03/11/2024    Right lower quadrant abdominal tenderness 03/04/2019    RLQ abdominal tenderness    Right upper quadrant pain 03/13/2015    Abdominal pain, RUQ (right upper quadrant)    Secondary hypertension 10/18/2023    Toenail fungus 04/19/2023    Uterine leiomyoma 09/27/2023    Viral URI 09/27/2023    Vitamin D deficiency 04/19/2023    Weight gain 03/11/2024     PSH:   Past Surgical History:   Procedure Laterality Date    BREAST SURGERY  05/30/2013    Breast Surgery Reduction Procedure    CHOLECYSTECTOMY  12/02/2014    Cholecystectomy Laparoscopic    CT GUIDED PERCUTANEOUS BIOPSY BONE  10/24/2016    CT GUIDED PERCUTANEOUS BIOPSY BONE 10/24/2016 [facility]    CT GUIDED PERCUTANEOUS BIOPSY LUNG  8/5/2022    CT GUIDED PERCUTANEOUS BIOPSY LUNG 8/5/2022 [facility]    ESOPHAGOGASTRODUODENOSCOPY  04/16/2013    Diagnostic Esophagogastroduodenoscopy    IR INTERVENTION ARTERIAL EMBOLIZATION  12/13/2023    IR INTERVENTION ARTERIAL EMBOLIZATION 12/13/2023 O[facility] Gold [facility]    IR INTERVENTION ARTERIAL EMBOLIZATION  12/19/2023    IR INTERVENTION ARTERIAL EMBOLIZATION 12/19/2023 O[facility] Gold [facility]    OTHER SURGICAL HISTORY  04/19/2013    Anal Fistulotomy (Subcutaneous)    OTHER SURGICAL HISTORY  10/25/2022    Lung wedge resection    OTHER SURGICAL HISTORY  10/25/2022    Lung lobectomy    OTHER SURGICAL HISTORY  05/08/2019    Resection    OTHER SURGICAL HISTORY  05/30/2013    Perineal Transplant Of Anovaginal Fistula    US GUIDED NEEDLE LIVER BIOPSY  7/11/2023    US GUIDED NEEDLE LIVER BIOPSY 7/11/2023 [facility]     FH:   Family History   Problem Relation Name Age of Onset    Other (atrial flutter) Mother      Diabetes Mother      Leukemia Father      Liver cancer Father      Ovarian cancer Sister      Hypothyroidism Brother      Breast cancer Paternal Grandmother       No Known Problems Sibling       SOCIAL HISTORY:    Smoking:    Social History     Tobacco Use   Smoking Status Never   Smokeless Tobacco Never       Alcohol:    Social History     Substance and Sexual Activity   Alcohol Use None       MEDICATIONS:   Prior to Admission medications    Medication Sig Start Date End Date Taking? Authorizing Provider   metoprolol succinate XL (Toprol-XL) 25 mg 24 hr tablet Take 1 tablet (25 mg) by mouth once daily. 3/11/24 3/11/25 Yes JUAN Huertas   albuterol 108 (90 Base) MCG/ACT inhaler Inhale 1-2 puffs. Every 4-6 hours as needed    Historical Provider, MD   fluticasone (Flonase) 50 mcg/actuation nasal spray Administer 1 spray into each nostril once daily. Shake gently. Before first use, prime pump. After use, clean tip and replace cap. 4/21/24 4/21/25  Timothy De La Paz MD   lidocaine-prilocaine (Emla) 2.5-2.5 % cream Apply topically if needed for mild pain (1 - 3). Please apply to [facility] prior to access for chemotherapy 5/14/24   JUNA Oden   lisinopril 20 mg tablet Take 1 tablet (20 mg) by mouth once daily. 3/11/24 3/11/25  JUAN Huertas   loratadine (Claritin) 10 mg tablet Take 1 tablet (10 mg) by mouth once daily as needed for allergies.    Historical Provider, MD   LORazepam (Ativan) 0.5 mg tablet Take 1 tablet (0.5 mg) by mouth every 8 hours if needed for anxiety.    Historical Provider, MD   meclizine (Antivert) 12.5 mg tablet Take 1-2 tablets (12.5-25 mg) by mouth 3 times a day as needed (vertigo).    Historical Provider, MD   metFORMIN (Glucophage) 500 mg tablet Take 1 tablet (500 mg) by mouth 2 times a day. 3/11/24 3/11/25  JUAN Huertas   ondansetron (Zofran) 8 mg tablet Take 1 tablet (8 mg) by mouth every 8 hours if needed for nausea or vomiting.    Historical Provider, MD   prochlorperazine (Compazine) 10 mg tablet Take 1 tablet (10 mg) by mouth every 6 hours if  needed for nausea or vomiting.    Historical Provider, MD     ALLERGIES:   Allergies   Allergen Reactions    Hydromorphone Other    Codeine Other     Abdominal pain       REVIEW OF SYSTEMS:  ROS negative except for what is stated in HPI    PHYSICAL EXAM:  Physical Exam  Constitutional:       Appearance: She is obese. She is ill-appearing and toxic-appearing.   HENT:      Head: Normocephalic and atraumatic.      Mouth/Throat:      Mouth: Mucous membranes are dry.   Eyes:      General: Scleral icterus present.   Neck:      Comments: RIJ in place  Cardiovascular:      Rate and Rhythm: Normal rate.   Pulmonary:      Effort: Pulmonary effort is normal.      Comments: 3LNC  Abdominal:      General: There is no distension.      Palpations: Abdomen is soft.      Tenderness: There is no abdominal tenderness.      Hernia: A hernia is present.      Comments: Soft epigastric hernia without overlying skin changes, not fully reducible   Genitourinary:     Comments: No rojas  Skin:     General: Skin is warm and dry.   Neurological:      General: No focal deficit present.   Psychiatric:         Behavior: Behavior normal.       IMAGING SUMMARY:   CT AP  ABDOMEN-PELVIS:  1. No acute abdominopelvic abnormality.  2. Similar abdominal wall edema and mesenteric stranding likely  secondary to volume overload.  3. Osseous metastatic disease similar to previous.    LABS:  Results from last 7 days   Lab Units 08/02/24  1800 08/02/24  0305 08/01/24  1859 07/31/24  0300 07/30/24  0427   WBC AUTO x10*3/uL 33.3* 24.9* 20.4*   < > 2.8*  2.8*   HEMOGLOBIN g/dL 7.8* 7.8* 7.7*   < > 8.2*  8.2*   HEMATOCRIT % 24.3* 23.3* 22.9*   < > 24.2*  24.2*   PLATELETS AUTO x10*3/uL 103* 24* 32*   < > 23*  23*   LYMPHO PCT MAN % 6.5  --  5.7  --  15.5   MONO PCT MAN % 7.2  --  4.9  --  10.6   EOSINO PCT MAN % 0.7  --  0.0  --  0.0    < > = values in this interval not displayed.     Results from last 7 days   Lab Units 08/01/24  0918   APTT seconds 23*   INR   1.1     Results from last 7 days   Lab Units 08/02/24  1800 08/02/24  0305 08/01/24  1859 08/01/24  0411 07/31/24  1646 07/31/24  0300   SODIUM mmol/L 135* 131* 131* 129*   < > 131*   POTASSIUM mmol/L 5.0 4.9 4.8 4.9   < > 4.5   CHLORIDE mmol/L 97* 98 96* 96*   < > 97*   CO2 mmol/L 12* 14* 17* 21   < > 21   BUN mg/dL 22 21 21 17   < > 17   CREATININE mg/dL 1.28* 1.35* 1.43* 1.41*   < > 1.53*   CALCIUM mg/dL 8.7 8.0* 8.2* 8.3*   < > 8.3*   PROTEIN TOTAL g/dL  --  4.4*  4.4*  --  4.6*  --  4.7*   BILIRUBIN TOTAL mg/dL  --  3.0*  3.0*  --  2.3*  --  1.8*   ALK PHOS U/L  --  655*  655*  --  643*  --  535*   ALT U/L  --  148*  148*  --  118*  --  97*   AST U/L  --  534*  534*  --  407*  --  320*   GLUCOSE mg/dL 83 178* 185* 244*   < > 228*    < > = values in this interval not displayed.     Results from last 7 days   Lab Units 08/02/24  0305 08/01/24  0411 07/31/24  0300   BILIRUBIN TOTAL mg/dL 3.0*  3.0* 2.3* 1.8*   BILIRUBIN DIRECT mg/dL 1.4*  --   --      Results from last 7 days   Lab Units 08/02/24  0639 07/31/24  1920 07/27/24  1747   POCT PH, ARTERIAL pH 7.39 7.48* 7.42   POCT PCO2, ARTERIAL mm Hg 20* 27* 27*   POCT PO2, ARTERIAL mm Hg 102* 67* 103*   POCT HCO3 CALCULATED, ARTERIAL mmol/L 12.1* 20.1* 17.5*   POCT BASE EXCESS, ARTERIAL mmol/L -11.5* -2.8* -5.5*         I have reviewed all laboratory and imaging results ordered/pertinent for this encounter.

## 2024-08-03 NOTE — DISCHARGE SUMMARY
Final Diagnosis  Sepsis (Multi)  Metastatic leiomyosarcoma    Issues Requiring Follow-Up  None    Discharge Meds  None    Test Results Pending At Discharge  Pending Labs       Order Current Status    CMV DNA, quantitative, PCR In process    Interleukin 2 In process    Interleukin 2 Receptor, Soluble In process    Interleukin-6 In process    Varicella zoster DNA, quantitative In process    Vitamin B1, whole blood In process    Blood Culture Preliminary result    Blood Culture Preliminary result            Hospital Course  Ms. 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 She presented 7/27 as a transfer from [facility] for septic shock requiring vasopressors, acute hypoxic hypercapnic respiratory failure, and possible TLS.     Per chart review, on 7/25 at pt presented to [facility] ED for nausea, vomit, and weakness. She was found tp be hypotensive to 75/52, in acute hypoxic hypercapnic respiratory failure requiring oxygen, and tachycardic. She was also found to be in acute renal failure with BUN 90, Cr 7.7, and Oliguria. In addition she was suspected to be in TLS with K 5.6, Ca 7.9, and Uric Acid 16.9. Her UA was concerning for UTI , so Ceftriaxone was begun. Overnight pt was found to be increasingly hypotensive. She was give 3L normal saline and her pressures were initially fluid responsive, but then hypotension persisted requiring norepinephrine, prompting transfer to the ICU service on 7/26. In the ICU she was also started on vasopressin for persistent hypotension and per discussion with Nephrology was started on CVVH. Her hyperuricemia was treated with 2* Rasburicase and Fluids. She was given an Insulin drip, Lokelma 10g *1, Sodium Bicarbonate infusion, and Calcium gluconate for her  hyperkalemia. Her UCX demonstrated >100,000 Enteric Bacilli, prompting changing of her ABX to Zosyn. In total she received 5L NS, 1L LR, 50g Albumin, 100mg solumedrol. She was also given Ativan and Versed for anxiety.      Upon admission to [facility] MICU, she was on 0.35 levo, 0.03 vaso, and on 4L She was oriented, but lethargic with tachycardia. She was noted to be neutropenic (ANC 0.69) with pancytopenia and her antibiotic was broadened to Vanc/Zosyn. Nephrology and Oncology were consulted. She was hyperglycemic, prompting restarting of the Insulin drip. Nephrology placed patient back on CVVH. Oncology recommended Neupogen 480 daily until ANC >1500 and to check CK level since her treatment can cause rhabdomyolysis. CK came back 1288.    The patient's antibiotic regimen was changed to meropenem and vancomycin for better disease coverage. Levophed and vasopressin were titrated based on the patient's blood pressure. Neupogen was continued until patient reached the goal ANC. Initially, the patient's pressures showed small improvement. However, she began to experience increased fatigue, changes in mentation, and rising LFTs. Labs were placed, and hepatology and oncology were consulted to further workup the patient's change in mental status and increased liver enzymes. However, the patient passed away on 8/3/24 before workup could be completed.     Outpatient Follow-Up  No future appointments.      Queen PAMELA Macias MD

## 2024-08-03 NOTE — PROGRESS NOTES
Spiritual Care Visit    Clinical Encounter Type  Visited With: Patient and family together  Routine Visit: Follow-up  Continue Visiting: Yes  Crisis Visit: Patient actively dying  Referral From: Nurse  Referral To:     Jewish Encounters  Jewish Needs: Prayer         Sacramental Encounters  Sacrament of Sick-Anointing: Anointed    Patient received the Sacrament of the Anointing of the Sick by Fr. Afshin Glaser, [facility] .  Family members were preset.          Within the [employer] the Sacrament of the Sick, also known as the Anointing of the Sick, is a prayer in which we invoke the healing power of God.  Along with Eucharist and Reconciliation it is a repeatable sacrament and should be received by anyone about to undergo surgery, those in recovery and the elderly.  This IS NOT 'Last Rites' nor does the Jew have such a ritual.  Those who are actively dying should receive the Anointing of the Sick for physical and spiritual healing.

## 2024-08-03 NOTE — SIGNIFICANT EVENT
Called by bedside RN for patient unresponsiveness. On exam, patient has no response to verbal, physical, or painful stimuli. Pupils fixed and dilated. Absent heart and breath sounds for greater than 1 minute. Absent peripheral pulses. Patient pronounced  at 0200 on 8/3/2024. Family at bedside.

## 2024-08-04 LAB
BACTERIA BLD CULT: NORMAL
BACTERIA BLD CULT: NORMAL
CMV DNA SERPL NAA+PROBE-LOG IU: NORMAL {LOG_IU}/ML
LABORATORY COMMENT REPORT: NOT DETECTED

## 2024-08-05 ENCOUNTER — APPOINTMENT (OUTPATIENT)
Dept: HEMATOLOGY/ONCOLOGY | Facility: CLINIC | Age: 59
End: 2024-08-05
Payer: COMMERCIAL

## 2024-08-05 ENCOUNTER — APPOINTMENT (OUTPATIENT)
Dept: RADIOLOGY | Facility: CLINIC | Age: 59
End: 2024-08-05
Payer: COMMERCIAL

## 2024-08-05 ENCOUNTER — APPOINTMENT (OUTPATIENT)
Dept: RADIOLOGY | Facility: HOSPITAL | Age: 59
End: 2024-08-05
Payer: COMMERCIAL

## 2024-08-05 LAB
IL2 SERPL-MCNC: <2.1 PG/ML
IL6 SERPL-MCNC: 300.3 PG/ML
SOL IL2 RECEP SERPL-MCNC: 6814.1 PG/ML (ref 175.3–858.2)

## 2024-08-06 LAB
BACTERIA BLD CULT: NORMAL
BACTERIA BLD CULT: NORMAL
VZV QPCR,QUANT,PLASMA, VIRC: NOT DETECTED COPIES/ML

## 2024-08-07 ENCOUNTER — APPOINTMENT (OUTPATIENT)
Dept: HEMATOLOGY/ONCOLOGY | Facility: CLINIC | Age: 59
End: 2024-08-07
Payer: COMMERCIAL

## 2024-08-07 LAB
ATRIAL RATE: 104 BPM
P AXIS: 72 DEGREES
P OFFSET: 207 MS
P ONSET: 156 MS
PR INTERVAL: 136 MS
Q ONSET: 224 MS
QRS COUNT: 18 BEATS
QRS DURATION: 84 MS
QT INTERVAL: 372 MS
QTC CALCULATION(BAZETT): 489 MS
QTC FREDERICIA: 447 MS
R AXIS: 48 DEGREES
T AXIS: -33 DEGREES
T OFFSET: 410 MS
VENTRICULAR RATE: 104 BPM

## 2024-08-09 LAB — VIT B1 PYROPHOSHATE BLD-SCNC: 74 NMOL/L (ref 70–180)

## 2024-08-14 ENCOUNTER — APPOINTMENT (OUTPATIENT)
Dept: HEMATOLOGY/ONCOLOGY | Facility: CLINIC | Age: 59
End: 2024-08-14
Payer: COMMERCIAL

## 2024-09-12 ENCOUNTER — APPOINTMENT (OUTPATIENT)
Dept: PRIMARY CARE | Facility: CLINIC | Age: 59
End: 2024-09-12
Payer: COMMERCIAL

## 2024-10-08 ENCOUNTER — APPOINTMENT (OUTPATIENT)
Dept: RADIATION ONCOLOGY | Facility: HOSPITAL | Age: 59
End: 2024-10-08
Payer: COMMERCIAL